# Patient Record
Sex: MALE | Race: BLACK OR AFRICAN AMERICAN | NOT HISPANIC OR LATINO | Employment: OTHER | URBAN - METROPOLITAN AREA
[De-identification: names, ages, dates, MRNs, and addresses within clinical notes are randomized per-mention and may not be internally consistent; named-entity substitution may affect disease eponyms.]

---

## 2017-01-05 ENCOUNTER — OFFICE VISIT (OUTPATIENT)
Dept: SURGERY | Facility: CLINIC | Age: 67
End: 2017-01-05
Payer: MEDICAID

## 2017-01-05 VITALS — BODY MASS INDEX: 24.77 KG/M2 | TEMPERATURE: 98 F | WEIGHT: 198.19 LBS

## 2017-01-05 DIAGNOSIS — Z48.02 VISIT FOR SUTURE REMOVAL: Primary | ICD-10-CM

## 2017-01-05 PROCEDURE — 99999 PR PBB SHADOW E&M-EST. PATIENT-LVL II: CPT | Mod: PBBFAC,,, | Performed by: SURGERY

## 2017-01-05 PROCEDURE — 99212 OFFICE O/P EST SF 10 MIN: CPT | Mod: PBBFAC,PO | Performed by: SURGERY

## 2017-01-05 PROCEDURE — 99024 POSTOP FOLLOW-UP VISIT: CPT | Mod: ,,, | Performed by: SURGERY

## 2017-01-05 NOTE — PATIENT INSTRUCTIONS
You no longer need to put antibiotic ointment on your scalp.    A little pustules in your beard from ingrown hairs these are usually self-limiting or if you can find hairs you can remove

## 2017-01-05 NOTE — MR AVS SNAPSHOT
[facility]  [address] [place] [address]  Phone: [phone]  Fax: [phone]                  Ulysses Boreaux   2017 1:20 PM   Office Visit    Description:  Male : 1950   Provider:  Chito Carolina MD   Department:  [facility]           Reason for Visit     Post-op Evaluation                To Do List           Future Appointments        Provider Department Dept Phone    2017 1:20 PM Chito Carolina MD [facility] [phone]    2017 2:00 PM MD JIMMY [facility] - Hematology Oncology [phone]    2017 9:00 AM ESTEPHANIA Bowles - Interventional Pain [phone]    4/3/2017 9:00 AM MD JIMMY [facility] - Internal Medicine [phone]    2017 9:00 AM MINGO [facility] - Ophthalmology [phone]      Goals (5 Years of Data)     None      [facility]     [facility] On Call Nurse Care Line -  Assistance  Registered nurses in the [facility] Center provide clinical advisement, health education, appointment booking, and other advisory services.  Call for this free service at [phone].             Medications           Message regarding Medications     Verify the changes and/or additions to your medication regime listed below are the same as discussed with your clinician today.  If any of these changes or additions are incorrect, please notify your healthcare provider.             Verify that the below list of medications is an accurate representation of the medications you are currently taking.  If none reported, the list may be blank. If incorrect, please contact your healthcare provider. Carry this list with you in case of emergency.           Current Medications     amlodipine (NORVASC) 10 MG tablet Take 1 tablet (10 mg total) by mouth once daily.    atorvastatin (LIPITOR) 20 MG tablet Take 1 tablet (20 mg total) by mouth nightly.    blood glucose control high,low Soln  1 each by Misc.(Non-Drug; Combo Route) route as needed. Accu-chek Sarina Plus control solution    blood sugar diagnostic Strp 1 each by Misc.(Non-Drug; Combo Route) route 2 (two) times daily.    blood-glucose meter (FREESTYLE SYSTEM KIT) kit Use as instructed    clonazePAM (KLONOPIN) 1 MG tablet Take 1 tablet (1 mg total) by mouth every evening.    fluticasone (FLONASE) 50 mcg/actuation nasal spray INSTILL 2 SPRAYS IN EACH NOSTRIL ONCE DAILY    hydrocodone-acetaminophen 10-325mg (NORCO)  mg Tab HYDROCODONE-ACETAMINOPHEN (Norco)  MG ORAL TAB - 1 tab po bid prn pain    hydrOXYzine HCl (ATARAX) 25 MG tablet Take 1 tablet (25 mg total) by mouth 4 (four) times daily as needed for Itching.    insulin NPH-insulin regular, 70/30, (HUMULIN 70/30) 100 unit/mL (70-30) injection INJECT 25 UNITS UNDER THE SKIN EVERY MORNING, THEN 23 UNITS EVERY EVENING    insulin syringe-needle U-100 1 mL 31 gauge x 5/16 Syrg USE AS DIRECTED WITH INSULIN    lancets Misc 1 each by Misc.(Non-Drug; Combo Route) route 2 (two) times daily.    losartan-hydrochlorothiazide 100-25 mg (HYZAAR) 100-25 mg per tablet Take 1 tablet by mouth once daily.    nebivolol (BYSTOLIC) 10 MG Tab Take 1 tablet (10 mg total) by mouth once daily.    hydrocortisone 1 % cream Apply to affected area on neck and chest 2 times daily           Clinical Reference Information           Vital Signs - Last Recorded  Most recent update: 1/5/2017  9:45 AM by Filippo Figueroa MA    Temp Wt BMI          97.6 °F (36.4 °C) 89.9 kg (198 lb 3.1 oz) 24.77 kg/m2        Allergies as of 1/5/2017     No Known Allergies      Immunizations Administered on Date of Encounter - 1/5/2017     None      [facility] Sign-Up     Activating your [facility] account is as easy as 1-2-3!     1) Visit [email or web address], select Sign Up Now, enter this activation code and your date of birth, then select Next.  [email or web address]  Expires: 1/23/2017 10:22 AM      2) Create a username and password to use  when you visit [facility] in the future and select a security question in case you lose your password and select Next.    3) Enter your e-mail address and click Sign Up!    Additional Information  If you have questions, please e-mail [email or web address] or call [phone] to talk to our [facility] staff. Remember, [facility] is NOT to be used for urgent needs. For medical emergencies, dial 911.         Instructions    You no longer need to put antibiotic ointment on your scalp.    A little pustules in your beard from ingrown hairs these are usually self-limiting or if you can find hairs you can remove

## 2017-01-05 NOTE — PROGRESS NOTES
Subjective:       Patient ID: Ulysses Boreaux is a 66 y.o. male.    Suture removal    Chief Complaint: Post-op Evaluation    HPI Comments: Patient has done well.  Incision is healing    Review of Systems   Skin:        Small pustules in his beard       Objective:      Physical Exam   Skin:   The anterior scalp incision is healing well.  Sutures removed.  There are a few pustules in his beard on the right       Assessment:        the incision from the pylorus cyst excision is healed.  Pustules in the beard from ingrown hair which should be self-limited      Plan: follow-up with surgery as needed

## 2017-01-09 ENCOUNTER — OFFICE VISIT (OUTPATIENT)
Dept: HEMATOLOGY/ONCOLOGY | Facility: CLINIC | Age: 67
End: 2017-01-09
Payer: MEDICAID

## 2017-01-09 ENCOUNTER — TELEPHONE (OUTPATIENT)
Dept: GASTROENTEROLOGY | Facility: CLINIC | Age: 67
End: 2017-01-09

## 2017-01-09 VITALS
DIASTOLIC BLOOD PRESSURE: 72 MMHG | TEMPERATURE: 98 F | WEIGHT: 199.31 LBS | HEIGHT: 75 IN | SYSTOLIC BLOOD PRESSURE: 118 MMHG | BODY MASS INDEX: 24.78 KG/M2 | OXYGEN SATURATION: 97 % | RESPIRATION RATE: 20 BRPM | HEART RATE: 77 BPM

## 2017-01-09 DIAGNOSIS — D72.820 LYMPHOCYTOSIS: ICD-10-CM

## 2017-01-09 DIAGNOSIS — B18.2 CHRONIC HEPATITIS C WITHOUT HEPATIC COMA: Primary | ICD-10-CM

## 2017-01-09 PROCEDURE — 99214 OFFICE O/P EST MOD 30 MIN: CPT | Mod: S$PBB,,, | Performed by: INTERNAL MEDICINE

## 2017-01-09 PROCEDURE — 99213 OFFICE O/P EST LOW 20 MIN: CPT | Mod: PBBFAC | Performed by: INTERNAL MEDICINE

## 2017-01-09 PROCEDURE — 99999 PR PBB SHADOW E&M-EST. PATIENT-LVL III: CPT | Mod: PBBFAC,,, | Performed by: INTERNAL MEDICINE

## 2017-01-09 NOTE — PROGRESS NOTES
Subjective:       Patient ID: Ulysses Boreaux is a 66 y.o. male.    Chief Complaint: No chief complaint on file.    HPI This 66-year-old -American gentleman had a   CBC done on 02/29/2016 that was reported as showing a hemoglobin of 14.9 with   4.58 million red cells. White cell count was 4390 (66% lymphocytes and 13.9%   granulocytes. Monocytes were 13.4%).      The patient's electronic chart shows that he has had relative lymphocytosis in   his differential at least since 2013 when the lymphocytes were about 50%.      The patient has a history of chronic active hepatitis C. He was referred to GI.  The process was started to get him on medication, and he was supposed to start HARVONI but he has not  ALLERGIES: None.      MEDICATIONS: See MedCard.      PREVIOUS SURGERIES: Amputation of fingers of the left hand after saw accident.   Appendectomy. Abdominal wall hernia repair x2.      SOCIAL HISTORY: He is single. He has two children. He lives in [place].   Smokes an occasional cigar. He used to smoke cigarettes, a pack a day or so for  10 years. He stopped in the 1970s. Denies drinking. He was a ,   currently disabled due to the amputation of the fingers of the left hand.      FAMILY HISTORY: Mother had throat cancer as well as a brother. A brother and   sister have diabetes. No heart attacks.      PAST MEDICAL HISTORY:  1. 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  2. Chronic active hepatitis C.  3. High blood pressure.  4. Diabetes.  5. Relative lymphocytosis without leukocytosis.  6. Abdominal wall hernia.      Review of Systems   Constitutional: Negative.  Negative for fatigue.   Eyes: Negative.    Cardiovascular: Negative.  Negative for chest pain.   Gastrointestinal: Negative for abdominal pain and nausea.   Genitourinary: Negative.  Negative for hematuria.   Musculoskeletal: Negative.    Skin: Negative.    Neurological: Negative.    Psychiatric/Behavioral: Negative.         Objective:      Physical Exam   Constitutional: He is oriented to person, place, and time. He appears well-developed and well-nourished.   HENT:   Head: Normocephalic.   Mouth/Throat: No oropharyngeal exudate.   Eyes: Pupils are equal, round, and reactive to light.   Neck: No thyromegaly present.   Cardiovascular: Normal rate, regular rhythm and normal heart sounds.  Exam reveals no gallop.    No murmur heard.  Pulmonary/Chest: No respiratory distress. He has no wheezes. He has no rales.   Abdominal: Soft. Bowel sounds are normal. He exhibits no distension and no mass. There is no rebound and no guarding.   Musculoskeletal: Normal range of motion. He exhibits no edema.   Lymphadenopathy:     He has no cervical adenopathy.   Neurological: He is alert and oriented to person, place, and time.   Skin: Skin is warm and dry.   Psychiatric: He has a normal mood and affect. His behavior is normal.       Wt Readings from Last 3 Encounters:   01/09/17 90.4 kg (199 lb 4.7 oz)   01/05/17 89.9 kg (198 lb 3.1 oz)   12/22/16 89 kg (196 lb 3.4 oz)     Temp Readings from Last 3 Encounters:   01/09/17 97.5 °F (36.4 °C) (Oral)   01/05/17 97.6 °F (36.4 °C)   12/22/16 97.9 °F (36.6 °C)     BP Readings from Last 3 Encounters:   01/09/17 118/72   12/22/16 (!) 155/90   12/12/16 118/72     Pulse Readings from Last 3 Encounters:   01/09/17 77   12/22/16 100   12/12/16 77       Assessment:       1. Chronic hepatitis C without hepatic coma    2. Lymphocytosis       CBC and cmp from today pending.  Needs to see GI for treatment of his hep C    Plan:       See em in 6 months with a cbc and a cmp

## 2017-01-09 NOTE — TELEPHONE ENCOUNTER
Dr. Smith saw the patient and wanted him to follow up with us to get his HCV treated; however, we have tried twice and his insurance won't cover treatment. If he wants, we can do a liver biopsy and see if that helps. No guarantee, but we cold try.

## 2017-01-10 NOTE — TELEPHONE ENCOUNTER
Spoke with patient and he states he does not want to do Liver Biopsy  And his insurance has not changed.

## 2017-01-11 ENCOUNTER — LAB VISIT (OUTPATIENT)
Dept: LAB | Facility: HOSPITAL | Age: 67
End: 2017-01-11
Attending: INTERNAL MEDICINE
Payer: MEDICAID

## 2017-01-11 ENCOUNTER — OFFICE VISIT (OUTPATIENT)
Dept: GASTROENTEROLOGY | Facility: CLINIC | Age: 67
End: 2017-01-11
Payer: MEDICAID

## 2017-01-11 VITALS
HEIGHT: 75 IN | DIASTOLIC BLOOD PRESSURE: 74 MMHG | HEART RATE: 79 BPM | BODY MASS INDEX: 24.94 KG/M2 | WEIGHT: 200.63 LBS | SYSTOLIC BLOOD PRESSURE: 120 MMHG

## 2017-01-11 DIAGNOSIS — B18.2 CHRONIC HEPATITIS C WITHOUT HEPATIC COMA: Primary | ICD-10-CM

## 2017-01-11 DIAGNOSIS — Z86.010 HISTORY OF COLON POLYPS: ICD-10-CM

## 2017-01-11 DIAGNOSIS — D72.820 LYMPHOCYTOSIS: ICD-10-CM

## 2017-01-11 DIAGNOSIS — B18.2 CHRONIC HEPATITIS C WITHOUT HEPATIC COMA: ICD-10-CM

## 2017-01-11 LAB
ALBUMIN SERPL BCP-MCNC: 3.7 G/DL
ALP SERPL-CCNC: 76 U/L
ALT SERPL W/O P-5'-P-CCNC: 74 U/L
ANION GAP SERPL CALC-SCNC: 8 MMOL/L
AST SERPL-CCNC: 68 U/L
BASOPHILS # BLD AUTO: 0.03 K/UL
BASOPHILS NFR BLD: 0.7 %
BILIRUB SERPL-MCNC: 0.7 MG/DL
BUN SERPL-MCNC: 14 MG/DL
CALCIUM SERPL-MCNC: 9.5 MG/DL
CHLORIDE SERPL-SCNC: 103 MMOL/L
CO2 SERPL-SCNC: 26 MMOL/L
CREAT SERPL-MCNC: 1.2 MG/DL
DIFFERENTIAL METHOD: ABNORMAL
EOSINOPHIL # BLD AUTO: 0.2 K/UL
EOSINOPHIL NFR BLD: 4.3 %
ERYTHROCYTE [DISTWIDTH] IN BLOOD BY AUTOMATED COUNT: 12.4 %
EST. GFR  (AFRICAN AMERICAN): >60 ML/MIN/1.73 M^2
EST. GFR  (NON AFRICAN AMERICAN): >60 ML/MIN/1.73 M^2
GLUCOSE SERPL-MCNC: 159 MG/DL
HCT VFR BLD AUTO: 41.4 %
HGB BLD-MCNC: 14.2 G/DL
LYMPHOCYTES # BLD AUTO: 2.5 K/UL
LYMPHOCYTES NFR BLD: 57.9 %
MCH RBC QN AUTO: 32.6 PG
MCHC RBC AUTO-ENTMCNC: 34.3 %
MCV RBC AUTO: 95 FL
MONOCYTES # BLD AUTO: 0.6 K/UL
MONOCYTES NFR BLD: 15.1 %
NEUTROPHILS # BLD AUTO: 0.9 K/UL
NEUTROPHILS NFR BLD: 22 %
PLATELET # BLD AUTO: 163 K/UL
PMV BLD AUTO: 10 FL
POTASSIUM SERPL-SCNC: 3.6 MMOL/L
PROT SERPL-MCNC: 7.5 G/DL
RBC # BLD AUTO: 4.35 M/UL
SODIUM SERPL-SCNC: 137 MMOL/L
WBC # BLD AUTO: 4.23 K/UL

## 2017-01-11 PROCEDURE — 84165 PROTEIN E-PHORESIS SERUM: CPT | Mod: 26,,, | Performed by: PATHOLOGY

## 2017-01-11 PROCEDURE — 99213 OFFICE O/P EST LOW 20 MIN: CPT | Mod: S$PBB,,, | Performed by: PHYSICIAN ASSISTANT

## 2017-01-11 PROCEDURE — 99214 OFFICE O/P EST MOD 30 MIN: CPT | Mod: PBBFAC | Performed by: PHYSICIAN ASSISTANT

## 2017-01-11 PROCEDURE — 99999 PR PBB SHADOW E&M-EST. PATIENT-LVL IV: CPT | Mod: PBBFAC,,, | Performed by: PHYSICIAN ASSISTANT

## 2017-01-11 NOTE — PROGRESS NOTES
Subjective:       Patient ID: Ulysses Boreaux is a 66 y.o. male.    Chief Complaint: Hepatitis C    HPI   The patient has a history of Hepatitis C and is here for a follow up visit. The patient was diagnosed about seven years ago. His risk factors include homemade tattoos he got as a teenager and using cocaine in the late 70s-early 80s. Genotype is 1b. Last viral load is around 1.3 million. He has never had a liver biopsy. He is treatment naive. 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 A repeat Fibrosure (07/2016) showed FIbrosis stage F3. The patient reports doing well. He denies liver or GI symptoms. He reports drinking alcohol on the weekends. He is due for a colonoscopy in February.     Review of Systems    As per HPI.     Objective:      Physical Exam   Constitutional: He is oriented to person, place, and time. He appears well-developed and well-nourished.   HENT:   Head: Normocephalic and atraumatic.   Cardiovascular: Normal rate and regular rhythm.    Pulmonary/Chest: Effort normal and breath sounds normal. No respiratory distress.   Abdominal: Soft. Bowel sounds are normal. He exhibits no distension. There is no tenderness.   Neurological: He is alert and oriented to person, place, and time. No cranial nerve deficit.   Psychiatric: His behavior is normal.       Assessment:       1. Chronic hepatitis C without hepatic coma    2. History of colon polyps        Plan:       We discussed doing a liver biopsy or a fibroscan. He said he will think about it. He will let me know what he decides so we can arrange it. Cautioned about alcohol use. Otherwise, RTC in a year.   The patient is due for surveillance colonoscopy. He was given the option to schedule now, but he said he would call back when he was ready.     Thank you for the opportunity to participate in the care of this patient.   Jeferson Vega PA-C.

## 2017-01-11 NOTE — PATIENT INSTRUCTIONS
Screening colonoscopy due next month.     Liver Biopsy  During a liver biopsy, your health care provider puts a needle through your skin (percutaneous) and into your liver. He or she removes a small sample of liver tissue and sends it to a lab to be looked at.  Before your liver biopsy, ask your health care provider any questions you have.      Your health care provider will give you an ultrasound or CT scan of your lower chest and upper abdominal area to help find the best site for your biopsy.   Getting ready  · Be sure to have any blood tests that your health care provider orders.  · Stop taking aspirin and other medicines as directed 1 week before the biopsy.  · Follow any directions youre given for not eating or drinking before the biopsy.  · Arrange for someone to drive you home after your biopsy.  · Tell your provider if you are taking any blood-thinning medicines. This includes aspirin.  During the procedure  · You will change into a hospital gown. You will lie on your back or your left side. Part of your body is draped.  · Your health care provider checks your blood pressure, pulse, breathing, and temperature.   · Your provider may give you medicine through an IV (intravenous) line to help you relax. He or she will also put medicine on your skin around the biopsy site to numb it.  · He or she puts a small needle through a tiny cut (incision) in your abdominal wall into the liver.  · He or she will take out a small sample of liver tissue. While this is done, you will be told to hold your breath. The needle is taken out.  · A health care provider places a bandage over the incision site. He or she may ask you to lie for a while on your right side. A pillow or special sandbag may be used to put pressure on the incision site.  · You will be watched for a few hours after your biopsy. You can then go home if you have no pain or signs of bleeding.  After the procedure  Have someone drive you home after your liver  biopsy. You may feel some pain near the biopsy site or in your right shoulder. This is a result of referral pain. Get plenty of rest. Avoid alcohol, heavy lifting, and exercise for a few days. Don't take aspirin. Follow your health care provider's advice.  Getting your results  Getting your biopsy results may take a few days. When the results are ready, your health care provider will discuss them with you.  When to call your doctor  Call your health care provider right away if you have any of these:  · Severe pain near the biopsy site or in your abdomen or chest  · Fainting or feeling lightheaded  · Trouble breathing  · Fever  · Bleeding from the incision site  · Rectal bleeding  · Swollen abdomen   © 6817-2169 [employer]. [address] [place] [address], [place] [address]. All rights reserved. This information is not intended as a substitute for professional medical care. Always follow your healthcare professional's instructions.

## 2017-01-11 NOTE — MR AVS SNAPSHOT
[facility]  [address]  [place] [address]  Phone: [phone]  Fax: [phone]                  Ulysses Boreaux   2017 9:15 AM   Office Visit    Description:  Male : 1950   Provider:  Jeferson Vega PA-C   Department:  [facility]           Reason for Visit     Hepatitis C           Diagnoses this Visit        Comments    Chronic hepatitis C without hepatic coma    -  Primary     History of colon polyps                To Do List           Future Appointments        Provider Department Dept Phone    2017 9:00 AM Ysabel Garcia PA-C [facility] - Interventional Pain [phone]    4/3/2017 9:00 AM Elza Pantoja MD [facility] - Internal Medicine [phone]    2017 9:00 AM MAURA Adams OD [facility] - Ophthalmology [phone]      Goals (5 Years of Data)     None      Follow-Up and Disposition     Return in about 1 year (around 2018).      [facility] On Call     [facility] On Call Nurse Care Line - 24/7 Assistance  Registered nurses in the [facility] On Call Center provide clinical advisement, health education, appointment booking, and other advisory services.  Call for this free service at [phone].             Medications           Message regarding Medications     Verify the changes and/or additions to your medication regime listed below are the same as discussed with your clinician today.  If any of these changes or additions are incorrect, please notify your healthcare provider.             Verify that the below list of medications is an accurate representation of the medications you are currently taking.  If none reported, the list may be blank. If incorrect, please contact your healthcare provider. Carry this list with you in case of emergency.           Current Medications     amlodipine (NORVASC) 10 MG tablet Take 1 tablet (10 mg total) by mouth once daily.    atorvastatin (LIPITOR) 20 MG tablet Take 1 tablet (20 mg  "total) by mouth nightly.    blood glucose control high,low Soln 1 each by Misc.(Non-Drug; Combo Route) route as needed. Accu-chek Sarina Plus control solution    blood sugar diagnostic Strp 1 each by Misc.(Non-Drug; Combo Route) route 2 (two) times daily.    blood-glucose meter (FREESTYLE SYSTEM KIT) kit Use as instructed    clonazePAM (KLONOPIN) 1 MG tablet Take 1 tablet (1 mg total) by mouth every evening.    fluticasone (FLONASE) 50 mcg/actuation nasal spray INSTILL 2 SPRAYS IN EACH NOSTRIL ONCE DAILY    hydrOXYzine HCl (ATARAX) 25 MG tablet Take 1 tablet (25 mg total) by mouth 4 (four) times daily as needed for Itching.    insulin NPH-insulin regular, 70/30, (HUMULIN 70/30) 100 unit/mL (70-30) injection INJECT 25 UNITS UNDER THE SKIN EVERY MORNING, THEN 23 UNITS EVERY EVENING    insulin syringe-needle U-100 1 mL 31 gauge x 5/16 Syrg USE AS DIRECTED WITH INSULIN    lancets Misc 1 each by Misc.(Non-Drug; Combo Route) route 2 (two) times daily.    nebivolol (BYSTOLIC) 10 MG Tab Take 1 tablet (10 mg total) by mouth once daily.    hydrocodone-acetaminophen 10-325mg (NORCO)  mg Tab HYDROCODONE-ACETAMINOPHEN (Norco)  MG ORAL TAB - 1 tab po bid prn pain    hydrocortisone 1 % cream Apply to affected area on neck and chest 2 times daily    losartan-hydrochlorothiazide 100-25 mg (HYZAAR) 100-25 mg per tablet Take 1 tablet by mouth once daily.           Clinical Reference Information           Vital Signs - Last Recorded  Most recent update: 1/11/2017  9:16 AM by Tamica Bah MA    BP Pulse Ht Wt BMI    120/74 79 6' 3" (1.905 m) 91 kg (200 lb 9.9 oz) 25.08 kg/m2      Blood Pressure          Most Recent Value    BP  120/74      Allergies as of 1/11/2017     No Known Allergies      Immunizations Administered on Date of Encounter - 1/11/2017     None      Orders Placed During Today's Visit     Future Labs/Procedures Expected by Expires    Hepatitis C genotype  1/11/2017 3/12/2018    Protime-INR  1/11/2017 " 3/12/2018      [facility] Sign-Up     Activating your [facility] account is as easy as 1-2-3!     1) Visit [email or web address], select Sign Up Now, enter this activation code and your date of birth, then select Next.  [email or web address]  Expires: 1/23/2017 10:22 AM      2) Create a username and password to use when you visit [facility] in the future and select a security question in case you lose your password and select Next.    3) Enter your e-mail address and click Sign Up!    Additional Information  If you have questions, please e-mail [email or web address] or call [phone] to talk to our [facility] staff. Remember, [facility] is NOT to be used for urgent needs. For medical emergencies, dial 911.         Instructions    Screening colonoscopy due next month.     Liver Biopsy  During a liver biopsy, your health care provider puts a needle through your skin (percutaneous) and into your liver. He or she removes a small sample of liver tissue and sends it to a lab to be looked at.  Before your liver biopsy, ask your health care provider any questions you have.      Your health care provider will give you an ultrasound or CT scan of your lower chest and upper abdominal area to help find the best site for your biopsy.   Getting ready  · Be sure to have any blood tests that your health care provider orders.  · Stop taking aspirin and other medicines as directed 1 week before the biopsy.  · Follow any directions youre given for not eating or drinking before the biopsy.  · Arrange for someone to drive you home after your biopsy.  · Tell your provider if you are taking any blood-thinning medicines. This includes aspirin.  During the procedure  · You will change into a hospital gown. You will lie on your back or your left side. Part of your body is draped.  · Your health care provider checks your blood pressure, pulse, breathing, and temperature.   · Your provider may give you medicine through an IV (intravenous) line to help you  relax. He or she will also put medicine on your skin around the biopsy site to numb it.  · He or she puts a small needle through a tiny cut (incision) in your abdominal wall into the liver.  · He or she will take out a small sample of liver tissue. While this is done, you will be told to hold your breath. The needle is taken out.  · A health care provider places a bandage over the incision site. He or she may ask you to lie for a while on your right side. A pillow or special sandbag may be used to put pressure on the incision site.  · You will be watched for a few hours after your biopsy. You can then go home if you have no pain or signs of bleeding.  After the procedure  Have someone drive you home after your liver biopsy. You may feel some pain near the biopsy site or in your right shoulder. This is a result of referral pain. Get plenty of rest. Avoid alcohol, heavy lifting, and exercise for a few days. Don't take aspirin. Follow your health care provider's advice.  Getting your results  Getting your biopsy results may take a few days. When the results are ready, your health care provider will discuss them with you.  When to call your doctor  Call your health care provider right away if you have any of these:  · Severe pain near the biopsy site or in your abdomen or chest  · Fainting or feeling lightheaded  · Trouble breathing  · Fever  · Bleeding from the incision site  · Rectal bleeding  · Swollen abdomen   © 4938-1299 [employer]. [address] [place] [address], [place] [address]. All rights reserved. This information is not intended as a substitute for professional medical care. Always follow your healthcare professional's instructions.

## 2017-01-12 LAB
ALBUMIN SERPL ELPH-MCNC: 3.82 G/DL
ALPHA1 GLOB SERPL ELPH-MCNC: 0.32 G/DL
ALPHA2 GLOB SERPL ELPH-MCNC: 0.87 G/DL
B-GLOBULIN SERPL ELPH-MCNC: 0.78 G/DL
GAMMA GLOB SERPL ELPH-MCNC: 1.41 G/DL
PATHOLOGIST INTERPRETATION SPE: NORMAL
PROT SERPL-MCNC: 7.2 G/DL

## 2017-01-17 DIAGNOSIS — G89.21 CHRONIC PAIN DUE TO TRAUMA: ICD-10-CM

## 2017-01-17 RX ORDER — HYDROCODONE BITARTRATE AND ACETAMINOPHEN 10; 325 MG/1; MG/1
TABLET ORAL
Qty: 60 TABLET | Refills: 0 | Status: SHIPPED | OUTPATIENT
Start: 2017-01-18 | End: 2017-01-17 | Stop reason: SDUPTHER

## 2017-01-17 RX ORDER — HYDROCODONE BITARTRATE AND ACETAMINOPHEN 10; 325 MG/1; MG/1
TABLET ORAL
Qty: 60 TABLET | Refills: 0 | Status: SHIPPED | OUTPATIENT
Start: 2017-02-16 | End: 2017-01-17 | Stop reason: SDUPTHER

## 2017-01-17 RX ORDER — HYDROCODONE BITARTRATE AND ACETAMINOPHEN 10; 325 MG/1; MG/1
TABLET ORAL
Qty: 60 TABLET | Refills: 0 | Status: SHIPPED | OUTPATIENT
Start: 2017-03-17 | End: 2017-04-13 | Stop reason: SDUPTHER

## 2017-01-17 NOTE — TELEPHONE ENCOUNTER
Patient's prescriptions were printed and signed by Dr. Howell prior to the patient's clinic appointment.

## 2017-01-18 ENCOUNTER — LAB VISIT (OUTPATIENT)
Dept: LAB | Facility: HOSPITAL | Age: 67
End: 2017-01-18
Attending: INTERNAL MEDICINE
Payer: MEDICAID

## 2017-01-18 ENCOUNTER — OFFICE VISIT (OUTPATIENT)
Dept: PAIN MEDICINE | Facility: CLINIC | Age: 67
End: 2017-01-18

## 2017-01-18 VITALS
HEART RATE: 80 BPM | DIASTOLIC BLOOD PRESSURE: 77 MMHG | BODY MASS INDEX: 24.87 KG/M2 | HEIGHT: 75 IN | WEIGHT: 200 LBS | SYSTOLIC BLOOD PRESSURE: 126 MMHG

## 2017-01-18 DIAGNOSIS — G89.4 CHRONIC PAIN DISORDER: ICD-10-CM

## 2017-01-18 DIAGNOSIS — B18.2 CHRONIC HEPATITIS C WITHOUT HEPATIC COMA: ICD-10-CM

## 2017-01-18 DIAGNOSIS — S68.119A TRAUMATIC AMPUTATION OF DIGIT OF ONE HAND WITHOUT COMPLICATION: ICD-10-CM

## 2017-01-18 DIAGNOSIS — G89.21 CHRONIC PAIN DUE TO TRAUMA: Primary | ICD-10-CM

## 2017-01-18 LAB
INR PPP: 1
PROTHROMBIN TIME: 10.9 SEC

## 2017-01-18 PROCEDURE — 99213 OFFICE O/P EST LOW 20 MIN: CPT | Mod: PBBFAC | Performed by: PHYSICIAN ASSISTANT

## 2017-01-18 PROCEDURE — 99999 PR PBB SHADOW E&M-EST. PATIENT-LVL III: CPT | Mod: PBBFAC,,, | Performed by: PHYSICIAN ASSISTANT

## 2017-01-18 PROCEDURE — 87902 NFCT AGT GNTYP ALYS HEP C: CPT

## 2017-01-18 PROCEDURE — 87522 HEPATITIS C REVRS TRNSCRPJ: CPT

## 2017-01-18 PROCEDURE — 99214 OFFICE O/P EST MOD 30 MIN: CPT | Mod: S$PBB,,, | Performed by: PHYSICIAN ASSISTANT

## 2017-01-18 NOTE — MR AVS SNAPSHOT
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         Chronic pain disorder                To Do List           Future Appointments        Provider Department Dept Phone    2017 11:15 AM LAB, SAME DAY [facility] [phone]    2017 2:40 PM Ysabel Garcia PA-C [facility] - Interventional Pain [phone]    4/3/2017 9:00 AM Elza Pantoja MD [facility] - Internal Medicine [phone]    2017 10:00 AM Ysabel Garcia PA-C [facility] - Interventional Pain [phone]    2017 9:00 AM MAURA Adams OD [facility] - Ophthalmology [phone]      Goals (5 Years of Data)     None      Follow-Up and Disposition     Return in about 12 weeks (around 2017) for Medication refill.      [facility] Line - 24/7 Assistance  Registered nurses in the [facility] provide clinical advisement, health education, appointment booking, and other advisory services.  Call for this free service at [phone].             Medications           Message regarding Medications     Verify the changes and/or additions to your medication regime listed below are the same as discussed with your clinician today.  If any of these changes or additions are incorrect, please notify your healthcare provider.             Verify that the below list of medications is an accurate representation of the medications you are currently taking.  If none reported, the list may be blank. If incorrect,  "please contact your healthcare provider. Carry this list with you in case of emergency.           Current Medications     amlodipine (NORVASC) 10 MG tablet Take 1 tablet (10 mg total) by mouth once daily.    atorvastatin (LIPITOR) 20 MG tablet Take 1 tablet (20 mg total) by mouth nightly.    blood glucose control high,low Soln 1 each by Misc.(Non-Drug; Combo Route) route as needed. Accu-chek Sarina Plus control solution    blood sugar diagnostic Strp 1 each by Misc.(Non-Drug; Combo Route) route 2 (two) times daily.    blood-glucose meter (FREESTYLE SYSTEM KIT) kit Use as instructed    clonazePAM (KLONOPIN) 1 MG tablet Take 1 tablet (1 mg total) by mouth every evening.    fluticasone (FLONASE) 50 mcg/actuation nasal spray INSTILL 2 SPRAYS IN EACH NOSTRIL ONCE DAILY    hydrocodone-acetaminophen 10-325mg (NORCO)  mg Tab Starting on Mar 17, 2017. HYDROCODONE-ACETAMINOPHEN (Norco)  MG ORAL TAB - 1 tab po bid prn pain    hydrocortisone 1 % cream Apply to affected area on neck and chest 2 times daily    hydrOXYzine HCl (ATARAX) 25 MG tablet Take 1 tablet (25 mg total) by mouth 4 (four) times daily as needed for Itching.    insulin NPH-insulin regular, 70/30, (HUMULIN 70/30) 100 unit/mL (70-30) injection INJECT 25 UNITS UNDER THE SKIN EVERY MORNING, THEN 23 UNITS EVERY EVENING    insulin syringe-needle U-100 1 mL 31 gauge x 5/16 Syrg USE AS DIRECTED WITH INSULIN    lancets Misc 1 each by Misc.(Non-Drug; Combo Route) route 2 (two) times daily.    losartan-hydrochlorothiazide 100-25 mg (HYZAAR) 100-25 mg per tablet Take 1 tablet by mouth once daily.    nebivolol (BYSTOLIC) 10 MG Tab Take 1 tablet (10 mg total) by mouth once daily.           Clinical Reference Information           Vital Signs - Last Recorded  Most recent update: 1/18/2017 10:28 AM by Enid Escobar MA    BP Pulse Ht Wt BMI    126/77 80 6' 3" (1.905 m) 90.7 kg (200 lb) 25 kg/m2      Blood Pressure          Most Recent Value    BP  126/77      Allergies " as of 1/18/2017     No Known Allergies      Immunizations Administered on Date of Encounter - 1/18/2017     None      [facility] Sign-Up     Activating your [facility] account is as easy as 1-2-3!     1) Visit [email or web address], select Sign Up Now, enter this activation code and your date of birth, then select Next.  [email or web address]  Expires: 1/23/2017 10:22 AM      2) Create a username and password to use when you visit [facility] in the future and select a security question in case you lose your password and select Next.    3) Enter your e-mail address and click Sign Up!    Additional Information  If you have questions, please e-mail [email or web address] or call [phone] to talk to our [facility] staff. Remember, [facility] is NOT to be used for urgent needs. For medical emergencies, dial 911.

## 2017-01-18 NOTE — PROGRESS NOTES
"Subjective:        Chief Complaint:  Left Hand Pain, Lt side     History of Present Illness:  This patient is a 65 year old male who presents today for f/u complaining of the above noted pains. The patient describes this pain as follows.    - duration (acute, chronic): Chronic, left hand pain since 1997 status post table saw amputation of digits 2 through 5 at work, left lower quadrant pain since hernia surgery in 2004  - timing (constant, intermittent): Constant  - character (sharp, dull, aching, burning): Throbbing  - radiating: No  - dermatomal distribution: No  - side: Left   - aggravating factors: Nothing worsens left digit pain, left lower quadrant pain worse with exercise or walking  - relieving factors: Medication / Norco  - antecedent trauma: Amputation via skill saw and 1997, hernia repair in 2004  - prior spinal surgery: None  - pertinent negatives: No fever, No chills, No weight loss, No bladder dysfunction, No bowel dysfunction, No extremity weakness, No saddle anesthesia  - medications tried: Norco, Percocet, over-the-counter medications, compound pain cream  - other therapies tried (physical therapy, injections):     >> physical therapy tried in the past    >> no prior injection        ROS:  CONSTITUTIONAL: No fever, chills, weight loss  SKIN: No rash or itching  CARDIOVASCULAR:  No chest pain, palpitations  RESPIRATORY: No shortness of breath  GASTROINTESTINAL: No diarrhea, No constipation, abdominal pain, left lower quadrant  GENITOURINARY: No urinary incontinence    MUSCULOSKELETAL:  - patient reports pain as above, see chief complaint     NEUROLOGICAL:   - Pain as above  - Strength in lower extremities is normal  - Sensation in lower extremities is normal  - No bowel or bladder incontinence     PSYCHIATRIC: No change in mood noticed       Objective:     Visit Vitals    /77    Pulse 80    Ht 6' 3" (1.905 m)    Wt 90.7 kg (200 lb)    BMI 25 kg/m2   (reviewed on 1/18/2017)    General: " alert and oriented, in no apparent distress  Gait: normal gait  Skin: No rashes, No discoloration   HEENT: EOMI  Respiratory: respirations nonlabored    Musculoskeletal:  - Cervical range of motion intact, left upper extremity range of motion intact throughout  - Digital stumps are hypersensitive to palpation, hypersensitivity does not extend further proximal, no color change, no swelling, no changes in hair growth along left hand     Neuro:  - Lower extremities:      >> 5/5 strength bilaterally, throughout, symmetric  - Reflexes: Upper extremity DTRs 2+ throughout  - Sensory: sensation to light touch intact bilaterally, hypersensitivity as described above    Psych: mood and affect appropriate        Assessment:   - Chronic pain due to trauma  - Left finger amputation    Plan:   This patient presents today for follow-up visit.  He has chronic left digital pain along stumps at site of previous amputation.    - He continues to take the Norco 10/325 twice daily, and this helps his pain, which we will refill today x 2 months.  He tolerates this medication well, denies any drowsiness or constipation.    - [place]  appropriate (filled on 12-20-16).  - UDS from last visit was appropriate. Previous urine drug screens have been appropriate. Will order repeat test next visit  RTC in 3 months for med refill. I discussed the risks, benefits, and alternatives to potential treatment options. All questions and concerns were fully addressed today in clinic. Dr. Howell was consulted regarding the patient plan and agrees.            >> UDS:  8-18-15 :: appropriate  12-29-15 :: appropriate  5-3-16 :: appropriate  10-18-16 :: appropriate    >> PDI:  10/18/2016 :: 50  1/18/2017 :: 35    >>Opioid Risk Tool:  1/18/2017 :: 0

## 2017-01-23 LAB
HCV GENTYP SERPL NAA+PROBE: ABNORMAL
HCV QUALITATIVE RESULT: DETECTED
HCV QUANTITATIVE LOG: 6.45 LOG (10) IU/ML
HCV RNA SPEC NAA+PROBE-ACNC: ABNORMAL IU/ML

## 2017-01-25 DIAGNOSIS — B18.2 CHRONIC HEPATITIS C WITHOUT HEPATIC COMA: Primary | ICD-10-CM

## 2017-02-24 ENCOUNTER — TELEPHONE (OUTPATIENT)
Dept: RADIOLOGY | Facility: HOSPITAL | Age: 67
End: 2017-02-24

## 2017-02-27 ENCOUNTER — HOSPITAL ENCOUNTER (OUTPATIENT)
Dept: RADIOLOGY | Facility: HOSPITAL | Age: 67
Discharge: HOME | End: 2017-02-27
Attending: INTERNAL MEDICINE
Payer: MEDICAID

## 2017-02-27 ENCOUNTER — TELEPHONE (OUTPATIENT)
Dept: RADIOLOGY | Facility: HOSPITAL | Age: 67
End: 2017-02-27

## 2017-03-10 ENCOUNTER — TELEPHONE (OUTPATIENT)
Dept: RADIOLOGY | Facility: HOSPITAL | Age: 67
End: 2017-03-10

## 2017-03-13 ENCOUNTER — HOSPITAL ENCOUNTER (OUTPATIENT)
Dept: RADIOLOGY | Facility: HOSPITAL | Age: 67
Discharge: HOME | End: 2017-03-13
Attending: INTERNAL MEDICINE
Payer: MEDICAID

## 2017-03-13 VITALS
OXYGEN SATURATION: 98 % | BODY MASS INDEX: 25.49 KG/M2 | SYSTOLIC BLOOD PRESSURE: 117 MMHG | HEIGHT: 75 IN | TEMPERATURE: 98 F | WEIGHT: 205 LBS | HEART RATE: 62 BPM | DIASTOLIC BLOOD PRESSURE: 72 MMHG

## 2017-03-13 DIAGNOSIS — B18.2 CHRONIC HEPATITIS C WITHOUT HEPATIC COMA: ICD-10-CM

## 2017-03-13 LAB
APTT BLDCRRT: 26.8 SEC
BASOPHILS # BLD AUTO: 0.02 K/UL
BASOPHILS NFR BLD: 0.5 %
DIFFERENTIAL METHOD: ABNORMAL
EOSINOPHIL # BLD AUTO: 0.1 K/UL
EOSINOPHIL NFR BLD: 3.2 %
ERYTHROCYTE [DISTWIDTH] IN BLOOD BY AUTOMATED COUNT: 12.2 %
HCT VFR BLD AUTO: 44 %
HGB BLD-MCNC: 15.6 G/DL
INR PPP: 1
LYMPHOCYTES # BLD AUTO: 2.6 K/UL
LYMPHOCYTES NFR BLD: 59.9 %
MCH RBC QN AUTO: 33.2 PG
MCHC RBC AUTO-ENTMCNC: 35.5 %
MCV RBC AUTO: 94 FL
MONOCYTES # BLD AUTO: 0.4 K/UL
MONOCYTES NFR BLD: 9 %
NEUTROPHILS # BLD AUTO: 1.2 K/UL
NEUTROPHILS NFR BLD: 27.4 %
PLATELET # BLD AUTO: 189 K/UL
PMV BLD AUTO: 10.3 FL
PROTHROMBIN TIME: 10.7 SEC
RBC # BLD AUTO: 4.7 M/UL
WBC # BLD AUTO: 4.31 K/UL

## 2017-03-13 PROCEDURE — 85025 COMPLETE CBC W/AUTO DIFF WBC: CPT

## 2017-03-13 PROCEDURE — 85730 THROMBOPLASTIN TIME PARTIAL: CPT

## 2017-03-13 PROCEDURE — 85610 PROTHROMBIN TIME: CPT

## 2017-03-13 NOTE — H&P
"[facility] -   History & Physical    Subjective:      Chief Complaint/Reason for Admission: Hep C Ulysses Boreaux is a 66 y.o. male.    Past Medical History:   Diagnosis Date    Amputation of finger of left hand     all fingers except for thumb    Cataract     OS NGCL     Chronic pain due to trauma     traumatic amputations left hand    Diabetes mellitus, type 2     Hepatitis C, chronic 2/13/2014    Hyperlipidemia     Hypertension     Refractive error      Past Surgical History:   Procedure Laterality Date    APPENDECTOMY      2003    CATARACT EXTRACTION      OD     HERNIA REPAIR       Family History   Problem Relation Age of Onset    Cancer Mother     Hypertension Mother     Hypertension Father     Diabetes Sister     Hypertension Sister     Heart disease Sister     Diabetes Brother     Hypertension Brother     Cancer Brother      Social History   Substance Use Topics    Smoking status: Former Smoker     Packs/day: 0.50     Types: Cigarettes     Quit date: 2/24/1972    Smokeless tobacco: Not on file    Alcohol use 0.0 oz/week     0 Standard drinks or equivalent per week      Comment: " Every blue moon"         (Not in a hospital admission)  Review of patient's allergies indicates:  No Known Allergies     Review of Systems   Constitutional: Negative.    Eyes: Negative.    Cardiovascular: Negative.    Gastrointestinal: Negative.    Musculoskeletal: Negative.        Objective:      Vital Signs (Most Recent)       Vital Signs Range (Last 24H):       Physical Exam   Constitutional: He appears well-developed and well-nourished.   Pulmonary/Chest: Effort normal and breath sounds normal.   Abdominal: Soft. Bowel sounds are normal.       Data Review:    CBC:   Lab Results   Component Value Date    WBC 4.23 01/11/2017    RBC 4.35 (L) 01/11/2017    HGB 14.2 01/11/2017    HCT 41.4 01/11/2017     01/11/2017      ECG: no prior ECG.     Assessment:      There are no hospital problems " to display for this patient.      Plan:    CT guided liver biopsy

## 2017-03-13 NOTE — DISCHARGE INSTRUCTIONS
Discharge Instructions for Liver Biopsy  You had a procedure called liver biopsy. A healthcare provider used a special needle to remove a small piece of tissue from your liver. Then it was examined for signs of damage or disease. A liver biopsy is ordered after other tests have shown that your liver is not working properly. You may also have a liver biopsy when liver disease is suspected, to determine whether there is too much iron in the liver, or to rule out cancer.  Home care  Recommendations include the following:   · Because you had anesthesia, you should not drive until the day after your biopsy.   · Remove the bandage covering the biopsy site 48 hours after the procedure.  · Rest for 6 hours and take it easy when you arrive home.  · Dont shower for 24 hours after the biopsy. If you wish, you may wash yourself with a sponge or washcloth. When you are able to shower, dont scrub the site. Gently wash the area and pat it dry.  · Dont lift anything heavier than 10 pounds for up to 1 week after the procedure, or as advised by your healthcare provider.  · Don't do strenuous activities or exercises for up to 1 week after the procedure.  · Ask your healthcare provider when you can return to work.  · Do not start taking blood thinners without clear instructions from your healthcare provider.  Follow-up care  Make a follow-up appointment as directed by our staff.     When to call your healthcare provider  Call your healthcare provider immediately if you have any of the following:  · Bleeding from the biopsy site  · Dizziness or lightheadedness  · Sudden or increased shortness of breath  · Sudden chest pain  · Fever of 100.4°F (38.0°C) or higher, or as directed by your healthcare provider  · Shaking chills  · Yellow eyes or skin  · Increasing redness, tenderness, or swelling at the biopsy site  · Drainage from the biopsy site  · Opening of the biopsy site  · Vomiting blood  · Rectal bleeding or bloody  stools  · Increasing pain, with or without activity, in the liver or belly area, or pain shooting to the right shoulder   Date Last Reviewed: 7/1/2016  © 6714-7175 [employer]. [address] [place] [address], [place] [address]. All rights reserved. This information is not intended as a substitute for professional medical care. Always follow your healthcare professional's instructions.

## 2017-03-13 NOTE — PLAN OF CARE
Problem: Patient Care Overview  Goal: Individualization & Mutuality  Outcome: Ongoing (interventions implemented as appropriate)  Liver biopsy.  Pt admitted to extended recovery post procedure.

## 2017-03-20 ENCOUNTER — HOSPITAL ENCOUNTER (OUTPATIENT)
Dept: RADIOLOGY | Facility: HOSPITAL | Age: 67
Discharge: HOME | End: 2017-03-20
Attending: INTERNAL MEDICINE

## 2017-03-23 ENCOUNTER — TELEPHONE (OUTPATIENT)
Dept: RADIOLOGY | Facility: HOSPITAL | Age: 67
End: 2017-03-23

## 2017-03-24 ENCOUNTER — HOSPITAL ENCOUNTER (OUTPATIENT)
Dept: RADIOLOGY | Facility: HOSPITAL | Age: 67
Discharge: HOME | End: 2017-03-24
Attending: INTERNAL MEDICINE
Payer: MEDICAID

## 2017-03-24 ENCOUNTER — HOSPITAL ENCOUNTER (OUTPATIENT)
Facility: HOSPITAL | Age: 67
Discharge: LEFT BEFORE DISCHARGE | End: 2017-03-25
Attending: INTERNAL MEDICINE | Admitting: INTERNAL MEDICINE
Payer: MEDICAID

## 2017-03-24 VITALS
OXYGEN SATURATION: 100 % | SYSTOLIC BLOOD PRESSURE: 110 MMHG | RESPIRATION RATE: 16 BRPM | TEMPERATURE: 97 F | DIASTOLIC BLOOD PRESSURE: 78 MMHG | HEART RATE: 60 BPM

## 2017-03-24 DIAGNOSIS — B18.2 CHRONIC HEPATITIS C WITHOUT HEPATIC COMA: ICD-10-CM

## 2017-03-24 DIAGNOSIS — Z98.890 HISTORY OF LIVER BIOPSY: ICD-10-CM

## 2017-03-24 LAB
POCT GLUCOSE: 275 MG/DL (ref 70–110)
POCT GLUCOSE: 308 MG/DL (ref 70–110)

## 2017-03-24 PROCEDURE — 88313 SPECIAL STAINS GROUP 2: CPT | Mod: 26,,, | Performed by: PATHOLOGY

## 2017-03-24 PROCEDURE — 88307 TISSUE EXAM BY PATHOLOGIST: CPT | Performed by: PATHOLOGY

## 2017-03-24 PROCEDURE — 36415 COLL VENOUS BLD VENIPUNCTURE: CPT

## 2017-03-24 PROCEDURE — 63600175 PHARM REV CODE 636 W HCPCS: Performed by: NURSE PRACTITIONER

## 2017-03-24 PROCEDURE — 63600175 PHARM REV CODE 636 W HCPCS: Performed by: RADIOLOGY

## 2017-03-24 PROCEDURE — G0379 DIRECT REFER HOSPITAL OBSERV: HCPCS

## 2017-03-24 PROCEDURE — 25000003 PHARM REV CODE 250: Performed by: RADIOLOGY

## 2017-03-24 PROCEDURE — 25000003 PHARM REV CODE 250: Performed by: NURSE PRACTITIONER

## 2017-03-24 PROCEDURE — 83036 HEMOGLOBIN GLYCOSYLATED A1C: CPT

## 2017-03-24 PROCEDURE — 47000 NEEDLE BIOPSY OF LIVER PERQ: CPT

## 2017-03-24 PROCEDURE — G0378 HOSPITAL OBSERVATION PER HR: HCPCS

## 2017-03-24 PROCEDURE — 88307 TISSUE EXAM BY PATHOLOGIST: CPT | Mod: 26,,, | Performed by: PATHOLOGY

## 2017-03-24 RX ORDER — NEBIVOLOL 10 MG/1
10 TABLET ORAL DAILY
Status: DISCONTINUED | OUTPATIENT
Start: 2017-03-25 | End: 2017-03-25 | Stop reason: HOSPADM

## 2017-03-24 RX ORDER — INSULIN ASPART 100 [IU]/ML
0-5 INJECTION, SOLUTION INTRAVENOUS; SUBCUTANEOUS
Status: DISCONTINUED | OUTPATIENT
Start: 2017-03-24 | End: 2017-03-25 | Stop reason: HOSPADM

## 2017-03-24 RX ORDER — SODIUM CHLORIDE 9 MG/ML
INJECTION, SOLUTION INTRAVENOUS CONTINUOUS
Status: DISCONTINUED | OUTPATIENT
Start: 2017-03-24 | End: 2017-03-25 | Stop reason: HOSPADM

## 2017-03-24 RX ORDER — HYDROCODONE BITARTRATE AND ACETAMINOPHEN 10; 325 MG/1; MG/1
1 TABLET ORAL ONCE
Status: COMPLETED | OUTPATIENT
Start: 2017-03-24 | End: 2017-03-24

## 2017-03-24 RX ORDER — ATORVASTATIN CALCIUM 10 MG/1
20 TABLET, FILM COATED ORAL NIGHTLY
Status: DISCONTINUED | OUTPATIENT
Start: 2017-03-24 | End: 2017-03-25 | Stop reason: HOSPADM

## 2017-03-24 RX ORDER — IBUPROFEN 200 MG
24 TABLET ORAL
Status: DISCONTINUED | OUTPATIENT
Start: 2017-03-24 | End: 2017-03-25 | Stop reason: HOSPADM

## 2017-03-24 RX ORDER — BUTALBITAL, ACETAMINOPHEN AND CAFFEINE 50; 325; 40 MG/1; MG/1; MG/1
1 TABLET ORAL EVERY 6 HOURS PRN
Status: DISCONTINUED | OUTPATIENT
Start: 2017-03-24 | End: 2017-03-25 | Stop reason: HOSPADM

## 2017-03-24 RX ORDER — AMLODIPINE BESYLATE 10 MG/1
10 TABLET ORAL DAILY
Status: DISCONTINUED | OUTPATIENT
Start: 2017-03-25 | End: 2017-03-25 | Stop reason: HOSPADM

## 2017-03-24 RX ORDER — FENTANYL CITRATE 50 UG/ML
50 INJECTION, SOLUTION INTRAMUSCULAR; INTRAVENOUS ONCE
Status: COMPLETED | OUTPATIENT
Start: 2017-03-24 | End: 2017-03-24

## 2017-03-24 RX ORDER — IBUPROFEN 200 MG
16 TABLET ORAL
Status: DISCONTINUED | OUTPATIENT
Start: 2017-03-24 | End: 2017-03-25 | Stop reason: HOSPADM

## 2017-03-24 RX ORDER — HYDROCODONE BITARTRATE AND ACETAMINOPHEN 5; 325 MG/1; MG/1
1 TABLET ORAL EVERY 6 HOURS PRN
Status: DISCONTINUED | OUTPATIENT
Start: 2017-03-24 | End: 2017-03-25 | Stop reason: HOSPADM

## 2017-03-24 RX ORDER — GLUCAGON 1 MG
1 KIT INJECTION
Status: DISCONTINUED | OUTPATIENT
Start: 2017-03-24 | End: 2017-03-25 | Stop reason: HOSPADM

## 2017-03-24 RX ORDER — BUTALBITAL, ACETAMINOPHEN AND CAFFEINE 50; 325; 40 MG/1; MG/1; MG/1
1 TABLET ORAL EVERY 4 HOURS PRN
Status: DISCONTINUED | OUTPATIENT
Start: 2017-03-24 | End: 2017-03-24

## 2017-03-24 RX ORDER — CIPROFLOXACIN 2 MG/ML
400 INJECTION, SOLUTION INTRAVENOUS
Status: DISCONTINUED | OUTPATIENT
Start: 2017-03-24 | End: 2017-03-25 | Stop reason: HOSPADM

## 2017-03-24 RX ORDER — MIDAZOLAM HYDROCHLORIDE 1 MG/ML
1 INJECTION, SOLUTION INTRAMUSCULAR; INTRAVENOUS ONCE
Status: COMPLETED | OUTPATIENT
Start: 2017-03-24 | End: 2017-03-24

## 2017-03-24 RX ADMIN — SODIUM CHLORIDE: 0.9 INJECTION, SOLUTION INTRAVENOUS at 08:03

## 2017-03-24 RX ADMIN — CIPROFLOXACIN 400 MG: 2 INJECTION, SOLUTION INTRAVENOUS at 08:03

## 2017-03-24 RX ADMIN — FENTANYL CITRATE 50 MCG: 50 INJECTION, SOLUTION INTRAMUSCULAR; INTRAVENOUS at 10:03

## 2017-03-24 RX ADMIN — HYDROCODONE BITARTRATE AND ACETAMINOPHEN 1 TABLET: 10; 325 TABLET ORAL at 12:03

## 2017-03-24 RX ADMIN — ATORVASTATIN CALCIUM 20 MG: 10 TABLET, FILM COATED ORAL at 09:03

## 2017-03-24 RX ADMIN — INSULIN ASPART 2 UNITS: 100 INJECTION, SOLUTION INTRAVENOUS; SUBCUTANEOUS at 09:03

## 2017-03-24 RX ADMIN — BUTALBITAL, ACETAMINOPHEN, AND CAFFEINE 1 TABLET: 50; 325; 40 TABLET ORAL at 06:03

## 2017-03-24 RX ADMIN — MIDAZOLAM HYDROCHLORIDE 1 MG: 1 INJECTION, SOLUTION INTRAMUSCULAR; INTRAVENOUS at 10:03

## 2017-03-24 NOTE — DISCHARGE SUMMARY
Procedure was performed by the radiologist.  Sterile technique was performed in the RUQ, lidocaine was used as a local anesthetic.  Multiple samples taken from liver core.  Pt tolerated the procedure well without immediate complications.  Please see radiologist report for details. F/u with PCP and/or ordering physician.

## 2017-03-24 NOTE — SUBJECTIVE & OBJECTIVE
Past Medical History:   Diagnosis Date    Amputation of finger of left hand     all fingers except for thumb    Cataract     OS NGCL     Chronic pain due to trauma     traumatic amputations left hand    Diabetes mellitus, type 2     Hepatitis C, chronic 2/13/2014    Hyperlipidemia     Hypertension     Refractive error        Past Surgical History:   Procedure Laterality Date    APPENDECTOMY      2003    CATARACT EXTRACTION      OD     HERNIA REPAIR         Review of patient's allergies indicates:  No Known Allergies    Current Facility-Administered Medications on File Prior to Encounter   Medication    [COMPLETED] fentaNYL 50 mcg/mL injection 50 mcg    [COMPLETED] hydrocodone-acetaminophen 10-325mg per tablet 1 tablet    [COMPLETED] midazolam injection 1 mg     Current Outpatient Prescriptions on File Prior to Encounter   Medication Sig    amlodipine (NORVASC) 10 MG tablet Take 1 tablet (10 mg total) by mouth once daily.    atorvastatin (LIPITOR) 20 MG tablet Take 1 tablet (20 mg total) by mouth nightly.    blood glucose control high,low Soln 1 each by Misc.(Non-Drug; Combo Route) route as needed. Accu-chek Sarina Plus control solution    blood sugar diagnostic Strp 1 each by Misc.(Non-Drug; Combo Route) route 2 (two) times daily.    blood-glucose meter (FREESTYLE SYSTEM KIT) kit Use as instructed    clonazePAM (KLONOPIN) 1 MG tablet Take 1 tablet (1 mg total) by mouth every evening.    fluticasone (FLONASE) 50 mcg/actuation nasal spray INSTILL 2 SPRAYS IN EACH NOSTRIL ONCE DAILY    hydrocodone-acetaminophen 10-325mg (NORCO)  mg Tab HYDROCODONE-ACETAMINOPHEN (Norco)  MG ORAL TAB - 1 tab po bid prn pain    hydrocortisone 1 % cream Apply to affected area on neck and chest 2 times daily    hydrOXYzine HCl (ATARAX) 25 MG tablet Take 1 tablet (25 mg total) by mouth 4 (four) times daily as needed for Itching.    insulin NPH-insulin regular, 70/30, (HUMULIN 70/30) 100 unit/mL (70-30)  "injection INJECT 25 UNITS UNDER THE SKIN EVERY MORNING, THEN 23 UNITS EVERY EVENING (Patient taking differently: INJECT 21 UNITS UNDER THE SKIN EVERY MORNING, THEN 21 UNITS EVERY EVENING)    insulin syringe-needle U-100 1 mL 31 gauge x 5/16 Syrg USE AS DIRECTED WITH INSULIN    lancets Misc 1 each by Misc.(Non-Drug; Combo Route) route 2 (two) times daily.    losartan-hydrochlorothiazide 100-25 mg (HYZAAR) 100-25 mg per tablet Take 1 tablet by mouth once daily.    nebivolol (BYSTOLIC) 10 MG Tab Take 1 tablet (10 mg total) by mouth once daily.     Family History     Problem Relation (Age of Onset)    Cancer Mother, Brother    Diabetes Sister, Brother    Heart disease Sister    Hypertension Mother, Father, Sister, Brother        Social History Main Topics    Smoking status: Former Smoker     Packs/day: 0.50     Types: Cigarettes     Quit date: 2/24/1972    Smokeless tobacco: Not on file    Alcohol use 0.0 oz/week     0 Standard drinks or equivalent per week      Comment: " Every blue moon"    Drug use: No    Sexual activity: Yes     Partners: Female     Review of Systems   Constitutional: Negative for activity change, appetite change, chills, fatigue and fever.   HENT: Negative for congestion, postnasal drip, rhinorrhea, sinus pressure, sneezing, sore throat and trouble swallowing.    Eyes: Negative for redness, itching and visual disturbance.   Respiratory: Negative for cough, chest tightness, shortness of breath and wheezing.    Cardiovascular: Negative for chest pain, palpitations and leg swelling.   Gastrointestinal: Negative for abdominal distention, abdominal pain, diarrhea, nausea and vomiting.   Endocrine: Negative for cold intolerance and heat intolerance.   Genitourinary: Negative for difficulty urinating, dysuria, flank pain, frequency and urgency.   Musculoskeletal: Negative for arthralgias, back pain, joint swelling and myalgias.   Skin: Negative for color change and wound.   Allergic/Immunologic: " Negative for environmental allergies and food allergies.   Neurological: Positive for headaches. Negative for dizziness, syncope and weakness.   Hematological: Does not bruise/bleed easily.   Psychiatric/Behavioral: Negative for confusion and sleep disturbance. The patient is not nervous/anxious.      Objective:     Vital Signs (Most Recent):  Temp: 97.7 °F (36.5 °C) (03/24/17 1420)  Pulse: (!) 56 (03/24/17 1420)  Resp: 17 (03/24/17 1420)  BP: 127/80 (03/24/17 1420)  SpO2: 100 % (03/24/17 1420) Vital Signs (24h Range):  Temp:  [97.3 °F (36.3 °C)-97.7 °F (36.5 °C)] 97.7 °F (36.5 °C)  Pulse:  [56-78] 56  Resp:  [16-17] 17  SpO2:  [98 %-100 %] 100 %  BP: ()/(55-80) 127/80        There is no height or weight on file to calculate BMI.    Physical Exam   Constitutional: He is oriented to person, place, and time. He appears well-developed and well-nourished.   HENT:   Head: Normocephalic.   Nose: Nose normal.   Mouth/Throat: Oropharynx is clear and moist.   Eyes: Conjunctivae and EOM are normal.   Neck: Normal range of motion. No JVD present.   Cardiovascular: Normal rate, regular rhythm, normal heart sounds and intact distal pulses.  Exam reveals no friction rub.    No murmur heard.  Pulmonary/Chest: Effort normal and breath sounds normal. No respiratory distress. He has no wheezes.   Abdominal: Soft. Bowel sounds are normal. He exhibits no distension. There is no tenderness.   Musculoskeletal: Normal range of motion.   Neurological: He is alert and oriented to person, place, and time.   Skin: Skin is warm and dry.   Psychiatric: He has a normal mood and affect. His behavior is normal. Thought content normal.        Significant Labs: CBC: No results for input(s): WBC, HGB, HCT, PLT in the last 48 hours.  CMP: No results for input(s): NA, K, CL, CO2, GLU, BUN, CREATININE, CALCIUM, PROT, ALBUMIN, BILITOT, ALKPHOS, AST, ALT, ANIONGAP, EGFRNONAA in the last 48 hours.    Invalid input(s): ESTGFAFR[place]    Significant  Imaging:   Imaging Results     None

## 2017-03-24 NOTE — PROGRESS NOTES
"Pt resting comfortably on stretcher on R side. Pt denies pain, states "I feel good". Pt aaox3, nad noted, vss. Biopsy site wnl.   "

## 2017-03-24 NOTE — PROGRESS NOTES
Pt lying in stretcher, aaox3, biopsy site wnl, band aid intact. Abd soft/nondistended. Pt denies complaints. Pt remains on monitor. NAD noted.

## 2017-03-24 NOTE — H&P
"[facility] -   History & Physical    Subjective:      Chief Complaint/Reason for Admission: Hep C Ulysses Boreaux is a 66 y.o. male.    Past Medical History:   Diagnosis Date    Amputation of finger of left hand     all fingers except for thumb    Cataract     OS NGCL     Chronic pain due to trauma     traumatic amputations left hand    Diabetes mellitus, type 2     Hepatitis C, chronic 2/13/2014    Hyperlipidemia     Hypertension     Refractive error      Past Surgical History:   Procedure Laterality Date    APPENDECTOMY      2003    CATARACT EXTRACTION      OD     HERNIA REPAIR       Family History   Problem Relation Age of Onset    Cancer Mother     Hypertension Mother     Hypertension Father     Diabetes Sister     Hypertension Sister     Heart disease Sister     Diabetes Brother     Hypertension Brother     Cancer Brother      Social History   Substance Use Topics    Smoking status: Former Smoker     Packs/day: 0.50     Types: Cigarettes     Quit date: 2/24/1972    Smokeless tobacco: Not on file    Alcohol use 0.0 oz/week     0 Standard drinks or equivalent per week      Comment: " Every blue moon"         (Not in a hospital admission)  Review of patient's allergies indicates:  No Known Allergies     Review of Systems   Constitutional: Negative.    Respiratory: Negative.    Gastrointestinal: Negative.        Objective:      Vital Signs (Most Recent)  Temp: 97.3 °F (36.3 °C) (03/24/17 0900)  Pulse: 78 (03/24/17 0900)  Resp: 16 (03/24/17 0900)  BP: 115/72 (03/24/17 0900)  SpO2: 98 % (03/24/17 0900)    Vital Signs Range (Last 24H):  Temp:  [97.3 °F (36.3 °C)]   Pulse:  [78]   Resp:  [16]   BP: (115)/(72)   SpO2:  [98 %]     Physical Exam   Constitutional: He appears well-developed and well-nourished.   Pulmonary/Chest: Effort normal.   Abdominal: Soft. Bowel sounds are normal.       Data Review:    CBC:   Lab Results   Component Value Date    WBC 4.31 03/13/2017    RBC 4.70 " 03/13/2017    HGB 15.6 03/13/2017    HCT 44.0 03/13/2017     03/13/2017      ECG: no prior ECG.     Assessment:      Active Hospital Problems    Diagnosis  POA    Chronic hepatitis C without hepatic coma [B18.2]  Yes      Resolved Hospital Problems    Diagnosis Date Resolved POA   No resolved problems to display.       Plan:    CT guided liver biopsy

## 2017-03-24 NOTE — PROGRESS NOTES
Pt aaox3, NAD noted, lunch tray ordered to be delivered to . Pt amb w/o complaints, bx site wnl, vss. Pt transported to  236 in stable condition.

## 2017-03-24 NOTE — NURSING TRANSFER
Nursing Transfer Note      3/24/2017     Transfer : to  236    Transfer via wc     Transfer with clothes, no family    Transported by LORETO Arango RN    Medicines sent: NONE    Chart send with patient:No    Notified: RAJAT Lewis    Patient reassessed at: 1300, NAD NOTED    Upon arrival to floor

## 2017-03-24 NOTE — BRIEF OP NOTE
Pt transported to ct via stretcher in stable condition. Pt placed self on ct table in supine position with bilat arms above head. Pt denies complaints. Pt placed on monitor. VSS

## 2017-03-24 NOTE — H&P
[facility] Hospital Medicine  History & Physical    Patient Name: Ulysses Boreaux  MRN: [id number]  Admission Date: 3/24/2017  Attending Physician: Yung Cardona MD   Primary Care Provider: Elza Pantoja MD         Patient information was obtained from patient and ER records.     Subjective:     Principal Problem: s/p liver biopsy    Chief Complaint: abnormal lab results     HPI: Ulysses Boreaux is a 66 y.o. Male with PMHx of HTN, DM, HLD, Hep C, and chronic pain admitted to Observation Unit for extended recovery post liver biopsy per IR.  Pt followed outpatient by GI.    Past Medical History:   Diagnosis Date    Amputation of finger of left hand     all fingers except for thumb    Cataract     OS NGCL     Chronic pain due to trauma     traumatic amputations left hand    Diabetes mellitus, type 2     Hepatitis C, chronic 2/13/2014    Hyperlipidemia     Hypertension     Refractive error        Past Surgical History:   Procedure Laterality Date    APPENDECTOMY      2003    CATARACT EXTRACTION      OD     HERNIA REPAIR         Review of patient's allergies indicates:  No Known Allergies    Current Facility-Administered Medications on File Prior to Encounter   Medication    [COMPLETED] fentaNYL 50 mcg/mL injection 50 mcg    [COMPLETED] hydrocodone-acetaminophen 10-325mg per tablet 1 tablet    [COMPLETED] midazolam injection 1 mg     Current Outpatient Prescriptions on File Prior to Encounter   Medication Sig    amlodipine (NORVASC) 10 MG tablet Take 1 tablet (10 mg total) by mouth once daily.    atorvastatin (LIPITOR) 20 MG tablet Take 1 tablet (20 mg total) by mouth nightly.    blood glucose control high,low Soln 1 each by Misc.(Non-Drug; Combo Route) route as needed. Accu-chek Sarina Plus control solution    blood sugar diagnostic Strp 1 each by Misc.(Non-Drug; Combo Route) route 2 (two) times daily.    blood-glucose meter (FREESTYLE SYSTEM KIT) kit Use as instructed     "clonazePAM (KLONOPIN) 1 MG tablet Take 1 tablet (1 mg total) by mouth every evening.    fluticasone (FLONASE) 50 mcg/actuation nasal spray INSTILL 2 SPRAYS IN EACH NOSTRIL ONCE DAILY    hydrocodone-acetaminophen 10-325mg (NORCO)  mg Tab HYDROCODONE-ACETAMINOPHEN (Norco)  MG ORAL TAB - 1 tab po bid prn pain    hydrocortisone 1 % cream Apply to affected area on neck and chest 2 times daily    hydrOXYzine HCl (ATARAX) 25 MG tablet Take 1 tablet (25 mg total) by mouth 4 (four) times daily as needed for Itching.    insulin NPH-insulin regular, 70/30, (HUMULIN 70/30) 100 unit/mL (70-30) injection INJECT 25 UNITS UNDER THE SKIN EVERY MORNING, THEN 23 UNITS EVERY EVENING (Patient taking differently: INJECT 21 UNITS UNDER THE SKIN EVERY MORNING, THEN 21 UNITS EVERY EVENING)    insulin syringe-needle U-100 1 mL 31 gauge x 5/16 Syrg USE AS DIRECTED WITH INSULIN    lancets Misc 1 each by Misc.(Non-Drug; Combo Route) route 2 (two) times daily.    losartan-hydrochlorothiazide 100-25 mg (HYZAAR) 100-25 mg per tablet Take 1 tablet by mouth once daily.    nebivolol (BYSTOLIC) 10 MG Tab Take 1 tablet (10 mg total) by mouth once daily.     Family History     Problem Relation (Age of Onset)    Cancer Mother, Brother    Diabetes Sister, Brother    Heart disease Sister    Hypertension Mother, Father, Sister, Brother        Social History Main Topics    Smoking status: Former Smoker     Packs/day: 0.50     Types: Cigarettes     Quit date: 2/24/1972    Smokeless tobacco: Not on file    Alcohol use 0.0 oz/week     0 Standard drinks or equivalent per week      Comment: " Every blue moon"    Drug use: No    Sexual activity: Yes     Partners: Female     Review of Systems   Constitutional: Negative for activity change, appetite change, chills, fatigue and fever.   HENT: Negative for congestion, postnasal drip, rhinorrhea, sinus pressure, sneezing, sore throat and trouble swallowing.    Eyes: Negative for redness, " itching and visual disturbance.   Respiratory: Negative for cough, chest tightness, shortness of breath and wheezing.    Cardiovascular: Negative for chest pain, palpitations and leg swelling.   Gastrointestinal: Negative for abdominal distention, abdominal pain, diarrhea, nausea and vomiting.   Endocrine: Negative for cold intolerance and heat intolerance.   Genitourinary: Negative for difficulty urinating, dysuria, flank pain, frequency and urgency.   Musculoskeletal: Negative for arthralgias, back pain, joint swelling and myalgias.   Skin: Negative for color change and wound.   Allergic/Immunologic: Negative for environmental allergies and food allergies.   Neurological: Positive for headaches. Negative for dizziness, syncope and weakness.   Hematological: Does not bruise/bleed easily.   Psychiatric/Behavioral: Negative for confusion and sleep disturbance. The patient is not nervous/anxious.      Objective:     Vital Signs (Most Recent):  Temp: 97.7 °F (36.5 °C) (03/24/17 1420)  Pulse: (!) 56 (03/24/17 1420)  Resp: 17 (03/24/17 1420)  BP: 127/80 (03/24/17 1420)  SpO2: 100 % (03/24/17 1420) Vital Signs (24h Range):  Temp:  [97.3 °F (36.3 °C)-97.7 °F (36.5 °C)] 97.7 °F (36.5 °C)  Pulse:  [56-78] 56  Resp:  [16-17] 17  SpO2:  [98 %-100 %] 100 %  BP: ()/(55-80) 127/80        There is no height or weight on file to calculate BMI.    Physical Exam   Constitutional: He is oriented to person, place, and time. He appears well-developed and well-nourished.   HENT:   Head: Normocephalic.   Nose: Nose normal.   Mouth/Throat: Oropharynx is clear and moist.   Eyes: Conjunctivae and EOM are normal.   Neck: Normal range of motion. No JVD present.   Cardiovascular: Normal rate, regular rhythm, normal heart sounds and intact distal pulses.  Exam reveals no friction rub.    No murmur heard.  Pulmonary/Chest: Effort normal and breath sounds normal. No respiratory distress. He has no wheezes.   Abdominal: Soft. Bowel sounds are  normal. He exhibits no distension. There is no tenderness.   Musculoskeletal: Normal range of motion.   Neurological: He is alert and oriented to person, place, and time.   Skin: Skin is warm and dry.   Psychiatric: He has a normal mood and affect. His behavior is normal. Thought content normal.        Significant Labs: CBC: No results for input(s): WBC, HGB, HCT, PLT in the last 48 hours.  CMP: No results for input(s): NA, K, CL, CO2, GLU, BUN, CREATININE, CALCIUM, PROT, ALBUMIN, BILITOT, ALKPHOS, AST, ALT, ANIONGAP, EGFRNONAA in the last 48 hours.    Invalid input(s): ESTGFAFR[place]    Significant Imaging:   Imaging Results     None        Assessment/Plan:     Uncontrolled type 2 diabetes mellitus without complication, with long-term current use of insulin  -Accuchecks and SSI continued    Unspecified essential hypertension  -home medications resumed    Other and unspecified hyperlipidemia  -statin continued    Hepatitis C, chronic  -hx of  -will continue to monitor    History of liver biopsy  -pt admitted to Observation Unit for extended recovery post procedure  -s/p biopsy 3/24/17  -IR performed procedure today  -IVF  -Cipro  -analgesia prn      VTE Risk Mitigation     None        Meri Helm NP  Department of Hospital Medicine   [facility]

## 2017-03-24 NOTE — BRIEF OP NOTE
Procedure complete, pt tolerated well. Biopsy site wnl, band aid applied. Abd soft/nondistended. Pt transferred self onto stretcher and is lying on R side. Side rails x2, transported back to recovery in stable condition. Pt verbalized understanding of staying on R side x30 min. Pt placed on monitor, will monitor closely for changes. Pt voices no complaints.

## 2017-03-25 VITALS
RESPIRATION RATE: 18 BRPM | BODY MASS INDEX: 24.25 KG/M2 | HEIGHT: 75 IN | TEMPERATURE: 98 F | HEART RATE: 65 BPM | DIASTOLIC BLOOD PRESSURE: 65 MMHG | OXYGEN SATURATION: 98 % | WEIGHT: 195 LBS | SYSTOLIC BLOOD PRESSURE: 105 MMHG

## 2017-03-25 LAB
BASOPHILS # BLD AUTO: 0.03 K/UL
BASOPHILS NFR BLD: 0.7 %
DIFFERENTIAL METHOD: ABNORMAL
EOSINOPHIL # BLD AUTO: 0.2 K/UL
EOSINOPHIL NFR BLD: 4.7 %
ERYTHROCYTE [DISTWIDTH] IN BLOOD BY AUTOMATED COUNT: 13.1 %
HCT VFR BLD AUTO: 38.6 %
HGB BLD-MCNC: 13.9 G/DL
LYMPHOCYTES # BLD AUTO: 2.7 K/UL
LYMPHOCYTES NFR BLD: 64.3 %
MCH RBC QN AUTO: 33.2 PG
MCHC RBC AUTO-ENTMCNC: 36 %
MCV RBC AUTO: 92 FL
MONOCYTES # BLD AUTO: 0.5 K/UL
MONOCYTES NFR BLD: 10.6 %
NEUTROPHILS # BLD AUTO: 0.8 K/UL
NEUTROPHILS NFR BLD: 19.9 %
PLATELET # BLD AUTO: 252 K/UL
PLATELET BLD QL SMEAR: ABNORMAL
PMV BLD AUTO: 12 FL
POCT GLUCOSE: 179 MG/DL (ref 70–110)
RBC # BLD AUTO: 4.19 M/UL
WBC # BLD AUTO: 4.26 K/UL

## 2017-03-25 PROCEDURE — 36415 COLL VENOUS BLD VENIPUNCTURE: CPT

## 2017-03-25 PROCEDURE — 63600175 PHARM REV CODE 636 W HCPCS: Performed by: NURSE PRACTITIONER

## 2017-03-25 PROCEDURE — G0378 HOSPITAL OBSERVATION PER HR: HCPCS

## 2017-03-25 PROCEDURE — 85025 COMPLETE CBC W/AUTO DIFF WBC: CPT

## 2017-03-25 RX ADMIN — CIPROFLOXACIN 400 MG: 2 INJECTION, SOLUTION INTRAVENOUS at 06:03

## 2017-03-25 NOTE — PROGRESS NOTES
Pt. Okay to have IV antibiotics infused, but refused to stay to continue to receive the 1Liter of NS. Pt. Wanted to leave AMA, form was signed and physician was notified. AMA form is in the chart.

## 2017-03-25 NOTE — DISCHARGE SUMMARY
[facility] Hospital Medicine  Discharge Summary      Patient Name: Ulysses Boreaux  MRN: [id number]  Admission Date: 3/24/2017  Hospital Length of Stay: 0 days  Discharge Date and Time: 3/25/2017  9:15 AM  Attending Physician: Dr.Majid Germain  Discharging Provider: Meri Helm NP  Primary Care Provider: Elza Pantoja MD      HPI:   Ulysses Boreaux is a 66 y.o. Male with PMHx of HTN, DM, HLD, Hep C, and chronic pain admitted to Observation Unit for extended recovery post liver biopsy per IR.      * No surgery found *      Indwelling Lines/Drains at time of discharge:   Lines/Drains/Airways          No matching active lines, drains, or airways        Hospital Course:   Pt admitted to Observation Unit post liver biopsy per IR.  Pt received IVF, antibiotics, and analgesia prn post procedure.  Pt refused any additional interventions and  decided to leave AMA with physician on call informed.  All risks associated with leaving AMA explained to patient per nurse prior to and form signed.       Consults:     Significant Diagnostic Studies:   Imaging Results     None          Pending Diagnostic Studies:     None        Final Active Diagnoses:    Diagnosis Date Noted POA    History of liver biopsy [Z98.890] 03/24/2017 Not Applicable    Hepatitis C, chronic [B18.2] 02/13/2014 Yes    Uncontrolled type 2 diabetes mellitus without complication, with long-term current use of insulin [E11.65, Z79.4] 12/31/2013 Not Applicable    Unspecified essential hypertension [I10] 12/31/2013 Yes    Other and unspecified hyperlipidemia [E78.5] 12/31/2013 Yes      Problems Resolved During this Admission:    Diagnosis Date Noted Date Resolved POA        Discharged Condition: against medical advice    Disposition: Left Against Medical Adv*    Follow Up:    Patient Instructions:   No discharge procedures on file.  Medications:  Reconciled Home Medications:   Discharge Medication List as of 3/25/2017  1:03 PM      CONTINUE  these medications which have NOT CHANGED    Details   atorvastatin (LIPITOR) 20 MG tablet Take 1 tablet (20 mg total) by mouth nightly., Starting 10/3/2016, Until Discontinued, Normal      blood glucose control high,low Soln 1 each by Misc.(Non-Drug; Combo Route) route as needed. Accu-chek Sarina Plus control solution, Starting 10/17/2016, Until Tue 10/17/17, Normal      blood sugar diagnostic Strp 1 each by Misc.(Non-Drug; Combo Route) route 2 (two) times daily., Starting 10/3/2016, Until Discontinued, Normal      blood-glucose meter (FREESTYLE SYSTEM KIT) kit Use as instructed, Normal      clonazePAM (KLONOPIN) 1 MG tablet Take 1 tablet (1 mg total) by mouth every evening., Starting 10/18/2016, Until Discontinued, Normal      fluticasone (FLONASE) 50 mcg/actuation nasal spray INSTILL 2 SPRAYS IN EACH NOSTRIL ONCE DAILY, Normal      hydrocodone-acetaminophen 10-325mg (NORCO)  mg Tab HYDROCODONE-ACETAMINOPHEN (Norco)  MG ORAL TAB - 1 tab po bid prn pain, Print      hydrocortisone 1 % cream Apply to affected area on neck and chest 2 times daily, Print      hydrOXYzine HCl (ATARAX) 25 MG tablet Take 1 tablet (25 mg total) by mouth 4 (four) times daily as needed for Itching., Starting 12/9/2016, Until Discontinued, Normal      insulin NPH-insulin regular, 70/30, (HUMULIN 70/30) 100 unit/mL (70-30) injection INJECT 25 UNITS UNDER THE SKIN EVERY MORNING, THEN 23 UNITS EVERY EVENING, Normal      insulin syringe-needle U-100 1 mL 31 gauge x 5/16 Syrg USE AS DIRECTED WITH INSULIN, Normal      lancets Misc 1 each by Misc.(Non-Drug; Combo Route) route 2 (two) times daily., Starting 10/3/2016, Until Discontinued, Normal      nebivolol (BYSTOLIC) 10 MG Tab Take 1 tablet (10 mg total) by mouth once daily., Starting 10/3/2016, Until Discontinued, Normal      amlodipine (NORVASC) 10 MG tablet Take 1 tablet (10 mg total) by mouth once daily., Starting 10/3/2016, Until Discontinued, Normal       losartan-hydrochlorothiazide 100-25 mg (HYZAAR) 100-25 mg per tablet Take 1 tablet by mouth once daily., Starting 10/3/2016, Until Discontinued, Normal           Time spent on the discharge of patient: 35 minutes    Meri Helm NP  Department of Hospital Medicine  [facility]

## 2017-03-25 NOTE — PLAN OF CARE
Problem: Patient Care Overview  Goal: Plan of Care Review  Outcome: Ongoing (interventions implemented as appropriate)  Free of falls/injury during shift  Pain managed effectively w/ PRN meds  DM monitored and managed  Pending d/c this am  Safety interventions in place

## 2017-03-26 LAB
ESTIMATED AVG GLUCOSE: 180 MG/DL
HBA1C MFR BLD HPLC: 7.9 %

## 2017-03-27 DIAGNOSIS — I15.2 HYPERTENSION ASSOCIATED WITH DIABETES: ICD-10-CM

## 2017-03-27 DIAGNOSIS — E11.59 HYPERTENSION ASSOCIATED WITH DIABETES: ICD-10-CM

## 2017-03-27 RX ORDER — AMLODIPINE BESYLATE 10 MG/1
TABLET ORAL
Qty: 30 TABLET | Refills: 0 | Status: SHIPPED | OUTPATIENT
Start: 2017-03-27 | End: 2017-04-03 | Stop reason: SDUPTHER

## 2017-03-27 RX ORDER — LOSARTAN POTASSIUM AND HYDROCHLOROTHIAZIDE 25; 100 MG/1; MG/1
TABLET ORAL
Qty: 30 TABLET | Refills: 0 | Status: SHIPPED | OUTPATIENT
Start: 2017-03-27 | End: 2017-04-03 | Stop reason: SDUPTHER

## 2017-04-03 ENCOUNTER — TELEPHONE (OUTPATIENT)
Dept: DIABETES | Facility: CLINIC | Age: 67
End: 2017-04-03

## 2017-04-03 ENCOUNTER — OFFICE VISIT (OUTPATIENT)
Dept: INTERNAL MEDICINE | Facility: CLINIC | Age: 67
End: 2017-04-03
Payer: MEDICAID

## 2017-04-03 VITALS
SYSTOLIC BLOOD PRESSURE: 130 MMHG | HEIGHT: 75 IN | WEIGHT: 197.75 LBS | DIASTOLIC BLOOD PRESSURE: 80 MMHG | TEMPERATURE: 97 F | HEART RATE: 72 BPM | BODY MASS INDEX: 24.59 KG/M2

## 2017-04-03 DIAGNOSIS — E11.69 HYPERLIPIDEMIA ASSOCIATED WITH TYPE 2 DIABETES MELLITUS: ICD-10-CM

## 2017-04-03 DIAGNOSIS — E11.59 HYPERTENSION ASSOCIATED WITH DIABETES: ICD-10-CM

## 2017-04-03 DIAGNOSIS — I15.2 HYPERTENSION ASSOCIATED WITH DIABETES: ICD-10-CM

## 2017-04-03 DIAGNOSIS — E78.5 HYPERLIPIDEMIA ASSOCIATED WITH TYPE 2 DIABETES MELLITUS: ICD-10-CM

## 2017-04-03 DIAGNOSIS — Z00.00 ROUTINE GENERAL MEDICAL EXAMINATION AT A HEALTH CARE FACILITY: Primary | ICD-10-CM

## 2017-04-03 PROCEDURE — 99999 PR PBB SHADOW E&M-EST. PATIENT-LVL III: CPT | Mod: PBBFAC,,, | Performed by: FAMILY MEDICINE

## 2017-04-03 PROCEDURE — 99213 OFFICE O/P EST LOW 20 MIN: CPT | Mod: PBBFAC | Performed by: FAMILY MEDICINE

## 2017-04-03 PROCEDURE — 99396 PREV VISIT EST AGE 40-64: CPT | Mod: S$PBB,,, | Performed by: FAMILY MEDICINE

## 2017-04-03 RX ORDER — AMLODIPINE BESYLATE 10 MG/1
10 TABLET ORAL DAILY
Qty: 30 TABLET | Refills: 11 | Status: SHIPPED | OUTPATIENT
Start: 2017-04-03 | End: 2017-04-13 | Stop reason: SDUPTHER

## 2017-04-03 RX ORDER — LOSARTAN POTASSIUM AND HYDROCHLOROTHIAZIDE 25; 100 MG/1; MG/1
1 TABLET ORAL DAILY
Qty: 30 TABLET | Refills: 11 | Status: SHIPPED | OUTPATIENT
Start: 2017-04-03 | End: 2017-04-13 | Stop reason: SDUPTHER

## 2017-04-03 RX ORDER — NEBIVOLOL 10 MG/1
10 TABLET ORAL DAILY
Qty: 30 TABLET | Refills: 11 | Status: SHIPPED | OUTPATIENT
Start: 2017-04-03 | End: 2017-04-13 | Stop reason: SDUPTHER

## 2017-04-03 NOTE — TELEPHONE ENCOUNTER
----- Message from Elza Pantoja MD sent at 4/3/2017  9:38 AM CDT -----  Patient needs f/u with Mrs. Mcgarry, a1c 7.9. Thank you

## 2017-04-07 ENCOUNTER — TELEPHONE (OUTPATIENT)
Dept: GASTROENTEROLOGY | Facility: CLINIC | Age: 67
End: 2017-04-07

## 2017-04-07 DIAGNOSIS — B18.2 CHRONIC HEPATITIS C WITHOUT HEPATIC COMA: Primary | ICD-10-CM

## 2017-04-07 NOTE — TELEPHONE ENCOUNTER
----- Message from Nolvia Angulo sent at 4/7/2017 12:28 PM CDT -----  Regarding: Medicaid Required Labs / Documentation  Jeferson    To proceed with the PA for Mr. Ernandez[facility] Medicaid will need the following labs:       1. Urine Drug Toxicology screen for drugs - within 30 days of starting treatment.    2. Blood Alcohol toxicology screen for alcohol - within 30 days of starting treatment.    3. New HCV Fibrosure to document staging - Fibrosure must be within 90 days of starting PA / treatment.      4. CBC Panel - Must have updated test within 90 days of starting PA/treatment    5. CHEM Panel - Must have updated test within 90 days of starting PA/treatment    6. New HIV test - Must have updated test within 30 days of starting PA / treatment.     7. Doctor's attestation in chart notes that patient's life expectancy is not less than 12 months due to non Hep C conditions.     8. Doctor's attestation in chart notes that patient has abstained from drugs for the last six months. (Due to previous history)    9.  Once all labs are completed and returned order new prescription for HCV medication.    Let me know if you have any questions.    Nolvia

## 2017-04-07 NOTE — TELEPHONE ENCOUNTER
----- Message from Nolvia Angulo sent at 4/7/2017 12:28 PM CDT -----  Regarding: Medicaid Required Labs / Documentation  Jeferson    To proceed with the PA for Mr. [facility] Medicaid will need the following labs:       1. Urine Drug Toxicology screen for drugs - within 30 days of starting treatment.    2. Blood Alcohol toxicology screen for alcohol - within 30 days of starting treatment.    3. New HCV Fibrosure to document staging - Fibrosure must be within 90 days of starting PA / treatment.      4. CBC Panel - Must have updated test within 90 days of starting PA/treatment    5. CHEM Panel - Must have updated test within 90 days of starting PA/treatment    6. New HIV test - Must have updated test within 30 days of starting PA / treatment.     7. Doctor's attestation in chart notes that patient's life expectancy is not less than 12 months due to non Hep C conditions.     8. Doctor's attestation in chart notes that patient has abstained from drugs for the last six months. (Due to previous history)    9.  Once all labs are completed and returned order new prescription for HCV medication.    Let me know if you have any questions.    Nolvia

## 2017-04-07 NOTE — TELEPHONE ENCOUNTER
Tamica,   I have ordered the labs requested by the insurance. Please contact the patient to schedule.     Based on previous interviews with the patient, he hasn't used drugs in many years. His comorbid conditions were reviewed and his life expectancy is not less than 12 months.     Jeferson Vega PA-C

## 2017-04-07 NOTE — TELEPHONE ENCOUNTER
----- Message from Nolvia Angulo sent at 4/7/2017 12:28 PM CDT -----  Regarding: Medicaid Required Labs / Documentation  Jeferson    To proceed with the PA for Mr. [facility] Medicaid will need the following labs:       1. Urine Drug Toxicology screen for drugs - within 30 days of starting treatment.    2. Blood Alcohol toxicology screen for alcohol - within 30 days of starting treatment.    3. New HCV Fibrosure to document staging - Fibrosure must be within 90 days of starting PA / treatment.      4. CBC Panel - Must have updated test within 90 days of starting PA/treatment    5. CHEM Panel - Must have updated test within 90 days of starting PA/treatment    6. New HIV test - Must have updated test within 30 days of starting PA / treatment.     7. Doctor's attestation in chart notes that patient's life expectancy is not less than 12 months due to non Hep C conditions.     8. Doctor's attestation in chart notes that patient has abstained from drugs for the last six months. (Due to previous history)    9.  Once all labs are completed and returned order new prescription for HCV medication.    Let me know if you have any questions.    Nolvia

## 2017-04-10 NOTE — PROGRESS NOTES
Subjective:       Patient ID: Ulysses Boreaux is a 66 y.o. male.    Chief Complaint: Annual Exam    HPI Comments: Patient presents to clinic today for annual physical exam.    Review of Systems   Constitutional: Negative for chills, fatigue, fever and unexpected weight change.   HENT: Negative for congestion, dental problem, ear pain, hearing loss, rhinorrhea and trouble swallowing.    Eyes: Negative for pain and visual disturbance.   Respiratory: Negative for cough and shortness of breath.    Cardiovascular: Negative for chest pain, palpitations and leg swelling.   Gastrointestinal: Negative for abdominal distention, abdominal pain, blood in stool, constipation, diarrhea, nausea and vomiting.   Genitourinary: Negative for difficulty urinating, scrotal swelling and testicular pain.   Musculoskeletal: Negative for arthralgias and myalgias.   Skin: Negative for rash.   Neurological: Negative for dizziness, weakness, numbness and headaches.   Hematological: Negative for adenopathy. Does not bruise/bleed easily.   Psychiatric/Behavioral: Negative for dysphoric mood and sleep disturbance. The patient is not nervous/anxious.        Objective:      Physical Exam   Constitutional: He is oriented to person, place, and time. He appears well-developed and well-nourished. No distress.   HENT:   Head: Normocephalic and atraumatic.   Right Ear: Hearing, tympanic membrane, external ear and ear canal normal.   Left Ear: Hearing, tympanic membrane, external ear and ear canal normal.   Nose: Nose normal.   Mouth/Throat: Uvula is midline, oropharynx is clear and moist and mucous membranes are normal.   Eyes: Conjunctivae, EOM and lids are normal. Pupils are equal, round, and reactive to light. No scleral icterus.   Neck: Normal range of motion. Neck supple. No thyromegaly present.   Cardiovascular: Normal rate and regular rhythm.  Exam reveals no gallop and no friction rub.    No murmur heard.  Pulmonary/Chest: Effort normal and  breath sounds normal. He has no wheezes. He has no rales.   Abdominal: Soft. Bowel sounds are normal. He exhibits no distension and no mass. There is no hepatosplenomegaly. There is no tenderness.   Musculoskeletal: Normal range of motion. He exhibits no edema or tenderness.   Lymphadenopathy:     He has no cervical adenopathy.   Neurological: He is alert and oriented to person, place, and time. No cranial nerve deficit. Coordination normal.   Reflex Scores:       Patellar reflexes are 2+ on the right side and 2+ on the left side.  Skin: Skin is warm and dry. No rash noted.   Psychiatric: He has a normal mood and affect.   Vitals reviewed.      Assessment:       1. Routine general medical examination at a health care facility    2. Uncontrolled type 2 diabetes mellitus with complication, with long-term current use of insulin    3. Hypertension associated with diabetes    4. Hyperlipidemia associated with type 2 diabetes mellitus        Plan:   Ulysses was seen today for annual exam.    Diagnoses and all orders for this visit:    Routine general medical examination at a health care facility    Uncontrolled type 2 diabetes mellitus with complication, with long-term current use of insulin  Comments:  advised follow up with Mrs. Mcgarry in diabetes    Hypertension associated with diabetes  Comments:  controlled, continue current meds  Orders:  -     amlodipine (NORVASC) 10 MG tablet; Take 1 tablet (10 mg total) by mouth once daily.  -     losartan-hydrochlorothiazide 100-25 mg (HYZAAR) 100-25 mg per tablet; Take 1 tablet by mouth once daily.  -     nebivolol (BYSTOLIC) 10 MG Tab; Take 1 tablet (10 mg total) by mouth once daily.  -     TSH; Standing    Hyperlipidemia associated with type 2 diabetes mellitus  Comments:  status pending labs  Orders:  -     Comprehensive metabolic panel; Standing  -     Lipid panel; Standing    - Health Maintenance reviewed/updated. Plans to discuss colonoscopy with GI.

## 2017-04-11 ENCOUNTER — TELEPHONE (OUTPATIENT)
Dept: GASTROENTEROLOGY | Facility: CLINIC | Age: 67
End: 2017-04-11

## 2017-04-11 NOTE — TELEPHONE ENCOUNTER
----- Message from Zoya Roberts sent at 4/11/2017 11:43 AM CDT -----  Contact: pt  Pt returning a missed call pt can be reached at [phone]///[id number]

## 2017-04-13 ENCOUNTER — LAB VISIT (OUTPATIENT)
Dept: LAB | Facility: HOSPITAL | Age: 67
End: 2017-04-13
Attending: PHYSICIAN ASSISTANT
Payer: MEDICAID

## 2017-04-13 ENCOUNTER — TELEPHONE (OUTPATIENT)
Dept: PHARMACY | Facility: CLINIC | Age: 67
End: 2017-04-13

## 2017-04-13 ENCOUNTER — OFFICE VISIT (OUTPATIENT)
Dept: PAIN MEDICINE | Facility: CLINIC | Age: 67
End: 2017-04-13

## 2017-04-13 VITALS
SYSTOLIC BLOOD PRESSURE: 109 MMHG | BODY MASS INDEX: 24.87 KG/M2 | DIASTOLIC BLOOD PRESSURE: 69 MMHG | HEART RATE: 80 BPM | RESPIRATION RATE: 18 BRPM | HEIGHT: 75 IN | WEIGHT: 200 LBS

## 2017-04-13 DIAGNOSIS — E11.69 COMBINED HYPERLIPIDEMIA ASSOCIATED WITH TYPE 2 DIABETES MELLITUS: ICD-10-CM

## 2017-04-13 DIAGNOSIS — G89.21 CHRONIC PAIN DUE TO TRAUMA: ICD-10-CM

## 2017-04-13 DIAGNOSIS — B18.2 CHRONIC HEPATITIS C WITHOUT HEPATIC COMA: ICD-10-CM

## 2017-04-13 DIAGNOSIS — E11.59 HYPERTENSION ASSOCIATED WITH DIABETES: ICD-10-CM

## 2017-04-13 DIAGNOSIS — E78.2 COMBINED HYPERLIPIDEMIA ASSOCIATED WITH TYPE 2 DIABETES MELLITUS: ICD-10-CM

## 2017-04-13 DIAGNOSIS — I15.2 HYPERTENSION ASSOCIATED WITH DIABETES: ICD-10-CM

## 2017-04-13 DIAGNOSIS — E78.5 HYPERLIPIDEMIA ASSOCIATED WITH TYPE 2 DIABETES MELLITUS: ICD-10-CM

## 2017-04-13 DIAGNOSIS — E11.69 HYPERLIPIDEMIA ASSOCIATED WITH TYPE 2 DIABETES MELLITUS: ICD-10-CM

## 2017-04-13 LAB
ALBUMIN SERPL BCP-MCNC: 3.8 G/DL
ALBUMIN SERPL BCP-MCNC: 3.8 G/DL
ALP SERPL-CCNC: 65 U/L
ALP SERPL-CCNC: 65 U/L
ALT SERPL W/O P-5'-P-CCNC: 87 U/L
ALT SERPL W/O P-5'-P-CCNC: 87 U/L
ANION GAP SERPL CALC-SCNC: 10 MMOL/L
ANION GAP SERPL CALC-SCNC: 10 MMOL/L
APTT BLDCRRT: 25.8 SEC
AST SERPL-CCNC: 89 U/L
AST SERPL-CCNC: 89 U/L
BASOPHILS # BLD AUTO: 0.03 K/UL
BASOPHILS # BLD AUTO: 0.03 K/UL
BASOPHILS NFR BLD: 0.7 %
BASOPHILS NFR BLD: 0.7 %
BILIRUB SERPL-MCNC: 0.6 MG/DL
BILIRUB SERPL-MCNC: 0.6 MG/DL
BUN SERPL-MCNC: 20 MG/DL
BUN SERPL-MCNC: 20 MG/DL
CALCIUM SERPL-MCNC: 9.6 MG/DL
CALCIUM SERPL-MCNC: 9.6 MG/DL
CHLORIDE SERPL-SCNC: 104 MMOL/L
CHLORIDE SERPL-SCNC: 104 MMOL/L
CHOLEST/HDLC SERPL: 2.5 {RATIO}
CO2 SERPL-SCNC: 27 MMOL/L
CO2 SERPL-SCNC: 27 MMOL/L
CREAT SERPL-MCNC: 1.3 MG/DL
CREAT SERPL-MCNC: 1.3 MG/DL
DIFFERENTIAL METHOD: ABNORMAL
DIFFERENTIAL METHOD: ABNORMAL
EOSINOPHIL # BLD AUTO: 0.3 K/UL
EOSINOPHIL # BLD AUTO: 0.3 K/UL
EOSINOPHIL NFR BLD: 6.6 %
EOSINOPHIL NFR BLD: 6.6 %
ERYTHROCYTE [DISTWIDTH] IN BLOOD BY AUTOMATED COUNT: 12.6 %
ERYTHROCYTE [DISTWIDTH] IN BLOOD BY AUTOMATED COUNT: 12.6 %
EST. GFR  (AFRICAN AMERICAN): >60 ML/MIN/1.73 M^2
EST. GFR  (AFRICAN AMERICAN): >60 ML/MIN/1.73 M^2
EST. GFR  (NON AFRICAN AMERICAN): 57.7 ML/MIN/1.73 M^2
EST. GFR  (NON AFRICAN AMERICAN): 57.7 ML/MIN/1.73 M^2
ETHANOL SERPL-MCNC: <10 MG/DL
GLUCOSE SERPL-MCNC: 100 MG/DL
GLUCOSE SERPL-MCNC: 100 MG/DL
HCT VFR BLD AUTO: 42.9 %
HCT VFR BLD AUTO: 42.9 %
HDL/CHOLESTEROL RATIO: 39.3 %
HDLC SERPL-MCNC: 122 MG/DL
HDLC SERPL-MCNC: 48 MG/DL
HGB BLD-MCNC: 15 G/DL
HGB BLD-MCNC: 15 G/DL
INR PPP: 1
LDLC SERPL CALC-MCNC: 62 MG/DL
LYMPHOCYTES # BLD AUTO: 2.8 K/UL
LYMPHOCYTES # BLD AUTO: 2.8 K/UL
LYMPHOCYTES NFR BLD: 66 %
LYMPHOCYTES NFR BLD: 66 %
MCH RBC QN AUTO: 32.5 PG
MCH RBC QN AUTO: 32.5 PG
MCHC RBC AUTO-ENTMCNC: 35 %
MCHC RBC AUTO-ENTMCNC: 35 %
MCV RBC AUTO: 93 FL
MCV RBC AUTO: 93 FL
MONOCYTES # BLD AUTO: 0.5 K/UL
MONOCYTES # BLD AUTO: 0.5 K/UL
MONOCYTES NFR BLD: 10.6 %
MONOCYTES NFR BLD: 10.6 %
NEUTROPHILS # BLD AUTO: 0.7 K/UL
NEUTROPHILS # BLD AUTO: 0.7 K/UL
NEUTROPHILS NFR BLD: 16.1 %
NEUTROPHILS NFR BLD: 16.1 %
NONHDLC SERPL-MCNC: 74 MG/DL
PLATELET # BLD AUTO: 188 K/UL
PLATELET # BLD AUTO: 188 K/UL
PMV BLD AUTO: 10.9 FL
PMV BLD AUTO: 10.9 FL
POTASSIUM SERPL-SCNC: 3.6 MMOL/L
POTASSIUM SERPL-SCNC: 3.6 MMOL/L
PROT SERPL-MCNC: 7.6 G/DL
PROT SERPL-MCNC: 7.6 G/DL
PROTHROMBIN TIME: 10.9 SEC
RBC # BLD AUTO: 4.61 M/UL
RBC # BLD AUTO: 4.61 M/UL
SODIUM SERPL-SCNC: 141 MMOL/L
SODIUM SERPL-SCNC: 141 MMOL/L
T4 FREE SERPL-MCNC: 1.33 NG/DL
TRIGL SERPL-MCNC: 60 MG/DL
TSH SERPL DL<=0.005 MIU/L-ACNC: 0.03 UIU/ML
WBC # BLD AUTO: 4.26 K/UL
WBC # BLD AUTO: 4.26 K/UL

## 2017-04-13 PROCEDURE — 99213 OFFICE O/P EST LOW 20 MIN: CPT | Mod: PBBFAC | Performed by: ANESTHESIOLOGY

## 2017-04-13 PROCEDURE — 99999 PR PBB SHADOW E&M-EST. PATIENT-LVL III: CPT | Mod: PBBFAC,,, | Performed by: ANESTHESIOLOGY

## 2017-04-13 PROCEDURE — 99213 OFFICE O/P EST LOW 20 MIN: CPT | Mod: S$PBB,,, | Performed by: ANESTHESIOLOGY

## 2017-04-13 RX ORDER — AMLODIPINE BESYLATE 10 MG/1
10 TABLET ORAL DAILY
Qty: 30 TABLET | Refills: 11 | Status: SHIPPED | OUTPATIENT
Start: 2017-04-13 | End: 2017-04-26 | Stop reason: SDUPTHER

## 2017-04-13 RX ORDER — HYDROCODONE BITARTRATE AND ACETAMINOPHEN 10; 325 MG/1; MG/1
TABLET ORAL
Qty: 60 TABLET | Refills: 0 | Status: SHIPPED | OUTPATIENT
Start: 2017-04-13 | End: 2017-04-13 | Stop reason: SDUPTHER

## 2017-04-13 RX ORDER — HYDROCODONE BITARTRATE AND ACETAMINOPHEN 10; 325 MG/1; MG/1
TABLET ORAL
Qty: 60 TABLET | Refills: 0 | Status: SHIPPED | OUTPATIENT
Start: 2017-05-12 | End: 2017-04-13 | Stop reason: SDUPTHER

## 2017-04-13 RX ORDER — ATORVASTATIN CALCIUM 20 MG/1
20 TABLET, FILM COATED ORAL NIGHTLY
Qty: 30 TABLET | Refills: 5 | Status: SHIPPED | OUTPATIENT
Start: 2017-04-13 | End: 2017-10-10 | Stop reason: SDUPTHER

## 2017-04-13 RX ORDER — HYDROCODONE BITARTRATE AND ACETAMINOPHEN 10; 325 MG/1; MG/1
TABLET ORAL
Qty: 60 TABLET | Refills: 0 | Status: SHIPPED | OUTPATIENT
Start: 2017-06-11 | End: 2017-07-06 | Stop reason: SDUPTHER

## 2017-04-13 RX ORDER — LOSARTAN POTASSIUM AND HYDROCHLOROTHIAZIDE 25; 100 MG/1; MG/1
1 TABLET ORAL DAILY
Qty: 30 TABLET | Refills: 11 | Status: SHIPPED | OUTPATIENT
Start: 2017-04-13 | End: 2017-04-26 | Stop reason: SDUPTHER

## 2017-04-13 RX ORDER — NEBIVOLOL 10 MG/1
10 TABLET ORAL DAILY
Qty: 30 TABLET | Refills: 11 | Status: SHIPPED | OUTPATIENT
Start: 2017-04-13 | End: 2017-10-10 | Stop reason: SDUPTHER

## 2017-04-13 RX ORDER — CLONAZEPAM 1 MG/1
1 TABLET ORAL NIGHTLY
Qty: 30 TABLET | Refills: 5 | Status: SHIPPED | OUTPATIENT
Start: 2017-04-13 | End: 2017-10-03 | Stop reason: SDUPTHER

## 2017-04-13 NOTE — TELEPHONE ENCOUNTER
Called to notify patient Harvoni prescription received and insurance requires a PA.    Patient is well informed on procedures with his insurance, and asked me to keep him posted.  I explained we would.    Patient verbalized understanding.

## 2017-04-13 NOTE — MR AVS SNAPSHOT
[facility]  [address] [facility] [address]  Phone: [phone]  Fax: [phone]                  Ulysses Boreaux   2017 10:00 AM   Office Visit    Description:  Male : 1950   Provider:  Owen Howell MD   Department:  [facility]           Reason for Visit     Hand Pain           Diagnoses this Visit        Comments    Chronic pain due to trauma                To Do List           Future Appointments        Provider Department Dept Phone    2017 10:00 AM Owen Howell MD [facility] [phone]    2017 10:00 AM Citlali Mcgarry, NP-C, E [facility] Diabetes Management [phone]    2017 9:00 AM MAURA Adams OD [facility] - Ophthalmology [phone]    2017 10:40 AM Owen Howell MD [facility] Inter[facility] [phone]    2017 10:30 AM LABORATORY, [address] [facility] [phone]      Goals (5 Years of Data)     None      Follow-Up and Disposition     Return in about 12 weeks (around 2017).       These Medications        Disp Refills Start End    hydrocodone-acetaminophen 10-325mg (NORCO)  mg Tab 60 tablet 0 2017     HYDROCODONE-ACETAMINOPHEN (Norco)  MG ORAL TAB - 1 tab po bid prn pain    Pharmacy: [facility] [address] [place] [address] SAUL MARTINEZ AT [facility] Ph #: [phone]         [facility]     [facility] Nurse Care [facility] -  Assistance  Unless otherwise directed by your provider, please contact [facility], our nurse care line that is available for  assistance.     Registered nurses in the [facility] provide: appointment scheduling, clinical advisement, health education, and other advisory services.  Call: [phone] (toll free)               Medications           Message regarding Medications     Verify the changes and/or additions to your medication  regime listed below are the same as discussed with your clinician today.  If any of these changes or additions are incorrect, please notify your healthcare provider.             Verify that the below list of medications is an accurate representation of the medications you are currently taking.  If none reported, the list may be blank. If incorrect, please contact your healthcare provider. Carry this list with you in case of emergency.           Current Medications     amlodipine (NORVASC) 10 MG tablet Take 1 tablet (10 mg total) by mouth once daily.    atorvastatin (LIPITOR) 20 MG tablet Take 1 tablet (20 mg total) by mouth nightly.    blood glucose control high,low Soln 1 each by Misc.(Non-Drug; Combo Route) route as needed. Accu-chek Sarina Plus control solution    blood sugar diagnostic Strp 1 each by Misc.(Non-Drug; Combo Route) route 2 (two) times daily.    blood-glucose meter (FREESTYLE SYSTEM KIT) kit Use as instructed    clonazePAM (KLONOPIN) 1 MG tablet Take 1 tablet (1 mg total) by mouth every evening.    fluticasone (FLONASE) 50 mcg/actuation nasal spray INSTILL 2 SPRAYS IN EACH NOSTRIL ONCE DAILY    hydrocodone-acetaminophen 10-325mg (NORCO)  mg Tab Starting on Jun 11, 2017. HYDROCODONE-ACETAMINOPHEN (Norco)  MG ORAL TAB - 1 tab po bid prn pain    insulin NPH-insulin regular, 70/30, (HUMULIN 70/30) 100 unit/mL (70-30) injection INJECT 25 UNITS UNDER THE SKIN EVERY MORNING, THEN 23 UNITS EVERY EVENING    insulin syringe-needle U-100 1 mL 31 gauge x 5/16 Syrg USE AS DIRECTED WITH INSULIN    lancets Misc 1 each by Misc.(Non-Drug; Combo Route) route 2 (two) times daily.    losartan-hydrochlorothiazide 100-25 mg (HYZAAR) 100-25 mg per tablet Take 1 tablet by mouth once daily.    nebivolol (BYSTOLIC) 10 MG Tab Take 1 tablet (10 mg total) by mouth once daily.    hydrocortisone 1 % cream Apply to affected area on neck and chest 2 times daily           Clinical Reference Information           Your  "Vitals Were     BP Pulse Resp Height Weight BMI    109/69 (BP Location: Right arm, Patient Position: Sitting, BP Method: Automatic) 80 18 6' 3" (1.905 m) 90.7 kg (200 lb) 25 kg/m2      Blood Pressure          Most Recent Value    BP  109/69      Allergies as of 4/13/2017     No Known Allergies      Immunizations Administered on Date of Encounter - 4/13/2017     None      [facility] Sign-Up     Activating your [facility] account is as easy as 1-2-3!     1) Visit [email or web address], select Sign Up Now, enter this activation code and your date of birth, then select Next.  [email or web address]  Expires: 5/28/2017  9:49 AM      2) Create a username and password to use when you visit [facility] in the future and select a security question in case you lose your password and select Next.    3) Enter your e-mail address and click Sign Up!    Additional Information  If you have questions, please e-mail [email or web address] or call [phone] to talk to our [facility] staff. Remember, [facility] is NOT to be used for urgent needs. For medical emergencies, dial 911.         Language Assistance Services     ATTENTION: Language assistance services are available, free of charge. Please call [phone].      ATENCIÓN: Si habla español, tiene a acosta disposición servicios gratuitos de asistencia lingüística. Llame al [phone].     CHÚ Ý: N?u b?n nói Ti?ng Vi?t, có các d?ch v? h? tr? ngôn ng? mi?n phí dành cho b?n. G?i s? [phone].         [facility] complies with applicable Federal civil rights laws and does not discriminate on the basis of race, color, national origin, age, disability, or sex.        "

## 2017-04-13 NOTE — PROGRESS NOTES
"Subjective:        Chief Complaint:  Left Hand Pain    History of Present Illness:  This patient is a 65 year old male who presents today for f/u complaining of the above noted pains. The patient describes this pain as follows.    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 work, left lower quadrant pain since hernia surgery in 2004  - timing (constant, intermittent): Constant  - character (sharp, dull, aching, burning): Throbbing  - radiating: No  - dermatomal distribution: No  - side: Left   - aggravating factors: Nothing worsens left digit pain, left lower quadrant pain worse with exercise or walking  - relieving factors: Medication / Norco  - antecedent trauma: Amputation via skill saw and 1997, hernia repair in 2004  - prior spinal surgery: None  - pertinent negatives: No fever, No chills, No weight loss, No bladder dysfunction, No bowel dysfunction, No extremity weakness, No saddle anesthesia  - medications tried: Norco, Percocet, over-the-counter medications, compound pain cream  - other therapies tried (physical therapy, injections):     >> physical therapy tried in the past    >> no prior injection        ROS:  CONSTITUTIONAL: No fever, chills, weight loss  SKIN: No rash or itching  CARDIOVASCULAR:  No chest pain, palpitations  RESPIRATORY: No shortness of breath  GASTROINTESTINAL: No diarrhea, No constipation, abdominal pain, left lower quadrant  GENITOURINARY: No urinary incontinence    MUSCULOSKELETAL:  - patient reports pain as above, see chief complaint     NEUROLOGICAL:   - Pain as above  - Strength in lower extremities is normal  - Sensation in lower extremities is normal  - No bowel or bladder incontinence     PSYCHIATRIC: No change in mood noticed       Objective:     /69 (BP Location: Right arm, Patient Position: Sitting, BP Method: Automatic)  Pulse 80  Resp 18  Ht 6' 3" (1.905 m)  Wt 90.7 kg (200 lb)  BMI 25 " kg/m2(reviewed on 4/13/2017)    General: alert and oriented, in no apparent distress  Gait: normal gait  Skin: No rashes, No discoloration   HEENT: EOMI  Respiratory: respirations nonlabored    Musculoskeletal:  - Cervical range of motion intact, left upper extremity range of motion intact throughout  - Digital stumps are hypersensitive to palpation, hypersensitivity does not extend further proximal, no color change, no swelling, no changes in hair growth along left hand     Neuro:  - Lower extremities:      >> 5/5 strength bilaterally, throughout, symmetric  - Reflexes: Upper extremity DTRs 2+ throughout  - Sensory: sensation to light touch intact bilaterally, hypersensitivity as described above    Psych: mood and affect appropriate        Assessment:   - Chronic pain due to trauma      Plan:   - This patient presents today for follow-up visit.  He has chronic left digital pain along stumps at site of previous amputation.    - He continues to take the Norco 10/325 twice daily, and this helps his pain, he tolerates this medication well, denies any drowsiness or constipation.    - LA  appropriate (filled on 3-12-17).  - UDS from last visit was appropriate, I will check today.  - RTC in 3 months for med refill.   - I discussed the risks, benefits, and alternatives to potential treatment options. All questions and concerns were fully addressed today in clinic.      >> UDS:  8-18-15 :: appropriate  12-29-15 :: appropriate  5-3-16 :: appropriate  10-18-16 :: appropriate  4/13/2017 :: pending

## 2017-04-14 LAB — HIV 1+2 AB+HIV1 P24 AG SERPL QL IA: NEGATIVE

## 2017-04-18 LAB
A2 MACROGLOB SERPL-MCNC: 365 MG/DL (ref 106–279)
ALT SERPL W P-5'-P-CCNC: 70 U/L (ref 9–46)
APO A-I SERPL-MCNC: 151 MG/DL (ref 94–176)
BILIRUB SERPL-MCNC: 0.6 MG/DL (ref 0.2–1.2)
FIBROSIS STAGE SERPL QL: ABNORMAL
FIBROTEST INTERPRETATION: ABNORMAL
FOOTNOTE: ABNORMAL
GGT SERPL-CCNC: 107 U/L (ref 3–70)
HAPTOGLOB SERPL-MCNC: 129 MG/DL (ref 43–212)
LIVER FIBR SCORE SERPL CALC.FIBROSURE: 0.73
NECROINFLAMMAT INTERP: ABNORMAL
NECROINFLAMMATORY ACT GRADE SERPL QL: ABNORMAL
NECROINFLAMMATORY ACT SCORE SERPL: 0.58
REFERENCE ID: ABNORMAL

## 2017-04-26 DIAGNOSIS — I15.2 HYPERTENSION ASSOCIATED WITH DIABETES: ICD-10-CM

## 2017-04-26 DIAGNOSIS — E11.59 HYPERTENSION ASSOCIATED WITH DIABETES: ICD-10-CM

## 2017-04-26 RX ORDER — LOSARTAN POTASSIUM AND HYDROCHLOROTHIAZIDE 25; 100 MG/1; MG/1
TABLET ORAL
Qty: 30 TABLET | Refills: 11 | Status: SHIPPED | OUTPATIENT
Start: 2017-04-26 | End: 2017-10-10 | Stop reason: SDUPTHER

## 2017-04-26 RX ORDER — AMLODIPINE BESYLATE 10 MG/1
TABLET ORAL
Qty: 30 TABLET | Refills: 11 | Status: SHIPPED | OUTPATIENT
Start: 2017-04-26 | End: 2017-10-10 | Stop reason: SDUPTHER

## 2017-05-09 ENCOUNTER — TELEPHONE (OUTPATIENT)
Dept: PHARMACY | Facility: CLINIC | Age: 67
End: 2017-05-09

## 2017-05-09 NOTE — TELEPHONE ENCOUNTER
Called to notify patient PA for Js was denied on his insurance.    Explained to patient that I notified Ms. Vega of decision and we would notify him of next steps.  Patient verbalized understanding and was thankful for the call and information.

## 2017-05-10 ENCOUNTER — TELEPHONE (OUTPATIENT)
Dept: GASTROENTEROLOGY | Facility: CLINIC | Age: 67
End: 2017-05-10

## 2017-05-19 ENCOUNTER — OFFICE VISIT (OUTPATIENT)
Dept: DIABETES | Facility: CLINIC | Age: 67
End: 2017-05-19
Payer: MEDICAID

## 2017-05-19 VITALS
BODY MASS INDEX: 24.21 KG/M2 | DIASTOLIC BLOOD PRESSURE: 70 MMHG | HEIGHT: 75 IN | WEIGHT: 194.69 LBS | SYSTOLIC BLOOD PRESSURE: 108 MMHG

## 2017-05-19 DIAGNOSIS — I15.2 HYPERTENSION ASSOCIATED WITH DIABETES: ICD-10-CM

## 2017-05-19 DIAGNOSIS — E78.2 COMBINED HYPERLIPIDEMIA ASSOCIATED WITH TYPE 2 DIABETES MELLITUS: ICD-10-CM

## 2017-05-19 DIAGNOSIS — E11.59 HYPERTENSION ASSOCIATED WITH DIABETES: ICD-10-CM

## 2017-05-19 DIAGNOSIS — E11.69 COMBINED HYPERLIPIDEMIA ASSOCIATED WITH TYPE 2 DIABETES MELLITUS: ICD-10-CM

## 2017-05-19 LAB — GLUCOSE SERPL-MCNC: 183 MG/DL (ref 70–110)

## 2017-05-19 PROCEDURE — 99213 OFFICE O/P EST LOW 20 MIN: CPT | Mod: PBBFAC | Performed by: NURSE PRACTITIONER

## 2017-05-19 PROCEDURE — 82948 REAGENT STRIP/BLOOD GLUCOSE: CPT | Mod: PBBFAC | Performed by: NURSE PRACTITIONER

## 2017-05-19 PROCEDURE — 99999 PR PBB SHADOW E&M-EST. PATIENT-LVL III: CPT | Mod: PBBFAC,,, | Performed by: NURSE PRACTITIONER

## 2017-05-19 PROCEDURE — 99214 OFFICE O/P EST MOD 30 MIN: CPT | Mod: S$PBB,,, | Performed by: NURSE PRACTITIONER

## 2017-05-19 RX ORDER — DEXTROSE 4 G
1 TABLET,CHEWABLE ORAL 2 TIMES DAILY
Qty: 1 EACH | Refills: 0 | Status: SHIPPED | OUTPATIENT
Start: 2017-05-19 | End: 2017-10-26 | Stop reason: SDUPTHER

## 2017-05-19 RX ORDER — LANCETS
1 EACH MISCELLANEOUS 2 TIMES DAILY
Qty: 100 EACH | Refills: 11 | Status: SHIPPED | OUTPATIENT
Start: 2017-05-19 | End: 2017-08-22 | Stop reason: SDUPTHER

## 2017-05-19 NOTE — PROGRESS NOTES
"PCP: Dr. Pantoja    HISTORY OF PRESENT ILLNESS: 66 year old  male patient is in clinic today for diabetes.  Patient has had Type II diabetes since 2003.  Most recent A1C was 7.9, ADA recommends less than 7.0.  Patient has lost 6 pounds since last visit.  No glucometer or BG readings today.  Per recall, fasting BGs are 119 - 127; ac dinner around 109 - 120 s.  He has a history of chronic Hep C.     Patient denies polyuria, polydipsia, polyphagia, or blurred vision.   Also denies nausea, vomiting, or diarrhea.  Has occasional hypoglycemia, BG 70, which he treats with cream pies.     Height: 6 ' 3 " Weight: 194 pounds, BMI 24.33  Blood Glucose reading this AM: 140 mg/dl fasting  Blood Glucose reading in clinic: 183 mg/dl 8:56 am    DM MEDICATIONS:   Humulin 70 / 30 - 21 units TWICE DAILY ac    LABORATORY: reviewed    REVIEW OF SYSTEMS:  GENERAL: Denies fever, chills, change in appetite.  HEENT: Denies impaired hearing, dysphagia.  RESPIRATORY: Denies shortness of breath, cough or wheezing.  CARDIOVASCULAR: Denies chest pain, palpitations.  GI: Denies hematochezia.  : Denies hematuria, dysuria or frequency.  MS: Normal gait. Denies difficulty with mobility, muscle or joint pain.  SKIN: Denies rashes and lesions.  NEURO: Denies numbness or tingling in the hands or feet.   PSYCH: Denies depression or anxiety. No suicidal ideations.  ENDO: See HPI.    STANDARDS OF CARE:   Eye exam: Dr. JOANNA Adams, Last exam 5 / 2016.  Has an upcoming appointment.   Dental exam: Recommend regular exams; denies gums bleeding.  Podiatry exam: None    ACTIVITY LEVEL: No regular activity.   MEAL PLANNING: Number of meals per day to be 3 and number of snacks per day to be 2.  Patient is encouraged to consume 45 - 60 grams of carbohydrates in each meal, and 1800 k / enid per day.  Per dietary recall, patient is not limiting carbohydrates, saturated fats and sodium.   BLOOD GLUCOSE TESTING: Self-monitoring with ACCU-CHEK " meter    PHYSICAL EXAMINATION:  GENERAL: WDWN  male in no acute distress, ambulatory, responds appropriately. AAO X 3.   NECK: Supple, no thyromegaly, no cervical or supraclavicular lymphadenopathy, no carotid bruit.  HEART: Regular rate and rhythm. No rubs, murmurs or gallops.   LUNGS: CTA bilaterally. Unlabored breathing, no use of accessory muscles.  MUSCULOSKELETAL: Normal gait and muscle strength.  ABDOMEN: Active bowel sounds X 4, no masses or tenderness.   SKIN: Warm, dry skin. No lesions or abrasions. Clean, dry well-healed injection sites.   NEUROLOGIC: Cranial nerves II-XII grossly intact.   FOOT EXAMINATION: Protective Sensation (w/ 10 gram monofilament):  Right: Intact Left: Intact  // Visual Inspection:  Normal -  Bilateral  // Pedal Pulses:  Right: Present Left: Present    ASSESSMENT:  1. Diabetes Type 2 - suboptimal control on  Humulin 70 / 30, A1C 7.9  2. Hypertension - good control on Losartan-HCT, amlodipine, bystolic   3. Hyperlipidemia - good control on atorvastatin    PLAN:  1.) Patient was instructed to monitor blood glucose 2 - 3 x daily, fasting and ac lunch and dinner. Discussed ADA goal for fasting blood sugar, 80 - 130 mg/dL; pp blood sugars below 180 mg/dl. Also, discussed prevention of hypoglycemia and the need to adjust goals to higher levels if persistent hypoglycemia.  Reminded to bring BG records or meter to each visit for review.  2.) Reviewed pathophysiology of Type 2 diabetes, complications related to the disease, importance of annual dilated eye exam and daily foot examination.  3.) Continue Humulin 70/30 -  21 units TWICE DAILY ac.  He did not bring records or meter today, so will continue current meds.  Since A1C is < 8 %, I explained that patient should limit his carbohydrate intake because his pp BG are contributing to elevation in A1C.  Also, increase exercise.   He voiced understanding.  I  suggested he monitor BG after meals so we can adjust insulin as  needed accordingly.  He agrees to bring records to clinic next Friday.  4.) Meal planning teaching: Carbohydrate definition - one serving is 15 gms. Carbohydrate spacing - carbohydrates should be spaced into approximately 3 meals with 2 snacks ( of one carbohydrate ) between meals or at bedtime. Increase vegetable intake to 2 or more cups of vegetables per day as well as 2 fruit servings. Recommended low saturated fat, low sodium diet to aid in control of hypertension and cholesterol.  5.) Discussed activity, benefits, methods, and precautions. Recommended patient start some form of exercise and increase as tolerated to 30 minutes per day to facilitate weight loss and aid in control of blood glucoses.  6.) Future Labs scheduled in July: A1C   7.) Return to clinic in 3 months for follow up.  Advised patient to call clinic with any questions or concerns.    Citlali Mcgarry, NP-C, CDE

## 2017-05-19 NOTE — MR AVS SNAPSHOT
[facility]  [address] [facility]  [place] [address]  Phone: [phone]  Fax: [phone]                  Ulysses Boreaux   2017 9:00 AM   Office Visit    Description:  Male : 1950   Provider:  BEN Ugalde, ANNAMARIA   Department:  [facility]           Reason for Visit     Diabetes           Diagnoses this Visit        Comments    Uncontrolled type 2 diabetes mellitus without complication, with long-term current use of insulin    -  Primary     Hypertension associated with diabetes         Unspecified essential hypertension                To Do List           Future Appointments        Provider Department Dept Phone    2017 9:00 AM MAURA Adams OD [facility] - Ophthalmology [phone]    2017 10:40 AM Owen Howell MD [facility] - Interventional Pain [phone]    2017 10:30 AM LABORATORY, [address] [facility] [phone]    2017 11:00 AM Pietro Smith MD [place] - Hematology Oncology [phone]    2017 9:00 AM BEN Ugalde, ANNAMARIA [facility] Diabetes Management [phone]      Goals (5 Years of Data)     None      Follow-Up and Disposition     Return in about 3 months (around 2017).      [facility]     [facility] Care [facility] -  Assistance  Unless otherwise directed by your provider, please contact [facility], our nurse care line that is available for  assistance.     Registered nurses in the [facility] provide: appointment scheduling, clinical advisement, health education, and other advisory services.  Call: [phone] (toll free)               Medications           Message regarding Medications     Verify the changes and/or additions to your medication regime listed below are the same as discussed with your clinician today.  If any of these changes or additions are incorrect, please notify your healthcare provider.    "          Verify that the below list of medications is an accurate representation of the medications you are currently taking.  If none reported, the list may be blank. If incorrect, please contact your healthcare provider. Carry this list with you in case of emergency.           Current Medications     amlodipine (NORVASC) 10 MG tablet TAKE 1 TABLET BY MOUTH ONCE DAILY    atorvastatin (LIPITOR) 20 MG tablet Take 1 tablet (20 mg total) by mouth nightly.    blood glucose control high,low Soln 1 each by Misc.(Non-Drug; Combo Route) route as needed. Accu-chek Sarina Plus control solution    clonazePAM (KLONOPIN) 1 MG tablet Take 1 tablet (1 mg total) by mouth every evening.    fluticasone (FLONASE) 50 mcg/actuation nasal spray INSTILL 2 SPRAYS IN EACH NOSTRIL ONCE DAILY    hydrocodone-acetaminophen 10-325mg (NORCO)  mg Tab Starting on Jun 11, 2017. HYDROCODONE-ACETAMINOPHEN (Norco)  MG ORAL TAB - 1 tab po bid prn pain    insulin NPH-insulin regular, 70/30, (HUMULIN 70/30) 100 unit/mL (70-30) injection INJECT 21 UNITS UNDER THE SKIN EVERY MORNING, THEN 21 UNITS EVERY EVENING    insulin syringe-needle U-100 1 mL 31 gauge x 5/16 Syrg USE AS DIRECTED WITH INSULIN    losartan-hydrochlorothiazide 100-25 mg (HYZAAR) 100-25 mg per tablet TAKE 1 TABLET BY MOUTH EVERY DAY    nebivolol (BYSTOLIC) 10 MG Tab Take 1 tablet (10 mg total) by mouth once daily.    hydrocortisone 1 % cream Apply to affected area on neck and chest 2 times daily           Clinical Reference Information           Your Vitals Were     BP Height Weight BMI       108/70 6' 3" (1.905 m) 88.3 kg (194 lb 10.7 oz) 24.33 kg/m2       Blood Pressure          Most Recent Value    BP  108/70      Allergies as of 5/19/2017     No Known Allergies      Immunizations Administered on Date of Encounter - 5/19/2017     None      Orders Placed During Today's Visit      Normal Orders This Visit    POCT glucose          5/19/2017  9:29 AM - Cb Joseph LPN    "   Component Results     Component Value Flag Ref Range Units Status    POC Glucose 183 (A) 70 - 110 mg/dL Final            [facility] Sign-Up     Activating your [facility] account is as easy as 1-2-3!     1) Visit [email or web address], select Sign Up Now, enter this activation code and your date of birth, then select Next.  [email or web address]  Expires: 5/28/2017  9:49 AM      2) Create a username and password to use when you visit [facility] in the future and select a security question in case you lose your password and select Next.    3) Enter your e-mail address and click Sign Up!    Additional Information  If you have questions, please e-mail [email or web address] or call [phone] to talk to our [facility] staff. Remember, [facility] is NOT to be used for urgent needs. For medical emergencies, dial 911.         Language Assistance Services     ATTENTION: Language assistance services are available, free of charge. Please call [phone].      ATENCIÓN: Si habla susan, tiene a acosta disposición servicios gratuitos de asistencia lingüística. Llame al [phone].     CHÚ Ý: N?u b?n nói Ti?ng Vi?t, có các d?ch v? h? tr? ngôn ng? mi?n phí dành cho b?n. G?i s? [phone].         [facility] complies with applicable Federal civil rights laws and does not discriminate on the basis of race, color, national origin, age, disability, or sex.

## 2017-05-22 ENCOUNTER — TELEPHONE (OUTPATIENT)
Dept: GASTROENTEROLOGY | Facility: CLINIC | Age: 67
End: 2017-05-22

## 2017-05-22 ENCOUNTER — TELEPHONE (OUTPATIENT)
Dept: PHARMACY | Facility: CLINIC | Age: 67
End: 2017-05-22

## 2017-05-22 NOTE — TELEPHONE ENCOUNTER
----- Message from Nolvia Angulo sent at 5/10/2017  3:46 PM CDT -----  Regarding: RE: Ranjeetrosa m Denied  Yes, I am.  They we can submit the PA for Zepatier.  They may still deny it, then we can appeal Ms. Govea.    Lilliana  ----- Message -----     From: Jeferson Vega PA-C     Sent: 5/10/2017   2:59 PM       To: Nolvia Angulo  Subject: RE: Ranjeetrosa m Denied                               I can send you a script for Zepatier. Are you seeing this happen a lot where the Zepatier is preferred?  Jeferson     ----- Message -----     From: Nolvia Angulo     Sent: 5/9/2017   4:03 PM       To: Jeferson Vega PA-C, Black Davila, PharmD, #  Subject: Harvoni Denied                                   Ms. Govea,    [employer] has denied the PA for Js stating criteria has not been met for coverage.    1.  Zepatier is preferred   2.  Patient does not have advanced severe fibrosis of the liver.    Would you like us to appeal this decision?    Lilliana

## 2017-05-26 ENCOUNTER — OFFICE VISIT (OUTPATIENT)
Dept: OPHTHALMOLOGY | Facility: CLINIC | Age: 67
End: 2017-05-26
Payer: MEDICAID

## 2017-05-26 DIAGNOSIS — H52.7 REFRACTIVE ERROR: ICD-10-CM

## 2017-05-26 DIAGNOSIS — E11.9 DIABETES MELLITUS TYPE 2 WITHOUT RETINOPATHY: ICD-10-CM

## 2017-05-26 DIAGNOSIS — E11.59 HYPERTENSION ASSOCIATED WITH DIABETES: Primary | ICD-10-CM

## 2017-05-26 DIAGNOSIS — I15.2 HYPERTENSION ASSOCIATED WITH DIABETES: Primary | ICD-10-CM

## 2017-05-26 DIAGNOSIS — Z13.5 SCREENING FOR GLAUCOMA: ICD-10-CM

## 2017-05-26 DIAGNOSIS — Z96.1 PSEUDOPHAKIA OF RIGHT EYE: ICD-10-CM

## 2017-05-26 DIAGNOSIS — H25.12 CATARACT, NUCLEAR SCLEROTIC, LEFT EYE: ICD-10-CM

## 2017-05-26 PROCEDURE — 99999 PR PBB SHADOW E&M-EST. PATIENT-LVL I: CPT | Mod: PBBFAC,,, | Performed by: OPTOMETRIST

## 2017-05-26 PROCEDURE — 92014 COMPRE OPH EXAM EST PT 1/>: CPT | Mod: S$PBB,,, | Performed by: OPTOMETRIST

## 2017-05-26 PROCEDURE — 99211 OFF/OP EST MAY X REQ PHY/QHP: CPT | Mod: PBBFAC | Performed by: OPTOMETRIST

## 2017-05-26 PROCEDURE — 92015 DETERMINE REFRACTIVE STATE: CPT | Mod: ,,, | Performed by: OPTOMETRIST

## 2017-06-01 NOTE — TELEPHONE ENCOUNTER
PA for Zepatier DENIED on 5/30/17 because patient does not have advanced to severe fibrosis of the liver (F3-F4 fibrosis).    Routing provider to advise.

## 2017-06-02 NOTE — TELEPHONE ENCOUNTER
Patient returned call and was given  Information on denial.  And patient states that he has spoken with the pharmacy and they advised that they will continue to work on this because his insurance will change in august.

## 2017-07-06 ENCOUNTER — OFFICE VISIT (OUTPATIENT)
Dept: PAIN MEDICINE | Facility: CLINIC | Age: 67
End: 2017-07-06
Payer: MEDICAID

## 2017-07-06 VITALS
SYSTOLIC BLOOD PRESSURE: 123 MMHG | HEIGHT: 75 IN | WEIGHT: 194 LBS | BODY MASS INDEX: 24.12 KG/M2 | RESPIRATION RATE: 16 BRPM | HEART RATE: 74 BPM | DIASTOLIC BLOOD PRESSURE: 76 MMHG

## 2017-07-06 DIAGNOSIS — S68.119A TRAUMATIC AMPUTATION OF DIGIT OF ONE HAND WITHOUT COMPLICATION: Primary | ICD-10-CM

## 2017-07-06 DIAGNOSIS — G89.21 CHRONIC PAIN DUE TO TRAUMA: ICD-10-CM

## 2017-07-06 DIAGNOSIS — M79.642 LEFT HAND PAIN: ICD-10-CM

## 2017-07-06 DIAGNOSIS — G89.4 CHRONIC PAIN DISORDER: ICD-10-CM

## 2017-07-06 PROCEDURE — 99999 PR PBB SHADOW E&M-EST. PATIENT-LVL IV: CPT | Mod: PBBFAC,,, | Performed by: PHYSICIAN ASSISTANT

## 2017-07-06 PROCEDURE — 99214 OFFICE O/P EST MOD 30 MIN: CPT | Mod: S$PBB,,, | Performed by: PHYSICIAN ASSISTANT

## 2017-07-06 PROCEDURE — 99214 OFFICE O/P EST MOD 30 MIN: CPT | Mod: PBBFAC | Performed by: PHYSICIAN ASSISTANT

## 2017-07-06 RX ORDER — HYDROCODONE BITARTRATE AND ACETAMINOPHEN 10; 325 MG/1; MG/1
TABLET ORAL
Qty: 60 TABLET | Refills: 0 | Status: SHIPPED | OUTPATIENT
Start: 2017-07-12 | End: 2017-07-06 | Stop reason: SDUPTHER

## 2017-07-06 RX ORDER — HYDROCODONE BITARTRATE AND ACETAMINOPHEN 10; 325 MG/1; MG/1
TABLET ORAL
Qty: 60 TABLET | Refills: 0 | Status: SHIPPED | OUTPATIENT
Start: 2017-08-10 | End: 2017-09-28 | Stop reason: SDUPTHER

## 2017-07-06 RX ORDER — HYDROCODONE BITARTRATE AND ACETAMINOPHEN 10; 325 MG/1; MG/1
1 TABLET ORAL 2 TIMES DAILY PRN
Qty: 60 TABLET | Refills: 0 | Status: SHIPPED | OUTPATIENT
Start: 2017-09-08 | End: 2017-09-28 | Stop reason: SDUPTHER

## 2017-07-06 NOTE — PROGRESS NOTES
"Subjective:     Chief Complaint:  Left Hand Pain    History of Present Illness:  This patient is a 65 year old male who presents today for f/u complaining of the above noted pains. The patient describes this pain as follows.    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 work, left lower quadrant pain since hernia surgery in 2004  - timing (constant, intermittent): Constant  - character (sharp, dull, aching, burning): Throbbing  - radiating: No  - dermatomal distribution: No  - side: Left   - aggravating factors: Nothing worsens left digit pain, left lower quadrant pain worse with exercise or walking  - relieving factors: Medication / Norco  - antecedent trauma: Amputation via skill saw and 1997, hernia repair in 2004  - prior spinal surgery: None  - pertinent negatives: No fever, No chills, No weight loss, No bladder dysfunction, No bowel dysfunction, No extremity weakness, No saddle anesthesia  - medications tried: Norco, Percocet, over-the-counter medications, compound pain cream  - other therapies tried (physical therapy, injections):     >> physical therapy tried in the past    >> no prior injections        ROS:  CONSTITUTIONAL: No fever, chills, weight loss  SKIN: No rash or itching  CARDIOVASCULAR:  No chest pain, palpitations  RESPIRATORY: No shortness of breath  GASTROINTESTINAL: No diarrhea, No constipation, abdominal pain, left lower quadrant  GENITOURINARY: No urinary incontinence    MUSCULOSKELETAL:  - patient reports pain as above, see chief complaint     NEUROLOGICAL:   - Pain as above  - Strength in lower extremities is normal  - Sensation in lower extremities is normal  - No bowel or bladder incontinence     PSYCHIATRIC: No change in mood noticed       Objective:     Vitals:  /76 (BP Location: Right arm, Patient Position: Sitting, BP Method: Automatic)   Pulse 74   Resp 16   Ht 6' 3" (1.905 m)   Wt 88 kg (194 lb)   BMI " 24.25 kg/m²    (reviewed on 7/6/2017)     General: alert and oriented, in no apparent distress  Gait: normal gait  Skin: No rashes, No discoloration   HEENT: EOMI  Respiratory: respirations nonlabored    Musculoskeletal:  - Cervical range of motion intact, left upper extremity range of motion intact throughout  - Digital stumps are hypersensitive to palpation, hypersensitivity does not extend further proximal, no color change, no swelling, no changes in hair growth along left hand     Neuro:  - Lower extremities:      >> 5/5 strength bilaterally, throughout, symmetric  - Reflexes:    >> Upper extremity DTRs 2+ throughout  - Sensory:    >> sensation to light touch intact bilaterally, hypersensitivity as described above    Psych: mood and affect appropriate        Assessment:   - Chronic pain due to trauma      Plan:   - This patient presents today for follow-up visit.  He has chronic left digital pain along stumps at site of previous amputation.    - Refill Norco 10/325 BID PRN pain x 3 months. He feels this helps his pain. He tolerates this medication well, and he denies any drowsiness or constipation.    - LA  appropriate (filled on 6-13-17).  - UDS from last visit was appropriate. Will order UDS next visit to ensure medication compliance.    RTC in 3 months for med refill. I discussed the risks, benefits, and alternatives to potential treatment options. All questions and concerns were fully addressed today in clinic. Dr. Howell was consulted regarding the patient plan and agrees.             >> UDS:  8-18-15 :: appropriate  12-29-15 :: appropriate  5-3-16 :: appropriate  10-18-16 :: appropriate  4/13/2017 :: appropriate

## 2017-07-16 DIAGNOSIS — J30.9 ALLERGIC RHINITIS: ICD-10-CM

## 2017-07-17 RX ORDER — FLUTICASONE PROPIONATE 50 MCG
SPRAY, SUSPENSION (ML) NASAL
Qty: 16 G | Refills: 11 | Status: SHIPPED | OUTPATIENT
Start: 2017-07-17 | End: 2018-07-16 | Stop reason: SDUPTHER

## 2017-07-27 ENCOUNTER — LAB VISIT (OUTPATIENT)
Dept: LAB | Facility: HOSPITAL | Age: 67
End: 2017-07-27
Attending: INTERNAL MEDICINE
Payer: MEDICAID

## 2017-07-27 ENCOUNTER — OFFICE VISIT (OUTPATIENT)
Dept: HEMATOLOGY/ONCOLOGY | Facility: CLINIC | Age: 67
End: 2017-07-27
Payer: MEDICAID

## 2017-07-27 VITALS
DIASTOLIC BLOOD PRESSURE: 75 MMHG | TEMPERATURE: 97 F | BODY MASS INDEX: 24.45 KG/M2 | HEIGHT: 75 IN | SYSTOLIC BLOOD PRESSURE: 128 MMHG | RESPIRATION RATE: 20 BRPM | OXYGEN SATURATION: 98 % | WEIGHT: 196.63 LBS | HEART RATE: 87 BPM

## 2017-07-27 DIAGNOSIS — D72.820 LYMPHOCYTOSIS: Primary | ICD-10-CM

## 2017-07-27 DIAGNOSIS — D72.820 LYMPHOCYTOSIS: ICD-10-CM

## 2017-07-27 DIAGNOSIS — B18.2 CHRONIC HEPATITIS C WITHOUT HEPATIC COMA: Primary | ICD-10-CM

## 2017-07-27 DIAGNOSIS — B18.2 HEPATITIS C VIRUS CARRIER STATE: Primary | ICD-10-CM

## 2017-07-27 DIAGNOSIS — B18.2 CHRONIC HEPATITIS C WITHOUT HEPATIC COMA: ICD-10-CM

## 2017-07-27 LAB
ALBUMIN SERPL BCP-MCNC: 3.6 G/DL
ALP SERPL-CCNC: 72 U/L
ALT SERPL W/O P-5'-P-CCNC: 97 U/L
ANION GAP SERPL CALC-SCNC: 8 MMOL/L
AST SERPL-CCNC: 88 U/L
BASOPHILS # BLD AUTO: 0.03 K/UL
BASOPHILS NFR BLD: 0.7 %
BILIRUB SERPL-MCNC: 0.6 MG/DL
BUN SERPL-MCNC: 19 MG/DL
CALCIUM SERPL-MCNC: 9.9 MG/DL
CHLORIDE SERPL-SCNC: 101 MMOL/L
CO2 SERPL-SCNC: 30 MMOL/L
CREAT SERPL-MCNC: 1.6 MG/DL
DIFFERENTIAL METHOD: ABNORMAL
EOSINOPHIL # BLD AUTO: 0.1 K/UL
EOSINOPHIL NFR BLD: 2.8 %
ERYTHROCYTE [DISTWIDTH] IN BLOOD BY AUTOMATED COUNT: 12.3 %
EST. GFR  (AFRICAN AMERICAN): 52 ML/MIN/1.73 M^2
EST. GFR  (NON AFRICAN AMERICAN): 45 ML/MIN/1.73 M^2
GLUCOSE SERPL-MCNC: 349 MG/DL
HCT VFR BLD AUTO: 41.3 %
HGB BLD-MCNC: 14.9 G/DL
LYMPHOCYTES # BLD AUTO: 2.3 K/UL
LYMPHOCYTES NFR BLD: 51 %
MCH RBC QN AUTO: 33.5 PG
MCHC RBC AUTO-ENTMCNC: 36.1 G/DL
MCV RBC AUTO: 93 FL
MONOCYTES # BLD AUTO: 0.5 K/UL
MONOCYTES NFR BLD: 10.7 %
NEUTROPHILS # BLD AUTO: 1.6 K/UL
NEUTROPHILS NFR BLD: 34.8 %
PLATELET # BLD AUTO: 155 K/UL
PMV BLD AUTO: 10.1 FL
POTASSIUM SERPL-SCNC: 3.9 MMOL/L
PROT SERPL-MCNC: 7.5 G/DL
RBC # BLD AUTO: 4.45 M/UL
SODIUM SERPL-SCNC: 139 MMOL/L
WBC # BLD AUTO: 4.59 K/UL

## 2017-07-27 PROCEDURE — 99214 OFFICE O/P EST MOD 30 MIN: CPT | Mod: PBBFAC,PO | Performed by: INTERNAL MEDICINE

## 2017-07-27 PROCEDURE — 99999 PR PBB SHADOW E&M-EST. PATIENT-LVL IV: CPT | Mod: PBBFAC,,, | Performed by: INTERNAL MEDICINE

## 2017-07-27 PROCEDURE — 99214 OFFICE O/P EST MOD 30 MIN: CPT | Mod: S$PBB,,, | Performed by: INTERNAL MEDICINE

## 2017-07-27 NOTE — PROGRESS NOTES
Subjective:       Patient ID: Ulysses Boreaux is a 66 y.o. male.    Chief Complaint: Follow-up    HPI  This 66-year-old -American gentleman had a   CBC done on 02/29/2016 that was reported as showing a hemoglobin of 14.9 with   4.58 million red cells. White cell count was 4390 (66% lymphocytes and 13.9%   granulocytes. Monocytes were 13.4%).      The patient's electronic chart shows that he has had relative lymphocytosis in   his differential at least since 2013 when the lymphocytes were about 50%.      The patient has a history of chronic active hepatitis C. He was referred to GI.  The process was started to get him on medication, and he was supposed to start HARVONI but he has not  ALLERGIES: None.      MEDICATIONS: See MedCard.      PREVIOUS SURGERIES: Amputation of fingers of the left hand after saw accident.   Appendectomy. Abdominal wall hernia repair x2.      SOCIAL HISTORY: He is single. He has two children. He lives in [place].   Smokes an occasional cigar. He used to smoke cigarettes, a pack a day or so for  10 years. He stopped in the 1970s. Denies drinking. He was a ,   currently disabled due to the amputation of the fingers of the left hand.      FAMILY HISTORY: Mother had throat cancer as well as a brother. A brother and   sister have diabetes. No heart attacks.      PAST MEDICAL HISTORY:  1. 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  2. Chronic active hepatitis C.  3. High blood pressure.  4. Diabetes.  5. Relative lymphocytosis without leukocytosis.  6. Abdominal wall hernia.      Review of Systems   Constitutional: Negative.  Negative for fatigue.   Eyes: Negative.    Cardiovascular: Negative.  Negative for chest pain.   Gastrointestinal: Negative for abdominal pain and nausea.   Genitourinary: Negative.  Negative for hematuria.   Musculoskeletal: Negative.    Skin: Negative.    Neurological: Negative.    Psychiatric/Behavioral: Negative.        Objective:       Physical Exam   Constitutional: He is oriented to person, place, and time. He appears well-developed and well-nourished.   HENT:   Head: Normocephalic.   Mouth/Throat: No oropharyngeal exudate.   Eyes: Pupils are equal, round, and reactive to light.   Neck: No thyromegaly present.   Cardiovascular: Normal rate, regular rhythm and normal heart sounds.  Exam reveals no gallop.    No murmur heard.  Pulmonary/Chest: No respiratory distress. He has no wheezes. He has no rales.   Abdominal: Soft. Bowel sounds are normal. He exhibits no distension and no mass. There is no rebound and no guarding.   Musculoskeletal: Normal range of motion. He exhibits no edema.   Lymphadenopathy:     He has no cervical adenopathy.   Neurological: He is alert and oriented to person, place, and time.   Skin: Skin is warm and dry.   Psychiatric: He has a normal mood and affect. His behavior is normal.       Wt Readings from Last 3 Encounters:   07/27/17 89.2 kg (196 lb 10.4 oz)   07/06/17 88 kg (194 lb)   05/19/17 88.3 kg (194 lb 10.7 oz)     Temp Readings from Last 3 Encounters:   07/27/17 97.2 °F (36.2 °C) (Oral)   04/03/17 96.6 °F (35.9 °C) (Tympanic)   03/25/17 98.1 °F (36.7 °C) (Oral)     BP Readings from Last 3 Encounters:   07/27/17 128/75   07/06/17 123/76   05/19/17 108/70     Pulse Readings from Last 3 Encounters:   07/27/17 87   07/06/17 74   04/13/17 80       Assessment:       1. Chronic hepatitis C without hepatic coma    2. Lymphocytosis        Plan:       Lab Results   Component Value Date    WBC 4.59 07/27/2017    HGB 14.9 07/27/2017    HCT 41.3 07/27/2017    MCV 93 07/27/2017     07/27/2017       Counts are essentially unchanged. See me in 6 months with a cbc  I am not sure why his insuranxce denies his treatment for hep C. Will refer to

## 2017-07-27 NOTE — PROGRESS NOTES
Subjective:       Patient ID: Ulysses Boreaux is a 66 y.o. male.    Chief Complaint: No chief complaint on file.    HPI  This 66-year-old -American gentleman had a   CBC done on 02/29/2016 that was reported as showing a hemoglobin of 14.9 with   4.58 million red cells. White cell count was 4390 (66% lymphocytes and 13.9%   granulocytes. Monocytes were 13.4%).      The patient's electronic chart shows that he has had relative lymphocytosis in   his differential at least since 2013 when the lymphocytes were about 50%.However, the number num,naseem of lymphocytes is normal, and his blood count shows relative neutropenia      The patient has a history of chronic active hepatitis C. He was referred to GI.  The process was started to get him on medication, and he was supposed to start treatment but he has not  ALLERGIES: None.      MEDICATIONS: See MedCard.      PREVIOUS SURGERIES: Amputation of fingers of the left hand after saw accident.   Appendectomy. Abdominal wall hernia repair x2.      SOCIAL HISTORY: He is single. He has two children. He lives in [place].   Smokes an occasional cigar. He used to smoke cigarettes, a pack a day or so for  10 years. He stopped in the 1970s. Denies drinking. He was a ,   currently disabled due to the amputation of the fingers of the left hand.      FAMILY HISTORY: Mother had throat cancer as well as a brother. A brother and   sister have diabetes. No heart attacks.      PAST MEDICAL HISTORY:  1. 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  2. Chronic active hepatitis C.  3. High blood pressure.  4. Diabetes.  5. Relative lymphocytosis without leukocytosis.  6. Abdominal wall hernia.      Review of Systems   Constitutional: Negative.  Negative for fatigue.   Eyes: Negative.    Cardiovascular: Negative.  Negative for chest pain.   Gastrointestinal: Negative for abdominal pain and nausea.   Genitourinary: Negative.  Negative for hematuria.    Musculoskeletal: Negative.    Skin: Negative.    Neurological: Negative.    Psychiatric/Behavioral: Negative.        Objective:      Physical Exam   Constitutional: He is oriented to person, place, and time. He appears well-developed and well-nourished.   HENT:   Head: Normocephalic.   Mouth/Throat: No oropharyngeal exudate.   Eyes: Pupils are equal, round, and reactive to light.   Neck: No thyromegaly present.   Cardiovascular: Normal rate, regular rhythm and normal heart sounds.  Exam reveals no gallop.    No murmur heard.  Pulmonary/Chest: No respiratory distress. He has no wheezes. He has no rales.   Abdominal: Soft. Bowel sounds are normal. He exhibits no distension and no mass. There is no rebound and no guarding.   Musculoskeletal: Normal range of motion. He exhibits no edema.   Lymphadenopathy:     He has no cervical adenopathy.   Neurological: He is alert and oriented to person, place, and time.   Skin: Skin is warm and dry.   Psychiatric: He has a normal mood and affect. His behavior is normal.       Wt Readings from Last 3 Encounters:   07/27/17 89.2 kg (196 lb 10.4 oz)   07/06/17 88 kg (194 lb)   05/19/17 88.3 kg (194 lb 10.7 oz)     Temp Readings from Last 3 Encounters:   07/27/17 97.2 °F (36.2 °C) (Oral)   04/03/17 96.6 °F (35.9 °C) (Tympanic)   03/25/17 98.1 °F (36.7 °C) (Oral)     BP Readings from Last 3 Encounters:   07/27/17 128/75   07/06/17 123/76   05/19/17 108/70     Pulse Readings from Last 3 Encounters:   07/27/17 87   07/06/17 74   04/13/17 80       Assessment:       1. Lymphocytosis    2. Chronic hepatitis C without hepatic coma        Plan:       Lab Results   Component Value Date    WBC 4.59 07/27/2017    HGB 14.9 07/27/2017    HCT 41.3 07/27/2017    MCV 93 07/27/2017     07/27/2017        Lymphocytes 51% granulocyes 37%  I am stgill unsure why his insurance denies treatmetn . Will ask  to check onthis

## 2017-07-28 LAB
ESTIMATED AVG GLUCOSE: 180 MG/DL
HBA1C MFR BLD HPLC: 7.9 %

## 2017-08-03 ENCOUNTER — TELEPHONE (OUTPATIENT)
Dept: HEMATOLOGY/ONCOLOGY | Facility: CLINIC | Age: 67
End: 2017-08-03

## 2017-08-03 NOTE — TELEPHONE ENCOUNTER
Rec'd call back from pt. Although he now does have traditional Medicare, he says, active August 1, he still has Medicaid secondary and likely this is still his only prescription coverage. Therefore, SW will contact [facility] to determine if there is any way to get the hep C medication approved for the patient. Will f/u with and update; will keep pt informed as well.

## 2017-08-03 NOTE — TELEPHONE ENCOUNTER
Attempted to reach pt to discuss issues obtaining Hep C medication. No answer; left voicemail requesting call back. Will f/u when call returned.

## 2017-08-22 ENCOUNTER — OFFICE VISIT (OUTPATIENT)
Dept: DIABETES | Facility: CLINIC | Age: 67
End: 2017-08-22
Payer: MEDICARE

## 2017-08-22 VITALS
HEIGHT: 75 IN | WEIGHT: 191.13 LBS | SYSTOLIC BLOOD PRESSURE: 114 MMHG | DIASTOLIC BLOOD PRESSURE: 70 MMHG | BODY MASS INDEX: 23.77 KG/M2

## 2017-08-22 DIAGNOSIS — E78.2 COMBINED HYPERLIPIDEMIA ASSOCIATED WITH TYPE 2 DIABETES MELLITUS: ICD-10-CM

## 2017-08-22 DIAGNOSIS — E11.65 UNCONTROLLED TYPE 2 DIABETES MELLITUS WITH HYPERGLYCEMIA, WITH LONG-TERM CURRENT USE OF INSULIN: ICD-10-CM

## 2017-08-22 DIAGNOSIS — Z79.4 UNCONTROLLED TYPE 2 DIABETES MELLITUS WITH HYPERGLYCEMIA, WITH LONG-TERM CURRENT USE OF INSULIN: ICD-10-CM

## 2017-08-22 DIAGNOSIS — E11.69 COMBINED HYPERLIPIDEMIA ASSOCIATED WITH TYPE 2 DIABETES MELLITUS: ICD-10-CM

## 2017-08-22 DIAGNOSIS — I15.2 HYPERTENSION ASSOCIATED WITH DIABETES: ICD-10-CM

## 2017-08-22 DIAGNOSIS — E11.59 HYPERTENSION ASSOCIATED WITH DIABETES: ICD-10-CM

## 2017-08-22 LAB — GLUCOSE SERPL-MCNC: 87 MG/DL (ref 70–110)

## 2017-08-22 PROCEDURE — 99999 PR PBB SHADOW E&M-EST. PATIENT-LVL III: CPT | Mod: PBBFAC,,, | Performed by: NURSE PRACTITIONER

## 2017-08-22 PROCEDURE — 99214 OFFICE O/P EST MOD 30 MIN: CPT | Mod: S$PBB,,, | Performed by: NURSE PRACTITIONER

## 2017-08-22 PROCEDURE — 3008F BODY MASS INDEX DOCD: CPT | Mod: ,,, | Performed by: NURSE PRACTITIONER

## 2017-08-22 PROCEDURE — 4010F ACE/ARB THERAPY RXD/TAKEN: CPT | Mod: ,,, | Performed by: NURSE PRACTITIONER

## 2017-08-22 PROCEDURE — 99213 OFFICE O/P EST LOW 20 MIN: CPT | Mod: PBBFAC | Performed by: NURSE PRACTITIONER

## 2017-08-22 PROCEDURE — 3045F PR MOST RECENT HEMOGLOBIN A1C LEVEL 7.0-9.0%: CPT | Mod: ,,, | Performed by: NURSE PRACTITIONER

## 2017-08-22 PROCEDURE — 82948 REAGENT STRIP/BLOOD GLUCOSE: CPT | Mod: PBBFAC | Performed by: NURSE PRACTITIONER

## 2017-08-22 PROCEDURE — 3078F DIAST BP <80 MM HG: CPT | Mod: ,,, | Performed by: NURSE PRACTITIONER

## 2017-08-22 PROCEDURE — 3074F SYST BP LT 130 MM HG: CPT | Mod: ,,, | Performed by: NURSE PRACTITIONER

## 2017-08-22 RX ORDER — SYRINGE,SAFETY WITH NEEDLE,1ML 25GX1"
SYRINGE (EA) MISCELLANEOUS
Qty: 100 EACH | Refills: 11 | Status: SHIPPED | OUTPATIENT
Start: 2017-08-22 | End: 2018-02-02 | Stop reason: SDUPTHER

## 2017-08-22 RX ORDER — LANCETS
1 EACH MISCELLANEOUS 2 TIMES DAILY
Qty: 100 EACH | Refills: 11 | Status: SHIPPED | OUTPATIENT
Start: 2017-08-22 | End: 2018-03-02 | Stop reason: CLARIF

## 2017-08-22 NOTE — PROGRESS NOTES
"PCP: Dr. Pantoja    HISTORY OF PRESENT ILLNESS: 67 year old  male patient is in clinic today for diabetes.  Patient has had Type II diabetes since 2003.  Most recent A1C was 7.9, ADA recommends less than 7.0.  Patient has lost 3 pounds since last visit.  No glucometer or blood glucose readings today.  Per recall, fasting BGs are mid 70 s - 130 s; ac dinner < 130 s.  He has a history of chronic Hep C.     Patient denies polyuria, polydipsia, polyphagia, or blurred vision.   Also denies nausea, vomiting, or diarrhea.  Has occasional hypoglycemia, BG 70, which he treats with cream pies.     Height: 6 ' 3 " Weight: 191 pounds, BMI 23.89  Blood Glucose reading this AM: Not Taken  Blood Glucose reading in clinic: 87 mg/dl 8:59 am    DM MEDICATIONS:   Humulin 70 / 30 - 21 units TWICE DAILY ac    LABORATORY: reviewed    REVIEW OF SYSTEMS:  GENERAL: Denies fever, chills, change in appetite.  HEENT: Denies impaired hearing, dysphagia.  RESPIRATORY: Denies shortness of breath, cough or wheezing.  CARDIOVASCULAR: Denies chest pain, palpitations.  GI: Denies hematochezia.  : Denies hematuria, dysuria or frequency.  MS: Normal gait. Denies difficulty with mobility, muscle or joint pain.  SKIN: Denies rashes and lesions.  NEURO: Denies numbness or tingling in the hands or feet.   PSYCH: Denies depression or anxiety. No suicidal ideations.  ENDO: See HPI.    STANDARDS OF CARE:   Eye exam: Dr. JOANNA Adams, Last exam 5 / 2017  Dental exam: Recommend regular exams; denies gums bleeding.  Podiatry exam: None    ACTIVITY LEVEL: No regular activity.   MEAL PLANNING: Number of meals per day to be 3 and number of snacks per day to be 2.  Patient is encouraged to consume 45 - 60 grams of carbohydrates in each meal, and 1800 k / enid per day.  Per dietary recall, patient is not limiting carbohydrates, saturated fats and sodium.   BLOOD GLUCOSE TESTING: Self-monitoring with ACCU-CHEK meter    PHYSICAL EXAMINATION:  GENERAL: " WDWN  male in no acute distress, ambulatory, responds appropriately. AAO X 3.   NECK: Supple, no thyromegaly, no cervical or supraclavicular lymphadenopathy, no carotid bruit.  HEART: Regular rate and rhythm. No rubs, murmurs or gallops.   LUNGS: CTA bilaterally. Unlabored breathing, no use of accessory muscles.  MUSCULOSKELETAL: Normal gait and muscle strength.  ABDOMEN: Active bowel sounds X 4, no masses or tenderness.   SKIN: Warm, dry skin. No lesions or abrasions. Clean, dry well-healed injection sites.   NEUROLOGIC: Cranial nerves II-XII grossly intact.   FOOT EXAMINATION: Protective Sensation (w/ 10 gram monofilament):  Right: Intact Left: Intact  // Visual Inspection:  Normal -  Bilateral  // Pedal Pulses:  Right: Present Left: Present    ASSESSMENT:  1. Diabetes Type 2 - suboptimal control on  Humulin 70 / 30, A1C 7.9  2. Hypertension - good control on Losartan-HCT, amlodipine, bystolic   3. Hyperlipidemia - good control on atorvastatin    PLAN:  1.) Patient was instructed to monitor blood glucose 2 - 3 x daily, fasting and ac lunch and dinner. Discussed ADA goal for fasting blood sugar, 80 - 130 mg/dL; pp blood sugars below 180 mg/dl. Also, discussed prevention of hypoglycemia and the need to adjust goals to higher levels if persistent hypoglycemia.  Reminded to bring blood glucose records or meter to each visit for review.  2.) Reviewed pathophysiology of Type 2 diabetes, complications related to the disease, importance of annual dilated eye exam and daily foot examination.  3.) Continue Humulin 70 / 30 -  Increase 22 - 23  units before breakfast, but continue 21 units before dinner.  He did not bring records or meter today, so no further adjustments made.  Since A1C is < 8 %, I explained that patient should limit his carbohydrate intake because his pp BG are contributing to elevation in A1C.  Also, increase exercise.   He voiced understanding.   4.) Meal planning teaching: Carbohydrate  definition - one serving is 15 gms. Carbohydrate spacing - carbohydrates should be spaced into approximately 3 meals with 2 snacks ( of one carbohydrate ) between meals or at bedtime. Increase vegetable intake to 2 or more cups of vegetables per day as well as 2 fruit servings. Recommended low saturated fat, low sodium diet to aid in control of hypertension and cholesterol.  5.) Discussed activity, benefits, methods, and precautions. Recommended patient start some form of exercise and increase as tolerated to 30 minutes per day to facilitate weight loss and aid in control of blood glucoses.  6.) Future Labs scheduled: C peptide, ARETHA, insulin antibody, CMP, A1C, and micral  6.) Return to clinic in 3 months for follow up.  Advised patient to call clinic with any questions or concerns.    Citlali Mcgarry, NP-C, CDE

## 2017-09-26 ENCOUNTER — LAB VISIT (OUTPATIENT)
Dept: LAB | Facility: HOSPITAL | Age: 67
End: 2017-09-26
Attending: FAMILY MEDICINE
Payer: MEDICARE

## 2017-09-26 DIAGNOSIS — E11.69 HYPERLIPIDEMIA ASSOCIATED WITH TYPE 2 DIABETES MELLITUS: ICD-10-CM

## 2017-09-26 DIAGNOSIS — E78.5 HYPERLIPIDEMIA ASSOCIATED WITH TYPE 2 DIABETES MELLITUS: ICD-10-CM

## 2017-09-26 LAB
ALBUMIN SERPL BCP-MCNC: 3.6 G/DL
ALP SERPL-CCNC: 71 U/L
ALT SERPL W/O P-5'-P-CCNC: 77 U/L
ANION GAP SERPL CALC-SCNC: 9 MMOL/L
AST SERPL-CCNC: 73 U/L
BILIRUB SERPL-MCNC: 0.7 MG/DL
BUN SERPL-MCNC: 22 MG/DL
CALCIUM SERPL-MCNC: 9.5 MG/DL
CHLORIDE SERPL-SCNC: 98 MMOL/L
CHOLEST SERPL-MCNC: 127 MG/DL
CHOLEST/HDLC SERPL: 2.7 {RATIO}
CO2 SERPL-SCNC: 30 MMOL/L
CREAT SERPL-MCNC: 1.4 MG/DL
EST. GFR  (AFRICAN AMERICAN): >60 ML/MIN/1.73 M^2
EST. GFR  (NON AFRICAN AMERICAN): 52.4 ML/MIN/1.73 M^2
GLUCOSE SERPL-MCNC: 142 MG/DL
HDLC SERPL-MCNC: 47 MG/DL
HDLC SERPL: 37 %
LDLC SERPL CALC-MCNC: 65.6 MG/DL
NONHDLC SERPL-MCNC: 80 MG/DL
POTASSIUM SERPL-SCNC: 3.6 MMOL/L
PROT SERPL-MCNC: 7.4 G/DL
SODIUM SERPL-SCNC: 137 MMOL/L
TRIGL SERPL-MCNC: 72 MG/DL

## 2017-09-26 PROCEDURE — 80061 LIPID PANEL: CPT

## 2017-09-26 PROCEDURE — 80053 COMPREHEN METABOLIC PANEL: CPT

## 2017-09-26 PROCEDURE — 36415 COLL VENOUS BLD VENIPUNCTURE: CPT

## 2017-09-28 DIAGNOSIS — G89.21 CHRONIC PAIN DUE TO TRAUMA: ICD-10-CM

## 2017-09-28 RX ORDER — HYDROCODONE BITARTRATE AND ACETAMINOPHEN 10; 325 MG/1; MG/1
1 TABLET ORAL 2 TIMES DAILY PRN
Qty: 60 TABLET | Refills: 0 | Status: SHIPPED | OUTPATIENT
Start: 2017-11-06 | End: 2017-12-15 | Stop reason: SDUPTHER

## 2017-09-28 RX ORDER — HYDROCODONE BITARTRATE AND ACETAMINOPHEN 10; 325 MG/1; MG/1
TABLET ORAL
Qty: 60 TABLET | Refills: 0 | Status: SHIPPED | OUTPATIENT
Start: 2017-12-05 | End: 2017-10-03 | Stop reason: SDUPTHER

## 2017-09-28 RX ORDER — HYDROCODONE BITARTRATE AND ACETAMINOPHEN 10; 325 MG/1; MG/1
TABLET ORAL
Qty: 60 TABLET | Refills: 0 | Status: SHIPPED | OUTPATIENT
Start: 2017-10-08 | End: 2017-09-28 | Stop reason: SDUPTHER

## 2017-09-29 ENCOUNTER — OFFICE VISIT (OUTPATIENT)
Dept: PAIN MEDICINE | Facility: CLINIC | Age: 67
End: 2017-09-29
Payer: MEDICARE

## 2017-09-29 VITALS
WEIGHT: 190 LBS | HEIGHT: 75 IN | DIASTOLIC BLOOD PRESSURE: 80 MMHG | BODY MASS INDEX: 23.62 KG/M2 | HEART RATE: 79 BPM | RESPIRATION RATE: 16 BRPM | SYSTOLIC BLOOD PRESSURE: 125 MMHG

## 2017-09-29 DIAGNOSIS — S68.119A TRAUMATIC AMPUTATION OF DIGIT OF ONE HAND WITHOUT COMPLICATION: ICD-10-CM

## 2017-09-29 DIAGNOSIS — M79.642 LEFT HAND PAIN: ICD-10-CM

## 2017-09-29 DIAGNOSIS — G89.4 CHRONIC PAIN DISORDER: ICD-10-CM

## 2017-09-29 DIAGNOSIS — G89.21 CHRONIC PAIN DUE TO TRAUMA: Primary | ICD-10-CM

## 2017-09-29 PROCEDURE — 99999 PR PBB SHADOW E&M-EST. PATIENT-LVL IV: CPT | Mod: PBBFAC,,, | Performed by: PHYSICIAN ASSISTANT

## 2017-09-29 PROCEDURE — 3079F DIAST BP 80-89 MM HG: CPT | Mod: ,,, | Performed by: PHYSICIAN ASSISTANT

## 2017-09-29 PROCEDURE — 99214 OFFICE O/P EST MOD 30 MIN: CPT | Mod: PBBFAC | Performed by: PHYSICIAN ASSISTANT

## 2017-09-29 PROCEDURE — 3074F SYST BP LT 130 MM HG: CPT | Mod: ,,, | Performed by: PHYSICIAN ASSISTANT

## 2017-09-29 PROCEDURE — 99214 OFFICE O/P EST MOD 30 MIN: CPT | Mod: S$PBB,,, | Performed by: PHYSICIAN ASSISTANT

## 2017-10-03 ENCOUNTER — OFFICE VISIT (OUTPATIENT)
Dept: INTERNAL MEDICINE | Facility: CLINIC | Age: 67
End: 2017-10-03
Payer: MEDICARE

## 2017-10-03 VITALS
WEIGHT: 193 LBS | DIASTOLIC BLOOD PRESSURE: 74 MMHG | OXYGEN SATURATION: 98 % | BODY MASS INDEX: 24 KG/M2 | HEART RATE: 85 BPM | HEIGHT: 75 IN | SYSTOLIC BLOOD PRESSURE: 128 MMHG | TEMPERATURE: 97 F

## 2017-10-03 DIAGNOSIS — I15.2 HYPERTENSION ASSOCIATED WITH DIABETES: Primary | ICD-10-CM

## 2017-10-03 DIAGNOSIS — Z13.6 ENCOUNTER FOR ABDOMINAL AORTIC ANEURYSM (AAA) SCREENING: ICD-10-CM

## 2017-10-03 DIAGNOSIS — E78.2 COMBINED HYPERLIPIDEMIA ASSOCIATED WITH TYPE 2 DIABETES MELLITUS: ICD-10-CM

## 2017-10-03 DIAGNOSIS — Z12.11 COLON CANCER SCREENING: ICD-10-CM

## 2017-10-03 DIAGNOSIS — E11.59 HYPERTENSION ASSOCIATED WITH DIABETES: Primary | ICD-10-CM

## 2017-10-03 DIAGNOSIS — E11.69 COMBINED HYPERLIPIDEMIA ASSOCIATED WITH TYPE 2 DIABETES MELLITUS: ICD-10-CM

## 2017-10-03 PROCEDURE — 99214 OFFICE O/P EST MOD 30 MIN: CPT | Mod: S$PBB,,, | Performed by: NURSE PRACTITIONER

## 2017-10-03 PROCEDURE — 99999 PR PBB SHADOW E&M-EST. PATIENT-LVL IV: CPT | Mod: PBBFAC,,, | Performed by: NURSE PRACTITIONER

## 2017-10-03 PROCEDURE — 99214 OFFICE O/P EST MOD 30 MIN: CPT | Mod: PBBFAC | Performed by: NURSE PRACTITIONER

## 2017-10-03 RX ORDER — CLONAZEPAM 1 MG/1
1 TABLET ORAL NIGHTLY
Qty: 30 TABLET | Refills: 5 | Status: SHIPPED | OUTPATIENT
Start: 2017-10-03 | End: 2018-04-04 | Stop reason: SDUPTHER

## 2017-10-03 NOTE — PROGRESS NOTES
"Subjective:       Patient ID: Ulysses Boreaux is a 67 y.o. male.    Chief Complaint: 6 month follow up    Patient presents to clinic today for followup of chronic conditions including HTN, HLD, and DMII. He reports his sleep has worsened in past two months. He is averaging 4-5 hours per night and often wakes feeling not rested. He is compliant with klonipin nightly and reports he has "been on it for years".        Review of Systems   Constitutional: Negative for chills, fatigue, fever and unexpected weight change.   Eyes: Negative for visual disturbance.   Respiratory: Negative for shortness of breath.    Cardiovascular: Negative for chest pain.   Musculoskeletal: Negative for myalgias.   Neurological: Negative for headaches.   Psychiatric/Behavioral: Positive for sleep disturbance.       Objective:      Physical Exam   Constitutional: He is oriented to person, place, and time. He appears well-developed and well-nourished. No distress.   HENT:   Head: Normocephalic and atraumatic.   Eyes: Conjunctivae and EOM are normal. Pupils are equal, round, and reactive to light.   Cardiovascular: Normal rate and regular rhythm.  Exam reveals no gallop and no friction rub.    No murmur heard.  Pulmonary/Chest: Effort normal and breath sounds normal.   Neurological: He is alert and oriented to person, place, and time.   Skin: Skin is warm and dry.   Psychiatric: He has a normal mood and affect.   Vitals reviewed.      Assessment:       1. Hypertension associated with diabetes    2. Combined hyperlipidemia associated with type 2 diabetes mellitus    3. Uncontrolled type 2 diabetes mellitus without complication, with long-term current use of insulin    4. Encounter for abdominal aortic aneurysm (AAA) screening    5. Colon cancer screening        Plan:   Hypertension associated with diabetes  Comments:  blood pressure controlled, continue current medications, keep follow up with DM next month for A1c    Combined hyperlipidemia " associated with type 2 diabetes mellitus  Comments:  lipids controlled, continue statin    Uncontrolled type 2 diabetes mellitus without complication, with long-term current use of insulin  Comments:  last A1c 7.9, home readings  per patient, keep follow up next month with DM NP    Encounter for abdominal aortic aneurysm (AAA) screening  -     Radiology US Abdominal Aorta; Future; Expected date: 10/03/2017    Colon cancer screening  -     Case request GI: COLONOSCOPY    Other orders  -     clonazePAM (KLONOPIN) 1 MG tablet; Take 1 tablet (1 mg total) by mouth every evening.  Dispense: 30 tablet; Refill: 5      Health Maintenance reviewed/updated.  Will get flu and PNA13 vaccines from pharmacy today.  Will discuss with Dr. Scarlett zepeda and sleep changes.  Patient verbalized understanding and agreement.   Return if symptoms worsen or fail to improve, for annual wellness.

## 2017-10-06 ENCOUNTER — TELEPHONE (OUTPATIENT)
Dept: INTERNAL MEDICINE | Facility: CLINIC | Age: 67
End: 2017-10-06

## 2017-10-06 NOTE — TELEPHONE ENCOUNTER
Patient notified of  and [facility]'s recommendations. Patient states he will think about it and call back if he decides to schedule with sleep specialist. Advised to call the office as needed,patient verbalized understanding.

## 2017-10-06 NOTE — TELEPHONE ENCOUNTER
----- Message from Soheila Tolbert NP sent at 10/4/2017  8:12 AM CDT -----  Regarding: FW: sleep and klonopin  Please let patient know I discussed with Dr. Pantoja and she agreed on sleep study or referral to sleep specialist if he wants to change the klonopin.    Thanks  jen  ----- Message -----  From: Elza Pantoja MD  Sent: 10/4/2017   6:56 AM  To: Soheila Tolbert NP  Subject: RE: sleep and klonopin                           I agree with sleep study, next step is either psychiatry or sleep specialist.  Thanks,  gina  ----- Message -----  From: Soheila Tolbert NP  Sent: 10/3/2017  10:24 AM  To: Elza Pantoja MD  Subject: sleep and klonopin                               I saw him today for 6 month follow up. He reports sleep has gotten worse in past two months and getting 4-5 hours per night. Sometimes wakes feeling rested, other times not. He is on klonopin 1mg nightly and stated he's been on that for years. Not sure where else to go with him but I recommended maybe a sleep study and he did not seem to want that.    Thoughts?    jen

## 2017-10-09 DIAGNOSIS — I15.2 HYPERTENSION ASSOCIATED WITH DIABETES: ICD-10-CM

## 2017-10-09 DIAGNOSIS — E11.59 HYPERTENSION ASSOCIATED WITH DIABETES: ICD-10-CM

## 2017-10-09 DIAGNOSIS — E78.2 COMBINED HYPERLIPIDEMIA ASSOCIATED WITH TYPE 2 DIABETES MELLITUS: ICD-10-CM

## 2017-10-09 DIAGNOSIS — G47.00 INSOMNIA, UNSPECIFIED TYPE: Primary | ICD-10-CM

## 2017-10-09 DIAGNOSIS — E11.69 COMBINED HYPERLIPIDEMIA ASSOCIATED WITH TYPE 2 DIABETES MELLITUS: ICD-10-CM

## 2017-10-09 NOTE — TELEPHONE ENCOUNTER
----- Message from Mony Barragan sent at 10/9/2017  9:14 AM CDT -----  Pt is requesting a call from nurse to discuss his refills on all his medication from a previous visit.        Please call pt back at [phone]

## 2017-10-10 ENCOUNTER — TELEPHONE (OUTPATIENT)
Dept: INTERNAL MEDICINE | Facility: CLINIC | Age: 67
End: 2017-10-10

## 2017-10-10 NOTE — TELEPHONE ENCOUNTER
Patient notified about the appointment with the sleep specialist that has been scheduled for 10/26. Patient states he does not want to go to this appointment. Patient states he is calling to have all of his medications refilled. Patient states at the last appointment only the Klonopin was refilled. Advised that the appointment will be canceled and medications will be sent to [facility] for refill. Advised to call back if he decides to have the sleep study done, patient verbalized understanding.

## 2017-10-11 RX ORDER — NEBIVOLOL 10 MG/1
10 TABLET ORAL DAILY
Qty: 30 TABLET | Refills: 6 | Status: SHIPPED | OUTPATIENT
Start: 2017-10-11 | End: 2019-10-30 | Stop reason: SDUPTHER

## 2017-10-11 RX ORDER — LOSARTAN POTASSIUM AND HYDROCHLOROTHIAZIDE 25; 100 MG/1; MG/1
1 TABLET ORAL DAILY
Qty: 30 TABLET | Refills: 6 | Status: SHIPPED | OUTPATIENT
Start: 2017-10-11 | End: 2018-04-01 | Stop reason: SDUPTHER

## 2017-10-11 RX ORDER — ATORVASTATIN CALCIUM 20 MG/1
20 TABLET, FILM COATED ORAL NIGHTLY
Qty: 30 TABLET | Refills: 6 | Status: SHIPPED | OUTPATIENT
Start: 2017-10-11 | End: 2018-04-30 | Stop reason: SDUPTHER

## 2017-10-11 RX ORDER — AMLODIPINE BESYLATE 10 MG/1
10 TABLET ORAL DAILY
Qty: 30 TABLET | Refills: 6 | Status: SHIPPED | OUTPATIENT
Start: 2017-10-11 | End: 2018-04-01 | Stop reason: SDUPTHER

## 2017-10-13 ENCOUNTER — TELEPHONE (OUTPATIENT)
Dept: INTERNAL MEDICINE | Facility: CLINIC | Age: 67
End: 2017-10-13

## 2017-10-13 NOTE — TELEPHONE ENCOUNTER
----- Message from Soheila Tolbert NP sent at 10/12/2017  3:29 PM CDT -----  Please notify patient lipid panel normal. CMP within acceptable limits and stable from previous results.

## 2017-10-20 ENCOUNTER — LAB VISIT (OUTPATIENT)
Dept: LAB | Facility: HOSPITAL | Age: 67
End: 2017-10-20
Attending: NURSE PRACTITIONER
Payer: MEDICARE

## 2017-10-20 LAB
C PEPTIDE SERPL-MCNC: 1.33 NG/ML
ESTIMATED AVG GLUCOSE: 163 MG/DL
HBA1C MFR BLD HPLC: 7.3 %

## 2017-10-20 PROCEDURE — 84681 ASSAY OF C-PEPTIDE: CPT

## 2017-10-20 PROCEDURE — 83036 HEMOGLOBIN GLYCOSYLATED A1C: CPT

## 2017-10-20 PROCEDURE — 36415 COLL VENOUS BLD VENIPUNCTURE: CPT

## 2017-10-25 LAB
GAD65 AB SER-SCNC: 0 NMOL/L
INSULIN AB SER-SCNC: 0.04 NMOL/L (ref 0–0.02)

## 2017-10-26 RX ORDER — DEXTROSE 4 G
1 TABLET,CHEWABLE ORAL 2 TIMES DAILY
Qty: 1 EACH | Refills: 0 | Status: SHIPPED | OUTPATIENT
Start: 2017-10-26 | End: 2018-03-05 | Stop reason: ALTCHOICE

## 2017-10-26 NOTE — TELEPHONE ENCOUNTER
LM informing patient new prescription for a Accu-chek bennett plus meter will be sent in to his preferred pharmacy. Advised patient to call back with any additional questions or concerns.

## 2017-10-26 NOTE — TELEPHONE ENCOUNTER
----- Message from Akua Carias sent at 10/25/2017 11:53 AM CDT -----  blood sugar machine acting up, pls adv..[phone] (home)

## 2017-11-28 ENCOUNTER — OFFICE VISIT (OUTPATIENT)
Dept: DIABETES | Facility: CLINIC | Age: 67
End: 2017-11-28
Payer: MEDICARE

## 2017-11-28 ENCOUNTER — LAB VISIT (OUTPATIENT)
Dept: LAB | Facility: HOSPITAL | Age: 67
End: 2017-11-28
Attending: NURSE PRACTITIONER
Payer: MEDICARE

## 2017-11-28 VITALS
WEIGHT: 194 LBS | HEIGHT: 75 IN | SYSTOLIC BLOOD PRESSURE: 132 MMHG | DIASTOLIC BLOOD PRESSURE: 86 MMHG | BODY MASS INDEX: 24.12 KG/M2

## 2017-11-28 DIAGNOSIS — E11.59 HYPERTENSION ASSOCIATED WITH DIABETES: ICD-10-CM

## 2017-11-28 DIAGNOSIS — I15.2 HYPERTENSION ASSOCIATED WITH DIABETES: ICD-10-CM

## 2017-11-28 DIAGNOSIS — E11.69 COMBINED HYPERLIPIDEMIA ASSOCIATED WITH TYPE 2 DIABETES MELLITUS: ICD-10-CM

## 2017-11-28 DIAGNOSIS — E78.2 COMBINED HYPERLIPIDEMIA ASSOCIATED WITH TYPE 2 DIABETES MELLITUS: ICD-10-CM

## 2017-11-28 LAB
CREAT UR-MCNC: 25 MG/DL
GLUCOSE SERPL-MCNC: 142 MG/DL (ref 70–110)
MICROALBUMIN UR DL<=1MG/L-MCNC: <2.5 UG/ML
MICROALBUMIN/CREATININE RATIO: NORMAL UG/MG

## 2017-11-28 PROCEDURE — 99214 OFFICE O/P EST MOD 30 MIN: CPT | Mod: S$PBB,,, | Performed by: NURSE PRACTITIONER

## 2017-11-28 PROCEDURE — 82948 REAGENT STRIP/BLOOD GLUCOSE: CPT | Mod: PBBFAC | Performed by: NURSE PRACTITIONER

## 2017-11-28 PROCEDURE — 99999 PR PBB SHADOW E&M-EST. PATIENT-LVL III: CPT | Mod: PBBFAC,,, | Performed by: NURSE PRACTITIONER

## 2017-11-28 PROCEDURE — 82570 ASSAY OF URINE CREATININE: CPT

## 2017-11-28 PROCEDURE — 99213 OFFICE O/P EST LOW 20 MIN: CPT | Mod: PBBFAC | Performed by: NURSE PRACTITIONER

## 2017-11-28 RX ORDER — INSULIN PUMP SYRINGE, 3 ML
1 EACH MISCELLANEOUS DAILY PRN
Qty: 1 EACH | Refills: 3 | Status: SHIPPED | OUTPATIENT
Start: 2017-11-28 | End: 2019-02-12

## 2017-11-28 NOTE — PROGRESS NOTES
"PCP: Dr. Pantoja    HISTORY OF PRESENT ILLNESS: 67 year old  male patient is in clinic today for diabetes.  Patient has had Type II diabetes since 2003.  Most recent A1C was 7.3, ADA recommends less than 7.0.  Patient has gained 3 pounds since last visit.  No glucometer or blood glucose readings today.  Per recall, fasting BGs are mid 80 s - 150 s; occasional 160. Not really monitoring other times.  He has a history of chronic Hep C.     Patient denies polyuria, polydipsia, polyphagia, or blurred vision.   Also denies nausea, vomiting, or diarrhea.  Has occasional hypoglycemia, BG 70, which he treats with cream pies.     Height: 6 ' 3 " Weight: 194 pounds, BMI 24.25  Blood Glucose reading this AM: Not Taken  Blood Glucose reading in clinic: 142 mg/dl 9:20 am    DM MEDICATIONS:   Humulin 70 / 30 - 21 units before breakfast and 23 units before dinner    LABORATORY: reviewed    REVIEW OF SYSTEMS:  GENERAL: Denies fever, chills, change in appetite.  HEENT: Denies impaired hearing, dysphagia.  RESPIRATORY: Denies shortness of breath, cough or wheezing.  CARDIOVASCULAR: Denies chest pain, palpitations.  GI: Denies hematochezia.  : Denies hematuria, dysuria or frequency.  MS: Normal gait. Denies difficulty with mobility, muscle or joint pain.  SKIN: Denies rashes and lesions.  NEURO: Denies numbness or tingling in the hands or feet.   PSYCH: Denies depression or anxiety. No suicidal ideations.  ENDO: See HPI.    STANDARDS OF CARE:   Eye exam: Dr. JOANNA Adams, Last exam 5 / 2017  Dental exam: Recommend regular exams; denies gums bleeding.  Podiatry exam: None    ACTIVITY LEVEL: No regular activity.   MEAL PLANNING: Number of meals per day to be 3 and number of snacks per day to be 2.  Patient is encouraged to consume 45 - 60 grams of carbohydrates in each meal, and 1800 k / enid per day.  Per dietary recall, patient is not limiting carbohydrates, saturated fats and sodium.   BLOOD GLUCOSE TESTING: " Self-monitoring with ACCU-CHEK meter    PHYSICAL EXAMINATION:  GENERAL: WDWN  male in no acute distress, ambulatory, responds appropriately. AAO X 3.   NECK: Supple, no thyromegaly, no cervical or supraclavicular lymphadenopathy, no carotid bruit.  HEART: Regular rate and rhythm. No rubs, murmurs or gallops.   LUNGS: CTA bilaterally. Unlabored breathing, no use of accessory muscles.  MUSCULOSKELETAL: Normal gait and muscle strength.  ABDOMEN: Active bowel sounds X 4, no masses or tenderness.   SKIN: Warm, dry skin. No lesions or abrasions. Clean, dry well-healed injection sites.   NEUROLOGIC: Cranial nerves II-XII grossly intact.   FOOT EXAMINATION: Protective Sensation (w/ 10 gram monofilament):  Right: Intact  Left: Intact  //  Visual Inspection: Normal -  Bilateral  //  Pedal Pulses:  Right: Present Left: Present    ASSESSMENT:  1. Diabetes Type 2 - improving control on Humulin 70 / 30, A1C 7.3  2. Hypertension - good control on Losartan-HCT, amlodipine, bystolic   3. Hyperlipidemia - good control on atorvastatin    PLAN:  1.) Patient was instructed to monitor blood glucose 2 - 3 x daily, fasting and ac lunch and dinner. Discussed ADA goal for fasting blood sugar, 80 - 130 mg/dL; pp blood sugars below 180 mg/dl. Also, discussed prevention of hypoglycemia and the need to adjust goals to higher levels if persistent hypoglycemia.  Reminded to bring blood glucose records or meter to each visit for review.  2.) Reviewed pathophysiology of Type 2 diabetes, complications related to the disease, importance of annual dilated eye exam and daily foot examination.  3.) Continue Humulin 70 / 30, 21 units before breakfast and 23 units before dinner.  He did not bring records or meter today, so no further adjustments made.  Since A1C is < 8 %, I explained that patient should limit his carbohydrate intake because his pp BG are contributing to elevation in A1C.  Also, increase exercise. He voiced understanding.    I offered to make changes, such as adding a DPP-4; but, patient declined at this time.  He would like to continue insulin only.   4.) Meal planning teaching: Carbohydrate definition - one serving is 15 gms. Carbohydrate spacing - carbohydrates should be spaced into approximately 3 meals with 2 snacks ( of one carbohydrate ) between meals or at bedtime. Increase vegetable intake to 2 or more cups of vegetables per day as well as 2 fruit servings. Recommended low saturated fat, low sodium diet to aid in control of hypertension and cholesterol.  5.) Discussed activity, benefits, methods, and precautions. Recommended patient start some form of exercise and increase as tolerated to 30 minutes per day to facilitate weight loss and aid in control of blood glucoses.  6.) Return to clinic in 3 months for follow up.  Advised patient to call clinic with any questions or concerns.    Citlali Mcgarry, NP-C, CDE

## 2017-12-06 DIAGNOSIS — D72.820 LYMPHOCYTOSIS: Primary | ICD-10-CM

## 2017-12-15 ENCOUNTER — OFFICE VISIT (OUTPATIENT)
Dept: PAIN MEDICINE | Facility: CLINIC | Age: 67
End: 2017-12-15
Payer: MEDICARE

## 2017-12-15 VITALS
WEIGHT: 194 LBS | SYSTOLIC BLOOD PRESSURE: 118 MMHG | HEART RATE: 82 BPM | RESPIRATION RATE: 16 BRPM | HEIGHT: 75 IN | BODY MASS INDEX: 24.12 KG/M2 | DIASTOLIC BLOOD PRESSURE: 76 MMHG

## 2017-12-15 DIAGNOSIS — G89.21 CHRONIC PAIN DUE TO TRAUMA: Primary | ICD-10-CM

## 2017-12-15 PROCEDURE — 99214 OFFICE O/P EST MOD 30 MIN: CPT | Mod: S$PBB,,, | Performed by: ANESTHESIOLOGY

## 2017-12-15 PROCEDURE — 99214 OFFICE O/P EST MOD 30 MIN: CPT | Mod: PBBFAC | Performed by: ANESTHESIOLOGY

## 2017-12-15 PROCEDURE — 99999 PR PBB SHADOW E&M-EST. PATIENT-LVL IV: CPT | Mod: PBBFAC,,, | Performed by: ANESTHESIOLOGY

## 2017-12-15 RX ORDER — HYDROCODONE BITARTRATE AND ACETAMINOPHEN 10; 325 MG/1; MG/1
1 TABLET ORAL 2 TIMES DAILY PRN
Qty: 60 TABLET | Refills: 0 | Status: SHIPPED | OUTPATIENT
Start: 2018-01-13 | End: 2017-12-15 | Stop reason: SDUPTHER

## 2017-12-15 RX ORDER — HYDROCODONE BITARTRATE AND ACETAMINOPHEN 10; 325 MG/1; MG/1
1 TABLET ORAL 2 TIMES DAILY PRN
Qty: 60 TABLET | Refills: 0 | Status: SHIPPED | OUTPATIENT
Start: 2017-12-15 | End: 2017-12-15 | Stop reason: SDUPTHER

## 2017-12-15 RX ORDER — HYDROCODONE BITARTRATE AND ACETAMINOPHEN 10; 325 MG/1; MG/1
1 TABLET ORAL 2 TIMES DAILY PRN
Qty: 60 TABLET | Refills: 0 | Status: SHIPPED | OUTPATIENT
Start: 2018-02-12 | End: 2018-03-08 | Stop reason: SDUPTHER

## 2017-12-15 NOTE — PROGRESS NOTES
Subjective:     Chief Complaint:  Left Hand Pain    History of Present Illness:  This patient is a 65 year old male who presents today for f/u complaining of the above noted pains. The patient describes this pain as follows.    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 work, left lower quadrant pain since hernia surgery in 2004  - timing (constant, intermittent): Constant  - character (sharp, dull, aching, burning): Throbbing  - radiating: No  - dermatomal distribution: No  - side: Left   - aggravating factors: Nothing worsens left digit pain, left lower quadrant pain worse with exercise or walking  - relieving factors: Medication / Norco  - antecedent trauma: Amputation via skill saw and 1997, hernia repair in 2004  - prior spinal surgery: None  - pertinent negatives: No fever, No chills, No weight loss, No bladder dysfunction, No bowel dysfunction, No extremity weakness, No saddle anesthesia  - medications tried: Norco, Percocet, over-the-counter medications, compound pain cream  - other therapies tried (physical therapy, injections):     >> physical therapy tried in the past    >> no prior injections        Imaging/ Labs (reviewed on 12/15/2017):      Comprehensive metabolic panel     Ref Range & Units 3d ago  (9/26/17) 2mo ago  (7/27/17) 5mo ago  (4/13/17) 5mo ago  (4/13/17)    Sodium 136 - 145 mmol/L 137  139  141  141     Potassium 3.5 - 5.1 mmol/L 3.6  3.9  3.6  3.6     Chloride 95 - 110 mmol/L 98  101  104  104     CO2 23 - 29 mmol/L 30   30   27  27     Glucose 70 - 110 mg/dL 142   349   100  100     BUN, Bld 8 - 23 mg/dL 22  19  20  20     Creatinine 0.5 - 1.4 mg/dL 1.4  1.6   1.3  1.3     Calcium 8.7 - 10.5 mg/dL 9.5  9.9  9.6  9.6     Total Protein 6.0 - 8.4 g/dL 7.4  7.5  7.6  7.6     Albumin 3.5 - 5.2 g/dL 3.6  3.6  3.8  3.8     Total Bilirubin 0.1 - 1.0 mg/dL 0.7  0.6CM  0.6CM  0.6CM    Comments: For infants and newborns,  "interpretation of results should be based   on gestational age, weight and in agreement with clinical   observations.   Premature Infant recommended reference ranges:   Up to 24 hours.............<8.0 mg/dL   Up to 48 hours............<12.0 mg/dL   3-5 days..................<15.0 mg/dL   6-29 days.................<15.0 mg/dL     Alkaline Phosphatase 55 - 135 U/L 71  72  65  65     AST 10 - 40 U/L 73   88   89   89      ALT 10 - 44 U/L 77   97   87   87      Anion Gap 8 - 16 mmol/L 9  8  10  10     eGFR if African American >60 mL/min/1.73 m^2 >60.0  52   >60.0  >60.0     eGFR if non African American >60 mL/min/1.73 m^2 52.4   45CM   57.7CM   57.7CM                 ROS:  CONSTITUTIONAL: No fever, chills, weight loss  SKIN: No rash or itching  CARDIOVASCULAR:  No chest pain, palpitations  RESPIRATORY: No shortness of breath  GASTROINTESTINAL: No diarrhea, No constipation, abdominal pain, left lower quadrant  GENITOURINARY: No urinary incontinence    MUSCULOSKELETAL:  - patient reports pain as above, see chief complaint     NEUROLOGICAL:   - Pain as above  - Strength in lower extremities is normal  - Sensation in lower extremities is normal  - No bowel or bladder incontinence     PSYCHIATRIC: No change in mood noticed         Objective:     Vitals:  /76 (BP Location: Right arm, Patient Position: Sitting, BP Method: Medium (Automatic))   Pulse 82   Resp 16   Ht 6' 3" (1.905 m)   Wt 88 kg (194 lb)   BMI 24.25 kg/m²    (reviewed on 12/15/2017)     General: alert and oriented, in no apparent distress  Gait: normal gait  Skin: No rashes, No discoloration   HEENT: EOMI  Respiratory: respirations nonlabored    Musculoskeletal:  - Cervical range of motion intact  - Left upper extremity range of motion intact throughout  - Digital stumps are hypersensitive to palpation, hypersensitivity does not extend further proximal  - No color change, no swelling, no changes in hair growth along left hand     Neuro:  - Lower " extremities:      >> 5/5 strength bilaterally, throughout, symmetric    Psych: mood and affect appropriate        Assessment:   - Chronic pain due to trauma      Plan:   - This patient presents today for follow-up visit.  He complains chronic left digital pain along stumps at site of previous amputation.    - Refill Norco 10/325 BID PRN pain x 3 months. He feels this helps his pain. He tolerates this medication well, and he denies any drowsiness or constipation.    - LA  appropriate.  - RTC in 3 months for med refill.  - I discussed the risks, benefits, and alternatives to potential treatment options. All questions and concerns were fully addressed today in clinic.       >> UDS:  8-18-15 :: appropriate  12-29-15 :: appropriate  5-3-16 :: appropriate  10-18-16 :: appropriate  4/13/2017 :: appropriate  9/29/2017 :: appropriate

## 2017-12-18 ENCOUNTER — HOSPITAL ENCOUNTER (EMERGENCY)
Facility: HOSPITAL | Age: 67
Discharge: HOME | End: 2017-12-18
Payer: MEDICARE

## 2017-12-18 VITALS
HEIGHT: 75 IN | DIASTOLIC BLOOD PRESSURE: 90 MMHG | OXYGEN SATURATION: 96 % | WEIGHT: 194.25 LBS | TEMPERATURE: 98 F | HEART RATE: 90 BPM | SYSTOLIC BLOOD PRESSURE: 140 MMHG | RESPIRATION RATE: 18 BRPM | BODY MASS INDEX: 24.15 KG/M2

## 2017-12-18 DIAGNOSIS — L65.9 ALOPECIA: Primary | ICD-10-CM

## 2017-12-18 PROCEDURE — 99283 EMERGENCY DEPT VISIT LOW MDM: CPT | Mod: 25

## 2017-12-18 NOTE — ED PROVIDER NOTES
"Encounter Date: 12/18/2017       History     Chief Complaint   Patient presents with    Hair Loss     " started within the past 2 weeks"     Pt c/o patch of hair loss to scalp.      The history is provided by the patient.   Rash    This is a new problem. The current episode started several weeks ago. The problem has been unchanged. The rash is present on the scalp.     Review of patient's allergies indicates:  No Known Allergies  Past Medical History:   Diagnosis Date    Amputation of finger of left hand     all fingers except for thumb    Cataract     OS NGCL     Chronic pain due to trauma     traumatic amputations left hand    Diabetes mellitus, type 2     Hepatitis C, chronic 2/13/2014    Hyperlipidemia     Hypertension     Refractive error      Past Surgical History:   Procedure Laterality Date    APPENDECTOMY      2003    CATARACT EXTRACTION      OD     HERNIA REPAIR       Family History   Problem Relation Age of Onset    Cancer Mother     Hypertension Mother     Hypertension Father     Diabetes Sister     Hypertension Sister     Heart disease Sister     Diabetes Brother     Hypertension Brother     Cancer Brother      Social History   Substance Use Topics    Smoking status: Former Smoker     Packs/day: 0.50     Types: Cigarettes     Quit date: 2/24/1972    Smokeless tobacco: Never Used    Alcohol use 0.0 oz/week      Comment: " Every blue moon"     Review of Systems   Constitutional: Negative for diaphoresis and fever.   HENT: Negative for sore throat.    Eyes: Negative for photophobia and redness.   Respiratory: Negative for shortness of breath.    Cardiovascular: Negative for chest pain.   Gastrointestinal: Negative for abdominal pain, constipation, diarrhea, nausea and vomiting.   Endocrine: Negative for polydipsia and polyphagia.   Genitourinary: Negative for dysuria and frequency.   Musculoskeletal: Negative for back pain.   Skin: Negative for rash.        Hair loss   "   Neurological: Negative for weakness.   Hematological: Does not bruise/bleed easily.   Psychiatric/Behavioral: The patient is not nervous/anxious.    All other systems reviewed and are negative.      Physical Exam     Initial Vitals [12/18/17 1042]   BP Pulse Resp Temp SpO2   (!) 140/90 90 18 98.4 °F (36.9 °C) 96 %      MAP       106.67         Physical Exam    Nursing note and vitals reviewed.  Constitutional: He appears well-developed and well-nourished.   HENT:   Head: Normocephalic and atraumatic.   Right Ear: External ear normal.   Left Ear: External ear normal.   Nose: Nose normal.   Mouth/Throat: Oropharynx is clear and moist.   Eyes: Conjunctivae and EOM are normal. Pupils are equal, round, and reactive to light.   Neck: Normal range of motion. Neck supple.   Cardiovascular: Normal rate, regular rhythm, normal heart sounds and intact distal pulses.   Pulmonary/Chest: Breath sounds normal. No respiratory distress. He has no wheezes. He has no rhonchi. He has no rales.   Abdominal: Soft. Bowel sounds are normal. He exhibits no distension. There is no tenderness. There is no rebound and no guarding.   Musculoskeletal: Normal range of motion.   Neurological: He is alert and oriented to person, place, and time. He has normal strength.   Skin: Skin is warm and dry.   Circular area of complete hair loss to right parietal scalp measuring approx 2 cm in diameter;  No scaling or skin changes noted   Psychiatric: He has a normal mood and affect. His behavior is normal. Judgment and thought content normal.         ED Course   Procedures  Labs Reviewed - No data to display                            ED Course      Clinical Impression:   The encounter diagnosis was Alopecia.    Disposition:   Disposition: Discharged  Condition: Stable                        CHEMA Schofield  12/18/17 1111

## 2018-01-02 ENCOUNTER — TELEPHONE (OUTPATIENT)
Dept: OTOLARYNGOLOGY | Facility: CLINIC | Age: 68
End: 2018-01-02

## 2018-01-02 ENCOUNTER — TELEPHONE (OUTPATIENT)
Dept: RADIOLOGY | Facility: HOSPITAL | Age: 68
End: 2018-01-02

## 2018-01-04 ENCOUNTER — TELEPHONE (OUTPATIENT)
Dept: PAIN MEDICINE | Facility: CLINIC | Age: 68
End: 2018-01-04

## 2018-01-06 ENCOUNTER — NURSE TRIAGE (OUTPATIENT)
Dept: ADMINISTRATIVE | Facility: CLINIC | Age: 68
End: 2018-01-06

## 2018-01-06 NOTE — TELEPHONE ENCOUNTER
Please get back with the insurance company [employer] on Monday morning because they are tell the patient that a form was incomplete or not returned.  The form has been faxed to you.   WDL

## 2018-01-06 NOTE — TELEPHONE ENCOUNTER
"  Reason for Disposition   Caller requesting a refill, no triage required, and triager able to refill per unit policy     No triage required BECAUSE patient is calling regarding the completion of paper work.    Answer Assessment - Initial Assessment Questions  1. SYMPTOMS: "Do you have any symptoms?"      pain  2. SEVERITY: If symptoms are present, ask "Are they mild, moderate or severe?"      Patient calling regarding forms that need to be completed.    Protocols used: ST MEDICATION QUESTION CALL-A-AH    "

## 2018-01-08 ENCOUNTER — TELEPHONE (OUTPATIENT)
Dept: PAIN MEDICINE | Facility: CLINIC | Age: 68
End: 2018-01-08

## 2018-01-08 NOTE — TELEPHONE ENCOUNTER
----- Message from Evelyn Graves LPN sent at 1/8/2018  1:24 PM CST -----  Contact: [phone]      ----- Message -----  From: Anju Rose  Sent: 1/8/2018  10:32 AM  To: Radha SALMERON Staff    Would like to consult with nurse about pain medication refill; hydrocodone; need authorization; pt states he has been out of med for over a week; please call pt kristine for further details; he states insurance company states incorrect information was submitted over from office; please call pt kristine at [phone] thx [facility] [employer] [address] [place] [address] ERIC CHAVEZ [facility] [address] [facility]  [address] [address]  Phone: [phone] Fax: [phone]

## 2018-01-09 ENCOUNTER — TELEPHONE (OUTPATIENT)
Dept: RADIOLOGY | Facility: HOSPITAL | Age: 68
End: 2018-01-09

## 2018-01-09 ENCOUNTER — TELEPHONE (OUTPATIENT)
Dept: PAIN MEDICINE | Facility: CLINIC | Age: 68
End: 2018-01-09

## 2018-01-10 ENCOUNTER — HOSPITAL ENCOUNTER (OUTPATIENT)
Dept: RADIOLOGY | Facility: HOSPITAL | Age: 68
Discharge: HOME | End: 2018-01-10
Attending: NURSE PRACTITIONER
Payer: MEDICARE

## 2018-01-10 DIAGNOSIS — Z13.6 ENCOUNTER FOR ABDOMINAL AORTIC ANEURYSM (AAA) SCREENING: ICD-10-CM

## 2018-01-10 PROCEDURE — 76775 US EXAM ABDO BACK WALL LIM: CPT | Mod: 26,59,, | Performed by: RADIOLOGY

## 2018-01-10 PROCEDURE — 76775 US EXAM ABDO BACK WALL LIM: CPT | Mod: TC,PO

## 2018-02-02 DIAGNOSIS — E78.2 COMBINED HYPERLIPIDEMIA ASSOCIATED WITH TYPE 2 DIABETES MELLITUS: ICD-10-CM

## 2018-02-02 DIAGNOSIS — E11.69 COMBINED HYPERLIPIDEMIA ASSOCIATED WITH TYPE 2 DIABETES MELLITUS: ICD-10-CM

## 2018-02-02 DIAGNOSIS — E11.59 HYPERTENSION ASSOCIATED WITH DIABETES: ICD-10-CM

## 2018-02-02 DIAGNOSIS — E11.65 UNCONTROLLED TYPE 2 DIABETES MELLITUS WITH HYPERGLYCEMIA, WITH LONG-TERM CURRENT USE OF INSULIN: ICD-10-CM

## 2018-02-02 DIAGNOSIS — Z79.4 UNCONTROLLED TYPE 2 DIABETES MELLITUS WITH HYPERGLYCEMIA, WITH LONG-TERM CURRENT USE OF INSULIN: ICD-10-CM

## 2018-02-02 DIAGNOSIS — I15.2 HYPERTENSION ASSOCIATED WITH DIABETES: ICD-10-CM

## 2018-02-02 RX ORDER — SYRINGE,SAFETY WITH NEEDLE,1ML 25GX1"
SYRINGE (EA) MISCELLANEOUS
Qty: 100 EACH | Refills: 11 | Status: SHIPPED | OUTPATIENT
Start: 2018-02-02 | End: 2019-02-12 | Stop reason: SDUPTHER

## 2018-02-02 NOTE — TELEPHONE ENCOUNTER
----- Message from Candace Bernard sent at 2/2/2018  1:26 PM CST -----  Contact: self   1. What is the name of the medication you are requesting? Diabetic medication  2. What is the dose?n/a  3. How do you take the medication? Orally, topically, etc? orally  4. How often do you take this medication? n/a  5. Do you need a 30 day or 90 day supply? n/a  6. How many refills are you requesting? n/a  7. What is your preferred pharmacy and location of the pharmacy?   [facility] [address] [place] [address] ERIC CHAVEZ  [address] AT [facility]  [address] [address]  Phone: [phone] Fax: [phone]      8. Who can we contact with further questions? Self

## 2018-02-02 NOTE — TELEPHONE ENCOUNTER
Spoke with patient and he stated pharmacy was saying additional info was needed for them to process insulin Rx. Contacted pharmacy to f/u and they said pt's formulary has changed and Novolin is now preferred. Pt updated

## 2018-03-02 ENCOUNTER — OFFICE VISIT (OUTPATIENT)
Dept: DIABETES | Facility: CLINIC | Age: 68
End: 2018-03-02
Payer: MEDICARE

## 2018-03-02 ENCOUNTER — LAB VISIT (OUTPATIENT)
Dept: LAB | Facility: HOSPITAL | Age: 68
End: 2018-03-02
Attending: NURSE PRACTITIONER
Payer: MEDICARE

## 2018-03-02 VITALS
DIASTOLIC BLOOD PRESSURE: 84 MMHG | SYSTOLIC BLOOD PRESSURE: 128 MMHG | WEIGHT: 193.56 LBS | BODY MASS INDEX: 24.07 KG/M2 | HEIGHT: 75 IN

## 2018-03-02 DIAGNOSIS — E11.59 HYPERTENSION ASSOCIATED WITH DIABETES: ICD-10-CM

## 2018-03-02 DIAGNOSIS — E78.2 COMBINED HYPERLIPIDEMIA ASSOCIATED WITH TYPE 2 DIABETES MELLITUS: ICD-10-CM

## 2018-03-02 DIAGNOSIS — E11.69 COMBINED HYPERLIPIDEMIA ASSOCIATED WITH TYPE 2 DIABETES MELLITUS: ICD-10-CM

## 2018-03-02 DIAGNOSIS — I15.2 HYPERTENSION ASSOCIATED WITH DIABETES: ICD-10-CM

## 2018-03-02 LAB
ALBUMIN SERPL BCP-MCNC: 4.1 G/DL
ALP SERPL-CCNC: 70 U/L
ALT SERPL W/O P-5'-P-CCNC: 98 U/L
ANION GAP SERPL CALC-SCNC: 8 MMOL/L
AST SERPL-CCNC: 87 U/L
BILIRUB SERPL-MCNC: 0.8 MG/DL
BUN SERPL-MCNC: 12 MG/DL
CALCIUM SERPL-MCNC: 10.1 MG/DL
CHLORIDE SERPL-SCNC: 101 MMOL/L
CO2 SERPL-SCNC: 31 MMOL/L
CREAT SERPL-MCNC: 1.2 MG/DL
EST. GFR  (AFRICAN AMERICAN): >60 ML/MIN/1.73 M^2
EST. GFR  (NON AFRICAN AMERICAN): >60 ML/MIN/1.73 M^2
ESTIMATED AVG GLUCOSE: 180 MG/DL
GLUCOSE SERPL-MCNC: 77 MG/DL
GLUCOSE SERPL-MCNC: 93 MG/DL (ref 70–110)
HBA1C MFR BLD HPLC: 7.9 %
POTASSIUM SERPL-SCNC: 3.9 MMOL/L
PROT SERPL-MCNC: 8 G/DL
SODIUM SERPL-SCNC: 140 MMOL/L

## 2018-03-02 PROCEDURE — 99214 OFFICE O/P EST MOD 30 MIN: CPT | Mod: S$PBB,,, | Performed by: NURSE PRACTITIONER

## 2018-03-02 PROCEDURE — 36415 COLL VENOUS BLD VENIPUNCTURE: CPT

## 2018-03-02 PROCEDURE — 82948 REAGENT STRIP/BLOOD GLUCOSE: CPT | Mod: PBBFAC | Performed by: NURSE PRACTITIONER

## 2018-03-02 PROCEDURE — 99213 OFFICE O/P EST LOW 20 MIN: CPT | Mod: PBBFAC | Performed by: NURSE PRACTITIONER

## 2018-03-02 PROCEDURE — 80053 COMPREHEN METABOLIC PANEL: CPT

## 2018-03-02 PROCEDURE — 99999 PR PBB SHADOW E&M-EST. PATIENT-LVL III: CPT | Mod: PBBFAC,,, | Performed by: NURSE PRACTITIONER

## 2018-03-02 PROCEDURE — 83036 HEMOGLOBIN GLYCOSYLATED A1C: CPT

## 2018-03-02 RX ORDER — LANCETS 33 GAUGE
1 EACH MISCELLANEOUS 3 TIMES DAILY
Qty: 100 EACH | Refills: 11 | Status: SHIPPED | OUTPATIENT
Start: 2018-03-02 | End: 2018-03-22 | Stop reason: SDUPTHER

## 2018-03-02 NOTE — PROGRESS NOTES
"PCP: Dr. Pantoja    HISTORY OF PRESENT ILLNESS: 67 year old  male patient is in clinic today for diabetes.  Patient has had Type II diabetes since 2003.  Most recent A1C was 7.3, ADA recommends less than 7.0.  Patient has lost 1 pound since last visit.  No glucometer or blood glucose readings today.  No meter today. Per recall, fasting BGs are mid 80 s - 120 s; occasional 160. Not really monitoring other times.  He has a history of chronic Hep C.     Patient denies polyuria, polydipsia, polyphagia, or blurred vision.   Also denies nausea, vomiting, or diarrhea.  Has occasional hypoglycemia, BG 70, which he treats with cream pies.     Height: 6 ' 3 " Weight: 193 pounds, BMI 24.19  Blood Glucose reading this AM: 104 mg/dl fasting  Blood Glucose reading in clinic: 93 mg/dl 8:54 am    DM MEDICATIONS:   Novolin 70 / 30 - 21 units before breakfast and 23 units before dinner    LABORATORY: reviewed    REVIEW OF SYSTEMS:  GENERAL: Denies fever, chills, change in appetite.  HEENT: Denies impaired hearing, dysphagia.  RESPIRATORY: Denies shortness of breath, cough or wheezing.  CARDIOVASCULAR: Denies chest pain, palpitations.  GI: Denies hematochezia.  : Denies hematuria, dysuria or frequency.  MS: Normal gait. Denies difficulty with mobility, muscle or joint pain.  SKIN: Denies rashes and lesions.  NEURO: Denies numbness or tingling in the hands or feet.   PSYCH: Denies depression or anxiety. No suicidal ideations.  ENDO: See HPI.    STANDARDS OF CARE:   Eye exam: Dr. Adams, Last exam 5 / 2017  Dental exam: Recommend regular exams; denies gums bleeding.  Podiatry exam: None  ACE / ARB: Yes  Statin: Yes    ACTIVITY LEVEL: No regular activity.   MEAL PLANNING: Number of meals per day to be 3 and number of snacks per day to be 2.  Patient is encouraged to consume 45 - 60 grams of carbohydrates in each meal, and 1800 k / enid per day.  Per dietary recall, patient is not limiting carbohydrates, saturated fats " and sodium.   BLOOD GLUCOSE TESTING: Self-monitoring     PHYSICAL EXAMINATION:  GENERAL: WDWN  male in no acute distress, ambulatory, responds appropriately. AAO X 3.   NECK: Supple, no thyromegaly, no cervical or supraclavicular lymphadenopathy, no carotid bruit.  HEART: Regular rate and rhythm. No rubs, murmurs or gallops.   LUNGS: CTA bilaterally. Unlabored breathing, no use of accessory muscles.  MUSCULOSKELETAL: Normal gait and muscle strength.  ABDOMEN: Active bowel sounds X 4, no masses or tenderness.   SKIN: Warm, dry skin. No lesions or abrasions. Clean, dry well-healed injection sites.   NEUROLOGIC: Cranial nerves II-XII grossly intact.   FOOT EXAMINATION: Protective Sensation (w/ 10 gram monofilament):  Right: Intact  Left: Intact  //  Visual Inspection: Normal -  Bilateral  //  Pedal Pulses:  Right: Present Left: Present    ASSESSMENT:  1. Diabetes Type 2 - per patient, controlled on Novolin 70 / 30,  A1C 7.3  2. Hypertension - good control on Losartan-HCT, amlodipine, bystolic   3. Hyperlipidemia - good control on Lipitor    PLAN:  1.) Provided patient with TRUEMETRIX meter.  Prescription for testing supplies sent to pharmacy. Patient was instructed to monitor blood glucose 2 - 3 x daily, fasting and ac lunch and dinner. Discussed ADA goal for fasting blood sugar, 80 - 130 mg/dL; pp blood sugars below 180 mg/dl. Also, discussed prevention of hypoglycemia and the need to adjust goals to higher levels if persistent hypoglycemia.  Reminded to bring blood glucose records or meter to each visit for review.  2.) Reviewed pathophysiology of Type 2 diabetes, complications related to the disease, importance of annual dilated eye exam and daily foot examination.  3.) Continue Novolin 70 / 30, 21 units before breakfast and 23 units before dinner.  This regimen appears to be controlling blood sugars without frequent hypoglycemia.  I offered to make changes, such as adding a DPP-4; but, patient  declined at this time.    4.) Meal planning teaching: Carbohydrate definition - one serving is 15 gms. Carbohydrate spacing - carbohydrates should be spaced into approximately 3 meals with 2 snacks ( of one carbohydrate ) between meals or at bedtime. Increase vegetable intake to 2 or more cups of vegetables per day as well as 2 fruit servings. Recommended low saturated fat, low sodium diet to aid in control of hypertension and cholesterol.  5.) Discussed activity, benefits, methods, and precautions. Recommended patient start some form of exercise and increase as tolerated to 30 minutes per day to facilitate weight loss and aid in control of blood glucoses.  6.) Labs Today: A1C, CMP  7.) Return to clinic in 6 months for follow up. The patient was explained the above plan and given opportunity to ask questions.  He understands, chooses and consents to this plan and accepts all the risks, which include but are not limited to the risks mentioned above. He understands the alternative of having no testing, interventions or treatments at this time. He left content and without further questions.     Citlali Mcgarry, NP-C, CDE

## 2018-03-05 ENCOUNTER — TELEPHONE (OUTPATIENT)
Dept: DIABETES | Facility: CLINIC | Age: 68
End: 2018-03-05

## 2018-03-05 NOTE — TELEPHONE ENCOUNTER
Patient said he's willing to try Januvia. Please send med with correct dosage to the pharmacy. Thank you!

## 2018-03-05 NOTE — TELEPHONE ENCOUNTER
Please call patient and advise that A1C has increased to 7.9 %.  I would like to start another oral medication for diabetes, called Vasiliyuvia.  This medication is very well tolerated and helps to lower after meal blood sugars.  The risk of low blood sugar is very low with this medication.  If he is willing, I will send medication to pharmacy.  He should take once a day in the am.  Thanks.

## 2018-03-08 RX ORDER — HYDROCODONE BITARTRATE AND ACETAMINOPHEN 10; 325 MG/1; MG/1
1 TABLET ORAL 2 TIMES DAILY PRN
Qty: 60 TABLET | Refills: 0 | Status: SHIPPED | OUTPATIENT
Start: 2018-04-04 | End: 2018-05-04

## 2018-03-09 ENCOUNTER — OFFICE VISIT (OUTPATIENT)
Dept: PAIN MEDICINE | Facility: CLINIC | Age: 68
End: 2018-03-09
Payer: MEDICARE

## 2018-03-09 VITALS
HEART RATE: 89 BPM | RESPIRATION RATE: 16 BRPM | BODY MASS INDEX: 24 KG/M2 | WEIGHT: 193 LBS | SYSTOLIC BLOOD PRESSURE: 153 MMHG | DIASTOLIC BLOOD PRESSURE: 91 MMHG | HEIGHT: 75 IN

## 2018-03-09 DIAGNOSIS — G89.21 CHRONIC PAIN DUE TO TRAUMA: Primary | ICD-10-CM

## 2018-03-09 DIAGNOSIS — G89.4 CHRONIC PAIN DISORDER: ICD-10-CM

## 2018-03-09 DIAGNOSIS — S68.119A TRAUMATIC AMPUTATION OF DIGIT OF ONE HAND WITHOUT COMPLICATION: ICD-10-CM

## 2018-03-09 DIAGNOSIS — M79.642 LEFT HAND PAIN: ICD-10-CM

## 2018-03-09 PROCEDURE — 99214 OFFICE O/P EST MOD 30 MIN: CPT | Mod: S$PBB,,, | Performed by: PHYSICIAN ASSISTANT

## 2018-03-09 PROCEDURE — 99999 PR PBB SHADOW E&M-EST. PATIENT-LVL IV: CPT | Mod: PBBFAC,,, | Performed by: PHYSICIAN ASSISTANT

## 2018-03-09 PROCEDURE — 99214 OFFICE O/P EST MOD 30 MIN: CPT | Mod: PBBFAC | Performed by: PHYSICIAN ASSISTANT

## 2018-03-09 NOTE — PROGRESS NOTES
Subjective:     Chief Complaint:  Left Hand Pain    History of Present Illness:  This patient is a 65 year old male who presents today for f/u complaining of the above noted pains. The patient describes this pain as follows.    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 work, left lower quadrant pain since hernia surgery in 2004  - timing (constant, intermittent): Constant  - character (sharp, dull, aching, burning): Throbbing  - radiating: No  - dermatomal distribution: No  - side: Left   - aggravating factors: Nothing worsens left digit pain, left lower quadrant pain worse with exercise or walking  - relieving factors: Medication / Norco  - antecedent trauma: Amputation via skill saw and 1997, hernia repair in 2004  - prior spinal surgery: None  - pertinent negatives: No fever, No chills, No weight loss, No bladder dysfunction, No bowel dysfunction, No extremity weakness, No saddle anesthesia  - medications tried: Norco, Percocet, over-the-counter medications, compound pain cream  - other therapies tried (physical therapy, injections):     >> physical therapy tried in the past    >> no prior injections        Imaging/ Labs (reviewed on 3/9/2018):    3/02/18 Comprehensive metabolic panel     Ref Range & Units 7d ago 5mo ago 7mo ago    Sodium 136 - 145 mmol/L 140  137  139     Potassium 3.5 - 5.1 mmol/L 3.9  3.6  3.9     Chloride 95 - 110 mmol/L 101  98  101     CO2 23 - 29 mmol/L 31   30   30      Glucose 70 - 110 mg/dL 77  142   349      BUN, Bld 8 - 23 mg/dL 12  22  19     Creatinine 0.5 - 1.4 mg/dL 1.2  1.4  1.6      Calcium 8.7 - 10.5 mg/dL 10.1  9.5  9.9     Total Protein 6.0 - 8.4 g/dL 8.0  7.4  7.5     Albumin 3.5 - 5.2 g/dL 4.1  3.6  3.6     Total Bilirubin 0.1 - 1.0 mg/dL 0.8  0.7CM  0.6CM    Comments: For infants and newborns, interpretation of results should be based   on gestational age, weight and in agreement with clinical  "  observations.   Premature Infant recommended reference ranges:   Up to 24 hours.............<8.0 mg/dL   Up to 48 hours............<12.0 mg/dL   3-5 days..................<15.0 mg/dL   6-29 days.................<15.0 mg/dL     Alkaline Phosphatase 55 - 135 U/L 70  71  72     AST 10 - 40 U/L 87   73   88      ALT 10 - 44 U/L 98   77   97      Anion Gap 8 - 16 mmol/L 8  9  8     eGFR if African American >60 mL/min/1.73 m^2 >60.0  >60.0  52      eGFR if non African American >60 mL/min/1.73 m^2 >60.0  52.4CM   45CM     Comments: Calculation used to obtain the estimated glomerular filtration   rate (eGFR) is the CKD-EPI equation.                ROS:  CONSTITUTIONAL: No fever, chills, weight loss  SKIN: No rash or itching  CARDIOVASCULAR:  No chest pain, palpitations  RESPIRATORY: No shortness of breath  GASTROINTESTINAL: No diarrhea, No constipation, abdominal pain, left lower quadrant  GENITOURINARY: No urinary incontinence    MUSCULOSKELETAL:  - patient reports pain as above, see chief complaint     NEUROLOGICAL:   - Pain as above  - Strength in lower extremities is normal  - Sensation in lower extremities is normal  - No bowel or bladder incontinence     PSYCHIATRIC: No change in mood noticed         Objective:   Vitals:  BP (!) 153/91 (BP Location: Right arm, Patient Position: Sitting, BP Method: Medium (Automatic))   Pulse 89   Resp 16   Ht 6' 3" (1.905 m)   Wt 87.5 kg (193 lb)   BMI 24.12 kg/m²    (reviewed on 3/9/2018)     General: alert and oriented, in no apparent distress  Gait: normal gait  Skin: No rashes, No discoloration   HEENT: EOMI  Respiratory: respirations nonlabored    Musculoskeletal:  - Cervical range of motion intact  - Left upper extremity range of motion intact throughout  - Digital stumps are hypersensitive to palpation, hypersensitivity does not extend further proximal  - No color change, no swelling, no changes in hair growth along left hand     Neuro:  - Lower extremities:      >> 5/5 " strength bilaterally, throughout, symmetric    Psych: mood and affect appropriate        Assessment:       ICD-10-CM ICD-9-CM   1. Chronic pain due to trauma G89.21 338.21   2. Traumatic amputation of digit of one hand without complication S68.119A 886.0   3. Left hand pain M79.642 729.5   4. Chronic pain disorder G89.4 338.4       He complains chronic left digital pain along stumps at site of previous amputation.        Plan:   1. Interventional: None.    2. Pharmacologic:   - Refill Norco 10/325 BID PRN pain. Patient is obtaining good pain control with functional improvement. He tolerates this medication well, and he denies any drowsiness or constipation.    - [place]  reviewed and appropriate. Last filled on 3-5-18. Will post-date accordingly.   - Will order UDS today to ensure medication compliance.      3. Rehabilitative: Encouraged regular exercise.    4. Diagnostic: None for now.    5. Follow up: med refill with Dr. Olmedo at first available slot.    - I discussed the risks, benefits, and alternatives to potential treatment options. All questions and concerns were fully addressed today in clinic. Dr. Olmedo was consulted regarding the patient plan and agrees.           >> UDS:  8-18-15 :: appropriate  12-29-15 :: appropriate  5-3-16 :: appropriate  10-18-16 :: appropriate  4/13/2017 :: appropriate  9/29/2017 :: appropriate  3/9/2018 :: pending

## 2018-03-09 NOTE — TELEPHONE ENCOUNTER
Patient's prescriptions were printed and signed by Dr. Olmedo prior to the patient's clinic appointment.

## 2018-03-21 ENCOUNTER — PATIENT OUTREACH (OUTPATIENT)
Dept: ADMINISTRATIVE | Facility: HOSPITAL | Age: 68
End: 2018-03-21

## 2018-03-22 RX ORDER — LANCETS 33 GAUGE
1 EACH MISCELLANEOUS 3 TIMES DAILY
Qty: 100 EACH | Refills: 11 | Status: SHIPPED | OUTPATIENT
Start: 2018-03-22 | End: 2019-02-12 | Stop reason: SDUPTHER

## 2018-03-22 NOTE — TELEPHONE ENCOUNTER
----- Message from Citlali Mcgarry, PhD, NP-C sent at 3/22/2018 10:13 AM CDT -----  Contact: pt      ----- Message -----  From: Sharon Ball  Sent: 3/22/2018   9:41 AM  To: Citlali Mcgarry, PhD, NP-C    Calling in about refill on test strips and send it to [address] and contact pt once it has been sent and please advise

## 2018-03-26 ENCOUNTER — OFFICE VISIT (OUTPATIENT)
Dept: DERMATOLOGY | Facility: CLINIC | Age: 68
End: 2018-03-26
Payer: MEDICARE

## 2018-03-26 ENCOUNTER — TELEPHONE (OUTPATIENT)
Dept: DIABETES | Facility: CLINIC | Age: 68
End: 2018-03-26

## 2018-03-26 ENCOUNTER — LAB VISIT (OUTPATIENT)
Dept: LAB | Facility: HOSPITAL | Age: 68
End: 2018-03-26
Attending: DERMATOLOGY
Payer: MEDICARE

## 2018-03-26 DIAGNOSIS — L63.9 ALOPECIA AREATA: Primary | ICD-10-CM

## 2018-03-26 DIAGNOSIS — L63.9 ALOPECIA AREATA: ICD-10-CM

## 2018-03-26 LAB
BASOPHILS # BLD AUTO: 0.05 K/UL
BASOPHILS NFR BLD: 1 %
DIFFERENTIAL METHOD: ABNORMAL
EOSINOPHIL # BLD AUTO: 0.1 K/UL
EOSINOPHIL NFR BLD: 1.7 %
ERYTHROCYTE [DISTWIDTH] IN BLOOD BY AUTOMATED COUNT: 12.2 %
HCT VFR BLD AUTO: 46.3 %
HGB BLD-MCNC: 15.2 G/DL
IMM GRANULOCYTES # BLD AUTO: 0 K/UL
IMM GRANULOCYTES NFR BLD AUTO: 0 %
LYMPHOCYTES # BLD AUTO: 2.6 K/UL
LYMPHOCYTES NFR BLD: 49.1 %
MCH RBC QN AUTO: 31.9 PG
MCHC RBC AUTO-ENTMCNC: 32.8 G/DL
MCV RBC AUTO: 97 FL
MONOCYTES # BLD AUTO: 0.6 K/UL
MONOCYTES NFR BLD: 11 %
NEUTROPHILS # BLD AUTO: 2 K/UL
NEUTROPHILS NFR BLD: 37.2 %
NRBC BLD-RTO: 0 /100 WBC
PLATELET # BLD AUTO: 206 K/UL
PMV BLD AUTO: 10.5 FL
RBC # BLD AUTO: 4.77 M/UL
T4 FREE SERPL-MCNC: 1.22 NG/DL
THYROPEROXIDASE IGG SERPL-ACNC: <6 IU/ML
TSH SERPL DL<=0.005 MIU/L-ACNC: 0.02 UIU/ML
WBC # BLD AUTO: 5.25 K/UL

## 2018-03-26 PROCEDURE — 86376 MICROSOMAL ANTIBODY EACH: CPT

## 2018-03-26 PROCEDURE — 84439 ASSAY OF FREE THYROXINE: CPT

## 2018-03-26 PROCEDURE — 36415 COLL VENOUS BLD VENIPUNCTURE: CPT | Mod: PO

## 2018-03-26 PROCEDURE — 11900 INJECT SKIN LESIONS </W 7: CPT | Mod: S$PBB,,, | Performed by: DERMATOLOGY

## 2018-03-26 PROCEDURE — 86800 THYROGLOBULIN ANTIBODY: CPT

## 2018-03-26 PROCEDURE — 99202 OFFICE O/P NEW SF 15 MIN: CPT | Mod: 25,S$PBB,, | Performed by: DERMATOLOGY

## 2018-03-26 PROCEDURE — 84443 ASSAY THYROID STIM HORMONE: CPT

## 2018-03-26 PROCEDURE — 85025 COMPLETE CBC W/AUTO DIFF WBC: CPT

## 2018-03-26 PROCEDURE — 99999 PR PBB SHADOW E&M-EST. PATIENT-LVL II: CPT | Mod: PBBFAC,,, | Performed by: DERMATOLOGY

## 2018-03-26 PROCEDURE — 99212 OFFICE O/P EST SF 10 MIN: CPT | Mod: PBBFAC,PO | Performed by: DERMATOLOGY

## 2018-03-26 PROCEDURE — 11900 INJECT SKIN LESIONS </W 7: CPT | Mod: PBBFAC,PO | Performed by: DERMATOLOGY

## 2018-03-26 RX ORDER — MOMETASONE FUROATE 1 MG/ML
SOLUTION TOPICAL 2 TIMES DAILY
Qty: 60 ML | Refills: 3 | Status: SHIPPED | OUTPATIENT
Start: 2018-03-26 | End: 2018-04-26 | Stop reason: ALTCHOICE

## 2018-03-26 RX ADMIN — TRIAMCINOLONE ACETONIDE 10 MG: 10 INJECTION, SUSPENSION INTRA-ARTICULAR; INTRALESIONAL at 10:03

## 2018-03-26 NOTE — PATIENT INSTRUCTIONS
Understanding Alopecia Areata    Alopecia areata is a condition that causes your hair to fall out. It causes bald patches on the scalp, but it can also cause hair loss on other parts of the body.  How to say it  [id number]   What causes alopecia areata?  Alopecia areata is an autoimmune disease. This means its caused by the bodys immune system attacking its own tissues. The immune system attacks the hair follicles. It causes hair to stop growing, and then break off and fall out.  You may be more likely to have alopecia areata if you have another type of autoimmune disease, such as:  · Thyroid disease  · Vitiligo  · Eczema (atopic dermatitis)  Symptoms of alopecia areata  The main symptom is one or more bald patches on the scalp that occur over a few weeks as hair falls out. Bald patches most often occur on the scalp, but hair can also fall out on the face and other parts of the body. The skin may itch or burn before the hair falls out. Then the skin may be smooth, or have some short hairs left. In some people, the rest of the hair on the head and the entire body may also thin or fall out.  Some people also have small dents or pits in their fingernails, or other nail symptoms. These may include roughness, cracks, red spots, or the nail pulling away from the finger.  Treatment for alopecia areata  You can choose not to have any treatment. For many people, the hair will grow back in time. In some cases, it may fall out again. Treatment doesnt prevent hair from falling out in the future.  Some medicines can help regrow hair faster, or stop hair from falling out. These medicines include:  · Corticosteroid medicine. This is injected into the areas where hair is falling out or gone. The injections are done every 4 to 12 weeks. Corticosteroid medicine can also be used as an ointment or cream put on the skin. These are often used along with the injections.  · Immunotherapy medicine. This is a type of  medicine that causes an allergic reaction on the skin. You apply it once a week as a lotion onto the skin of the scalp.  · Topical minoxidil. You put this over-the-counter medicine right on the scalp. It may help new hair grow.  · Other medicines. Many other medicines can be used, but they may suppress the immune system. They can have unwanted side effects.  Medicines used for treatment have side effects. They also work better on some people than others. It depends on how severe your hair loss is. Talk with your healthcare provider about what medicines are the best options for you.  Living with alopecia areata  For many people, the hair grows back within a year. But your hair may fall out again the future. This process may occur a few times over several years. Or the hair may not fully grow back. In some people, all the scalp hair or body hair may fall out.  Hair loss is more likely to happen again if you have any of these:  · Hair loss for more than 1 year  · Nail symptoms  · A family history of alopecia areata  · Hair loss that started in childhood  · Loss of a lot of hair  · Loss of hair in a band from the ears around the back of the head  Hair loss can cause stress and other emotional upset. Talk with your healthcare provider about finding support groups in your area to help you manage your condition. You can also talk to him or her about cosmetic fixes. A wig can be used to conceal bald patches. Tattooing of the eyebrows can restore the look of eyebrow hairs. Your healthcare provider may have resources to help.  When to call your healthcare provider  Call your healthcare provider right away if you have any of these:  · Symptoms that dont get better, or get worse  · New symptoms   Date Last Reviewed: 5/1/2016  © 9454-0058 [employer]. [address] [place] [address], [place] [address]. All rights reserved. This information is not intended as a substitute for professional medical care. Always follow your  healthcare professional's instructions.

## 2018-03-26 NOTE — PROGRESS NOTES
Subjective:       Patient ID:  Ulysses Boreaux is a 67 y.o. male who presents for   Chief Complaint   Patient presents with    Hair Loss     History of Present Illness: The patient presents with chief complaint of alopecia.  Location: scalp  Duration: 4 months  Signs/Symptoms: growing    Prior treatments: none          Review of Systems   Constitutional: Negative for fever and chills.   Gastrointestinal: Negative for nausea and vomiting.   Skin: Negative for itching, daily sunscreen use, activity-related sunscreen use and recent sunburn.   Hematologic/Lymphatic: Does not bruise/bleed easily.        Objective:    Physical Exam   Constitutional: He appears well-developed and well-nourished. No distress.   Neurological: He is alert and oriented to person, place, and time. He is not disoriented.   Psychiatric: He has a normal mood and affect.   Skin:   Areas Examined (abnormalities noted in diagram):   Scalp / Hair Palpated and Inspected  Head / Face Inspection Performed  Neck Inspection Performed  RUE Inspected  LUE Inspection Performed  Nails and Digits Inspection Performed             Assessment / Plan:        Alopecia areata  -     mometasone (ELOCON) 0.1 % lotion; Apply topically 2 (two) times daily.  Dispense: 60 mL; Refill: 3  -     TSH; Future  -     CBC auto differential; Future  -     Thyroid peroxidase antibody; Future  -     THYROGLOBULIN; Future  -     triamcinolone acetonide injection 10 mg; 1 mL (10 mg total) by Other route one time.  -     Discussed dx, will start above med. ILK #1 done today.  After risks, benefits and alternatives explained and side effect profile reviewed, including hypopigmentation, telangiectasia and atrophy, the patient verbally consented to ILK injection. A total of 1 cc of kenalog 10 mg/ml was injected into the area of alopecia of the right scalp.  Pt tolerated well without side effects.  RTC in 4 weeks for repeat injection.                 Follow-up in about 4 weeks (around  4/23/2018).

## 2018-03-26 NOTE — TELEPHONE ENCOUNTER
----- Message from Linda Burns sent at 3/26/2018 10:20 AM CDT -----  Contact: Patient  Patient states he was told he could come buy and  strips for his machine, he is over hear at [facility] and wants  To pick  Them up here, please call him back at [phone]. Thank you

## 2018-03-28 LAB
THRYOGLOBULIN INTERPRETATION: ABNORMAL
THYROGLOB AB SERPL-ACNC: <1.8 IU/ML
THYROGLOB SERPL-MCNC: 20 NG/ML

## 2018-04-01 DIAGNOSIS — I15.2 HYPERTENSION ASSOCIATED WITH DIABETES: ICD-10-CM

## 2018-04-01 DIAGNOSIS — E11.59 HYPERTENSION ASSOCIATED WITH DIABETES: ICD-10-CM

## 2018-04-01 RX ORDER — LOSARTAN POTASSIUM AND HYDROCHLOROTHIAZIDE 25; 100 MG/1; MG/1
TABLET ORAL
Qty: 90 TABLET | Refills: 0 | Status: SHIPPED | OUTPATIENT
Start: 2018-04-01 | End: 2018-10-04 | Stop reason: SDUPTHER

## 2018-04-01 RX ORDER — AMLODIPINE BESYLATE 10 MG/1
TABLET ORAL
Qty: 90 TABLET | Refills: 0 | Status: SHIPPED | OUTPATIENT
Start: 2018-04-01 | End: 2018-06-29 | Stop reason: SDUPTHER

## 2018-04-04 ENCOUNTER — OFFICE VISIT (OUTPATIENT)
Dept: INTERNAL MEDICINE | Facility: CLINIC | Age: 68
End: 2018-04-04
Payer: MEDICARE

## 2018-04-04 VITALS
DIASTOLIC BLOOD PRESSURE: 66 MMHG | TEMPERATURE: 98 F | SYSTOLIC BLOOD PRESSURE: 112 MMHG | HEART RATE: 68 BPM | OXYGEN SATURATION: 97 % | HEIGHT: 75 IN | WEIGHT: 195.13 LBS | BODY MASS INDEX: 24.26 KG/M2

## 2018-04-04 DIAGNOSIS — I15.2 HYPERTENSION ASSOCIATED WITH DIABETES: ICD-10-CM

## 2018-04-04 DIAGNOSIS — E78.5 HYPERLIPIDEMIA ASSOCIATED WITH TYPE 2 DIABETES MELLITUS: ICD-10-CM

## 2018-04-04 DIAGNOSIS — R93.89 ABNORMAL ULTRASOUND: ICD-10-CM

## 2018-04-04 DIAGNOSIS — E11.69 HYPERLIPIDEMIA ASSOCIATED WITH TYPE 2 DIABETES MELLITUS: ICD-10-CM

## 2018-04-04 DIAGNOSIS — G47.00 INSOMNIA, UNSPECIFIED TYPE: Primary | ICD-10-CM

## 2018-04-04 DIAGNOSIS — E11.59 HYPERTENSION ASSOCIATED WITH DIABETES: ICD-10-CM

## 2018-04-04 DIAGNOSIS — G89.21 CHRONIC PAIN DUE TO TRAUMA: ICD-10-CM

## 2018-04-04 DIAGNOSIS — B18.2 CHRONIC HEPATITIS C WITHOUT HEPATIC COMA: ICD-10-CM

## 2018-04-04 PROCEDURE — 99214 OFFICE O/P EST MOD 30 MIN: CPT | Mod: S$PBB,,, | Performed by: FAMILY MEDICINE

## 2018-04-04 PROCEDURE — 99999 PR PBB SHADOW E&M-EST. PATIENT-LVL III: CPT | Mod: PBBFAC,,, | Performed by: FAMILY MEDICINE

## 2018-04-04 PROCEDURE — 99213 OFFICE O/P EST LOW 20 MIN: CPT | Mod: PBBFAC | Performed by: FAMILY MEDICINE

## 2018-04-04 RX ORDER — CLONAZEPAM 1 MG/1
TABLET ORAL
Qty: 30 TABLET | Refills: 0 | OUTPATIENT
Start: 2018-04-04

## 2018-04-04 RX ORDER — CLONAZEPAM 1 MG/1
1 TABLET ORAL NIGHTLY
Qty: 30 TABLET | Refills: 5 | Status: SHIPPED | OUTPATIENT
Start: 2018-04-04 | End: 2018-10-04 | Stop reason: SDUPTHER

## 2018-04-04 NOTE — PROGRESS NOTES
Subjective:       Patient ID: Ulysses Boreaux is a 67 y.o. male.    Chief Complaint: Annual Exam    Patient presents to clinic today for annual physical exam.      Review of Systems   Constitutional: Negative for chills, fatigue, fever and unexpected weight change.   HENT: Positive for ear pain. Negative for congestion, dental problem, hearing loss, rhinorrhea and trouble swallowing.    Eyes: Negative for pain and visual disturbance.   Respiratory: Negative for cough and shortness of breath.    Cardiovascular: Negative for chest pain, palpitations and leg swelling.   Gastrointestinal: Negative for abdominal distention, abdominal pain, blood in stool, constipation, diarrhea, nausea and vomiting.   Genitourinary: Negative for difficulty urinating, scrotal swelling and testicular pain.   Musculoskeletal: Negative for arthralgias and myalgias.   Skin: Negative for rash.   Neurological: Negative for dizziness, weakness, numbness and headaches.   Hematological: Negative for adenopathy. Does not bruise/bleed easily.   Psychiatric/Behavioral: Positive for sleep disturbance. Negative for dysphoric mood. The patient is not nervous/anxious.        Objective:      Physical Exam   Constitutional: He is oriented to person, place, and time. He appears well-developed and well-nourished. No distress.   HENT:   Head: Normocephalic and atraumatic.   Right Ear: Hearing, tympanic membrane, external ear and ear canal normal.   Left Ear: Hearing, tympanic membrane, external ear and ear canal normal.   Nose: Nose normal.   Mouth/Throat: Uvula is midline, oropharynx is clear and moist and mucous membranes are normal.   Eyes: Conjunctivae, EOM and lids are normal. Pupils are equal, round, and reactive to light. No scleral icterus.   Neck: Normal range of motion. Neck supple. No thyromegaly present.   Cardiovascular: Normal rate and regular rhythm.  Exam reveals no gallop and no friction rub.    No murmur heard.  Pulmonary/Chest: Effort  normal and breath sounds normal. He has no wheezes. He has no rales.   Abdominal: Soft. Bowel sounds are normal. He exhibits no distension and no mass. There is no hepatosplenomegaly. There is no tenderness.   Musculoskeletal: Normal range of motion. He exhibits no edema or tenderness.   Lymphadenopathy:     He has no cervical adenopathy.   Neurological: He is alert and oriented to person, place, and time. No cranial nerve deficit. Coordination normal.   Reflex Scores:       Patellar reflexes are 2+ on the right side and 2+ on the left side.  Skin: Skin is warm and dry. No rash noted.   Psychiatric: He has a normal mood and affect.   Vitals reviewed.      Assessment:       1. Insomnia, unspecified type    2. Chronic hepatitis C without hepatic coma    3. Uncontrolled type 2 diabetes mellitus without complication, with long-term current use of insulin    4. Hypertension associated with diabetes    5. Hyperlipidemia associated with type 2 diabetes mellitus    6. Chronic pain due to trauma    7. Abnormal ultrasound        Plan:     Problem List Items Addressed This Visit     Uncontrolled type 2 diabetes mellitus without complication, with long-term current use of insulin    Current Assessment & Plan     Last a1c 7.9, followed by Mrs. Mcgarry in diabetes         Hepatitis C, chronic    Relevant Orders    Ambulatory referral to Gastroenterology    Hypertension associated with diabetes    Current Assessment & Plan     Controlled, continue amlodipine, nebivolol and losartan-HCTZ         Chronic pain due to trauma    Current Assessment & Plan     Has upcoming appointment with Dr. Olmedo         Hyperlipidemia associated with type 2 diabetes mellitus    Current Assessment & Plan     Status pending labs, continue lipitor              Other Visit Diagnoses     Insomnia, unspecified type    -  Primary    Relevant Medications    clonazePAM (KLONOPIN) 1 MG tablet    Abnormal ultrasound        Relevant Orders    US Abdominal Aorta           Health Maintenance reviewed/updated. Patient will discuss colonoscopy at GI appointment. Advised he can obtain Tdap at pharmacy.

## 2018-04-06 DIAGNOSIS — E11.9 DIABETES MELLITUS WITHOUT COMPLICATION: ICD-10-CM

## 2018-04-10 PROBLEM — E11.69 HYPERLIPIDEMIA ASSOCIATED WITH TYPE 2 DIABETES MELLITUS: Status: ACTIVE | Noted: 2018-04-10

## 2018-04-10 PROBLEM — E78.5 HYPERLIPIDEMIA ASSOCIATED WITH TYPE 2 DIABETES MELLITUS: Status: ACTIVE | Noted: 2018-04-10

## 2018-04-26 ENCOUNTER — OFFICE VISIT (OUTPATIENT)
Dept: DERMATOLOGY | Facility: CLINIC | Age: 68
End: 2018-04-26
Payer: MEDICARE

## 2018-04-26 DIAGNOSIS — L63.9 ALOPECIA AREATA: Primary | ICD-10-CM

## 2018-04-26 PROCEDURE — 99999 PR PBB SHADOW E&M-EST. PATIENT-LVL II: CPT | Mod: PBBFAC,,, | Performed by: DERMATOLOGY

## 2018-04-26 PROCEDURE — 99212 OFFICE O/P EST SF 10 MIN: CPT | Mod: PBBFAC,PO,25 | Performed by: DERMATOLOGY

## 2018-04-26 PROCEDURE — 11900 INJECT SKIN LESIONS </W 7: CPT | Mod: S$PBB,,, | Performed by: DERMATOLOGY

## 2018-04-26 PROCEDURE — 11900 INJECT SKIN LESIONS </W 7: CPT | Mod: PBBFAC,PO | Performed by: DERMATOLOGY

## 2018-04-26 PROCEDURE — 99213 OFFICE O/P EST LOW 20 MIN: CPT | Mod: 25,S$PBB,, | Performed by: DERMATOLOGY

## 2018-04-26 RX ADMIN — TRIAMCINOLONE ACETONIDE 10 MG: 10 INJECTION, SUSPENSION INTRA-ARTICULAR; INTRALESIONAL at 12:04

## 2018-04-26 NOTE — PROGRESS NOTES
Subjective:       Patient ID:  Ulysses Boreaux is a 67 y.o. male who presents for No chief complaint on file.    Hx of hepatitis C and alopecia areata, last seen on 3/26/18.  He is s/p ILK #1 at last visit. He is currently on mometasone lotion bid.   + improvement, here today for repeat ILK.     + mild decrease in TSH, free T4 wnl.  Anti-thyroglobulin and anti-TPO antibodies negative.        Review of Systems   Constitutional: Negative for fever and chills.   Gastrointestinal: Negative for nausea and vomiting.   Skin: Negative for daily sunscreen use, activity-related sunscreen use and recent sunburn.   Hematologic/Lymphatic: Does not bruise/bleed easily.        Objective:    Physical Exam   Constitutional: He appears well-developed and well-nourished. No distress.   Neurological: He is alert and oriented to person, place, and time. He is not disoriented.   Psychiatric: He has a normal mood and affect.   Skin:   Areas Examined (abnormalities noted in diagram):   Scalp / Hair Palpated and Inspected  Head / Face Inspection Performed  Neck Inspection Performed  RUE Inspected  LUE Inspection Performed  Nails and Digits Inspection Performed                 Assessment / Plan:        Alopecia areata  -     triamcinolone acetonide injection 10 mg; 1 mL (10 mg total) by Other route one time.  -     ILK #2 done today.  Continue mometasone lotion bid.  After risks, benefits and alternatives explained and side effect profile reviewed, including hypopigmentation, telangiectasia and atrophy, the patient verbally consented to ILK injection. A total of 1 cc of kenalog 10 mg/ml was injected into the area of alopecia of the scalp.  Pt tolerated well without side effects.                   Follow-up in about 2 months (around 6/26/2018).

## 2018-04-30 DIAGNOSIS — E11.69 COMBINED HYPERLIPIDEMIA ASSOCIATED WITH TYPE 2 DIABETES MELLITUS: ICD-10-CM

## 2018-04-30 DIAGNOSIS — E78.2 COMBINED HYPERLIPIDEMIA ASSOCIATED WITH TYPE 2 DIABETES MELLITUS: ICD-10-CM

## 2018-04-30 RX ORDER — ATORVASTATIN CALCIUM 20 MG/1
TABLET, FILM COATED ORAL
Qty: 30 TABLET | Refills: 11 | Status: SHIPPED | OUTPATIENT
Start: 2018-04-30 | End: 2018-10-04 | Stop reason: SDUPTHER

## 2018-05-07 ENCOUNTER — OFFICE VISIT (OUTPATIENT)
Dept: PAIN MEDICINE | Facility: CLINIC | Age: 68
End: 2018-05-07
Payer: MEDICARE

## 2018-05-07 ENCOUNTER — LAB VISIT (OUTPATIENT)
Dept: LAB | Facility: HOSPITAL | Age: 68
End: 2018-05-07
Attending: FAMILY MEDICINE
Payer: MEDICARE

## 2018-05-07 VITALS
SYSTOLIC BLOOD PRESSURE: 136 MMHG | BODY MASS INDEX: 24.25 KG/M2 | DIASTOLIC BLOOD PRESSURE: 82 MMHG | WEIGHT: 195 LBS | HEIGHT: 75 IN | HEART RATE: 66 BPM

## 2018-05-07 DIAGNOSIS — G89.21 CHRONIC PAIN DUE TO TRAUMA: Primary | ICD-10-CM

## 2018-05-07 DIAGNOSIS — E11.69 HYPERLIPIDEMIA ASSOCIATED WITH TYPE 2 DIABETES MELLITUS: ICD-10-CM

## 2018-05-07 DIAGNOSIS — E78.5 HYPERLIPIDEMIA ASSOCIATED WITH TYPE 2 DIABETES MELLITUS: ICD-10-CM

## 2018-05-07 DIAGNOSIS — E11.59 HYPERTENSION ASSOCIATED WITH DIABETES: ICD-10-CM

## 2018-05-07 DIAGNOSIS — M79.642 PAIN OF LEFT HAND: ICD-10-CM

## 2018-05-07 DIAGNOSIS — I15.2 HYPERTENSION ASSOCIATED WITH DIABETES: ICD-10-CM

## 2018-05-07 LAB
ALBUMIN SERPL BCP-MCNC: 4.3 G/DL
ALP SERPL-CCNC: 81 U/L
ALT SERPL W/O P-5'-P-CCNC: 116 U/L
ANION GAP SERPL CALC-SCNC: 11 MMOL/L
AST SERPL-CCNC: 102 U/L
BILIRUB SERPL-MCNC: 0.6 MG/DL
BUN SERPL-MCNC: 15 MG/DL
CALCIUM SERPL-MCNC: 10.6 MG/DL
CHLORIDE SERPL-SCNC: 98 MMOL/L
CHOLEST SERPL-MCNC: 159 MG/DL
CHOLEST/HDLC SERPL: 2.3 {RATIO}
CO2 SERPL-SCNC: 29 MMOL/L
CREAT SERPL-MCNC: 1.4 MG/DL
EST. GFR  (AFRICAN AMERICAN): >60 ML/MIN/1.73 M^2
EST. GFR  (NON AFRICAN AMERICAN): 52.4 ML/MIN/1.73 M^2
GLUCOSE SERPL-MCNC: 138 MG/DL
HDLC SERPL-MCNC: 69 MG/DL
HDLC SERPL: 43.4 %
LDLC SERPL CALC-MCNC: 80.2 MG/DL
NONHDLC SERPL-MCNC: 90 MG/DL
POTASSIUM SERPL-SCNC: 4.1 MMOL/L
PROT SERPL-MCNC: 8.8 G/DL
SODIUM SERPL-SCNC: 138 MMOL/L
T4 FREE SERPL-MCNC: 1.36 NG/DL
TRIGL SERPL-MCNC: 49 MG/DL
TSH SERPL DL<=0.005 MIU/L-ACNC: 0.23 UIU/ML

## 2018-05-07 PROCEDURE — 99999 PR PBB SHADOW E&M-EST. PATIENT-LVL II: CPT | Mod: PBBFAC,,, | Performed by: ANESTHESIOLOGY

## 2018-05-07 PROCEDURE — 80053 COMPREHEN METABOLIC PANEL: CPT

## 2018-05-07 PROCEDURE — 84443 ASSAY THYROID STIM HORMONE: CPT

## 2018-05-07 PROCEDURE — 99212 OFFICE O/P EST SF 10 MIN: CPT | Mod: PBBFAC,PO | Performed by: ANESTHESIOLOGY

## 2018-05-07 PROCEDURE — 80061 LIPID PANEL: CPT

## 2018-05-07 PROCEDURE — 36415 COLL VENOUS BLD VENIPUNCTURE: CPT | Mod: PO

## 2018-05-07 PROCEDURE — 99214 OFFICE O/P EST MOD 30 MIN: CPT | Mod: S$PBB,,, | Performed by: ANESTHESIOLOGY

## 2018-05-07 PROCEDURE — 84439 ASSAY OF FREE THYROXINE: CPT

## 2018-05-07 RX ORDER — HYDROCODONE BITARTRATE AND ACETAMINOPHEN 10; 325 MG/1; MG/1
TABLET ORAL
Qty: 60 TABLET | Refills: 0 | Status: SHIPPED | OUTPATIENT
Start: 2018-05-07 | End: 2018-05-07 | Stop reason: SDUPTHER

## 2018-05-07 RX ORDER — HYDROCODONE BITARTRATE AND ACETAMINOPHEN 10; 325 MG/1; MG/1
TABLET ORAL
Qty: 60 TABLET | Refills: 0 | Status: SHIPPED | OUTPATIENT
Start: 2018-06-07 | End: 2018-07-09 | Stop reason: SDUPTHER

## 2018-05-07 NOTE — PROGRESS NOTES
Subjective:     Chief Complaint:  Left Hand Pain    History of Present Illness:  This patient is a 65 year old male who presents today for f/u complaining of the above noted pains. The patient describes this pain as follows.    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 work, left lower quadrant pain since hernia surgery in 2004  - timing (constant, intermittent): Constant  - character (sharp, dull, aching, burning): Throbbing  - radiating: No  - dermatomal distribution: No  - side: Left   - aggravating factors: Nothing worsens left digit pain, left lower quadrant pain worse with exercise or walking  - relieving factors: Medication / Norco  - antecedent trauma: Amputation via skill saw and 1997, hernia repair in 2004  - prior spinal surgery: None  - pertinent negatives: No fever, No chills, No weight loss, No bladder dysfunction, No bowel dysfunction, No extremity weakness, No saddle anesthesia  - medications tried: Norco, Percocet, over-the-counter medications, compound pain cream  - other therapies tried (physical therapy, injections):     >> physical therapy tried in the past    >> no prior injections        Imaging/ Labs (reviewed on 5/7/2018):    3/02/18 Comprehensive metabolic panel     Ref Range & Units 7d ago 5mo ago 7mo ago    Sodium 136 - 145 mmol/L 140  137  139     Potassium 3.5 - 5.1 mmol/L 3.9  3.6  3.9     Chloride 95 - 110 mmol/L 101  98  101     CO2 23 - 29 mmol/L 31   30   30      Glucose 70 - 110 mg/dL 77  142   349      BUN, Bld 8 - 23 mg/dL 12  22  19     Creatinine 0.5 - 1.4 mg/dL 1.2  1.4  1.6      Calcium 8.7 - 10.5 mg/dL 10.1  9.5  9.9     Total Protein 6.0 - 8.4 g/dL 8.0  7.4  7.5     Albumin 3.5 - 5.2 g/dL 4.1  3.6  3.6     Total Bilirubin 0.1 - 1.0 mg/dL 0.8  0.7CM  0.6CM    Comments: For infants and newborns, interpretation of results should be based   on gestational age, weight and in agreement with clinical  "  observations.   Premature Infant recommended reference ranges:   Up to 24 hours.............<8.0 mg/dL   Up to 48 hours............<12.0 mg/dL   3-5 days..................<15.0 mg/dL   6-29 days.................<15.0 mg/dL     Alkaline Phosphatase 55 - 135 U/L 70  71  72     AST 10 - 40 U/L 87   73   88      ALT 10 - 44 U/L 98   77   97      Anion Gap 8 - 16 mmol/L 8  9  8     eGFR if African American >60 mL/min/1.73 m^2 >60.0  >60.0  52      eGFR if non African American >60 mL/min/1.73 m^2 >60.0  52.4CM   45CM     Comments: Calculation used to obtain the estimated glomerular filtration   rate (eGFR) is the CKD-EPI equation.                ROS:  CONSTITUTIONAL: No fever, chills, weight loss  SKIN: No rash or itching  CARDIOVASCULAR:  No chest pain, palpitations  RESPIRATORY: No shortness of breath  GASTROINTESTINAL: No diarrhea, No constipation, abdominal pain, left lower quadrant  GENITOURINARY: No urinary incontinence    MUSCULOSKELETAL:  - patient reports pain as above, see chief complaint     NEUROLOGICAL:   - Pain as above  - Strength in lower extremities is normal  - Sensation in lower extremities is normal  - No bowel or bladder incontinence     PSYCHIATRIC: No change in mood noticed         Objective:   Vitals:  /82   Pulse 66   Ht 6' 3" (1.905 m)   Wt 88.5 kg (195 lb)   BMI 24.37 kg/m²    (reviewed on 5/7/2018)     General: alert and oriented, in no apparent distress  Gait: normal gait  Skin: No rashes, No discoloration   HEENT: EOMI  Respiratory: respirations nonlabored    Musculoskeletal:  - Cervical range of motion intact  - Left upper extremity range of motion intact throughout  - Digital stumps are hypersensitive to palpation, hypersensitivity does not extend further proximal  - No color change, no swelling, no changes in hair growth along left hand     Neuro:  - Lower extremities:      >> 5/5 strength bilaterally, throughout, symmetric    Psych: mood and affect " appropriate        Assessment:     Encounter Diagnoses   Name Primary?    Chronic pain due to trauma Yes    Pain of left hand        Plan:     1. Interventional: None.    2. Pharmacologic: Continuel Norco 10/325 mg PO BID PRN (60tabs). Refill x1 post dated for 6/7. Patient is obtaining good pain control with functional improvement. He tolerates this medication well, and he denies any drowsiness or constipation.  LA  reviewed and appropriate. Consider Gabapentin.    3. Rehabilitative: Encouraged regular exercise.    4. Diagnostic: None for now.    5. F/U 8 weeks.    >> UDS:  8-18-15 :: appropriate  12-29-15 :: appropriate  5-3-16 :: appropriate  10-18-16 :: appropriate  4/13/2017 :: appropriate  9/29/2017 :: appropriate  3/9/2018 :: pending

## 2018-05-08 ENCOUNTER — TELEPHONE (OUTPATIENT)
Dept: INTERNAL MEDICINE | Facility: CLINIC | Age: 68
End: 2018-05-08

## 2018-05-08 DIAGNOSIS — E83.52 HYPERCALCEMIA: Primary | ICD-10-CM

## 2018-05-08 NOTE — TELEPHONE ENCOUNTER
Returned call to patient to inform of GI appt. (Noted in chart that patient cancelled appt this morning after it was scheduled.) Discussed cancelled appt and test results with patient. He stated that he was told at last office visit that appt will be scheduled in June and that he doesn't understand why it was scheduled before then. I advised patient that appt was scheduled for next available. Patient stated that he does not want a May appt. Appt rescheduled to June. Pt declined to schedule lab appt. He stated that he will call back to schedule.

## 2018-05-08 NOTE — TELEPHONE ENCOUNTER
----- Message from Mony Barragan sent at 5/8/2018 10:26 AM CDT -----  Pt is returning a call from nurse.      Please call pt back at [phone]

## 2018-05-14 ENCOUNTER — TELEPHONE (OUTPATIENT)
Dept: DERMATOLOGY | Facility: CLINIC | Age: 68
End: 2018-05-14

## 2018-05-14 NOTE — TELEPHONE ENCOUNTER
----- Message from Rosario Tomasz sent at 5/14/2018  3:16 PM CDT -----  Contact: Patient   Patient would like something called for a rash that's in his face, Please call him at [phone].    [facility] [address] - ERIC CHAVEZ  [address] AT [facility] [address]  [address] [address]  Phone: [phone] Fax: [phone]    Thanks  td

## 2018-05-14 NOTE — TELEPHONE ENCOUNTER
Patient declined next available appointment. Patient was then  instructed to go to the nearest emergency room if he felt it to be urgent.

## 2018-06-18 ENCOUNTER — INITIAL CONSULT (OUTPATIENT)
Dept: GASTROENTEROLOGY | Facility: CLINIC | Age: 68
End: 2018-06-18
Payer: MEDICARE

## 2018-06-18 ENCOUNTER — TELEPHONE (OUTPATIENT)
Dept: PHARMACY | Facility: CLINIC | Age: 68
End: 2018-06-18

## 2018-06-18 ENCOUNTER — LAB VISIT (OUTPATIENT)
Dept: LAB | Facility: HOSPITAL | Age: 68
End: 2018-06-18
Attending: PHYSICIAN ASSISTANT
Payer: MEDICARE

## 2018-06-18 ENCOUNTER — TELEPHONE (OUTPATIENT)
Dept: DERMATOLOGY | Facility: CLINIC | Age: 68
End: 2018-06-18

## 2018-06-18 VITALS
HEART RATE: 79 BPM | SYSTOLIC BLOOD PRESSURE: 126 MMHG | DIASTOLIC BLOOD PRESSURE: 72 MMHG | BODY MASS INDEX: 24.45 KG/M2 | WEIGHT: 196.63 LBS | HEIGHT: 75 IN

## 2018-06-18 DIAGNOSIS — B18.2 CHRONIC HEPATITIS C WITHOUT HEPATIC COMA: Primary | ICD-10-CM

## 2018-06-18 DIAGNOSIS — B18.2 CHRONIC HEPATITIS C WITHOUT HEPATIC COMA: ICD-10-CM

## 2018-06-18 DIAGNOSIS — E83.52 HYPERCALCEMIA: ICD-10-CM

## 2018-06-18 LAB
25(OH)D3+25(OH)D2 SERPL-MCNC: 38 NG/ML
ALBUMIN SERPL BCP-MCNC: 3.7 G/DL
ALP SERPL-CCNC: 72 U/L
ALT SERPL W/O P-5'-P-CCNC: 82 U/L
AST SERPL-CCNC: 67 U/L
BASOPHILS # BLD AUTO: 0.05 K/UL
BASOPHILS NFR BLD: 1.3 %
BILIRUB DIRECT SERPL-MCNC: 0.3 MG/DL
BILIRUB SERPL-MCNC: 0.5 MG/DL
CALCIUM SERPL-MCNC: 9.7 MG/DL
DIFFERENTIAL METHOD: ABNORMAL
EOSINOPHIL # BLD AUTO: 0.1 K/UL
EOSINOPHIL NFR BLD: 2 %
ERYTHROCYTE [DISTWIDTH] IN BLOOD BY AUTOMATED COUNT: 11.9 %
HCT VFR BLD AUTO: 45.8 %
HGB BLD-MCNC: 15.2 G/DL
IMM GRANULOCYTES # BLD AUTO: 0.01 K/UL
IMM GRANULOCYTES NFR BLD AUTO: 0.3 %
INR PPP: 1
LYMPHOCYTES # BLD AUTO: 2.4 K/UL
LYMPHOCYTES NFR BLD: 59.9 %
MCH RBC QN AUTO: 32.1 PG
MCHC RBC AUTO-ENTMCNC: 33.2 G/DL
MCV RBC AUTO: 97 FL
MONOCYTES # BLD AUTO: 0.4 K/UL
MONOCYTES NFR BLD: 10.7 %
NEUTROPHILS # BLD AUTO: 1 K/UL
NEUTROPHILS NFR BLD: 25.8 %
NRBC BLD-RTO: 0 /100 WBC
PLATELET # BLD AUTO: 186 K/UL
PMV BLD AUTO: 10.7 FL
PROT SERPL-MCNC: 7.7 G/DL
PROTHROMBIN TIME: 10.5 SEC
RBC # BLD AUTO: 4.73 M/UL
WBC # BLD AUTO: 3.94 K/UL

## 2018-06-18 PROCEDURE — 82306 VITAMIN D 25 HYDROXY: CPT

## 2018-06-18 PROCEDURE — 85610 PROTHROMBIN TIME: CPT

## 2018-06-18 PROCEDURE — 99214 OFFICE O/P EST MOD 30 MIN: CPT | Mod: PBBFAC | Performed by: PHYSICIAN ASSISTANT

## 2018-06-18 PROCEDURE — 99999 PR PBB SHADOW E&M-EST. PATIENT-LVL IV: CPT | Mod: PBBFAC,,, | Performed by: PHYSICIAN ASSISTANT

## 2018-06-18 PROCEDURE — 99213 OFFICE O/P EST LOW 20 MIN: CPT | Mod: S$PBB,,, | Performed by: PHYSICIAN ASSISTANT

## 2018-06-18 PROCEDURE — 85025 COMPLETE CBC W/AUTO DIFF WBC: CPT

## 2018-06-18 PROCEDURE — 80076 HEPATIC FUNCTION PANEL: CPT

## 2018-06-18 PROCEDURE — 87902 NFCT AGT GNTYP ALYS HEP C: CPT

## 2018-06-18 PROCEDURE — 36415 COLL VENOUS BLD VENIPUNCTURE: CPT

## 2018-06-18 PROCEDURE — 87522 HEPATITIS C REVRS TRNSCRPJ: CPT

## 2018-06-18 PROCEDURE — 82310 ASSAY OF CALCIUM: CPT

## 2018-06-18 RX ORDER — LEDIPASVIR AND SOFOSBUVIR 90; 400 MG/1; MG/1
1 TABLET, FILM COATED ORAL DAILY
Qty: 30 TABLET | Refills: 2 | Status: SHIPPED | OUTPATIENT
Start: 2018-06-18 | End: 2020-06-09

## 2018-06-18 NOTE — PROGRESS NOTES
Subjective:       Patient ID: Ulysses Boreaux is a 67 y.o. male.    Chief Complaint: Hepatitis C    HPI   The patient has a history of Hepatitis C. He is here for a follow up. Last year we tried to get the patient on treatment but his insurance denied it. He was told to follow up in a year so he is here. He reports doing well. He has no GI symptoms. LFT have increased since March. His genotype is 1b and last viral load was 2.8 million. Fibrosure was F3-F4. He has changed insurance.     Review of Systems    As per HPI.     Objective:      Physical Exam   Constitutional: He is oriented to person, place, and time. He appears well-developed and well-nourished. No distress.   HENT:   Head: Normocephalic and atraumatic.   Eyes: EOM are normal.   Cardiovascular: Normal rate and regular rhythm.    Pulmonary/Chest: Effort normal and breath sounds normal. No respiratory distress. He has no wheezes.   Abdominal: Soft. Bowel sounds are normal. He exhibits no distension. There is no tenderness.   Neurological: He is alert and oriented to person, place, and time. No cranial nerve deficit.   Skin: He is not diaphoretic.   Psychiatric: His behavior is normal.       Assessment:       1. Chronic hepatitis C without hepatic coma        Plan:       I will update his labs and ultrasound and then send a prescription to the pharmacy for treatment. He is treatment naive, non-cirrhotic, genotype 1b.     Orders Placed This Encounter   Procedures    US Abdomen Limited    CBC auto differential    Protime-INR    Hepatic function panel    Hepatitis C genotype     Medications Ordered This Encounter      ledipasvir-sofosbuvir  mg Tab          Sig: Take 1 tablet by mouth once daily.          Dispense:  30 tablet          Refill:  2          Order Specific Question: For: 1. benefits investigation, prior auth. and appeals; 2. patient financial assistance; and 3. patient education and medication management send to [facility]  to fill the prescription.          Answer: Yes, send prescription to [facility]    Follow-up if symptoms worsen or fail to improve.    Thank you for the opportunity to participate in the care of this patient.   Jeferson Vega PA-C.

## 2018-06-18 NOTE — TELEPHONE ENCOUNTER
LVM for pt that appt will be cancelled. We can get him scheduled with PA this week or Dr. Messina next available.

## 2018-06-18 NOTE — TELEPHONE ENCOUNTER
----- Message from Zoya Roberts sent at 6/18/2018  3:15 PM CDT -----  Contact: pt  The pt states he is returning a missed call, can be reached at [phone]///thxMW

## 2018-06-18 NOTE — LETTER
June 18, 2018      Elza Pantoja MD  [address] [place] [address] [address] [id number]           [facility]  [address] [place] [address] [address] [address]  Phone: [phone]  Fax: [phone]          Patient: Ulysses Boreaux   MR Number: [id number]   YOB: 1950   Date of Visit: 6/18/2018       Dear Dr. Elza Pantoja:    Thank you for referring Ulysses Boreaux to me for evaluation. Attached you will find relevant portions of my assessment and plan of care.    If you have questions, please do not hesitate to call me. I look forward to following Ulysses Boreaux along with you.    Sincerely,    ESTEPHANIA Bauerosure  CC:  No Recipients    If you would like to receive this communication electronically, please contact [email or web address] or [phone] to request more information on [employer] access.    For providers and/or their staff who would like to refer a patient to [facility], please contact us through our one-stop-shop provider referral line, [facility] [place], at [phone].    If you feel you have received this communication in error or would no longer like to receive these types of communications, please e-mail [email or web address]

## 2018-06-20 NOTE — TELEPHONE ENCOUNTER
DOCUMENTATION ONLY:  Prior authorization for Js approved from 03/21/2018 to 09/11/2018 x 12 weeks of treatment.   Case ID# [id number]    Co-pay: $3.70    Patient Assistance IS NOT required.     Forward to clinical pharmacist for consult & shipment.

## 2018-06-21 ENCOUNTER — OFFICE VISIT (OUTPATIENT)
Dept: DERMATOLOGY | Facility: CLINIC | Age: 68
End: 2018-06-21
Payer: MEDICARE

## 2018-06-21 DIAGNOSIS — L63.9 ALOPECIA AREATA: Primary | ICD-10-CM

## 2018-06-21 PROCEDURE — 99212 OFFICE O/P EST SF 10 MIN: CPT | Mod: PBBFAC,PO | Performed by: PHYSICIAN ASSISTANT

## 2018-06-21 PROCEDURE — 99999 PR PBB SHADOW E&M-EST. PATIENT-LVL II: CPT | Mod: PBBFAC,,, | Performed by: PHYSICIAN ASSISTANT

## 2018-06-21 PROCEDURE — 99212 OFFICE O/P EST SF 10 MIN: CPT | Mod: 25,S$PBB,, | Performed by: PHYSICIAN ASSISTANT

## 2018-06-21 PROCEDURE — 11900 INJECT SKIN LESIONS </W 7: CPT | Mod: PBBFAC,PO | Performed by: PHYSICIAN ASSISTANT

## 2018-06-21 PROCEDURE — 11900 INJECT SKIN LESIONS </W 7: CPT | Mod: S$PBB,,, | Performed by: PHYSICIAN ASSISTANT

## 2018-06-21 RX ADMIN — TRIAMCINOLONE ACETONIDE 10 MG: 10 INJECTION, SUSPENSION INTRA-ARTICULAR; INTRALESIONAL at 01:06

## 2018-06-21 NOTE — PROGRESS NOTES
Subjective:       Patient ID:  Ulysses Boreaux is a 67 y.o. male who presents for   Chief Complaint   Patient presents with    Hair Loss     follow up     Hx of hepatitis C and alopecia areata, last seen on 4/26/18.  He is s/p ILK #2 at last visit. He is currently on mometasone lotion bid.   + improvement, here today for repeat ILK.     + mild decrease in TSH, free T4 wnl.  Anti-thyroglobulin and anti-TPO antibodies negative (doc: 3/26/18)        Review of Systems   Constitutional: Negative for fever and chills.   Gastrointestinal: Positive for nausea. Negative for vomiting.   Skin: Negative for rash and daily sunscreen use.        Objective:    Physical Exam   Constitutional: He appears well-developed and well-nourished. No distress.   Neurological: He is alert and oriented to person, place, and time. He is not disoriented.   Psychiatric: He has a normal mood and affect.   Skin:   Areas Examined (abnormalities noted in diagram):   Scalp / Hair Palpated and Inspected  Head / Face Inspection Performed  Neck Inspection Performed  RUE Inspected  LUE Inspection Performed              Diagram Legend     Erythematous scaling macule/papule c/w actinic keratosis       Vascular papule c/w angioma      Pigmented verrucoid papule/plaque c/w seborrheic keratosis      Yellow umbilicated papule c/w sebaceous hyperplasia      Irregularly shaped tan macule c/w lentigo     1-2 mm smooth white papules consistent with Milia      Movable subcutaneous cyst with punctum c/w epidermal inclusion cyst      Subcutaneous movable cyst c/w pilar cyst      Firm pink to brown papule c/w dermatofibroma      Pedunculated fleshy papule(s) c/w skin tag(s)      Evenly pigmented macule c/w junctional nevus     Mildly variegated pigmented, slightly irregular-bordered macule c/w mildly atypical nevus      Flesh colored to evenly pigmented papule c/w intradermal nevus       Pink pearly papule/plaque c/w basal cell carcinoma      Erythematous  hyperkeratotic cursted plaque c/w SCC      Surgical scar with no sign of skin cancer recurrence      Open and closed comedones      Inflammatory papules and pustules      Verrucoid papule consistent consistent with wart     Erythematous eczematous patches and plaques     Dystrophic onycholytic nail with subungual debris c/w onychomycosis     Umbilicated papule    Erythematous-base heme-crusted tan verrucoid plaque consistent with inflamed seborrheic keratosis     Erythematous Silvery Scaling Plaque c/w Psoriasis     See annotation      Assessment / Plan:        Alopecia areata, improving  -     triamcinolone acetonide injection 10 mg; Inject 1 mL (10 mg total) into the skin one time.    -    After risks, benefits and alternatives explained and side effect profile reviewed, including hypopigmentation, telangiectasia and atrophy, the patient verbally consented to ILK injection. A total of 1 cc of kenalog 10 mg/ml was injected into the center of the areas of alopecia of the parietal scalp.  Pt tolerated well without side effects.  RTC in 4 weeks for repeat injection. Continue mometasone lotion bid.       Follow-up in about 4 weeks (around 7/19/2018) for alopecia areata.

## 2018-06-22 LAB
HCV GENTYP SERPL NAA+PROBE: ABNORMAL
HCV RNA SERPL NAA+PROBE-LOG IU: 6.29 LOG (10) IU/ML
HCV RNA SERPL QL NAA+PROBE: DETECTED
HCV RNA SPEC NAA+PROBE-ACNC: ABNORMAL IU/ML

## 2018-06-27 ENCOUNTER — TELEPHONE (OUTPATIENT)
Dept: PHARMACY | Facility: CLINIC | Age: 68
End: 2018-06-27

## 2018-06-27 DIAGNOSIS — B18.2 CHRONIC HEPATITIS C WITHOUT HEPATIC COMA: Primary | ICD-10-CM

## 2018-06-27 NOTE — TELEPHONE ENCOUNTER
Patient called for initial consult and ship on Harvoni - patient stated he is going out of town and cannot speak now.  Requested call back on Monday 7/2    LORETO Royal.Ph.  Clinical Pharmacist  [facility]  Phone: [phone]

## 2018-06-29 DIAGNOSIS — I15.2 HYPERTENSION ASSOCIATED WITH DIABETES: ICD-10-CM

## 2018-06-29 DIAGNOSIS — E11.59 HYPERTENSION ASSOCIATED WITH DIABETES: ICD-10-CM

## 2018-06-29 RX ORDER — AMLODIPINE BESYLATE 10 MG/1
TABLET ORAL
Qty: 90 TABLET | Refills: 1 | Status: SHIPPED | OUTPATIENT
Start: 2018-06-29 | End: 2018-10-04 | Stop reason: SDUPTHER

## 2018-07-02 NOTE — TELEPHONE ENCOUNTER
Initial Harvoni 90/400mg consult completed on . Harvoni 90/400mg will be shipped on  to arrive at patient's home on  via FedEx. $3.70 copay. Patient will start Harvoni 90/400mg on . Address confirmed, CC on file. Confirmed 2 patient identifiers - name and . Therapy Appropriate.    Harvoni- Take one tablet by mouth daily x 12 weeks  Counseling was reviewed:   1. Patient MUST take Harvoni at the SAME time every day.   2. Patient MUST avoid acid reducers without consulting with myself or provider first. Antacids are to be spaced out at least 4 hours apart from Harvoni. Patient denies taking any acid suppressing or controlling medication.  Antacid rules explained and understood.    3. Side effects include headaches and fatigue.   Headache: Patient may treat with OTC remedies. If Tylenol is used, dose should  not exceed 2000mg per day.    DDI: Medication list reviewed and potential DDIs addressed. No DDIs or allergies noted. Patient MUST contact myself or provider prior to starting any new OTC, herbal, or prescription drugs to avoid potential DDIs.    Discussed the importance of staying well hydrated while on therapy. Compliance stressed - patient to take missed doses as soon as remembered, but NOT to take 2 doses in one day. Patient will report questions or concerns to myself or practitioner. Patient verbalizes understanding. Patient plans to start Harvoni on . Consultation included: indication; goals of treatment; administration; storage and handling; side effects; how to handle side effects; the importance of compliance; how to handle missed doses; the importance of laboratory monitoring; the importance of keeping all follow up appointments.  Patient understands to report any medication changes to [facility] and provider. All questions answered and addressed to patients satisfaction.  I will f/u with patient in 1 week from start, and [facility] will contact patient in 3 weeks to coordinate next  refill.    Berlin Looney R.Ph.  Clinical Pharmacist  [facility]  Phone: [phone]

## 2018-07-09 ENCOUNTER — OFFICE VISIT (OUTPATIENT)
Dept: PAIN MEDICINE | Facility: CLINIC | Age: 68
End: 2018-07-09
Payer: MEDICARE

## 2018-07-09 VITALS
HEART RATE: 85 BPM | SYSTOLIC BLOOD PRESSURE: 132 MMHG | WEIGHT: 196 LBS | HEIGHT: 75 IN | DIASTOLIC BLOOD PRESSURE: 84 MMHG | RESPIRATION RATE: 18 BRPM | BODY MASS INDEX: 24.37 KG/M2

## 2018-07-09 DIAGNOSIS — M79.642 LEFT HAND PAIN: ICD-10-CM

## 2018-07-09 DIAGNOSIS — G89.21 CHRONIC PAIN DUE TO TRAUMA: Primary | ICD-10-CM

## 2018-07-09 DIAGNOSIS — G89.4 CHRONIC PAIN DISORDER: ICD-10-CM

## 2018-07-09 DIAGNOSIS — M79.642 PAIN OF LEFT HAND: ICD-10-CM

## 2018-07-09 DIAGNOSIS — S68.119A TRAUMATIC AMPUTATION OF DIGIT OF ONE HAND WITHOUT COMPLICATION: ICD-10-CM

## 2018-07-09 PROCEDURE — 99214 OFFICE O/P EST MOD 30 MIN: CPT | Mod: PBBFAC,PO | Performed by: PHYSICIAN ASSISTANT

## 2018-07-09 PROCEDURE — 99999 PR PBB SHADOW E&M-EST. PATIENT-LVL IV: CPT | Mod: PBBFAC,,, | Performed by: PHYSICIAN ASSISTANT

## 2018-07-09 PROCEDURE — 99214 OFFICE O/P EST MOD 30 MIN: CPT | Mod: S$PBB,,, | Performed by: PHYSICIAN ASSISTANT

## 2018-07-09 RX ORDER — HYDROCODONE BITARTRATE AND ACETAMINOPHEN 10; 325 MG/1; MG/1
TABLET ORAL
Qty: 60 TABLET | Refills: 0 | Status: SHIPPED | OUTPATIENT
Start: 2018-07-09 | End: 2018-10-04

## 2018-07-09 RX ORDER — HYDROCODONE BITARTRATE AND ACETAMINOPHEN 10; 325 MG/1; MG/1
1 TABLET ORAL EVERY 12 HOURS PRN
Qty: 60 TABLET | Refills: 0 | Status: SHIPPED | OUTPATIENT
Start: 2018-08-08 | End: 2018-09-04 | Stop reason: SDUPTHER

## 2018-07-09 NOTE — PROGRESS NOTES
Subjective:     Chief Complaint:  Left Hand Pain    History of Present Illness:  This patient is a 65 year old male who presents today for f/u complaining of the above noted pains. The patient describes this pain as follows.    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 work, left lower quadrant pain since hernia surgery in 2004  - timing (constant, intermittent): Constant  - character (sharp, dull, aching, burning): Throbbing  - radiating: No  - dermatomal distribution: No  - side: Left   - aggravating factors: Nothing worsens left digit pain, left lower quadrant pain worse with exercise or walking  - relieving factors: Medication / Norco  - antecedent trauma: Amputation via skill saw and 1997, hernia repair in 2004  - prior spinal surgery: None  - pertinent negatives: No fever, No chills, No weight loss, No bladder dysfunction, No bowel dysfunction, No extremity weakness, No saddle anesthesia  - medications tried: Norco, Percocet, over-the-counter medications, compound pain cream  - other therapies tried (physical therapy, injections):     >> physical therapy tried in the past    >> no prior injections        Imaging/ Labs (reviewed on 7/9/2018):    3/02/18 Comprehensive metabolic panel     Ref Range & Units 7d ago 5mo ago 7mo ago    Sodium 136 - 145 mmol/L 140  137  139     Potassium 3.5 - 5.1 mmol/L 3.9  3.6  3.9     Chloride 95 - 110 mmol/L 101  98  101     CO2 23 - 29 mmol/L 31   30   30      Glucose 70 - 110 mg/dL 77  142   349      BUN, Bld 8 - 23 mg/dL 12  22  19     Creatinine 0.5 - 1.4 mg/dL 1.2  1.4  1.6      Calcium 8.7 - 10.5 mg/dL 10.1  9.5  9.9     Total Protein 6.0 - 8.4 g/dL 8.0  7.4  7.5     Albumin 3.5 - 5.2 g/dL 4.1  3.6  3.6     Total Bilirubin 0.1 - 1.0 mg/dL 0.8  0.7CM  0.6CM    Comments: For infants and newborns, interpretation of results should be based   on gestational age, weight and in agreement with clinical  "  observations.   Premature Infant recommended reference ranges:   Up to 24 hours.............<8.0 mg/dL   Up to 48 hours............<12.0 mg/dL   3-5 days..................<15.0 mg/dL   6-29 days.................<15.0 mg/dL     Alkaline Phosphatase 55 - 135 U/L 70  71  72     AST 10 - 40 U/L 87   73   88      ALT 10 - 44 U/L 98   77   97      Anion Gap 8 - 16 mmol/L 8  9  8     eGFR if African American >60 mL/min/1.73 m^2 >60.0  >60.0  52      eGFR if non African American >60 mL/min/1.73 m^2 >60.0  52.4CM   45CM     Comments: Calculation used to obtain the estimated glomerular filtration   rate (eGFR) is the CKD-EPI equation.                ROS:  CONSTITUTIONAL: No fever, chills, weight loss  SKIN: No rash or itching  CARDIOVASCULAR:  No chest pain, palpitations  RESPIRATORY: No shortness of breath  GASTROINTESTINAL: No diarrhea, No constipation, abdominal pain, left lower quadrant  GENITOURINARY: No urinary incontinence    MUSCULOSKELETAL:  - patient reports pain as above, see chief complaint     NEUROLOGICAL:   - Pain as above  - Strength in lower extremities is normal  - Sensation in lower extremities is normal  - No bowel or bladder incontinence     PSYCHIATRIC: No change in mood noticed         Objective:   Vitals:  /84 (BP Location: Right arm, Patient Position: Sitting, BP Method: Medium (Automatic))   Pulse 85   Resp 18   Ht 6' 3" (1.905 m)   Wt 88.9 kg (196 lb)   BMI 24.50 kg/m²    (reviewed on 7/9/2018)     General: alert and oriented, in no apparent distress  Gait: normal gait  Skin: No rashes, No discoloration   HEENT: EOMI  Respiratory: respirations nonlabored    Musculoskeletal:  - Cervical range of motion intact  - Left upper extremity range of motion intact throughout  - Digital stumps are hypersensitive to palpation, hypersensitivity does not extend further proximal  - No color change, no swelling, no changes in hair growth along left hand     Neuro:  - Lower extremities:      >> 5/5 " strength bilaterally, throughout, symmetric    Psych: mood and affect appropriate        Assessment:     Encounter Diagnoses   Name Primary?   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 Interventional: None.    2. Pharmacologic:   - Refill Norco 10/325 mg PO BID PRN (60tabs) x 2 months. Paper Rx given. Patient tolerating opioids with no side effects, obtaining good pain control with functional improvement. He tolerates this medication well, and he denies any drowsiness or constipation.  - Consider Gabapentin.  - Opioid contract signed on 5/7/2018.     - LA  reviewed and appropriate. Last filled 6-7-18.  - Last UDS was appropriate.    3. Rehabilitative: Encouraged regular exercise.    4. Diagnostic: None for now.    5. Follow up: 8 weeks for med refill.      - I discussed the risks, benefits, and alternatives to potential treatment options. All questions and concerns were fully addressed today in clinic. Dr. Olmeod was consulted regarding the patient plan and agrees.           >> UDS:  8-18-15 :: appropriate  12-29-15 :: appropriate  5-3-16 :: appropriate  10-18-16 :: appropriate  4/13/2017 :: appropriate  9/29/2017 :: appropriate  3/9/2018 :: appropriate

## 2018-07-11 ENCOUNTER — TELEPHONE (OUTPATIENT)
Dept: RADIOLOGY | Facility: HOSPITAL | Age: 68
End: 2018-07-11

## 2018-07-12 ENCOUNTER — HOSPITAL ENCOUNTER (OUTPATIENT)
Dept: RADIOLOGY | Facility: HOSPITAL | Age: 68
Discharge: HOME | End: 2018-07-12
Attending: FAMILY MEDICINE
Payer: MEDICARE

## 2018-07-12 DIAGNOSIS — R93.89 ABNORMAL ULTRASOUND: ICD-10-CM

## 2018-07-12 PROCEDURE — 76775 US EXAM ABDO BACK WALL LIM: CPT | Mod: 26,,, | Performed by: RADIOLOGY

## 2018-07-12 PROCEDURE — 76775 US EXAM ABDO BACK WALL LIM: CPT | Mod: TC,PO

## 2018-07-16 ENCOUNTER — TELEPHONE (OUTPATIENT)
Dept: PHARMACY | Facility: CLINIC | Age: 68
End: 2018-07-16

## 2018-07-16 DIAGNOSIS — J30.9 ALLERGIC RHINITIS: ICD-10-CM

## 2018-07-16 RX ORDER — FLUTICASONE PROPIONATE 50 MCG
SPRAY, SUSPENSION (ML) NASAL
Qty: 16 ML | Refills: 5 | Status: SHIPPED | OUTPATIENT
Start: 2018-07-16 | End: 2018-10-04 | Stop reason: SDUPTHER

## 2018-07-16 NOTE — TELEPHONE ENCOUNTER
Initial clinical follow-up conducted for Js. Name/ confirmed. no missed doses; no new medications.  Patient reports moderate headache and fatigue; patient states he has been taking acetaminophen 1000 mg BID and does not help much.  Patient may some Norco he had left over if headache persists. Patient currently does not want to stop medication and has not missed any doses.  Patient understands to report any medication changes to [facility] and provider. All questions answered and addressed to patients satisfaction.

## 2018-07-26 ENCOUNTER — TELEPHONE (OUTPATIENT)
Dept: PHARMACY | Facility: CLINIC | Age: 68
End: 2018-07-26

## 2018-07-30 NOTE — TELEPHONE ENCOUNTER
Harvoni (2 of 3)  refill confirmed. We will ship Harvoni refill on  via [employer] to arrive on . $0.00 copay- 004. Confirmed 2 patient identifiers - name and .     Patient has 4 doses of Harvoni remaining and takes it around 9 am daily.  Pt reports mild headache and fatigue, but no other issues.  Patient reports that headache was painful at first, but now headaches have lessened.  No missed doses, no new medications, no new allergies or health conditions reported at this time.. All questions answered and addressed to patients satisfaction. Pt verbalized understanding.

## 2018-08-23 ENCOUNTER — TELEPHONE (OUTPATIENT)
Dept: PHARMACY | Facility: CLINIC | Age: 68
End: 2018-08-23

## 2018-08-23 NOTE — TELEPHONE ENCOUNTER
Harvoni (3 of 3)  refill confirmed. We will ship Harvoni refill on  via fedex to arrive on . $0.00 copay- 004. Confirmed 2 patient identifiers - name and .     Patient has 8 doses of Harvoni remaining and takes it around 7 am daily.  Pt reports occasional headache but Harvoni is very tolerable.  No missed doses, no new medications, no new allergies or health conditions reported at this time.  All questions answered and addressed to patients satisfaction.  Pt verbalized understanding.

## 2018-09-04 ENCOUNTER — OFFICE VISIT (OUTPATIENT)
Dept: PAIN MEDICINE | Facility: CLINIC | Age: 68
End: 2018-09-04
Payer: MEDICARE

## 2018-09-04 VITALS
WEIGHT: 196 LBS | DIASTOLIC BLOOD PRESSURE: 92 MMHG | SYSTOLIC BLOOD PRESSURE: 151 MMHG | BODY MASS INDEX: 24.37 KG/M2 | HEIGHT: 75 IN | RESPIRATION RATE: 18 BRPM | HEART RATE: 86 BPM

## 2018-09-04 DIAGNOSIS — S68.119A TRAUMATIC AMPUTATION OF DIGIT OF ONE HAND WITHOUT COMPLICATION: ICD-10-CM

## 2018-09-04 DIAGNOSIS — M79.642 LEFT HAND PAIN: ICD-10-CM

## 2018-09-04 DIAGNOSIS — M79.642 PAIN OF LEFT HAND: ICD-10-CM

## 2018-09-04 DIAGNOSIS — G89.21 CHRONIC PAIN DUE TO TRAUMA: Primary | ICD-10-CM

## 2018-09-04 PROCEDURE — 99214 OFFICE O/P EST MOD 30 MIN: CPT | Mod: S$PBB,,, | Performed by: PHYSICIAN ASSISTANT

## 2018-09-04 PROCEDURE — 99999 PR PBB SHADOW E&M-EST. PATIENT-LVL IV: CPT | Mod: PBBFAC,,, | Performed by: PHYSICIAN ASSISTANT

## 2018-09-04 PROCEDURE — 99214 OFFICE O/P EST MOD 30 MIN: CPT | Mod: PBBFAC | Performed by: PHYSICIAN ASSISTANT

## 2018-09-04 RX ORDER — HYDROCODONE BITARTRATE AND ACETAMINOPHEN 10; 325 MG/1; MG/1
1 TABLET ORAL EVERY 12 HOURS PRN
Qty: 60 TABLET | Refills: 0 | Status: SHIPPED | OUTPATIENT
Start: 2018-09-04 | End: 2018-10-04 | Stop reason: SDUPTHER

## 2018-09-04 NOTE — PROGRESS NOTES
Subjective:     Chief Complaint:  Left Hand Pain    History of Present Illness:  This patient is a 65 year old male who presents today for f/u complaining of the above noted pains. The patient describes this pain as follows.    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 work, left lower quadrant pain since hernia surgery in 2004  - timing (constant, intermittent): Constant  - character (sharp, dull, aching, burning): Throbbing  - radiating: No  - dermatomal distribution: No  - side: Left   - aggravating factors: Nothing worsens left digit pain, left lower quadrant pain worse with exercise or walking  - relieving factors: Medication / Norco  - antecedent trauma: Amputation via skill saw and 1997, hernia repair in 2004  - prior spinal surgery: None  - pertinent negatives: No fever, No chills, No weight loss, No bladder dysfunction, No bowel dysfunction, No extremity weakness, No saddle anesthesia  - medications tried: Norco, Percocet, over-the-counter medications, compound pain cream  - other therapies tried (physical therapy, injections):     >> physical therapy tried in the past    >> no prior injections        Imaging/ Labs (reviewed on 9/4/2018):    7/12/2018 US ABDOMINAL AORTA    CLINICAL HISTORY:  Abnormal findings on diagnostic imaging of other specified body structures    TECHNIQUE:  Limited ultrasound was performed of the abdominal aorta, with cross sectional diameter measurements obtained.    COMPARISON:  01/10/2018    FINDINGS:  The proximal abdominal aorta measures 2.7 cm.    The mid abdominal aorta measures 1.8 cm.    The distal abdominal aorta measures 1.9 cm, slightly ectatic and unchanged from prior.    The right iliac artery measures 1.0 cm.    The left iliac artery measures 1.1 cm.    Aortoiliac atherosclerosis: Mild    Impression: Stable examination with slight distal abdominal aortic ectasia.  Negative for aneurysm.  "        ROS:  CONSTITUTIONAL: No fever, chills, weight loss  SKIN: No rash or itching  CARDIOVASCULAR:  No chest pain, palpitations  RESPIRATORY: No shortness of breath  GASTROINTESTINAL: No diarrhea, No constipation, abdominal pain, left lower quadrant  GENITOURINARY: No urinary incontinence    MUSCULOSKELETAL:  - patient reports pain as above, see chief complaint     NEUROLOGICAL:   - Pain as above  - Strength in lower extremities is normal  - Sensation in lower extremities is normal  - No bowel or bladder incontinence     PSYCHIATRIC: No change in mood noticed         Objective:   Vitals:  BP (!) 151/92 (BP Location: Right arm, Patient Position: Sitting, BP Method: Medium (Automatic))   Pulse 86   Resp 18   Ht 6' 3" (1.905 m)   Wt 88.9 kg (196 lb)   BMI 24.50 kg/m²    (reviewed on 9/4/2018)     General: alert and oriented, in no apparent distress  Gait: normal gait  Skin: No rashes, No discoloration   HEENT: EOMI  Respiratory: respirations nonlabored    Musculoskeletal:  - Cervical range of motion intact  - Lumbar ROM intact  - Left upper extremity range of motion intact throughout  - Digital stumps are hypersensitive to palpation, hypersensitivity does not extend further proximal  - No color change, no swelling, no changes in hair growth along left hand     Neuro:  - Lower extremities:      >> 5/5 strength bilaterally, throughout, symmetric    Psych: mood and affect appropriate        Assessment:     Encounter Diagnoses   Name Primary?   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 Interventional: None.    2. Pharmacologic:   - Refill Norco 10/325 mg PO BID PRN (60 tabs) x 2 months. Will send in electronically this month and in 30 days when due for refill. Patient tolerating opioids with no side effects, obtaining good pain control with functional improvement. He tolerates this medication " well, and he denies any drowsiness or constipation.  - Opioid contract signed on 5/7/2018.     - [place]  reviewed and appropriate. Last filled 8-8-18.  - Will order UDS today to ensure medication compliance.      3. Rehabilitative: Encouraged regular exercise.    4. Diagnostic: None for now.    5. Follow up: 8 weeks for med refill.      - I discussed the risks, benefits, and alternatives to potential treatment options. All questions and concerns were fully addressed today in clinic. Dr. Olmedo was consulted regarding the patient plan and agrees.           >> UDS:  8-18-15 :: appropriate  12-29-15 :: appropriate  5-3-16 :: appropriate  10-18-16 :: appropriate  4/13/2017 :: appropriate  9/29/2017 :: appropriate  3/9/2018 :: appropriate  9/4/2018 :: pending

## 2018-09-14 DIAGNOSIS — E11.9 TYPE 2 DIABETES MELLITUS WITHOUT COMPLICATION: ICD-10-CM

## 2018-10-01 ENCOUNTER — TELEPHONE (OUTPATIENT)
Dept: PHARMACY | Facility: CLINIC | Age: 68
End: 2018-10-01

## 2018-10-01 DIAGNOSIS — G47.00 INSOMNIA, UNSPECIFIED TYPE: ICD-10-CM

## 2018-10-01 NOTE — TELEPHONE ENCOUNTER
Patient doing well since completing Harvoni.  Patient denies any Hep C symtoms prior to starting Harvoni, during treatment or after completion.  Patient is not physically limited and is able to complete daily tasks without difficult.  Patient had headache and fatigue while on Harvoni but symtoms have resolved.  Advised patient to contact [facility] with any issues.  Patient informed of SVR 12 and importance.

## 2018-10-02 RX ORDER — CLONAZEPAM 1 MG/1
TABLET ORAL
Qty: 30 TABLET | Refills: 0 | OUTPATIENT
Start: 2018-10-02

## 2018-10-03 ENCOUNTER — TELEPHONE (OUTPATIENT)
Dept: INTERNAL MEDICINE | Facility: CLINIC | Age: 68
End: 2018-10-03

## 2018-10-03 NOTE — TELEPHONE ENCOUNTER
Spoke with pt informed of need for appt.  Pt request appt for tomorrow 10/4/18 appt scheduled pt voiced understanding date and time.

## 2018-10-03 NOTE — TELEPHONE ENCOUNTER
----- Message from Juanaggie Garcia sent at 10/3/2018  1:16 PM CDT -----  Contact: [phone]  Pharmacy has not received Rx for Clonpin.  Please send Rx to pharmacy.  Please call back at [phone].  Thx-AH           Pt [facility]:  .  [facility] [facility] [address] [place] [address] ERIC CHAVEZ  [address] AT [place] [address] [address] [place] [address]  Phone: [phone] Fax: [phone]

## 2018-10-03 NOTE — TELEPHONE ENCOUNTER
----- Message from Juanaggie Garcia sent at 10/3/2018  1:16 PM CDT -----  Contact: [phone]  Pharmacy has not received Rx for Clonpin.  Please send Rx to pharmacy.  Please call back at [phone].  Thx-AH           Pt uses:  .  [facility] [employer] [address] [place] [address] ERIC CHAVEZ  [address] AT [place] [id number] [place] [place] [address]  Phone: [phone] Fax: [phone]

## 2018-10-03 NOTE — TELEPHONE ENCOUNTER
Spoke with pt request to schedule appt  10/04/18  For med refill.  Appt scheduled Voiced understanding.

## 2018-10-04 ENCOUNTER — TELEPHONE (OUTPATIENT)
Dept: INTERNAL MEDICINE | Facility: CLINIC | Age: 68
End: 2018-10-04

## 2018-10-04 ENCOUNTER — OFFICE VISIT (OUTPATIENT)
Dept: INTERNAL MEDICINE | Facility: CLINIC | Age: 68
End: 2018-10-04
Payer: MEDICARE

## 2018-10-04 VITALS
HEART RATE: 97 BPM | DIASTOLIC BLOOD PRESSURE: 76 MMHG | WEIGHT: 204.38 LBS | TEMPERATURE: 98 F | OXYGEN SATURATION: 97 % | HEIGHT: 75 IN | SYSTOLIC BLOOD PRESSURE: 138 MMHG | BODY MASS INDEX: 25.41 KG/M2

## 2018-10-04 DIAGNOSIS — E78.5 HYPERLIPIDEMIA ASSOCIATED WITH TYPE 2 DIABETES MELLITUS: ICD-10-CM

## 2018-10-04 DIAGNOSIS — I15.2 HYPERTENSION ASSOCIATED WITH DIABETES: ICD-10-CM

## 2018-10-04 DIAGNOSIS — E78.2 COMBINED HYPERLIPIDEMIA ASSOCIATED WITH TYPE 2 DIABETES MELLITUS: ICD-10-CM

## 2018-10-04 DIAGNOSIS — G47.00 INSOMNIA, UNSPECIFIED TYPE: ICD-10-CM

## 2018-10-04 DIAGNOSIS — J30.9 ALLERGIC RHINITIS: ICD-10-CM

## 2018-10-04 DIAGNOSIS — E11.69 HYPERLIPIDEMIA ASSOCIATED WITH TYPE 2 DIABETES MELLITUS: ICD-10-CM

## 2018-10-04 DIAGNOSIS — E11.59 HYPERTENSION ASSOCIATED WITH DIABETES: ICD-10-CM

## 2018-10-04 DIAGNOSIS — E11.69 COMBINED HYPERLIPIDEMIA ASSOCIATED WITH TYPE 2 DIABETES MELLITUS: ICD-10-CM

## 2018-10-04 PROCEDURE — 90662 IIV NO PRSV INCREASED AG IM: CPT | Mod: PBBFAC

## 2018-10-04 PROCEDURE — 99213 OFFICE O/P EST LOW 20 MIN: CPT | Mod: PBBFAC | Performed by: FAMILY MEDICINE

## 2018-10-04 PROCEDURE — 99214 OFFICE O/P EST MOD 30 MIN: CPT | Mod: S$PBB,,, | Performed by: FAMILY MEDICINE

## 2018-10-04 PROCEDURE — 99999 PR PBB SHADOW E&M-EST. PATIENT-LVL III: CPT | Mod: PBBFAC,,, | Performed by: FAMILY MEDICINE

## 2018-10-04 RX ORDER — AMLODIPINE BESYLATE 10 MG/1
TABLET ORAL
Qty: 90 TABLET | Refills: 1 | Status: SHIPPED | OUTPATIENT
Start: 2018-10-04 | End: 2019-06-23 | Stop reason: SDUPTHER

## 2018-10-04 RX ORDER — ATORVASTATIN CALCIUM 20 MG/1
TABLET, FILM COATED ORAL
Qty: 90 TABLET | Refills: 1 | Status: SHIPPED | OUTPATIENT
Start: 2018-10-04 | End: 2019-10-30 | Stop reason: SDUPTHER

## 2018-10-04 RX ORDER — LOSARTAN POTASSIUM AND HYDROCHLOROTHIAZIDE 25; 100 MG/1; MG/1
1 TABLET ORAL DAILY
Qty: 90 TABLET | Refills: 1 | Status: SHIPPED | OUTPATIENT
Start: 2018-10-04 | End: 2019-01-07 | Stop reason: SDUPTHER

## 2018-10-04 RX ORDER — CLONAZEPAM 1 MG/1
1 TABLET ORAL NIGHTLY
Qty: 30 TABLET | Refills: 5 | Status: SHIPPED | OUTPATIENT
Start: 2018-10-04 | End: 2019-04-04 | Stop reason: SDUPTHER

## 2018-10-04 RX ORDER — FLUTICASONE PROPIONATE 50 MCG
SPRAY, SUSPENSION (ML) NASAL
Qty: 16 ML | Refills: 5 | Status: SHIPPED | OUTPATIENT
Start: 2018-10-04 | End: 2019-08-08 | Stop reason: SDUPTHER

## 2018-10-04 RX ORDER — HYDROCODONE BITARTRATE AND ACETAMINOPHEN 10; 325 MG/1; MG/1
1 TABLET ORAL EVERY 12 HOURS PRN
Qty: 60 TABLET | Refills: 0 | Status: SHIPPED | OUTPATIENT
Start: 2018-10-04 | End: 2018-11-03

## 2018-10-04 NOTE — TELEPHONE ENCOUNTER
----- Message from Soheila Tolbert NP sent at 10/3/2018 10:05 AM CDT -----  Regarding: colonoscopy  Patient is overdue for colonoscopy. Order .     He is also due to see me this month. We can address that and chronic conditions then. Also please see who is eye doctor is. If he sees ours, please schedule them the same day as me for diabetic eye exam    Thank you!    mk

## 2018-10-04 NOTE — TELEPHONE ENCOUNTER
----- Message from Evelyn Shultz sent at 10/4/2018  3:18 PM CDT -----  Contact: self  Pt requesting rx refill. Please call back at [phone].    1. What is the name of the medication you are requesting? Narco   2. What is the dose? n/a  3. How do you take the medication? Orally, topically, etc? n/a  4. How often do you take this medication? n/a  5. Do you need a 30 day or 90 day supply? n/a  6. How many refills are you requesting? n/a  7. What is your preferred pharmacy and location of the pharmacy?     Pt uses:      [facility] [address] [place] [address] SARAH ARCHER [place] [address] [address] AT [facility]  [address] [address]  Phone: [phone] Fax: [phone]      8. Who can we contact with further questions? N/a    Thanks,   Evelyn Shultz

## 2018-10-09 PROBLEM — J30.9 ALLERGIC RHINITIS: Status: ACTIVE | Noted: 2018-10-09

## 2018-10-09 PROBLEM — G47.00 INSOMNIA: Status: ACTIVE | Noted: 2018-10-09

## 2018-10-10 ENCOUNTER — CLINICAL SUPPORT (OUTPATIENT)
Dept: DIABETES | Facility: CLINIC | Age: 68
End: 2018-10-10
Payer: MEDICARE

## 2018-10-10 ENCOUNTER — LAB VISIT (OUTPATIENT)
Dept: LAB | Facility: HOSPITAL | Age: 68
End: 2018-10-10
Attending: FAMILY MEDICINE
Payer: MEDICARE

## 2018-10-10 VITALS — BODY MASS INDEX: 25.08 KG/M2 | WEIGHT: 201.75 LBS | HEIGHT: 75 IN

## 2018-10-10 DIAGNOSIS — E11.69 HYPERLIPIDEMIA ASSOCIATED WITH TYPE 2 DIABETES MELLITUS: ICD-10-CM

## 2018-10-10 DIAGNOSIS — B18.2 CHRONIC HEPATITIS C WITHOUT HEPATIC COMA: ICD-10-CM

## 2018-10-10 DIAGNOSIS — E11.9 TYPE 2 DIABETES MELLITUS WITHOUT COMPLICATION: ICD-10-CM

## 2018-10-10 DIAGNOSIS — E78.5 HYPERLIPIDEMIA ASSOCIATED WITH TYPE 2 DIABETES MELLITUS: ICD-10-CM

## 2018-10-10 DIAGNOSIS — E11.9 DIABETES MELLITUS WITHOUT COMPLICATION: Primary | ICD-10-CM

## 2018-10-10 LAB
ALBUMIN SERPL BCP-MCNC: 4 G/DL
ALP SERPL-CCNC: 61 U/L
ALT SERPL W/O P-5'-P-CCNC: 25 U/L
ANION GAP SERPL CALC-SCNC: 7 MMOL/L
AST SERPL-CCNC: 30 U/L
BILIRUB SERPL-MCNC: 0.5 MG/DL
BUN SERPL-MCNC: 19 MG/DL
CALCIUM SERPL-MCNC: 10 MG/DL
CHLORIDE SERPL-SCNC: 105 MMOL/L
CHOLEST SERPL-MCNC: 153 MG/DL
CHOLEST/HDLC SERPL: 2.9 {RATIO}
CO2 SERPL-SCNC: 26 MMOL/L
CREAT SERPL-MCNC: 1.2 MG/DL
EST. GFR  (AFRICAN AMERICAN): >60 ML/MIN/1.73 M^2
EST. GFR  (NON AFRICAN AMERICAN): >60 ML/MIN/1.73 M^2
ESTIMATED AVG GLUCOSE: 154 MG/DL
GLUCOSE SERPL-MCNC: 143 MG/DL
HBA1C MFR BLD HPLC: 7 %
HDLC SERPL-MCNC: 53 MG/DL
HDLC SERPL: 34.6 %
LDLC SERPL CALC-MCNC: 89.4 MG/DL
NONHDLC SERPL-MCNC: 100 MG/DL
POTASSIUM SERPL-SCNC: 3.8 MMOL/L
PROT SERPL-MCNC: 8.2 G/DL
SODIUM SERPL-SCNC: 138 MMOL/L
TRIGL SERPL-MCNC: 53 MG/DL

## 2018-10-10 PROCEDURE — G0108 DIAB MANAGE TRN  PER INDIV: HCPCS | Mod: PBBFAC | Performed by: DIETITIAN, REGISTERED

## 2018-10-10 PROCEDURE — 80061 LIPID PANEL: CPT

## 2018-10-10 PROCEDURE — 83036 HEMOGLOBIN GLYCOSYLATED A1C: CPT

## 2018-10-10 PROCEDURE — 87522 HEPATITIS C REVRS TRNSCRPJ: CPT

## 2018-10-10 PROCEDURE — 80053 COMPREHEN METABOLIC PANEL: CPT

## 2018-10-10 PROCEDURE — 99999 PR PBB SHADOW E&M-EST. PATIENT-LVL III: CPT | Mod: PBBFAC,,, | Performed by: DIETITIAN, REGISTERED

## 2018-10-10 PROCEDURE — 99213 OFFICE O/P EST LOW 20 MIN: CPT | Mod: PBBFAC | Performed by: DIETITIAN, REGISTERED

## 2018-10-10 NOTE — LETTER
October 10, 2018        Elza Pantoja MD  [address] [place] [address] [address] [id number]             [facility]  [address] [place] [address] [address] [address]  Phone: [phone]  Fax: [phone]   Patient: Ulysses Boreaux   MR Number: [id number]   YOB: 1950   Date of Visit: 10/10/2018       Dear Dr. Pantoja:    Thank you for referring Ulysses Boreaux to me for evaluation. Below are the relevant portions of my assessment and plan of care.    If you have questions, please do not hesitate to call me. I look forward to following Ulysses along with you.    Sincerely,      Mu Sharma RD           CC  No Recipients

## 2018-10-10 NOTE — PROGRESS NOTES
Subjective:       Patient ID: Ulysses Boreaux is a 68 y.o. male.    Chief Complaint: Medication Refill    Patient presents to clinic today for followup of chronic conditions.       Review of Systems   Constitutional: Negative for chills, fatigue, fever and unexpected weight change.   Eyes: Negative for visual disturbance.   Respiratory: Negative for shortness of breath.    Cardiovascular: Negative for chest pain.   Musculoskeletal: Negative for myalgias.   Neurological: Negative for headaches.       Objective:      Physical Exam   Constitutional: He is oriented to person, place, and time. He appears well-developed and well-nourished. No distress.   HENT:   Head: Normocephalic and atraumatic.   Eyes: Conjunctivae and EOM are normal. Pupils are equal, round, and reactive to light. No scleral icterus.   Cardiovascular: Normal rate and regular rhythm. Exam reveals no gallop and no friction rub.   No murmur heard.  Pulmonary/Chest: Effort normal and breath sounds normal.   Neurological: He is alert and oriented to person, place, and time. No cranial nerve deficit. Gait normal.   Psychiatric: He has a normal mood and affect.   Vitals reviewed.      Assessment:       1. Uncontrolled type 2 diabetes mellitus without complication, with long-term current use of insulin    2. Hypertension associated with diabetes    3. Combined hyperlipidemia associated with type 2 diabetes mellitus    4. Insomnia, unspecified type    5. Allergic rhinitis    6. Hyperlipidemia associated with type 2 diabetes mellitus        Plan:     Problem List Items Addressed This Visit     Uncontrolled type 2 diabetes mellitus without complication, with long-term current use of insulin - Primary    Current Assessment & Plan     Status pending labs, continue current meds, followed by diabetic education           Hypertension associated with diabetes    Current Assessment & Plan     Controlled, continue nebivolol, losartan-HCTZ and amlodipine         Relevant  Medications    amLODIPine (NORVASC) 10 MG tablet    losartan-hydrochlorothiazide 100-25 mg (HYZAAR) 100-25 mg per tablet    Hyperlipidemia associated with type 2 diabetes mellitus    Current Assessment & Plan     Status pending labs, continue lipitor           Insomnia    Current Assessment & Plan     Stable on klonopin         Relevant Medications    clonazePAM (KLONOPIN) 1 MG tablet    Allergic rhinitis    Relevant Medications    fluticasone (FLONASE) 50 mcg/actuation nasal spray      Other Visit Diagnoses     Combined hyperlipidemia associated with type 2 diabetes mellitus        Relevant Medications    atorvastatin (LIPITOR) 20 MG tablet          Health Maintenance reviewed/updated. He will discuss colonoscopy with GI department. Flu vaccine today.

## 2018-10-10 NOTE — PROGRESS NOTES
Diabetes Education  Author: Mu Sharma RD  Date: 10/10/2018    Diabetes Care Management Summary  Diabetes Education Record Assessment/Progress: Initial  Current Diabetes Risk Level: Moderate     Referring Provider: Jimmy Boston  68 y.o. male in clinic today for diabetes education. Patient has taken diabetes education courses in the past.    Lab Results   Component Value Date    HGBA1C 7.0 (H) 10/10/2018       Diabetes Type  Diabetes Type : Type II    Diabetes History  Diabetes Diagnosis: >10 years(dx ~2004)  Current Treatment: Insulin, Diet, Oral Medication  Reviewed Problem List with Patient: Yes     DM medications:  Novolin 70/30 - 21 units am, 23 units pm  Januvia, 100 mg daily in am    Health Maintenance was reviewed today with patient. Discussed with patient importance of routine eye exams, foot exams/foot care, blood work (i.e.: A1c, microalbumin, and lipid), dental visits, yearly flu vaccine, and pneumonia vaccine as indicated by PCP. Patient verbalized understanding.     Health Maintenance Topics with due status: Not Due       Topic Last Completion Date    Pneumococcal (65+) 10/03/2017    Foot Exam 03/02/2018    Lipid Panel 05/07/2018    Low Dose Statin 10/10/2018     Health Maintenance Due   Topic Date Due    TETANUS VACCINE  08/16/1970    Colonoscopy  02/24/2017    Eye Exam  08/22/2018    Hemoglobin A1c  09/02/2018       Nutrition  Meal Planning: skipping meals, 3 meals per day, water, drinks regular soda, diet drinks, snacks between meal(eats breakfast and dinner)  What type of sweetener do you use?: Equal  What type of beverages do you drink?: diet soda/tea, regular soda/tea, juice, water  Meal Plan 24 Hour Recall - Breakfast: 9:30a - 2 scrambled eggs, birch, 2pc toast, 6 oz cranberry juice   Meal Plan 24 Hour Recall - Lunch: 1p - small pot pie, water  Meal Plan 24 Hour Recall - Dinner: 6:45p - white beans, rice, pig tails, candied yams, ribeye, cornbread, strawberry drink (regular)    Meal Plan 24 Hour Recall - Snack: 9:30p - peanut butter crackers     Monitoring   Self Monitoring : checking bid - 9a and 6p (wakes bet 6a-7a, breakfast at 9:30a) // per pt recall - fastin-135; acdinner:    Blood Glucose Logs: No  Do you use a personal continuous glucose monitor?: No  In the last month, how often have you had a low blood sugar reaction?: once(low but not confirmed by testing)  What are your symptoms of low blood sugar?: sweating, nervous, headache  How do you treat low blood sugar?: eat candy  Can you tell when your blood sugar is too high?: no       Exercise   Exercise Type: none(physically active but no exercise)    Current Diabetes Treatment   Current Treatment: Insulin, Diet, Oral Medication    Social History  Primary Support: Self, Family  Educational Level: High School  Occupation: retired  Smoking Status: Ex Smoker  Alcohol Use: Weekly(sometimes splurges on wine on weekend)            DDS-2 Score  ( > 3 = SIGNIFICANT DISTRESS): 1                   Barriers to Change  Barriers to Change: None  Learning Challenges : None    Readiness to Learn   Readiness to Learn : Acceptance    Cultural Influences  Cultural Influences: No    Diabetes Education Assessment/Progress  Diabetes Disease Process (diabetes disease process and treatment options): Discussion, Individual Session, Demonstrates Understanding/Competency(verbalizes/demonstrates)  Nutrition (Incorporating nutritional management into one's lifestyle): Discussion, Instructed, Individual Session, Demonstrates Understanding/Competency (verbalizes/demonstrates), Written Materials Provided  Physical Activity (incorporating physical activity into one's lifestyle): Discussion, Individual Session, Demonstrates Understanding/Competency (verbalizes/demonstrates)  Medications (states correct name, dose, onset, peak, duration, side effects & timing of meds): Discussion, Individual Session, Demonstrates  Understanding/Competency(verbalizes/demonstrates)  Monitoring (monitoring blood glucose/other parameters & using results): Discussion, Instructed, Individual Session, Demonstrates Understanding/Competency (verbalizes/demonstrates), Written Materials Provided  Acute Complications (preventing, detecting, and treating acute complications): Discussion, Instructed, Individual Session, Demonstrates Understanding/Competency (verbalizes/demonstrates), Written Materials Provided  Chronic Complications (preventing, detecting, and treating chronic complications): Discussion, Individual Session, Demonstrates Understanding/Competency (verbalizes/demonstrates)  Clinical (diabetes, other pertinent medical history, and relevant comorbidities reviewed during visit): Discussion, Individual Session, Demonstrates Understanding/Competency (verbalizes/demonstrates)  Cognitive (knowledge of self-management skills, functional health literacy): Discussion, Individual Session, Demonstrates Understanding/Competency (verbalizes/demonstrates)  Psychosocial (emotional response to diabetes): Discussion, Individual Session, Demonstrates Understanding/Competency (verbalizes/demonstrates)  Diabetes Distress and Support Systems: Other, Individual Session  Behavioral (readiness for change, lifestyle practices, self-care behaviors): Discussion, Individual Session, Demonstrates Understanding/Competency (verbalizes/demonstrates)    Goals  Patient has selected/evaluated goals during today's session: Yes, selected  Healthy Eating: Set(Follow meal plan; eat 3 meals per day)  Start Date: 10/10/18  Target Date: 01/10/19  Physical Activity: Set(walk 2-3 days/wk)  Start Date: 10/10/18  Target Date: 01/10/19  Monitoring: Set(check BG 2-3 times per day)  Start Date: 10/10/18  Target Date: 01/10/19         Diabetes Care Plan/Intervention  Education Plan/Intervention: Endocrine Provider Visit Set Up, Other, Individual Follow-Up DSMT(a1c today; scheduled f/u with DM  provider)   F/u in 3 months    Diabetes Meal Plan  Calories: 2200  Carbohydrate Per Meal: 45-60g  Carbohydrate Per Snack : 15-20g    Today's Self-Management Care Plan was developed with the patient's input and is based on barriers identified during today's assessment.    The long and short-term goals in the care plan were written with the patient/caregiver's input. The patient has agreed to work toward these goals to improve his overall diabetes control.      The patient received a copy of today's self-management plan and verbalized understanding of the care plan, goals, and all of today's instructions.      The patient was encouraged to communicate with his physician and care team regarding his condition(s) and treatment.  I provided the patient with my contact information today and encouraged him to contact me via phone or patient portal as needed.     Education Units of Time   Time Spent: 60 min

## 2018-10-12 LAB
HCV LOG: <1.08 LOG (10) IU/ML
HCV RNA QUANT PCR: <12 IU/ML
HCV, QUALITATIVE: NOT DETECTED IU/ML

## 2018-10-29 ENCOUNTER — OFFICE VISIT (OUTPATIENT)
Dept: PAIN MEDICINE | Facility: CLINIC | Age: 68
End: 2018-10-29
Payer: MEDICARE

## 2018-10-29 VITALS
RESPIRATION RATE: 18 BRPM | HEIGHT: 75 IN | SYSTOLIC BLOOD PRESSURE: 160 MMHG | BODY MASS INDEX: 24.99 KG/M2 | DIASTOLIC BLOOD PRESSURE: 79 MMHG | WEIGHT: 201 LBS | HEART RATE: 85 BPM

## 2018-10-29 DIAGNOSIS — G89.21 CHRONIC PAIN DUE TO TRAUMA: Primary | ICD-10-CM

## 2018-10-29 DIAGNOSIS — M79.642 PAIN OF LEFT HAND: ICD-10-CM

## 2018-10-29 DIAGNOSIS — M79.642 LEFT HAND PAIN: ICD-10-CM

## 2018-10-29 DIAGNOSIS — G89.4 CHRONIC PAIN DISORDER: ICD-10-CM

## 2018-10-29 DIAGNOSIS — S68.119A TRAUMATIC AMPUTATION OF DIGIT OF ONE HAND WITHOUT COMPLICATION: ICD-10-CM

## 2018-10-29 PROCEDURE — 99999 PR PBB SHADOW E&M-EST. PATIENT-LVL IV: CPT | Mod: PBBFAC,,, | Performed by: PHYSICIAN ASSISTANT

## 2018-10-29 PROCEDURE — 99214 OFFICE O/P EST MOD 30 MIN: CPT | Mod: PBBFAC,PO | Performed by: PHYSICIAN ASSISTANT

## 2018-10-29 PROCEDURE — 99214 OFFICE O/P EST MOD 30 MIN: CPT | Mod: S$PBB,,, | Performed by: PHYSICIAN ASSISTANT

## 2018-10-29 RX ORDER — HYDROCODONE BITARTRATE AND ACETAMINOPHEN 10; 325 MG/1; MG/1
1 TABLET ORAL EVERY 12 HOURS PRN
Qty: 60 TABLET | Refills: 0 | Status: SHIPPED | OUTPATIENT
Start: 2018-12-01 | End: 2018-12-31

## 2018-10-29 RX ORDER — HYDROCODONE BITARTRATE AND ACETAMINOPHEN 10; 325 MG/1; MG/1
1 TABLET ORAL EVERY 12 HOURS PRN
Qty: 60 TABLET | Refills: 0 | Status: SHIPPED | OUTPATIENT
Start: 2018-11-02 | End: 2018-12-02

## 2018-10-29 NOTE — PROGRESS NOTES
Subjective:     Chief Complaint:  Left Hand Pain    History of Present Illness:  This patient is a 65 year old male who presents today for f/u complaining of the above noted pains. The patient describes this pain as follows.    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 work, left lower quadrant pain since hernia surgery in 2004  - timing (constant, intermittent): Constant  - character (sharp, dull, aching, burning): Throbbing  - radiating: No  - dermatomal distribution: No  - side: Left   - aggravating factors: Nothing worsens left digit pain, left lower quadrant pain worse with exercise or walking  - relieving factors: Medication / Norco  - antecedent trauma: Amputation via skill saw and 1997, hernia repair in 2004  - prior spinal surgery: None  - pertinent negatives: No fever, No chills, No weight loss, No bladder dysfunction, No bowel dysfunction, No extremity weakness, No saddle anesthesia  - medications tried: Norco, Percocet, over-the-counter medications, compound pain cream  - other therapies tried (physical therapy, injections):     >> physical therapy tried in the past    >> no prior injections        Imaging/ Labs (reviewed on 10/29/2018):    7/12/2018 US ABDOMINAL AORTA    CLINICAL HISTORY:  Abnormal findings on diagnostic imaging of other specified body structures    TECHNIQUE:  Limited ultrasound was performed of the abdominal aorta, with cross sectional diameter measurements obtained.    COMPARISON:  01/10/2018    FINDINGS:  The proximal abdominal aorta measures 2.7 cm.    The mid abdominal aorta measures 1.8 cm.    The distal abdominal aorta measures 1.9 cm, slightly ectatic and unchanged from prior.    The right iliac artery measures 1.0 cm.    The left iliac artery measures 1.1 cm.    Aortoiliac atherosclerosis: Mild    Impression: Stable examination with slight distal abdominal aortic ectasia.  Negative for aneurysm.  "        ROS:  CONSTITUTIONAL: No fever, chills, weight loss  SKIN: No rash or itching  CARDIOVASCULAR:  No chest pain, palpitations  RESPIRATORY: No shortness of breath  GASTROINTESTINAL: No diarrhea, No constipation, abdominal pain, left lower quadrant  GENITOURINARY: No urinary incontinence    MUSCULOSKELETAL:  - patient reports pain as above, see chief complaint     NEUROLOGICAL:   - Pain as above  - Strength in lower extremities is normal  - Sensation in lower extremities is normal  - No bowel or bladder incontinence     PSYCHIATRIC: No change in mood noticed         Objective:   Vitals:  BP (!) 160/79 (BP Location: Right arm, Patient Position: Sitting, BP Method: Medium (Automatic))   Pulse 85   Resp 18   Ht 6' 3" (1.905 m)   Wt 91.2 kg (201 lb)   BMI 25.12 kg/m²    (reviewed on 10/29/2018)     General: alert and oriented, in no apparent distress  Gait: normal gait  Skin: No rashes, No discoloration   HEENT: EOMI  Respiratory: respirations nonlabored  Cardiology: No obvious peripheral edema    Musculoskeletal:  - Lumbar ROM intact  - Left upper extremity range of motion intact throughout  - Digital stumps are hypersensitive to palpation, hypersensitivity does not extend further proximal  - No color change, no swelling, no changes in hair growth along left hand     Neuro:  - Lower extremities:      >> 5/5 strength bilaterally, throughout, symmetric    Psych: mood and affect appropriate        Assessment:     Encounter Diagnoses   Name Primary?   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 Interventional: None.    2. Pharmacologic:   - Refill Norco 10/325 mg PO BID PRN (60 tabs) x 2 months. Paper Rx given. Patient tolerating opioids with no side effects, obtaining good pain control with functional improvement. He tolerates this medication well, and he denies any " drowsiness or constipation.  - Opioid contract signed on 5/7/2018.     - LA  reviewed and appropriate. Last filled 10-4-18.  - Last UDS was appropriate.    3. Rehabilitative: Encouraged regular exercise.    4. Diagnostic: None for now.    5. Follow up: 8 weeks for med refill.      - I discussed the risks, benefits, and alternatives to potential treatment options. All questions and concerns were fully addressed today in clinic. Dr. Olmedo was consulted regarding the patient plan and agrees.           >> UDS:  8-18-15 :: appropriate  12-29-15 :: appropriate  5-3-16 :: appropriate  10-18-16 :: appropriate  4/13/2017 :: appropriate  9/29/2017 :: appropriate  3/9/2018 :: appropriate  9/4/2018 :: appropriate

## 2018-11-05 ENCOUNTER — OFFICE VISIT (OUTPATIENT)
Dept: DIABETES | Facility: CLINIC | Age: 68
End: 2018-11-05
Payer: MEDICARE

## 2018-11-05 ENCOUNTER — LAB VISIT (OUTPATIENT)
Dept: LAB | Facility: HOSPITAL | Age: 68
End: 2018-11-05
Attending: NURSE PRACTITIONER
Payer: MEDICARE

## 2018-11-05 VITALS
BODY MASS INDEX: 25.38 KG/M2 | DIASTOLIC BLOOD PRESSURE: 86 MMHG | WEIGHT: 204.13 LBS | SYSTOLIC BLOOD PRESSURE: 148 MMHG | HEIGHT: 75 IN

## 2018-11-05 DIAGNOSIS — E11.59 HYPERTENSION ASSOCIATED WITH DIABETES: ICD-10-CM

## 2018-11-05 DIAGNOSIS — E78.5 HYPERLIPIDEMIA ASSOCIATED WITH TYPE 2 DIABETES MELLITUS: ICD-10-CM

## 2018-11-05 DIAGNOSIS — I15.2 HYPERTENSION ASSOCIATED WITH DIABETES: ICD-10-CM

## 2018-11-05 DIAGNOSIS — E11.69 HYPERLIPIDEMIA ASSOCIATED WITH TYPE 2 DIABETES MELLITUS: ICD-10-CM

## 2018-11-05 LAB
ALBUMIN/CREAT UR: NORMAL UG/MG
CREAT UR-MCNC: 41 MG/DL
GLUCOSE SERPL-MCNC: 311 MG/DL (ref 70–110)
MICROALBUMIN UR DL<=1MG/L-MCNC: <2.5 UG/ML

## 2018-11-05 PROCEDURE — 82043 UR ALBUMIN QUANTITATIVE: CPT

## 2018-11-05 PROCEDURE — 99213 OFFICE O/P EST LOW 20 MIN: CPT | Mod: PBBFAC,PO | Performed by: NURSE PRACTITIONER

## 2018-11-05 PROCEDURE — 82948 REAGENT STRIP/BLOOD GLUCOSE: CPT | Mod: PBBFAC,PO | Performed by: NURSE PRACTITIONER

## 2018-11-05 PROCEDURE — 99999 PR PBB SHADOW E&M-EST. PATIENT-LVL III: CPT | Mod: PBBFAC,,, | Performed by: NURSE PRACTITIONER

## 2018-11-05 PROCEDURE — 99215 OFFICE O/P EST HI 40 MIN: CPT | Mod: S$PBB,,, | Performed by: NURSE PRACTITIONER

## 2018-11-05 NOTE — PROGRESS NOTES
"Subjective:         Patient ID: Ulysses Boreaux is a 68 y.o. male.  Patient's current PCP is Elza Pantoja MD.   Social History     Socioeconomic History    Marital status:      Spouse name: Not on file    Number of children: 2    Years of education: Not on file    Highest education level: Not on file   Social Needs    Financial resource strain: Not on file    Food insecurity - worry: Not on file    Food insecurity - inability: Not on file    Transportation needs - medical: Not on file    Transportation needs - non-medical: Not on file   Occupational History    Occupation: retired   Tobacco Use    Smoking status: Former Smoker     Packs/day: 0.50     Types: Cigarettes     Last attempt to quit: 1972     Years since quittin.7    Smokeless tobacco: Never Used   Substance and Sexual Activity    Alcohol use: Yes     Alcohol/week: 0.0 oz     Comment: " Every blue moon"    Drug use: No    Sexual activity: Yes     Partners: Female   Other Topics Concern    Not on file   Social History Narrative     . Has 2 children. Patient disabled- was .        Chief Complaint: Diabetes    HPI  Ulysses Boreaux is a 68 y.o. Black or  male presenting for new consultation for diabetes, previously managed by Citlali Mcgarry NP. Patient has been diagnosed with diabetes since 2004 and has the following complications from diabetes: hypertension, hyperlipidemia. Blood glucose testing is performed regularly. In the past 1 week patient reports blood glucose values to have approximately ranged from 100-130s. Condition is controlled.     He denies any recent hospital admissions, emergency room visits, hypoglycemia.      Height: 6' 3" (190.5 cm)  //  Weight: 92.6 kg (204 lb 2.3 oz), Body mass index is 25.52 kg/m².  Home Blood Glucose reading this AM: 111 mg/dl fasting.  His blood sugar in clinic today is:   Lab Results   Component Value Date    POCGLU 311 (A) 2018   Patient " reports that he had a cheat meal this morning.     Labs reviewed and are noted below.    His most recent A1C is:   Lab Results   Component Value Date    HGBA1C 7.0 (H) 10/10/2018     Lab Results   Component Value Date    CPEPTIDE 1.33 10/20/2017     Lab Results   Component Value Date    GLUTAMICACID 0.00 10/20/2017     Lab Results   Component Value Date    WBC 3.94 06/18/2018    HGB 15.2 06/18/2018    HCT 45.8 06/18/2018     06/18/2018    CHOL 153 10/10/2018    TRIG 53 10/10/2018    HDL 53 10/10/2018    ALT 25 10/10/2018    AST 30 10/10/2018     10/10/2018    K 3.8 10/10/2018     10/10/2018    CREATININE 1.2 10/10/2018    BUN 19 10/10/2018    CO2 26 10/10/2018    TSH 0.231 (L) 05/07/2018    INR 1.0 06/18/2018    HGBA1C 7.0 (H) 10/10/2018       CURRENT DM MEDICATIONS:   Current Outpatient Medications   Medication Sig Dispense Refill    insulin NPH-insulin regular, 70/30, (NOVOLIN 70/30 U-100 INSULIN) 100 unit/mL (70-30) injection INJECT 21 UNITS UNDER THE SKIN EVERY MORNING, THEN 23 UNITS EVERY EVENING 20 mL 1    SITagliptin (JANUVIA) 100 MG Tab Take 1 tablet (100 mg total) by mouth once daily. 30 tablet 3     No current facility-administered medications for this visit.        Health Maintenance   Topic Date Due    TETANUS VACCINE  08/16/1970    Colonoscopy  02/24/2017    Eye Exam  08/22/2018    Foot Exam  03/02/2019    Hemoglobin A1c  04/10/2019    Lipid Panel  10/10/2019    Pneumococcal (65+) (2 of 2 - PPSV23) 04/01/2021    Hepatitis C Screening  Completed    Influenza Vaccine  Completed    Abdominal Aortic Aneurysm Screening  Completed    Zoster Vaccine  Addressed       STANDARDS OF CARE:  Current Ophthalmologist/Optometrist: MAURA Adams. Last exam 2017.  Current Podiatrist: No  ACE/ARB: Yes  Statin: Yes  He  has attended diabetes education in the past.     LIFESTYLE:    BLOOD GLUCOSE TESTING: Patient reports testing on average a total of 2 times per day.    Review of Systems    Constitutional: Negative for activity change, appetite change and fatigue.   Eyes: Negative for visual disturbance.   Gastrointestinal: Negative for diarrhea, nausea and rectal pain.   Endocrine: Negative for polydipsia, polyphagia and polyuria.   Psychiatric/Behavioral: Negative for confusion.         Objective:      Physical Exam   Constitutional: He is oriented to person, place, and time. He appears well-developed and well-nourished.   HENT:   Head: Normocephalic and atraumatic.   Neck: Normal range of motion.   Cardiovascular: Normal rate.   Pulmonary/Chest: Effort normal.   Musculoskeletal: Normal range of motion.   Neurological: He is alert and oriented to person, place, and time.   Skin: Skin is warm and dry.   Psychiatric: He has a normal mood and affect. His behavior is normal. Judgment and thought content normal.   Nursing note and vitals reviewed.      Assessment:       1. Uncontrolled type 2 diabetes mellitus without complication, with long-term current use of insulin    2. Hyperlipidemia associated with type 2 diabetes mellitus    3. Hypertension associated with diabetes        Plan:   Uncontrolled type 2 diabetes mellitus without complication, with long-term current use of insulin  -     POCT Glucose  -     Microalbumin/creatinine urine ratio; Future; Expected date: 11/05/2018  -     insulin NPH-insulin regular, 70/30, (NOVOLIN 70/30 U-100 INSULIN) 100 unit/mL (70-30) injection; INJECT 21 UNITS UNDER THE SKIN EVERY MORNING, THEN 23 UNITS EVERY EVENING  Dispense: 20 mL; Refill: 1  -     SITagliptin (JANUVIA) 100 MG Tab; Take 1 tablet (100 mg total) by mouth once daily.  Dispense: 30 tablet; Refill: 3  -     Hemoglobin A1c; Future; Expected date: 02/05/2019    - Condition controlled. Continue current regimen. DM education reviewed. Patient encouraged to carb count and exercise per recommendations. Labs and Referrals as noted. Patient instructed to send in log weekly for review and potential medication  adjustments. RV scheduled in 3 months.     Hyperlipidemia associated with type 2 diabetes mellitus    - LDL 89, patient working on diet and exercise to further decrease. Patient intolerant of Lipitor above 20mg.       Hypertension associated with diabetes    - Controlled.     Additional Plan Details:    1.) Patient was instructed to monitor blood glucose 2 - 3 x daily, fasting and ac dinner or at bedtime. Discussed ADA goal for fasting blood sugar, 80 - 130mg/dL; pp blood sugars below 180 mg/dl. Also, discussed prevention of hypoglycemia and the need to adjust goals to higher levels if persistent hypoglycemia.  Reminded to bring BG records or meter to each visit for review.  2.) Reviewed pathophysiology of Type 2 diabetes, complications related to the disease, importance of annual dilated eye exam and daily foot examination.  3.) We discussed the ADA recommendations, which are as follows:  Hemoglobin A1c below 7.0 %. All patients with diabetes should be on statins unless contraindicated.  ACE or ARB therapy if not contraindicated.    4.) Continue medications as prescribed.  My [facility] e-mail or phone review in one week with BG records for adjustment of medication.  5.) Meal planning teaching: Reviewed carb counting, portion control, importance of spacing meals throughout the day to prevent post prandial elevations.  6.) Discussed activity with related benefits, methods, and precautions. Recommended patient start or continue some form of exercise and increase as tolerated to 30 minutes per day to aid in control of BGs.  7.) A1C, TSH, Lipid Panel, CMP with eGFR and Micro/Creatinine are utd or were ordered per ADA protocol.  8.) Return to clinic in 3 months for follow up. The patient was explained the above plan and given opportunity to ask questions.  He understands, chooses and consents to this plan and accepts all the risks, which include but are not limited to the risks mentioned above. He understands the  alternative of having no testing, interventions or treatments at this time. He left content and without further questions.     A total of 60 minutes was spent in face to face time, of which over 50% was spent in counseling patient on disease process, complications, treatment, and side effects of medications.        Sandra Shirley, BESSIE-C

## 2018-11-05 NOTE — PATIENT INSTRUCTIONS
PATIENT INSTRUCTIONS  - Follow up as scheduled.   - Carb Count: 30-45G/meal and 15G/snack  - Exercise: Goal is 150 minutes or more per week  - Bring meter and blood sugar log to each appointment.   - Continue current regimen.     - You will take your blood sugar three times daily. Once will always be in the morning before you have any food, (known as your fasting blood sugar), and once should be two hours after EITHER your breakfast, lunch, or dinner, (known as your post-prandial blood sugar). Each day you should check a different meal, (ie. Monday-breakfast; Tuesday- lunch; Wednesday- supper, then repeat).     - Send me your blood sugars weekly if directed to do so. The easiest way to do this is through [facility]. Send in logs on Tuesdays, Wednesdays, or Thursdays.    - Blood Sugar Goals:  1. The goal for fasting blood sugars is 80 -130 mg/dl.   2. The goal for the 2 hour after meal blood sugars is below 180 mg/dl.  3. Blood sugars below 70 are considered LOW and you must eat or drink 15G of carbohydrates immediately, then recheck blood sugar after 15 minutes to ensure blood sugar has returned to normal range, (70 or above)                                                              Diabetes (General Information)  Diabetes is a long-term health problem. It means your body does not make enough insulin. Or it may mean that your body cannot use the insulin it makes. Insulin is a hormone in your body. It lets blood sugar (glucose) reach the cells in your body. All of your cells need glucose for fuel.  When you have diabetes, the glucose in your blood builds up because it cannot get into the cells. This buildup is called high blood sugar (hyperglycemia).  Your blood sugar level depends on several things. It depends on what kind of food you eat and how much of it you eat. It also depends on how much exercise you get, and how much insulin you have in your body. Eating too much of the wrong kinds of food or not taking  diabetes medicine on time can cause high blood sugar. Infections can cause high blood sugar even if you are taking medicines correctly.  These things can also cause low blood sugar:  · Missing meals  · Not eating enough food  · Taking too much diabetes medicine  Diabetes can cause serious problems over time if you do not get treated. These problems include heart disease, stroke, kidney failure, and blindness. They also include nerve pain or loss of feeling in your legs and feet, and gangrene of the feet. By keeping your blood sugar under control you can prevent or delay these problems.  Normal blood sugar levels are 80 to 100 before a meal and less than 180 in the 1 to 2 hours after a meal.  Home care  Follow these guidelines when caring for yourself at home:  · Follow the diet your healthcare provider gives you. Take insulin or other diabetes medicine exactly as told to.  · Watch your blood sugar as you are told to. Keep a log of your results. This will help your provider change your medicines to keep your blood sugar under control.  · Try to reach your ideal weight. You may be able to cut back on or not have to take diabetes medicine if you eat the right foods and get exercise.  · Do not smoke. Smoking worsens the effects of diabetes on your circulation. You are much more likely to have a heart attack if you have diabetes and you smoke.  · Take good care of your feet. If you have lost feeling in your feet, you may not see an injury or infection. Check your feet and between your toes at least once a week.  · Wear a medical alert bracelet or necklace, or carry a card in your wallet that says you have diabetes. This will help healthcare providers give you the right care if you get very ill and cannot tell them that you have diabetes.  Sick day plan  If you get a cold, the flu, or a bacterial or viral infection, take these steps:  · Look at your diabetes sick plan and call your healthcare provider as you were told to.  You may need to call your provider right away if:  ¨ Your blood sugar is above 240 while taking your diabetes medicine  ¨ Your urine ketone levels are above normal or high  ¨ You have been vomiting more than 6 hours  ¨ You have trouble breathing or your breath ha s a fruity smell  ¨ You have a high fever  ¨ You have a fever for several days and you are not getting better  ¨ You get light-headed and are sleepier than usual  · Keep taking your diabetes pills (oral medicine) even if you have been vomiting and are feeling sick. Call your provider right away because you may need insulin to lower your blood sugar until you recover from your illness.  · Keep taking your insulin even if you have been vomiting and are feeling sick. Call your provider right away to ask if you need to change your insulin dose. This will depend on your blood sugar results.  · Check your blood sugar every 2 to 4 hours, or at least 4 times a day.  · Check your ketones often. If you are vomiting and having diarrhea, watch them more often.  · Do not skip meals. Try to eat small meals on a regular schedule. Do this even if you do not feel like eating.  · Drink water or other liquids that do not have caffeine or calories. This will keep you from getting dehydrated. If you are nauseated or vomiting, takes small sips every 5 minutes. To prevent dehydration try to drink a cup (8 ounces) of fluids every hour while you are awake.  General care  Always bring a source of fast-acting sugar with you in case you have symptoms of low blood sugar (below 70). At the first sign of low blood sugar, eat or drink 15 to 20 grams of fast-acting sugar to raise your blood sugar. Examples are:  · 3 to 4 glucose tablets. You can buy these at most drugstores.  · 4 ounces (1/2 cup) of regular (not diet) soft drinks  · 4 ounces (1/2 cup) of any fruit juice  · 8 ounces (1 cup) of milk  · 5 to 6 pieces of hard candy  · 1 tablespoon of honey  Check your blood sugar 15 minutes  after treating yourself. If it is still below 70, take 15 to 20 more grams of fast-acting sugar. Test again in 15 minutes. If it returns to normal (70 or above), eat a snack or meal to keep your blood sugar in a safe range. If it stays low, call your doctor or go to an emergency room.  Follow-up care  Follow-up with your healthcare provider, or as advised. For more information about diabetes, visit the American Diabetes Association website at www.diabetes.org or call 143-451-9239.  When to seek medical advice  Call your healthcare provider right away if you have any of these symptoms of high blood sugar:  · Frequent urination  · Dizziness  · Drowsiness  · Thirst  · Headache  · Nausea or vomiting  · Abdominal pain  · Eyesight changes  · Fast breathing  · Confusion or loss of consciousness  Also call your provider right away if you have any of these signs of low blood sugar:  · Fatigue  · Headache  · Shakes  · Excess sweating  · Hunger  · Feeling anxious or restless  · Eyesight changes  · Drowsiness  · Weakness  · Confusion or loss of consciousness  Call 911  Call for emergency help right away if any of these occur:  · Chest pain or shortness of breath  · Dizziness or fainting  · Weakness of an arm or leg or one side of the face  · Trouble speaking or seeing   Date Last Reviewed: 6/1/2016 © 2000-2016 [employer]. [address] [place] [address], [place] [address]. All rights reserved. This information is not intended as a substitute for professional medical care. Always follow your healthcare professional's instructions.                                                                     Understanding Type 2 Diabetes  When your body is working normally, the food you eat is digested and used as fuel. This fuel supplies energy to the bodys cells. When you have diabetes, the fuel cant enter the cells. Without treatment, diabetes can cause serious long-term health problems.     Your body breaks down the food you  eat into glucose.          How the body gets energy  The digestive system breaks down food, resulting in a sugar called glucose. Some of this glucose is stored in the liver. But most of it enters the bloodstream and travels to the cells to be used as fuel. Glucose needs the help of a hormone called insulin to enter the cells. Insulin is made in the pancreas. It is released into the bloodstream in response to the presence of glucose in the blood. Think of insulin as a key. When insulin reaches a cell, it attaches to the cell wall. This signals the cell to create an opening that allows glucose to enter the cell.  When you have type 2 diabetes  Early in type 2 diabetes, your cells dont respond properly to insulin. Because of this, less glucose than normal moves into cells. This is called insulin resistance. In response, the pancreas makes more insulin. But eventually, the pancreas cant produce enough insulin to overcome insulin resistance. As less and less glucose enters cells, it builds up to a harmful level in the bloodstream. This is known as high blood sugar or hyperglycemia. The result is type 2 diabetes. The cells become starved for energy, which can leave you feeling tired and rundown.  Why high blood sugar is a problem  If high blood sugar is not controlled, blood vessels throughout the body become damaged. Prolonged high blood sugar affects organs, blood vessels, and nerves. As a result, the risks of damage to the heart, kidneys, eyes, and limbs increase. Diabetes also makes other problems, such as high blood pressure and high cholesterol, more dangerous. Over time, people with uncontrolled high blood sugar have an increase in risk of dying of, or being disabled by, heart attack or stroke.  Date Last Reviewed: 7/1/2016 © 2000-2016 [employer]. [address] [place] [address], [place] [address]. All rights reserved. This information is not intended as a substitute for professional medical care. Always  follow your healthcare professional's instructions.                                                            Using a Blood Sugar Log    You have diabetes. This means your body has trouble regulating a sugar called glucose. To help manage your diabetes, youll need to check your blood sugar level as directed by your healthcare provider. Keeping a log of your blood sugar levels will help you track your blood sugar readings. Its a simple and easy way to see how well you are controlling your diabetes.  Checking your blood sugar level  You can check your blood sugar level with a blood glucose meter. Youll first prick the side of your finger with a tiny lancet to draw a tiny drop of blood onto the test strip. Some glucose meters let you use another place on your body to test. But these other places should not be used in some cases as they may be inaccurate. Follow the instructions for your glucose meter. And talk with your healthcare provider before doing the test on other places.  The strip goes into the meter first, then a drop of blood is placed on the tip of the strip. The meter then shows a reading that tells you the level of your blood sugar. Your readings should be in your target range as often as possible. This means not too high or too low. Staying in this range helps lower your risk for complications. Your healthcare provider will help you figure out the target range that is best for you.  Tracking your readings  Every time you check your blood sugar, use your log to keep track of your readings. Your meter will also probably have a memory feature that your healthcare provider can check at your next visit. You may be advised by your healthcare provider to check your blood sugar in the morning, at bedtime, and before and after meals. Be sure to write down all of your numbers. Also use your log to record things that might have affected your blood sugar. Some examples include being sick, certain medicines, being  physically active, feeling stressed, or skipping meals.   Lessons learned from your readings  Tracking your blood sugar readings helps you see patterns. These patterns tell you how your actions affect your blood sugar. For instance, you may have higher numbers after eating certain foods or lower numbers after exercise. They just help you understand how to stay in your target range more often, so that your diabetes remains in good control.  Sharing your log with your healthcare team  Bring your blood sugar log and glucose meter with you to all of your healthcare appointments. This can help your healthcare team make changes to your treatment plan, if needed. This may involve making changes in what you eat, what medicines you take, or how much you exercise.  To learn more  The resources below can help you learn more:  · American Diabetes Association 886-366-8717 www.diabetes.org  · [employer] 510-212-2331 www.lighthouse.org  · [facility] [place] 213-209-6822 www.nei.nih.gov  · [employer] 066-291-4896 www.hormone.org  Date Last Reviewed: 5/1/2016  © 2462-1167 [employer]. [address] [place] [address]. All rights reserved. This information is not intended as a substitute for professional medical care. Always follow your healthcare professional's instructions.                                                                                            Diabetes: Exams and Tests    For your diabetes care, you may see your primary care provider or a specialist 2 to 4 times a year, or as directed. This page lists some of the regular exams and tests recommended for people with diabetes. To learn more, contact the American Diabetes Association (632-648-8235, www.diabetes.org).  Tests and immunizations  These should be done at least as often as stated below:  · Blood pressure check: every healthcare provider visit  · A1C: at first, every 3 months; if controlled, then every 3  to 6 months   · Cholesterol and blood lipid tests: at least every 12 months.  · Urine tests for kidney function: every 12 months  · Flu shots: once a year  · Pneumonia shots: talk with your healthcare provider about which pneumonia vaccines are right for you  · Hepatitis B shots: as soon as possible if youre under 60, or as advised by your healthcare provider if youre older than 60  · Shingles vaccine after age 60, even if you have already had shingles   · Other tests or vaccines: as advised by your healthcare provider  · Individualized medical nutrition therapy: at least once, then as needed  · Stop smoking counseling, if you still smoke, at each visit   Regular exams  The following exams help keep you healthy:  · Foot exams. Nerve and blood vessel problems can affect your feet sooner than other parts of your body. Make sure that your healthcare provider checks your feet at every office visit.  · Eye exams. You can have problems with your eyes even if you dont have trouble seeing. An ophthalmologist (eye healthcare provider) or specially trained optometrist will give you a dilated eye exam at least once a year. If you see dark spots, see poorly in dim light, have eye pain or pressure, or notice any other problems, tell your healthcare provider right away.  · Dental exams. Gum disease (also called periodontal disease) and other mouth problems are common in people with diabetes. To help prevent these problems, see your dentist two or more times a year.  Ask your healthcare provider what other exams youll need on a regular basis.  Date Last Reviewed: 6/1/2016  © 6867-5011 [employer]. [address] [place] [address], [place] [address]. All rights reserved. This information is not intended as a substitute for professional medical care. Always follow your healthcare professional's instructions.

## 2018-11-14 ENCOUNTER — TELEPHONE (OUTPATIENT)
Dept: DIABETES | Facility: CLINIC | Age: 68
End: 2018-11-14

## 2018-11-19 ENCOUNTER — TELEPHONE (OUTPATIENT)
Dept: RADIOLOGY | Facility: HOSPITAL | Age: 68
End: 2018-11-19

## 2018-11-20 ENCOUNTER — HOSPITAL ENCOUNTER (OUTPATIENT)
Dept: RADIOLOGY | Facility: HOSPITAL | Age: 68
Discharge: HOME | End: 2018-11-20
Attending: PHYSICIAN ASSISTANT
Payer: MEDICARE

## 2018-11-20 DIAGNOSIS — B18.2 CHRONIC HEPATITIS C WITHOUT HEPATIC COMA: ICD-10-CM

## 2018-11-20 PROCEDURE — 76705 ECHO EXAM OF ABDOMEN: CPT | Mod: 26,,, | Performed by: RADIOLOGY

## 2018-11-20 PROCEDURE — 76705 ECHO EXAM OF ABDOMEN: CPT | Mod: TC,PO

## 2018-12-10 ENCOUNTER — OFFICE VISIT (OUTPATIENT)
Dept: GASTROENTEROLOGY | Facility: CLINIC | Age: 68
End: 2018-12-10
Payer: MEDICARE

## 2018-12-10 ENCOUNTER — LAB VISIT (OUTPATIENT)
Dept: LAB | Facility: HOSPITAL | Age: 68
End: 2018-12-10
Payer: MEDICARE

## 2018-12-10 VITALS
WEIGHT: 208.31 LBS | HEIGHT: 75 IN | HEART RATE: 79 BPM | SYSTOLIC BLOOD PRESSURE: 122 MMHG | BODY MASS INDEX: 25.9 KG/M2 | DIASTOLIC BLOOD PRESSURE: 69 MMHG

## 2018-12-10 DIAGNOSIS — B18.2 CHRONIC HEPATITIS C WITHOUT HEPATIC COMA: Primary | ICD-10-CM

## 2018-12-10 DIAGNOSIS — B18.2 CHRONIC HEPATITIS C WITHOUT HEPATIC COMA: ICD-10-CM

## 2018-12-10 LAB
AFP SERPL-MCNC: 2.4 NG/ML
ALBUMIN SERPL BCP-MCNC: 3.8 G/DL
ALP SERPL-CCNC: 62 U/L
ALT SERPL W/O P-5'-P-CCNC: 26 U/L
ANION GAP SERPL CALC-SCNC: 7 MMOL/L
AST SERPL-CCNC: 26 U/L
BILIRUB SERPL-MCNC: 0.4 MG/DL
BUN SERPL-MCNC: 21 MG/DL
CALCIUM SERPL-MCNC: 10.1 MG/DL
CHLORIDE SERPL-SCNC: 101 MMOL/L
CO2 SERPL-SCNC: 30 MMOL/L
CREAT SERPL-MCNC: 1.3 MG/DL
EST. GFR  (AFRICAN AMERICAN): >60 ML/MIN/1.73 M^2
EST. GFR  (NON AFRICAN AMERICAN): 56.9 ML/MIN/1.73 M^2
GLUCOSE SERPL-MCNC: 200 MG/DL
INR PPP: 1
POTASSIUM SERPL-SCNC: 3.8 MMOL/L
PROT SERPL-MCNC: 8 G/DL
PROTHROMBIN TIME: 10.1 SEC
SODIUM SERPL-SCNC: 138 MMOL/L

## 2018-12-10 PROCEDURE — 85610 PROTHROMBIN TIME: CPT

## 2018-12-10 PROCEDURE — 82105 ALPHA-FETOPROTEIN SERUM: CPT

## 2018-12-10 PROCEDURE — 36415 COLL VENOUS BLD VENIPUNCTURE: CPT

## 2018-12-10 PROCEDURE — 99999 PR PBB SHADOW E&M-EST. PATIENT-LVL III: CPT | Mod: PBBFAC,,, | Performed by: PHYSICIAN ASSISTANT

## 2018-12-10 PROCEDURE — 80053 COMPREHEN METABOLIC PANEL: CPT

## 2018-12-10 PROCEDURE — 99213 OFFICE O/P EST LOW 20 MIN: CPT | Mod: S$PBB,,, | Performed by: PHYSICIAN ASSISTANT

## 2018-12-10 PROCEDURE — 87522 HEPATITIS C REVRS TRNSCRPJ: CPT

## 2018-12-10 PROCEDURE — 99213 OFFICE O/P EST LOW 20 MIN: CPT | Mod: PBBFAC | Performed by: PHYSICIAN ASSISTANT

## 2018-12-10 NOTE — PROGRESS NOTES
Subjective:      Patient ID: Ulysses Boreaux is a 68 y.o. male.    Chief Complaint: Hepatitis C    HPI  The patient has a history of hepatitis C. His genotype is 1b and viral load before treatment was 2.8 million. Fibrosure was F3-F4. He was treated with Harvoni for 8 weeks. End of treatment viral load is undetected. He is here today for a follow up. He had an ultrasound last month which showed slightly heterogeneou appearance of the liver which can be seen with underlying liver disease; unchanged since June 2016. No focal hepatic lesions. He has no complaints today. His last colonoscopy was 2014 and a three year repeat was recommended due to polys. He says he has no ride because his  broke her foot recently.     Review of Systems  As per HPI.     Objective:     Physical Exam   Constitutional: He is oriented to person, place, and time. He appears well-developed and well-nourished. No distress.   HENT:   Head: Normocephalic and atraumatic.   Eyes: EOM are normal.   Cardiovascular: Normal rate and regular rhythm.   Pulmonary/Chest: Effort normal and breath sounds normal. No respiratory distress. He has no wheezes.   Abdominal: Soft. Bowel sounds are normal. He exhibits no distension. There is no tenderness.   Neurological: He is alert and oriented to person, place, and time. No cranial nerve deficit.   Skin: He is not diaphoretic.   Psychiatric: His behavior is normal.       Assessment:     1. Chronic hepatitis C without hepatic coma        Plan:     Consider ultrasound elastography. Will discuss again at next visit.   He is due for a colonoscopy but doesn't have a ride at this time. We will discuss at his follow up when his ride should be healthy.     Orders Placed This Encounter   Procedures    Protime-INR    AFP tumor marker    Comprehensive metabolic panel    Hepatitis C RNA, quantitative, PCR       Follow-up in about 6 months (around 6/10/2019).    Thank you for the opportunity to participate in the  care of this patient.   Jeferson Vega PA-C.

## 2018-12-12 LAB
HCV RNA SERPL NAA+PROBE-LOG IU: <1.08 LOG (10) IU/ML
HCV RNA SERPL QL NAA+PROBE: NOT DETECTED IU/ML
HCV RNA SPEC NAA+PROBE-ACNC: <12 IU/ML

## 2018-12-17 ENCOUNTER — OFFICE VISIT (OUTPATIENT)
Dept: PAIN MEDICINE | Facility: CLINIC | Age: 68
End: 2018-12-17
Payer: MEDICARE

## 2018-12-17 VITALS
HEART RATE: 92 BPM | BODY MASS INDEX: 26.15 KG/M2 | DIASTOLIC BLOOD PRESSURE: 84 MMHG | SYSTOLIC BLOOD PRESSURE: 151 MMHG | WEIGHT: 210.31 LBS | HEIGHT: 75 IN

## 2018-12-17 DIAGNOSIS — M79.642 LEFT HAND PAIN: ICD-10-CM

## 2018-12-17 DIAGNOSIS — M79.642 PAIN OF LEFT HAND: ICD-10-CM

## 2018-12-17 DIAGNOSIS — G89.4 CHRONIC PAIN DISORDER: ICD-10-CM

## 2018-12-17 DIAGNOSIS — G89.21 CHRONIC PAIN DUE TO TRAUMA: Primary | ICD-10-CM

## 2018-12-17 DIAGNOSIS — S68.119A TRAUMATIC AMPUTATION OF DIGIT OF ONE HAND WITHOUT COMPLICATION: ICD-10-CM

## 2018-12-17 PROCEDURE — 99999 PR PBB SHADOW E&M-EST. PATIENT-LVL IV: CPT | Mod: PBBFAC,,, | Performed by: PHYSICIAN ASSISTANT

## 2018-12-17 PROCEDURE — 99214 OFFICE O/P EST MOD 30 MIN: CPT | Mod: PBBFAC,PO | Performed by: PHYSICIAN ASSISTANT

## 2018-12-17 PROCEDURE — 99214 OFFICE O/P EST MOD 30 MIN: CPT | Mod: S$PBB,,, | Performed by: PHYSICIAN ASSISTANT

## 2018-12-17 RX ORDER — HYDROCODONE BITARTRATE AND ACETAMINOPHEN 10; 325 MG/1; MG/1
1 TABLET ORAL EVERY 12 HOURS PRN
Qty: 60 TABLET | Refills: 0 | Status: SHIPPED | OUTPATIENT
Start: 2019-01-29 | End: 2019-02-28

## 2018-12-17 RX ORDER — HYDROCODONE BITARTRATE AND ACETAMINOPHEN 10; 325 MG/1; MG/1
1 TABLET ORAL EVERY 12 HOURS PRN
Qty: 60 TABLET | Refills: 0 | Status: SHIPPED | OUTPATIENT
Start: 2018-12-30 | End: 2019-01-29

## 2018-12-17 NOTE — PROGRESS NOTES
Subjective:     Chief Complaint:  Left Hand Pain    History of Present Illness:  This patient is a 65 year old male who presents today for f/u complaining of the above noted pains. The patient describes this pain as follows.    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 work, left lower quadrant pain since hernia surgery in 2004  - timing (constant, intermittent): Constant  - character (sharp, dull, aching, burning): Throbbing  - radiating: No  - dermatomal distribution: No  - side: Left   - aggravating factors: Nothing worsens left digit pain, left lower quadrant pain worse with exercise or walking  - relieving factors: Medication / Norco  - antecedent trauma: Amputation via skill saw and 1997, hernia repair in 2004  - prior spinal surgery: None  - pertinent negatives: No fever, No chills, No weight loss, No bladder dysfunction, No bowel dysfunction, No extremity weakness, No saddle anesthesia  - medications tried: Norco, Percocet, over-the-counter medications, compound pain cream  - other therapies tried (physical therapy, injections):     >> physical therapy tried in the past    >> no prior injections        Imaging/ Labs (reviewed on 12/17/2018):    7/12/2018 US ABDOMINAL AORTA  CLINICAL HISTORY:  Abnormal findings on diagnostic imaging of other specified body structures  TECHNIQUE:  Limited ultrasound was performed of the abdominal aorta, with cross sectional diameter measurements obtained.  COMPARISON:  01/10/2018  FINDINGS:  The proximal abdominal aorta measures 2.7 cm.  The mid abdominal aorta measures 1.8 cm.  The distal abdominal aorta measures 1.9 cm, slightly ectatic and unchanged from prior.  The right iliac artery measures 1.0 cm.  The left iliac artery measures 1.1 cm.  Aortoiliac atherosclerosis: Mild  Impression: Stable examination with slight distal abdominal aortic ectasia.  Negative for aneurysm.         ROS:  CONSTITUTIONAL:  "No fever, chills, weight loss  SKIN: No rash or itching  CARDIOVASCULAR:  No chest pain, palpitations  RESPIRATORY: No shortness of breath  GASTROINTESTINAL: No diarrhea, No constipation, abdominal pain, left lower quadrant  GENITOURINARY: No urinary incontinence    MUSCULOSKELETAL:  - patient reports pain as above, see chief complaint     NEUROLOGICAL:   - Pain as above  - Strength in lower extremities is normal  - Sensation in lower extremities is normal  - No bowel or bladder incontinence     PSYCHIATRIC: No change in mood noticed         Objective:   Vitals:  BP (!) 151/84   Pulse 92   Ht 6' 3" (1.905 m)   Wt 95.4 kg (210 lb 5.1 oz)   BMI 26.29 kg/m²    (reviewed on 12/17/2018)     General: alert and oriented, in no apparent distress  Gait: normal gait  Skin: No rashes, No discoloration   HEENT: EOMI  Respiratory: respirations nonlabored  Cardiology: No obvious peripheral edema    Musculoskeletal:  - Lumbar ROM intact  - Left upper extremity range of motion intact throughout  - Digital stumps are hypersensitive to palpation, hypersensitivity does not extend further proximal  - No color change, no swelling, no changes in hair growth along left hand     Neuro:  - Lower extremities:      >> 5/5 strength bilaterally, throughout, symmetric  - Upper extremities:    >> 5/5 strength bilaterally    Psych: mood and affect appropriate        Assessment:     Encounter Diagnoses   Name Primary?   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 Interventional: None.    2. Pharmacologic:   - Refill Norco 10/325 mg PO BID PRN (60 tabs) x 2 months. Paper Rx given. Patient tolerating opioids with no side effects, obtaining good pain control with functional improvement. He tolerates this medication well, and he denies any drowsiness or constipation.  - Opioid contract signed on 5/7/2018. "     - [place]  reviewed and appropriate. Last filled 12-1-18.  Will post date accordingly.  - Last UDS was appropriate.    3. Rehabilitative: Encouraged regular exercise.    4. Diagnostic: None for now.    5. Follow up: 9 weeks for med refill @ [facility]    - I discussed the risks, benefits, and alternatives to potential treatment options. All questions and concerns were fully addressed today in clinic. Dr. Olmedo was consulted regarding the patient plan and agrees.           >> UDS:  8-18-15 :: appropriate  12-29-15 :: appropriate  5-3-16 :: appropriate  10-18-16 :: appropriate  4/13/2017 :: appropriate  9/29/2017 :: appropriate  3/9/2018 :: appropriate  9/4/2018 :: appropriate

## 2018-12-20 DIAGNOSIS — I15.2 HYPERTENSION ASSOCIATED WITH DIABETES: ICD-10-CM

## 2018-12-20 DIAGNOSIS — E11.59 HYPERTENSION ASSOCIATED WITH DIABETES: ICD-10-CM

## 2018-12-20 RX ORDER — LOSARTAN POTASSIUM AND HYDROCHLOROTHIAZIDE 25; 100 MG/1; MG/1
TABLET ORAL
Qty: 30 TABLET | Refills: 5 | Status: SHIPPED | OUTPATIENT
Start: 2018-12-20 | End: 2019-10-30 | Stop reason: SDUPTHER

## 2019-01-07 ENCOUNTER — NUTRITION (OUTPATIENT)
Dept: DIABETES | Facility: CLINIC | Age: 69
End: 2019-01-07
Payer: MEDICARE

## 2019-01-07 VITALS — HEIGHT: 75 IN | BODY MASS INDEX: 25.85 KG/M2 | WEIGHT: 207.88 LBS

## 2019-01-07 DIAGNOSIS — E11.9 DIABETES MELLITUS WITHOUT COMPLICATION: Primary | ICD-10-CM

## 2019-01-07 PROCEDURE — G0108 DIAB MANAGE TRN  PER INDIV: HCPCS | Mod: PBBFAC | Performed by: DIETITIAN, REGISTERED

## 2019-01-07 PROCEDURE — 99999 PR PBB SHADOW E&M-EST. PATIENT-LVL II: CPT | Mod: PBBFAC,,, | Performed by: DIETITIAN, REGISTERED

## 2019-01-07 PROCEDURE — 99212 OFFICE O/P EST SF 10 MIN: CPT | Mod: PBBFAC | Performed by: DIETITIAN, REGISTERED

## 2019-01-07 PROCEDURE — 99999 PR PBB SHADOW E&M-EST. PATIENT-LVL II: ICD-10-PCS | Mod: PBBFAC,,, | Performed by: DIETITIAN, REGISTERED

## 2019-01-07 NOTE — PROGRESS NOTES
"Diabetes Education  Author: Mu Sharma, MICHELLE  Date: 1/7/2019    Diabetes Care Management Summary  Diabetes Education Record Assessment/Progress: Comprehensive/Group  Current Diabetes Risk Level: Moderate     Referring Provider: Elza Pantoja MD  68 y.o. male in clinic today for diabetes education f/u.     Weight: 94.3 kg (207 lb 14.3 oz)   Height: 6' 3" (190.5 cm)     6 lbs gained since last visit.    Last A1c:   Lab Results   Component Value Date    HGBA1C 7.0 (H) 10/10/2018     Last visit with Diabetes Educator: : 01/07/2019      Diabetes Type  Diabetes Type : Type II    Diabetes History  Diabetes Diagnosis: >10 years(dx 2004)  Current Treatment: Oral Medication, Diet, Insulin  Reviewed Problem List with Patient: Yes   DM medications:  Novolin 70/30, 21 units in am, 23 units pm (before meals)  Januvia, 100 mg    Health Maintenance was reviewed today with patient. Discussed with patient importance of routine eye exams, foot exams/foot care, blood work (i.e.: A1c, microalbumin, and lipid), dental visits, yearly flu vaccine, and pneumonia vaccine as indicated by PCP. Patient verbalized understanding.     Health Maintenance Topics with due status: Not Due       Topic Last Completion Date    Pneumococcal Vaccine (65+ Low/Medium Risk) 10/03/2017    Lipid Panel 10/10/2018    Hemoglobin A1c 10/10/2018    Low Dose Statin 12/17/2018     Health Maintenance Due   Topic Date Due    TETANUS VACCINE  08/16/1970    Colonoscopy  02/24/2017    Eye Exam  08/22/2018    Foot Exam  03/02/2019       Nutrition  Meal Planning: skipping meals, water, diet drinks(occasionally drinks beverage with sugar, but mostly diet drinks)  What type of sweetener do you use?: Equal  What type of beverages do you drink?: diet soda/tea, water  Meal Plan 24 Hour Recall - Breakfast: grits, eggs, birch, tea w/Equal  Meal Plan 24 Hour Recall - Lunch: none  Meal Plan 24 Hour Recall - Dinner: turkey wings, rice and gravy, yams, peas, water  Meal " Plan 24 Hour Recall - Snack: none   Pt says he has cut back on portions sizes.     Monitoring   Self Monitoring : checking bid - fasting and 6p (before dinner) // per meter review - fastin-186; acdinner:    Blood Glucose Logs: No  In the last month, how often have you had a low blood sugar reaction?: once  What are your symptoms of low blood sugar?: sweaty, shaky, headache  How do you treat low blood sugar?: eat something sweet - cake or honeybun, or juice or soft drink  Can you tell when your blood sugar is too high?: no    Exercise   Exercise Type: walking  Intensity: Low  Frequency: (2-3 days/wk)  Duration: 15 min    Current Diabetes Treatment   Current Treatment: Oral Medication, Diet, Insulin                                     Barriers to Change  Barriers to Change: None  Learning Challenges : None              Diabetes Education Assessment/Progress  Diabetes Disease Process (diabetes disease process and treatment options): Discussion, Individual Session, Demonstrates Understanding/Competency(verbalizes/demonstrates)  Nutrition (Incorporating nutritional management into one's lifestyle): Discussion, Individual Session, Demonstrates Understanding/Competency (verbalizes/demonstrates)  Physical Activity (incorporating physical activity into one's lifestyle): Discussion, Individual Session, Demonstrates Understanding/Competency (verbalizes/demonstrates)  Medications (states correct name, dose, onset, peak, duration, side effects & timing of meds): Discussion, Individual Session, Demonstrates Understanding/Competency(verbalizes/demonstrates)  Monitoring (monitoring blood glucose/other parameters & using results): Discussion, Individual Session, Demonstrates Understanding/Competency (verbalizes/demonstrates)  Acute Complications (preventing, detecting, and treating acute complications): Discussion, Instructed, Individual Session, Demonstrates Understanding/Competency (verbalizes/demonstrates)  Chronic  Complications (preventing, detecting, and treating chronic complications): Discussion, Individual Session, Demonstrates Understanding/Competency (verbalizes/demonstrates)  Clinical (diabetes, other pertinent medical history, and relevant comorbidities reviewed during visit): Discussion, Individual Session  Cognitive (knowledge of self-management skills, functional health literacy): Discussion, Individual Session  Psychosocial (emotional response to diabetes): Discussion, Individual Session  Diabetes Distress and Support Systems: Not Covered/Deferred  Behavioral (readiness for change, lifestyle practices, self-care behaviors): Discussion, Individual Session    Goals  Patient has selected/evaluated goals during today's session: Yes, selected  Healthy Eating: Set(Eat 3 meals per day, limiting carbs to 45-60 grams per meal; cont to avoid soft drinks and juice except when treating hypoglycemia)  Start Date: 01/07/19  Target Date: 04/07/19  Physical Activity: Set(Increase walking from 2-3 days/wk to 3-4 days/wk )  Start Date: 01/07/19  Target Date: 04/07/19  Monitoring: In Progress(check bg 2-3 times/day)    Cont to limit portion sizes, sky of carbohydrate foods.   Be cautious when treating hypoglycemia, do not overtreat with carbs.        Diabetes Care Plan/Intervention  Education Plan/Intervention: Individual Follow-Up DSMT   Noted appt with david and labs next month  F/u in 3 months       Diabetes Meal Plan  Calories: 2200  Carbohydrate Per Meal: 45-60g  Carbohydrate Per Snack : 15-20g    Today's Self-Management Care Plan was developed with the patient's input and is based on barriers identified during today's assessment.    The long and short-term goals in the care plan were written with the patient/caregiver's input. The patient has agreed to work toward these goals to improve his overall diabetes control.      The patient received a copy of today's self-management plan and verbalized understanding of the care plan,  goals, and all of today's instructions.      The patient was encouraged to communicate with his physician and care team regarding his condition(s) and treatment.  I provided the patient with my contact information today and encouraged him to contact me via phone or patient portal as needed.     Education Units of Time   Time Spent: 30 min

## 2019-02-05 ENCOUNTER — LAB VISIT (OUTPATIENT)
Dept: LAB | Facility: HOSPITAL | Age: 69
End: 2019-02-05
Attending: NURSE PRACTITIONER
Payer: MEDICARE

## 2019-02-05 LAB
ESTIMATED AVG GLUCOSE: 154 MG/DL
HBA1C MFR BLD HPLC: 7 %

## 2019-02-05 PROCEDURE — 83036 HEMOGLOBIN GLYCOSYLATED A1C: CPT

## 2019-02-05 PROCEDURE — 36415 COLL VENOUS BLD VENIPUNCTURE: CPT

## 2019-02-12 ENCOUNTER — OFFICE VISIT (OUTPATIENT)
Dept: DIABETES | Facility: CLINIC | Age: 69
End: 2019-02-12
Payer: MEDICARE

## 2019-02-12 VITALS
BODY MASS INDEX: 25.57 KG/M2 | DIASTOLIC BLOOD PRESSURE: 80 MMHG | SYSTOLIC BLOOD PRESSURE: 140 MMHG | HEIGHT: 75 IN | WEIGHT: 205.69 LBS

## 2019-02-12 DIAGNOSIS — E78.5 HYPERLIPIDEMIA ASSOCIATED WITH TYPE 2 DIABETES MELLITUS: ICD-10-CM

## 2019-02-12 DIAGNOSIS — E11.59 HYPERTENSION ASSOCIATED WITH DIABETES: ICD-10-CM

## 2019-02-12 DIAGNOSIS — E11.69 HYPERLIPIDEMIA ASSOCIATED WITH TYPE 2 DIABETES MELLITUS: ICD-10-CM

## 2019-02-12 DIAGNOSIS — I15.2 HYPERTENSION ASSOCIATED WITH DIABETES: ICD-10-CM

## 2019-02-12 LAB — GLUCOSE SERPL-MCNC: 260 MG/DL (ref 70–110)

## 2019-02-12 PROCEDURE — 99214 OFFICE O/P EST MOD 30 MIN: CPT | Mod: S$PBB,,, | Performed by: NURSE PRACTITIONER

## 2019-02-12 PROCEDURE — 82962 GLUCOSE BLOOD TEST: CPT | Mod: PBBFAC,PN | Performed by: NURSE PRACTITIONER

## 2019-02-12 PROCEDURE — 99999 PR PBB SHADOW E&M-EST. PATIENT-LVL III: ICD-10-PCS | Mod: PBBFAC,,, | Performed by: NURSE PRACTITIONER

## 2019-02-12 PROCEDURE — 99214 PR OFFICE/OUTPT VISIT, EST, LEVL IV, 30-39 MIN: ICD-10-PCS | Mod: S$PBB,,, | Performed by: NURSE PRACTITIONER

## 2019-02-12 PROCEDURE — 99999 PR PBB SHADOW E&M-EST. PATIENT-LVL III: CPT | Mod: PBBFAC,,, | Performed by: NURSE PRACTITIONER

## 2019-02-12 PROCEDURE — 99213 OFFICE O/P EST LOW 20 MIN: CPT | Mod: PBBFAC,PN | Performed by: NURSE PRACTITIONER

## 2019-02-12 RX ORDER — LANCETS 33 GAUGE
1 EACH MISCELLANEOUS 3 TIMES DAILY
Qty: 100 EACH | Refills: 11 | Status: SHIPPED | OUTPATIENT
Start: 2019-02-12 | End: 2020-12-28 | Stop reason: SDUPTHER

## 2019-02-12 RX ORDER — SYRINGE,SAFETY WITH NEEDLE,1ML 25GX1"
SYRINGE (EA) MISCELLANEOUS
Qty: 100 EACH | Refills: 11 | Status: SHIPPED | OUTPATIENT
Start: 2019-02-12 | End: 2020-03-06 | Stop reason: SDUPTHER

## 2019-02-12 NOTE — PATIENT INSTRUCTIONS
IF YOU ARE HAPPY WITH YOUR VISIT TODAY, PLEASE FILL OUT THE SURVEY THAT MAY BE SENT TO YOUR EMAIL ADDRESS OR MAILBOX FOLLOWING THIS VISIT. WE LOVE TO HEAR YOUR COMMENTS! HAVE A WONDERFUL DAY!    CONTINUE CURRENT REGIMEN!

## 2019-02-12 NOTE — PROGRESS NOTES
"Subjective:         Patient ID: Ulysses Boreaux is a 68 y.o. male.  Patient's current PCP is Elza Pantoja MD.   Social History     Socioeconomic History    Marital status:      Spouse name: Not on file    Number of children: 2    Years of education: Not on file    Highest education level: Not on file   Social Needs    Financial resource strain: Not on file    Food insecurity - worry: Not on file    Food insecurity - inability: Not on file    Transportation needs - medical: Not on file    Transportation needs - non-medical: Not on file   Occupational History    Occupation: retired   Tobacco Use    Smoking status: Former Smoker     Packs/day: 0.50     Types: Cigarettes     Last attempt to quit: 1972     Years since quittin.0    Smokeless tobacco: Never Used   Substance and Sexual Activity    Alcohol use: Yes     Alcohol/week: 0.0 oz     Comment: " Every blue moon"    Drug use: No    Sexual activity: Yes     Partners: Female   Other Topics Concern    Not on file   Social History Narrative     . Has 2 children. Patient disabled- was .        Chief Complaint: Diabetes      Ulysses Boreaux is a 68 y.o. Black or  male presenting for new consultation for diabetes, previously managed by Citlali Mcgarry NP. Patient has been diagnosed with diabetes since 2004 and has the following complications from diabetes: hypertension, hyperlipidemia. Blood glucose testing is performed regularly. In the past 1 week patient reports blood glucose values to have approximately ranged from 100-130s. Condition is controlled.     He denies any recent hospital admissions, emergency room visits, hypoglycemia.      Height: 6' 3" (190.5 cm)  //  Weight: 93.3 kg (205 lb 11 oz), Body mass index is 25.71 kg/m².  Home Blood Glucose reading this AM: 111 mg/dl fasting.  His blood sugar in clinic today is:   Lab Results   Component Value Date    POCGLU 311 (A) 2018   Patient " reports that he had a cheat meal this morning.     Labs reviewed and are noted below.    His most recent A1C is:   Lab Results   Component Value Date    HGBA1C 7.0 (H) 02/05/2019     Lab Results   Component Value Date    CPEPTIDE 1.33 10/20/2017     Lab Results   Component Value Date    GLUTAMICACID 0.00 10/20/2017     Lab Results   Component Value Date    WBC 3.94 06/18/2018    HGB 15.2 06/18/2018    HCT 45.8 06/18/2018     06/18/2018    CHOL 153 10/10/2018    TRIG 53 10/10/2018    HDL 53 10/10/2018    ALT 26 12/10/2018    AST 26 12/10/2018     12/10/2018    K 3.8 12/10/2018     12/10/2018    CREATININE 1.3 12/10/2018    BUN 21 12/10/2018    CO2 30 (H) 12/10/2018    TSH 0.231 (L) 05/07/2018    INR 1.0 12/10/2018    HGBA1C 7.0 (H) 02/05/2019       CURRENT DM MEDICATIONS:   Current Outpatient Medications   Medication Sig Dispense Refill    insulin NPH-insulin regular, 70/30, (NOVOLIN 70/30 U-100 INSULIN) 100 unit/mL (70-30) injection INJECT 21 UNITS UNDER THE SKIN EVERY MORNING, THEN 23 UNITS EVERY EVENING 20 mL 1    SITagliptin (JANUVIA) 100 MG Tab Take 1 tablet (100 mg total) by mouth once daily. 30 tablet 3     No current facility-administered medications for this visit.        Health Maintenance   Topic Date Due    TETANUS VACCINE  08/16/1970    Colonoscopy  02/24/2017    Eye Exam  08/22/2018    Foot Exam  03/02/2019    Hemoglobin A1c  08/05/2019    Lipid Panel  10/10/2019    Pneumococcal Vaccine (65+ Low/Medium Risk) (2 of 2 - PPSV23) 04/01/2021    Hepatitis C Screening  Completed    Influenza Vaccine  Completed    Abdominal Aortic Aneurysm Screening  Completed    Zoster Vaccine  Addressed       STANDARDS OF CARE:  Current Ophthalmologist/Optometrist: MAURA Adams. Last exam 2017.  Current Podiatrist: No  ACE/ARB: Yes  Statin: Yes  He  has attended diabetes education in the past.     LIFESTYLE:    BLOOD GLUCOSE TESTING: Patient reports testing on average a total of 2 times per  day.    Review of Systems   Constitutional: Negative for activity change, appetite change and fatigue.   Eyes: Negative for visual disturbance.   Gastrointestinal: Negative for diarrhea, nausea and rectal pain.   Endocrine: Negative for polydipsia, polyphagia and polyuria.   Psychiatric/Behavioral: Negative for confusion.         Objective:      Physical Exam   Constitutional: He is oriented to person, place, and time. He appears well-developed and well-nourished.   HENT:   Head: Normocephalic and atraumatic.   Neck: Normal range of motion.   Cardiovascular: Normal rate.   Pulmonary/Chest: Effort normal.   Musculoskeletal: Normal range of motion.   Neurological: He is alert and oriented to person, place, and time.   Skin: Skin is warm and dry.   Psychiatric: He has a normal mood and affect. His behavior is normal. Judgment and thought content normal.   Nursing note and vitals reviewed.      Assessment:       1. Uncontrolled type 2 diabetes mellitus without complication, with long-term current use of insulin    2. Hyperlipidemia associated with type 2 diabetes mellitus    3. Hypertension associated with diabetes        Plan:     Uncontrolled type 2 diabetes mellitus without complication, with long-term current use of insulin  -     Lipid panel; Future; Expected date: 02/12/2019  -     TSH; Future; Expected date: 02/12/2019  -     POCT Glucose, Hand-Held Device  -     Microalbumin/creatinine urine ratio; Future; Expected date: 02/12/2019  -     Hemoglobin A1c; Future; Expected date: 02/12/2019  -     SITagliptin (JANUVIA) 100 MG Tab; Take 1 tablet (100 mg total) by mouth once daily.  Dispense: 30 tablet; Refill: 6  -     insulin NPH-insulin regular, 70/30, (NOVOLIN 70/30 U-100 INSULIN) 100 unit/mL (70-30) injection; INJECT 21 UNITS UNDER THE SKIN EVERY MORNING, THEN 23 UNITS EVERY EVENING  Dispense: 20 mL; Refill: 6  -     insulin syringe-needle U-100 1 mL 31 gauge x 5/16 Syrg; USE AS DIRECTED WITH INSULIN  Dispense:  100 each; Refill: 11  -     lancets (TRUEPLUS LANCETS) 33 gauge Misc; 1 lancet by Misc.(Non-Drug; Combo Route) route 3 (three) times daily. ICD 10 Code: E11.9  Dispense: 100 each; Refill: 11  -     blood sugar diagnostic (TRUE METRIX GLUCOSE TEST STRIP) Strp; 1 strip by Misc.(Non-Drug; Combo Route) route 3 (three) times daily. True Metrix Strips- ICD:10 - E11.9  Dispense: 100 strip; Refill: 11    - Condition controlled. Continue current regimen. DM education reviewed. Patient encouraged to carb count and exercise per recommendations. Labs and Referrals as noted. Patient instructed to send in log weekly for review and potential medication adjustments. RV scheduled in 3 months.     Hyperlipidemia associated with type 2 diabetes mellitus  -     Lipid panel; Future; Expected date: 02/12/2019    - LDL 89, patient working on diet and exercise to further decrease. Patient intolerant of Lipitor above 20mg.     Hypertension associated with diabetes    - Controlled.     Additional Plan Details:    1.) Patient was instructed to monitor blood glucose 2 - 3 x daily, fasting and ac dinner or at bedtime. Discussed ADA goal for fasting blood sugar, 80 - 130mg/dL; pp blood sugars below 180 mg/dl. Also, discussed prevention of hypoglycemia and the need to adjust goals to higher levels if persistent hypoglycemia.  Reminded to bring BG records or meter to each visit for review.  2.) Reviewed pathophysiology of Type 2 diabetes, complications related to the disease, importance of annual dilated eye exam and daily foot examination.  3.) We discussed the ADA recommendations, which are as follows:  Hemoglobin A1c below 7.0 %. All patients with diabetes should be on statins unless contraindicated.  ACE or ARB therapy if not contraindicated.    4.) Continue medications as prescribed.  My [facility] e-mail or phone review in one week with BG records for adjustment of medication.  5.) Meal planning teaching: Reviewed carb counting, portion  control, importance of spacing meals throughout the day to prevent post prandial elevations.  6.) Discussed activity with related benefits, methods, and precautions. Recommended patient start or continue some form of exercise and increase as tolerated to 30 minutes per day to aid in control of BGs.  7.) A1C, TSH, Lipid Panel, CMP with eGFR and Micro/Creatinine are utd or were ordered per ADA protocol.  8.) Return to clinic in 3 months for follow up. The patient was explained the above plan and given opportunity to ask questions.  He understands, chooses and consents to this plan and accepts all the risks, which include but are not limited to the risks mentioned above. He understands the alternative of having no testing, interventions or treatments at this time. He left content and without further questions.     A total of 30 minutes was spent in face to face time, of which over 50% was spent in counseling patient on disease process, complications, treatment, and side effects of medications.        BEN Aguilar

## 2019-02-18 RX ORDER — HYDROCODONE BITARTRATE AND ACETAMINOPHEN 10; 325 MG/1; MG/1
1 TABLET ORAL EVERY 12 HOURS PRN
Qty: 60 TABLET | Refills: 0 | Status: SHIPPED | OUTPATIENT
Start: 2019-02-27 | End: 2019-03-29

## 2019-02-18 RX ORDER — HYDROCODONE BITARTRATE AND ACETAMINOPHEN 10; 325 MG/1; MG/1
1 TABLET ORAL EVERY 12 HOURS PRN
Qty: 60 TABLET | Refills: 0 | Status: SHIPPED | OUTPATIENT
Start: 2019-03-28 | End: 2019-04-27

## 2019-02-19 ENCOUNTER — OFFICE VISIT (OUTPATIENT)
Dept: PAIN MEDICINE | Facility: CLINIC | Age: 69
End: 2019-02-19
Payer: MEDICARE

## 2019-02-19 VITALS
HEIGHT: 75 IN | HEART RATE: 92 BPM | RESPIRATION RATE: 18 BRPM | BODY MASS INDEX: 25.49 KG/M2 | DIASTOLIC BLOOD PRESSURE: 87 MMHG | WEIGHT: 205 LBS | SYSTOLIC BLOOD PRESSURE: 146 MMHG

## 2019-02-19 DIAGNOSIS — M79.642 PAIN OF LEFT HAND: ICD-10-CM

## 2019-02-19 DIAGNOSIS — M79.642 LEFT HAND PAIN: ICD-10-CM

## 2019-02-19 DIAGNOSIS — G89.21 CHRONIC PAIN DUE TO TRAUMA: Primary | ICD-10-CM

## 2019-02-19 DIAGNOSIS — G89.4 CHRONIC PAIN DISORDER: ICD-10-CM

## 2019-02-19 DIAGNOSIS — S68.119A TRAUMATIC AMPUTATION OF DIGIT OF ONE HAND WITHOUT COMPLICATION: ICD-10-CM

## 2019-02-19 PROCEDURE — 99214 PR OFFICE/OUTPT VISIT, EST, LEVL IV, 30-39 MIN: ICD-10-PCS | Mod: S$PBB,,, | Performed by: PHYSICIAN ASSISTANT

## 2019-02-19 PROCEDURE — 99999 PR PBB SHADOW E&M-EST. PATIENT-LVL IV: ICD-10-PCS | Mod: PBBFAC,,, | Performed by: PHYSICIAN ASSISTANT

## 2019-02-19 PROCEDURE — 99214 OFFICE O/P EST MOD 30 MIN: CPT | Mod: S$PBB,,, | Performed by: PHYSICIAN ASSISTANT

## 2019-02-19 PROCEDURE — 99214 OFFICE O/P EST MOD 30 MIN: CPT | Mod: PBBFAC | Performed by: PHYSICIAN ASSISTANT

## 2019-02-19 PROCEDURE — 99999 PR PBB SHADOW E&M-EST. PATIENT-LVL IV: CPT | Mod: PBBFAC,,, | Performed by: PHYSICIAN ASSISTANT

## 2019-02-19 NOTE — PROGRESS NOTES
Subjective:     Chief Complaint:  Left Hand Pain    History of Present Illness:  This patient is a 65 year old male who presents today for f/u complaining of the above noted pains. The patient describes this pain as follows.    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 work, left lower quadrant pain since hernia surgery in 2004  - timing (constant, intermittent): Constant  - character (sharp, dull, aching, burning): Throbbing  - radiating: No  - dermatomal distribution: No  - side: Left   - aggravating factors: Nothing worsens left digit pain, left lower quadrant pain worse with exercise or walking  - relieving factors: Medication / Norco  - antecedent trauma: Amputation via skill saw and 1997, hernia repair in 2004  - prior spinal surgery: None  - pertinent negatives: No fever, No chills, No weight loss, No bladder dysfunction, No bowel dysfunction, No extremity weakness, No saddle anesthesia  - medications tried: Norco, Percocet, over-the-counter medications, compound pain cream  - other therapies tried (physical therapy, injections):     >> physical therapy tried in the past    >> no prior injections        Imaging/ Labs (reviewed on 2/19/2019):    7/12/2018 US ABDOMINAL AORTA  CLINICAL HISTORY:  Abnormal findings on diagnostic imaging of other specified body structures  TECHNIQUE:  Limited ultrasound was performed of the abdominal aorta, with cross sectional diameter measurements obtained.  COMPARISON:  01/10/2018  FINDINGS:  The proximal abdominal aorta measures 2.7 cm.  The mid abdominal aorta measures 1.8 cm.  The distal abdominal aorta measures 1.9 cm, slightly ectatic and unchanged from prior.  The right iliac artery measures 1.0 cm.  The left iliac artery measures 1.1 cm.  Aortoiliac atherosclerosis: Mild  Impression: Stable examination with slight distal abdominal aortic ectasia.  Negative for aneurysm.         ROS:  CONSTITUTIONAL: No  "fever, chills, weight loss  SKIN: No rash or itching  CARDIOVASCULAR:  No chest pain, palpitations  RESPIRATORY: No shortness of breath  GASTROINTESTINAL: No diarrhea, No constipation, abdominal pain, left lower quadrant  GENITOURINARY: No urinary incontinence    MUSCULOSKELETAL:  - patient reports pain as above, see chief complaint     NEUROLOGICAL:   - Pain as above  - Strength in lower extremities is normal  - Sensation in lower extremities is normal  - No bowel or bladder incontinence     PSYCHIATRIC: No change in mood noticed         Objective:   Vitals:  BP (!) 146/87 (BP Location: Right arm, Patient Position: Sitting, BP Method: Medium (Automatic))   Pulse 92   Resp 18   Ht 6' 3" (1.905 m)   Wt 93 kg (205 lb)   BMI 25.62 kg/m²    (reviewed on 2/19/2019)     General: alert and oriented, in no apparent distress  Gait: normal gait  Skin: No rashes, No discoloration   HEENT: EOMI  Respiratory: respirations nonlabored  Cardiology: No obvious peripheral edema    Musculoskeletal:  - Lumbar ROM intact  - Full cervical ROM  - Left upper extremity range of motion intact throughout  - Digital stumps are hypersensitive to palpation, hypersensitivity does not extend further proximal  - No color change, no swelling, no changes in hair growth along left hand     Neuro:  - Lower extremities:      >> 5/5 strength bilaterally, throughout, symmetric  - Upper extremities:    >> 5/5 strength bilaterally    Psych: mood and affect appropriate        Assessment:     Encounter Diagnoses   Name Primary?   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 Interventional: None.    2. Pharmacologic:   - Refill Norco 10/325 mg PO BID PRN (60 tabs) x 2 months. Paper Rx given. Patient tolerating opioids with no side effects, obtaining good pain control with functional improvement. He tolerates this " medication well, and he denies any drowsiness or constipation.  - Opioid contract signed on 5/7/2018.     - [place]  reviewed and appropriate. Last filled 1-29-19.  Will post date accordingly.  - Will order UDS next visit to ensure medication compliance.      3. Rehabilitative: Encouraged regular exercise.    4. Diagnostic: None for now.    5. Follow up: 9 weeks for med refill      - I discussed the risks, benefits, and alternatives to potential treatment options. All questions and concerns were fully addressed today in clinic. Dr. Olmedo was consulted regarding the patient plan and agrees.           >> UDS:  8-18-15 :: appropriate  12-29-15 :: appropriate  5-3-16 :: appropriate  10-18-16 :: appropriate  4/13/2017 :: appropriate  9/29/2017 :: appropriate  3/9/2018 :: appropriate  9/4/2018 :: appropriate

## 2019-02-27 ENCOUNTER — TELEPHONE (OUTPATIENT)
Dept: INTERNAL MEDICINE | Facility: CLINIC | Age: 69
End: 2019-02-27

## 2019-02-27 NOTE — TELEPHONE ENCOUNTER
----- Message from Evelyn Shultz sent at 2/27/2019  2:11 PM CST -----  Contact: self  Pt is calling to let nurse know he spoke with pharmacy and blood pressure medication, Losartan, 100 mg was not on [facility] recall list. Please call pt back at [phone].      Thanks,   Evelyn Shultz

## 2019-02-27 NOTE — TELEPHONE ENCOUNTER
----- Message from Shanna Esteban sent at 2/27/2019  8:34 AM CST -----  Contact: Patient  Type:  Needs Medical Advice    Who Called: Ulysses  Symptoms (please be specific): n/a  How long has patient had these symptoms:  n/a  Pharmacy name and phone #:    [facility]  [place] - [address] AT [facility]  [address] [address]  Phone: [phone] Fax: [phone]    Would the patient rather a call back or a response via [facility]? Call back  Best Call Back Number: [phone]  Additional Information: The patient thinks he has some medication that is recalled. (Losartan 100 mg)    ThanksShanna

## 2019-03-21 ENCOUNTER — PATIENT OUTREACH (OUTPATIENT)
Dept: ADMINISTRATIVE | Facility: HOSPITAL | Age: 69
End: 2019-03-21

## 2019-03-28 DIAGNOSIS — G47.00 INSOMNIA, UNSPECIFIED TYPE: ICD-10-CM

## 2019-03-29 RX ORDER — CLONAZEPAM 1 MG/1
TABLET ORAL
Qty: 30 TABLET | Refills: 0 | OUTPATIENT
Start: 2019-03-29

## 2019-03-29 NOTE — TELEPHONE ENCOUNTER
Too early for refill, last Rx was given early October 2018 #30 with 5 refills. I can refill at visit next week. Thank you.

## 2019-04-01 NOTE — PROGRESS NOTES
Subjective:     Chief Complaint:  Left Hand Pain    History of Present Illness:  This patient is a 65 year old male who presents today for f/u complaining of the above noted pains. The patient describes this pain as follows.    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 work, left lower quadrant pain since hernia surgery in 2004  - timing (constant, intermittent): Constant  - character (sharp, dull, aching, burning): Throbbing  - radiating: No  - dermatomal distribution: No  - side: Left   - aggravating factors: Nothing worsens left digit pain, left lower quadrant pain worse with exercise or walking  - relieving factors: Medication / Norco  - antecedent trauma: Amputation via skill saw and 1997, hernia repair in 2004  - prior spinal surgery: None  - pertinent negatives: No fever, No chills, No weight loss, No bladder dysfunction, No bowel dysfunction, No extremity weakness, No saddle anesthesia  - medications tried: Norco, Percocet, over-the-counter medications, compound pain cream  - other therapies tried (physical therapy, injections):     >> physical therapy tried in the past    >> no prior injections        Imaging/ Labs (reviewed on 9/29/2017):    Comprehensive metabolic panel     Ref Range & Units 3d ago  (9/26/17) 2mo ago  (7/27/17) 5mo ago  (4/13/17) 5mo ago  (4/13/17)    Sodium 136 - 145 mmol/L 137  139  141  141     Potassium 3.5 - 5.1 mmol/L 3.6  3.9  3.6  3.6     Chloride 95 - 110 mmol/L 98  101  104  104     CO2 23 - 29 mmol/L 30   30   27  27     Glucose 70 - 110 mg/dL 142   349   100  100     BUN, Bld 8 - 23 mg/dL 22  19  20  20     Creatinine 0.5 - 1.4 mg/dL 1.4  1.6   1.3  1.3     Calcium 8.7 - 10.5 mg/dL 9.5  9.9  9.6  9.6     Total Protein 6.0 - 8.4 g/dL 7.4  7.5  7.6  7.6     Albumin 3.5 - 5.2 g/dL 3.6  3.6  3.8  3.8     Total Bilirubin 0.1 - 1.0 mg/dL 0.7  0.6CM  0.6CM  0.6CM    Comments: For infants and newborns,  "interpretation of results should be based   on gestational age, weight and in agreement with clinical   observations.   Premature Infant recommended reference ranges:   Up to 24 hours.............<8.0 mg/dL   Up to 48 hours............<12.0 mg/dL   3-5 days..................<15.0 mg/dL   6-29 days.................<15.0 mg/dL     Alkaline Phosphatase 55 - 135 U/L 71  72  65  65     AST 10 - 40 U/L 73   88   89   89      ALT 10 - 44 U/L 77   97   87   87      Anion Gap 8 - 16 mmol/L 9  8  10  10     eGFR if African American >60 mL/min/1.73 m^2 >60.0  52   >60.0  >60.0     eGFR if non African American >60 mL/min/1.73 m^2 52.4   45CM   57.7CM   57.7CM                 ROS:  CONSTITUTIONAL: No fever, chills, weight loss  SKIN: No rash or itching  CARDIOVASCULAR:  No chest pain, palpitations  RESPIRATORY: No shortness of breath  GASTROINTESTINAL: No diarrhea, No constipation, abdominal pain, left lower quadrant  GENITOURINARY: No urinary incontinence    MUSCULOSKELETAL:  - patient reports pain as above, see chief complaint     NEUROLOGICAL:   - Pain as above  - Strength in lower extremities is normal  - Sensation in lower extremities is normal  - No bowel or bladder incontinence     PSYCHIATRIC: No change in mood noticed         Objective:     Vitals:  /80 (BP Location: Right arm, Patient Position: Sitting, BP Method: Large (Automatic))   Pulse 79   Resp 16   Ht 6' 3" (1.905 m)   Wt 86.2 kg (190 lb)   BMI 23.75 kg/m²    (reviewed on 9/29/2017)     General: alert and oriented, in no apparent distress  Gait: normal gait  Skin: No rashes, No discoloration   HEENT: EOMI  Respiratory: respirations nonlabored    Musculoskeletal:  - Cervical range of motion intact  - Left upper extremity range of motion intact throughout  - Digital stumps are hypersensitive to palpation, hypersensitivity does not extend further proximal  - No color change, no swelling, no changes in hair growth along left hand     Neuro:  - Lower " extremities:      >> 5/5 strength bilaterally, throughout, symmetric  - Reflexes:    >> Upper extremity DTRs 2+ throughout  - Sensory:    >> sensation to light touch intact bilaterally, hypersensitivity as described above    Psych: mood and affect appropriate        Assessment:   - Chronic pain due to trauma      Plan:   - This patient presents today for follow-up visit.  He complains chronic left digital pain along stumps at site of previous amputation.    - Refill Norco 10/325 BID PRN pain x 3 months. He feels this helps his pain. He tolerates this medication well, and he denies any drowsiness or constipation.    - LA  appropriate (filled on 9-9-17).  - Will order UDS today to ensure medication compliance.    RTC in 3 months for med refill. I discussed the risks, benefits, and alternatives to potential treatment options. All questions and concerns were fully addressed today in clinic. Dr. Howell was consulted regarding the patient plan and agrees.             >> UDS:  8-18-15 :: appropriate  12-29-15 :: appropriate  5-3-16 :: appropriate  10-18-16 :: appropriate  4/13/2017 :: appropriate  9/29/2017 :: pending     Pneumonia, unspecified organism

## 2019-04-04 ENCOUNTER — OFFICE VISIT (OUTPATIENT)
Dept: INTERNAL MEDICINE | Facility: CLINIC | Age: 69
End: 2019-04-04
Payer: MEDICARE

## 2019-04-04 VITALS
DIASTOLIC BLOOD PRESSURE: 80 MMHG | WEIGHT: 203.69 LBS | SYSTOLIC BLOOD PRESSURE: 134 MMHG | TEMPERATURE: 98 F | HEART RATE: 72 BPM | OXYGEN SATURATION: 98 % | BODY MASS INDEX: 25.33 KG/M2 | HEIGHT: 75 IN

## 2019-04-04 DIAGNOSIS — E78.5 HYPERLIPIDEMIA ASSOCIATED WITH TYPE 2 DIABETES MELLITUS: ICD-10-CM

## 2019-04-04 DIAGNOSIS — B18.2 CHRONIC HEPATITIS C WITHOUT HEPATIC COMA: ICD-10-CM

## 2019-04-04 DIAGNOSIS — E11.59 HYPERTENSION ASSOCIATED WITH DIABETES: Primary | ICD-10-CM

## 2019-04-04 DIAGNOSIS — E11.69 HYPERLIPIDEMIA ASSOCIATED WITH TYPE 2 DIABETES MELLITUS: ICD-10-CM

## 2019-04-04 DIAGNOSIS — I15.2 HYPERTENSION ASSOCIATED WITH DIABETES: Primary | ICD-10-CM

## 2019-04-04 DIAGNOSIS — Z00.00 ROUTINE GENERAL MEDICAL EXAMINATION AT A HEALTH CARE FACILITY: ICD-10-CM

## 2019-04-04 DIAGNOSIS — Z12.11 COLON CANCER SCREENING: ICD-10-CM

## 2019-04-04 DIAGNOSIS — G47.00 INSOMNIA, UNSPECIFIED TYPE: ICD-10-CM

## 2019-04-04 DIAGNOSIS — Z86.010 HISTORY OF COLON POLYPS: ICD-10-CM

## 2019-04-04 PROCEDURE — 99999 PR PBB SHADOW E&M-EST. PATIENT-LVL IV: CPT | Mod: PBBFAC,,, | Performed by: NURSE PRACTITIONER

## 2019-04-04 PROCEDURE — 99214 PR OFFICE/OUTPT VISIT, EST, LEVL IV, 30-39 MIN: ICD-10-PCS | Mod: S$PBB,,, | Performed by: NURSE PRACTITIONER

## 2019-04-04 PROCEDURE — 99214 OFFICE O/P EST MOD 30 MIN: CPT | Mod: S$PBB,,, | Performed by: NURSE PRACTITIONER

## 2019-04-04 PROCEDURE — 99999 PR PBB SHADOW E&M-EST. PATIENT-LVL IV: ICD-10-PCS | Mod: PBBFAC,,, | Performed by: NURSE PRACTITIONER

## 2019-04-04 PROCEDURE — 99214 OFFICE O/P EST MOD 30 MIN: CPT | Mod: PBBFAC | Performed by: NURSE PRACTITIONER

## 2019-04-04 RX ORDER — CLONAZEPAM 1 MG/1
1 TABLET ORAL NIGHTLY
Qty: 30 TABLET | Refills: 5 | Status: SHIPPED | OUTPATIENT
Start: 2019-04-04 | End: 2019-11-05 | Stop reason: SDUPTHER

## 2019-04-04 RX ORDER — SODIUM, POTASSIUM,MAG SULFATES 17.5-3.13G
SOLUTION, RECONSTITUTED, ORAL ORAL
Qty: 354 ML | Refills: 0 | Status: SHIPPED | OUTPATIENT
Start: 2019-04-04 | End: 2019-06-10

## 2019-04-04 NOTE — PROGRESS NOTES
Ulysses Boreaux  04/04/2019  [id number]    Elza Pantoja MD  Patient Care Team:  Elza Pantoja MD as PCP - General (Family Medicine)  Elza Pantoja MD as PCP - [facility]  Mu Sharma RD as Dietitian (Endocrinology)  Yaz Love LPN as Care Coordinator (Internal Medicine)  Has the patient seen any provider outside of the [facility] network since the last visit? (no). If yes, HIPPA forms completed and records requested.        Visit Type:a scheduled routine follow-up visit    Chief Complaint:  Chief Complaint   Patient presents with    Annual Exam       History of Present Illness:    Patient presents to clinic today for follow up of chronic conditions including HTN, HLD, and DMII. 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 He is otherwise without concern.     History:  Past Medical History:   Diagnosis Date    Allergy     Amputation of finger of left hand     all fingers except for thumb    Cataract     OS NGCL     Chronic pain due to trauma     traumatic amputations left hand    Diabetes mellitus, type 2     Hepatitis C, chronic 2/13/2014    Hyperlipidemia     Hypertension     Refractive error      Past Surgical History:   Procedure Laterality Date    APPENDECTOMY      2003    CATARACT EXTRACTION      OD     COLONOSCOPY N/A 2/24/2014    Performed by Lu [facility]nett [facility] at [facility] ENDO    HERNIA REPAIR       Family History   Problem Relation Age of Onset    Cancer Mother     Hypertension Mother     Hypertension Father     Diabetes Sister     Hypertension Sister     Heart disease Sister     Diabetes Brother     Hypertension Brother     Cancer Brother      Social History     Socioeconomic History    Marital status:      Spouse name: Not on file    Number of children: 2    Years of education: Not on file    Highest education level: Not on file   Occupational History    Occupation: retired   Social Needs    Financial  "resource strain: Not on file    Food insecurity:     Worry: Not on file     Inability: Not on file    Transportation needs:     Medical: Not on file     Non-medical: Not on file   Tobacco Use    Smoking status: Former Smoker     Packs/day: 0.50     Types: Cigarettes     Last attempt to quit: 1972     Years since quittin.1    Smokeless tobacco: Never Used   Substance and Sexual Activity    Alcohol use: Yes     Alcohol/week: 0.0 oz     Comment: " Every blue moon"    Drug use: No    Sexual activity: Yes     Partners: Female   Lifestyle    Physical activity:     Days per week: Not on file     Minutes per session: Not on file    Stress: Not on file   Relationships    Social connections:     Talks on phone: Not on file     Gets together: Not on file     Attends [employer] service: Not on file     Active member of club or organization: Not on file     Attends meetings of clubs or organizations: Not on file     Relationship status: Not on file   Other Topics Concern    Not on file   Social History Narrative     . Has 2 children. Patient disabled- was .      Patient Active Problem List   Diagnosis    Uncontrolled type 2 diabetes mellitus without complication, with long-term current use of insulin    Hypertension associated with diabetes    Chronic pain due to trauma    Lymphocytosis    Chronic hepatitis C without hepatic coma    Hyperlipidemia associated with type 2 diabetes mellitus    Insomnia    Allergic rhinitis     Review of patient's allergies indicates:  No Known Allergies    The following were reviewed at this visit: active problem list, medication list, allergies, family history, social history, and health maintenance.    Medications:  Current Outpatient Medications on File Prior to Visit   Medication Sig Dispense Refill    amLODIPine (NORVASC) 10 MG tablet TAKE 1 TABLET(10 MG) BY MOUTH EVERY DAY 90 tablet 1    atorvastatin (LIPITOR) 20 MG tablet TAKE 1 TABLET(20 MG) " BY MOUTH EVERY NIGHT 90 tablet 1    blood sugar diagnostic (TRUE METRIX GLUCOSE TEST STRIP) Strp 1 strip by Misc.(Non-Drug; Combo Route) route 3 (three) times daily. True Metrix Strips- ICD:10 - E11.9 100 strip 11    fluticasone (FLONASE) 50 mcg/actuation nasal spray SHAKE LIQUID AND USE 2 SPRAYS IN EACH NOSTRIL EVERY DAY 16 mL 5    HYDROcodone-acetaminophen (NORCO)  mg per tablet Take 1 tablet by mouth every 12 (twelve) hours as needed for Pain. 60 tablet 0    insulin NPH-insulin regular, 70/30, (NOVOLIN 70/30 U-100 INSULIN) 100 unit/mL (70-30) injection INJECT 21 UNITS UNDER THE SKIN EVERY MORNING, THEN 23 UNITS EVERY EVENING 20 mL 6    insulin syringe-needle U-100 1 mL 31 gauge x 5/16 Syrg USE AS DIRECTED WITH INSULIN 100 each 11    lancets (TRUEPLUS LANCETS) 33 gauge Misc 1 lancet by Misc.(Non-Drug; Combo Route) route 3 (three) times daily. ICD 10 Code: E11.9 100 each 11    ledipasvir-sofosbuvir (HARVONI)  mg Tab Take 1 tablet by mouth once daily. 30 tablet 2    losartan-hydrochlorothiazide 100-25 mg (HYZAAR) 100-25 mg per tablet TAKE 1 TABLET BY MOUTH EVERY DAY 30 tablet 5    nebivolol (BYSTOLIC) 10 MG Tab Take 1 tablet (10 mg total) by mouth once daily. 30 tablet 6    SITagliptin (JANUVIA) 100 MG Tab Take 1 tablet (100 mg total) by mouth once daily. 30 tablet 6    [DISCONTINUED] clonazePAM (KLONOPIN) 1 MG tablet Take 1 tablet (1 mg total) by mouth every evening. 30 tablet 5     No current facility-administered medications on file prior to visit.        Medications have been reviewed and reconciled with patient at this visit.  Barriers to medications present (no)    Adverse reactions to current medications (no)    Over the counter medications reviewed (Yes ), and if needed added to active Medication list at this visit.     Exam:  Wt Readings from Last 3 Encounters:   04/04/19 92.4 kg (203 lb 11.3 oz)   02/19/19 93 kg (205 lb)   02/12/19 93.3 kg (205 lb 11 oz)     Temp Readings from Last 3  Encounters:   04/04/19 97.7 °F (36.5 °C) (Tympanic)   10/04/18 97.7 °F (36.5 °C) (Tympanic)   04/04/18 97.8 °F (36.6 °C) (Tympanic)     BP Readings from Last 3 Encounters:   04/04/19 134/80   02/19/19 (!) 146/87   02/12/19 (!) 140/80     Pulse Readings from Last 3 Encounters:   04/04/19 72   02/19/19 92   12/17/18 92     Body mass index is 25.46 kg/m².      Review of Systems   Constitutional: Negative for chills, fever and weight loss.   Respiratory: Negative for cough and shortness of breath.    Cardiovascular: Negative for chest pain and palpitations.   Musculoskeletal: Positive for joint pain (followed by PM).   Skin: Negative for rash.        Cyst     Neurological: Negative for headaches.     Physical Exam   Constitutional: He is oriented to person, place, and time. He appears well-developed and well-nourished. No distress.   HENT:   Head: Normocephalic and atraumatic.       Right Ear: Tympanic membrane and ear canal normal.   Left Ear: Tympanic membrane and ear canal normal.   Nose: Nose normal.   Mouth/Throat: Uvula is midline, oropharynx is clear and moist and mucous membranes are normal.   Eyes: Pupils are equal, round, and reactive to light. Conjunctivae and EOM are normal. No scleral icterus.   Neck: Normal range of motion.   Cardiovascular: Normal rate and regular rhythm. Exam reveals no gallop and no friction rub.   No murmur heard.  Pulmonary/Chest: Effort normal and breath sounds normal. No stridor. No respiratory distress.   Abdominal: Soft. Bowel sounds are normal. He exhibits no distension. There is no tenderness.   Lymphadenopathy:     He has no cervical adenopathy.   Neurological: He is alert and oriented to person, place, and time.   Skin: Skin is warm and dry.   Psychiatric: He has a normal mood and affect.   Vitals reviewed.  Protective Sensation (w/ 10 gram monofilament):  Right: Intact  Left: Intact    Visual Inspection:  Normal -  Bilateral and Nails Intact - without Evidence of Foot  Deformity- Bilateral    Pedal Pulses:   Right: Present  Left: Present    Posterior tibialis:   Right:Present  Left: Present        Laboratory Reviewed ({Yes)  Lab Results   Component Value Date    WBC 3.94 06/18/2018    HGB 15.2 06/18/2018    HCT 45.8 06/18/2018     06/18/2018    CHOL 153 10/10/2018    TRIG 53 10/10/2018    HDL 53 10/10/2018    ALT 26 12/10/2018    AST 26 12/10/2018     12/10/2018    K 3.8 12/10/2018     12/10/2018    CREATININE 1.3 12/10/2018    BUN 21 12/10/2018    CO2 30 (H) 12/10/2018    TSH 0.231 (L) 05/07/2018    INR 1.0 12/10/2018    HGBA1C 7.0 (H) 02/05/2019       Ulysses was seen today for annual exam.    Diagnoses and all orders for this visit:    Hypertension associated with diabetes  Comments:  blood pressure controlled, continue current medications  Orders:  -     CBC auto differential; Future  -     Comprehensive metabolic panel; Future    Hyperlipidemia associated with type 2 diabetes mellitus  Comments:  status pending labs, continue current medications  Orders:  -     Comprehensive metabolic panel; Future  -     Lipid panel; Future    Chronic hepatitis C without hepatic coma  Comments:  followed by GI    Colon cancer screening  -     Case request GI: COLONOSCOPY  -     sodium,potassium,mag sulfates (SUPREP BOWEL PREP KIT) 17.5-3.13-1.6 gram SolR; Follow instructions per pharmacy    History of colon polyps  -     Case request GI: COLONOSCOPY  -     sodium,potassium,mag sulfates (SUPREP BOWEL PREP KIT) 17.5-3.13-1.6 gram SolR; Follow instructions per pharmacy    Routine general medical examination at a health care facility      Eye exam is scheduled and due to financial concerns, will be done later in the year.  Labs 4/8            Care Plan/Goals: Reviewed  (N/A)  Goals     None          Follow up: Follow up in about 6 months (around 10/4/2019), or if symptoms worsen or fail to improve, for annual wellness/EPP with Dr. Pantoja.    After visit summary was printed  and given to patient upon discharge today.  Patient goals and care plan are included in After Visit Summary.

## 2019-04-08 ENCOUNTER — LAB VISIT (OUTPATIENT)
Dept: LAB | Facility: HOSPITAL | Age: 69
End: 2019-04-08
Attending: NURSE PRACTITIONER
Payer: MEDICARE

## 2019-04-08 ENCOUNTER — NUTRITION (OUTPATIENT)
Dept: DIABETES | Facility: CLINIC | Age: 69
End: 2019-04-08
Payer: MEDICARE

## 2019-04-08 ENCOUNTER — TELEPHONE (OUTPATIENT)
Dept: INTERNAL MEDICINE | Facility: CLINIC | Age: 69
End: 2019-04-08

## 2019-04-08 VITALS — BODY MASS INDEX: 25.56 KG/M2 | HEIGHT: 75 IN | WEIGHT: 205.56 LBS

## 2019-04-08 DIAGNOSIS — E11.9 TYPE 2 DIABETES MELLITUS WITHOUT COMPLICATION, UNSPECIFIED WHETHER LONG TERM INSULIN USE: Primary | ICD-10-CM

## 2019-04-08 DIAGNOSIS — E78.5 HYPERLIPIDEMIA ASSOCIATED WITH TYPE 2 DIABETES MELLITUS: ICD-10-CM

## 2019-04-08 DIAGNOSIS — E11.59 HYPERTENSION ASSOCIATED WITH DIABETES: ICD-10-CM

## 2019-04-08 DIAGNOSIS — I15.2 HYPERTENSION ASSOCIATED WITH DIABETES: ICD-10-CM

## 2019-04-08 DIAGNOSIS — E11.69 HYPERLIPIDEMIA ASSOCIATED WITH TYPE 2 DIABETES MELLITUS: ICD-10-CM

## 2019-04-08 LAB
ALBUMIN SERPL BCP-MCNC: 4 G/DL (ref 3.5–5.2)
ALP SERPL-CCNC: 64 U/L (ref 55–135)
ALT SERPL W/O P-5'-P-CCNC: 32 U/L (ref 10–44)
ANION GAP SERPL CALC-SCNC: 6 MMOL/L (ref 8–16)
AST SERPL-CCNC: 25 U/L (ref 10–40)
BASOPHILS # BLD AUTO: 0.06 K/UL (ref 0–0.2)
BASOPHILS NFR BLD: 1.1 % (ref 0–1.9)
BILIRUB SERPL-MCNC: 0.7 MG/DL (ref 0.1–1)
BUN SERPL-MCNC: 20 MG/DL (ref 8–23)
CALCIUM SERPL-MCNC: 10.4 MG/DL (ref 8.7–10.5)
CHLORIDE SERPL-SCNC: 102 MMOL/L (ref 95–110)
CHOLEST SERPL-MCNC: 156 MG/DL (ref 120–199)
CHOLEST/HDLC SERPL: 2.3 {RATIO} (ref 2–5)
CO2 SERPL-SCNC: 32 MMOL/L (ref 23–29)
CREAT SERPL-MCNC: 1.3 MG/DL (ref 0.5–1.4)
DIFFERENTIAL METHOD: ABNORMAL
EOSINOPHIL # BLD AUTO: 0.2 K/UL (ref 0–0.5)
EOSINOPHIL NFR BLD: 4.4 % (ref 0–8)
ERYTHROCYTE [DISTWIDTH] IN BLOOD BY AUTOMATED COUNT: 12.8 % (ref 11.5–14.5)
EST. GFR  (AFRICAN AMERICAN): >60 ML/MIN/1.73 M^2
EST. GFR  (NON AFRICAN AMERICAN): 56.9 ML/MIN/1.73 M^2
GLUCOSE SERPL-MCNC: 86 MG/DL (ref 70–110)
HCT VFR BLD AUTO: 44.6 % (ref 40–54)
HDLC SERPL-MCNC: 67 MG/DL (ref 40–75)
HDLC SERPL: 42.9 % (ref 20–50)
HGB BLD-MCNC: 14.4 G/DL (ref 14–18)
IMM GRANULOCYTES # BLD AUTO: 0.01 K/UL (ref 0–0.04)
IMM GRANULOCYTES NFR BLD AUTO: 0.2 % (ref 0–0.5)
LDLC SERPL CALC-MCNC: 77.4 MG/DL (ref 63–159)
LYMPHOCYTES # BLD AUTO: 3.2 K/UL (ref 1–4.8)
LYMPHOCYTES NFR BLD: 60.9 % (ref 18–48)
MCH RBC QN AUTO: 30 PG (ref 27–31)
MCHC RBC AUTO-ENTMCNC: 32.3 G/DL (ref 32–36)
MCV RBC AUTO: 93 FL (ref 82–98)
MONOCYTES # BLD AUTO: 0.7 K/UL (ref 0.3–1)
MONOCYTES NFR BLD: 12.6 % (ref 4–15)
NEUTROPHILS # BLD AUTO: 1.1 K/UL (ref 1.8–7.7)
NEUTROPHILS NFR BLD: 20.8 % (ref 38–73)
NONHDLC SERPL-MCNC: 89 MG/DL
NRBC BLD-RTO: 0 /100 WBC
PLATELET # BLD AUTO: 240 K/UL (ref 150–350)
PMV BLD AUTO: 10.4 FL (ref 9.2–12.9)
POTASSIUM SERPL-SCNC: 4.6 MMOL/L (ref 3.5–5.1)
PROT SERPL-MCNC: 7.9 G/DL (ref 6–8.4)
RBC # BLD AUTO: 4.8 M/UL (ref 4.6–6.2)
SODIUM SERPL-SCNC: 140 MMOL/L (ref 136–145)
TRIGL SERPL-MCNC: 58 MG/DL (ref 30–150)
WBC # BLD AUTO: 5.22 K/UL (ref 3.9–12.7)

## 2019-04-08 PROCEDURE — 36415 COLL VENOUS BLD VENIPUNCTURE: CPT

## 2019-04-08 PROCEDURE — 85025 COMPLETE CBC W/AUTO DIFF WBC: CPT

## 2019-04-08 PROCEDURE — 80061 LIPID PANEL: CPT

## 2019-04-08 PROCEDURE — 80053 COMPREHEN METABOLIC PANEL: CPT

## 2019-04-08 PROCEDURE — 99999 PR PBB SHADOW E&M-EST. PATIENT-LVL III: CPT | Mod: PBBFAC,,, | Performed by: DIETITIAN, REGISTERED

## 2019-04-08 PROCEDURE — 99999 PR PBB SHADOW E&M-EST. PATIENT-LVL III: ICD-10-PCS | Mod: PBBFAC,,, | Performed by: DIETITIAN, REGISTERED

## 2019-04-08 PROCEDURE — 99213 OFFICE O/P EST LOW 20 MIN: CPT | Mod: PBBFAC | Performed by: DIETITIAN, REGISTERED

## 2019-04-08 PROCEDURE — G0108 DIAB MANAGE TRN  PER INDIV: HCPCS | Mod: PBBFAC | Performed by: DIETITIAN, REGISTERED

## 2019-04-08 NOTE — TELEPHONE ENCOUNTER
----- Message from Soheila Tolbert NP sent at 4/8/2019  2:51 PM CDT -----  Please notify patient CBC within acceptable limits

## 2019-04-08 NOTE — PROGRESS NOTES
"Diabetes Education  Author: Mu hSarma, MICHELLE  Date: 4/8/2019    Diabetes Care Management Summary  Diabetes Education Record Assessment/Progress: Comprehensive/Group  Current Diabetes Risk Level: Moderate     Referring Provider: Elza Pantoja MD  68 y.o. male in clinic today for diabetes education f/u.     Weight: 93.2 kg (205 lb 9.3 oz)   Height: 6' 3" (190.5 cm)   BMI = 25.7     2 lbs lost since last visit.    Last A1c:   Lab Results   Component Value Date    HGBA1C 7.0 (H) 02/05/2019     Last visit with Diabetes Educator: : 04/08/2019      Diabetes Type  Diabetes Type : Type II    Diabetes History  Diabetes Diagnosis: >10 years  Current Treatment: Diet  Reviewed Problem List with Patient: Yes   DM medications:  Novolin 70/30, 21 units in am, 23 units pm (before meals)  Januvia, 100 mg    Health Maintenance was reviewed today with patient. Discussed with patient importance of routine eye exams, foot exams/foot care, blood work (i.e.: A1c, microalbumin, and lipid), dental visits, yearly flu vaccine, and pneumonia vaccine as indicated by PCP. Patient verbalized understanding.     Health Maintenance Topics with due status: Not Due       Topic Last Completion Date    Pneumococcal Vaccine (65+ Low/Medium Risk) 10/03/2017    Lipid Panel 10/10/2018    Hemoglobin A1c 02/05/2019    Low Dose Statin 04/04/2019    Foot Exam 04/04/2019     Health Maintenance Due   Topic Date Due    TETANUS VACCINE  08/16/1970    Colonoscopy  02/24/2017    Eye Exam  08/22/2018       Nutrition  Meal Planning: water, skipping meals  What type of sweetener do you use?: Equal  What type of beverages do you drink?: diet soda/tea, water  Meal Plan 24 Hour Recall - Breakfast: 2 eggs, 2 sl toast, sausage, water  Meal Plan 24 Hour Recall - Lunch: none  Meal Plan 24 Hour Recall - Dinner: steak, baked potato, peas, 2 sl ww bread, water  Meal Plan 24 Hour Recall - Snack: none    Monitoring   Self Monitoring : checking bid - fasting and 6p " (before dinner) // per meter review - fastin-138; acdinner:    Blood Glucose Logs: No  Do you use a personal continuous glucose monitor?: No  In the last month, how often have you had a low blood sugar reaction?: once  What are your symptoms of low blood sugar?: shaking  How do you treat low blood sugar?: eat something - cookies and candy bar  Can you tell when your blood sugar is too high?: no    Exercise   Exercise Type: none(no formal exercise, but has been physically active)    Current Diabetes Treatment   Current Treatment: Diet                                     Barriers to Change  Barriers to Change: None  Learning Challenges : None              Diabetes Education Assessment/Progress  Diabetes Disease Process (diabetes disease process and treatment options): Discussion, Individual Session, Demonstrates Understanding/Competency(verbalizes/demonstrates)  Nutrition (Incorporating nutritional management into one's lifestyle): Discussion, Individual Session, Demonstrates Understanding/Competency (verbalizes/demonstrates)  Physical Activity (incorporating physical activity into one's lifestyle): Discussion, Individual Session, Demonstrates Understanding/Competency (verbalizes/demonstrates)  Medications (states correct name, dose, onset, peak, duration, side effects & timing of meds): Discussion, Individual Session, Demonstrates Understanding/Competency(verbalizes/demonstrates)  Monitoring (monitoring blood glucose/other parameters & using results): Discussion, Individual Session, Demonstrates Understanding/Competency (verbalizes/demonstrates)  Acute Complications (preventing, detecting, and treating acute complications): Discussion, Individual Session, Demonstrates Understanding/Competency (verbalizes/demonstrates)  Chronic Complications (preventing, detecting, and treating chronic complications): Discussion, Individual Session, Demonstrates Understanding/Competency (verbalizes/demonstrates)  Clinical  (diabetes, other pertinent medical history, and relevant comorbidities reviewed during visit): Discussion, Individual Session  Cognitive (knowledge of self-management skills, functional health literacy): Discussion, Individual Session  Psychosocial (emotional response to diabetes): Discussion, Individual Session  Diabetes Distress and Support Systems: Not Covered/Deferred  Behavioral (readiness for change, lifestyle practices, self-care behaviors): Discussion, Individual Session    Goals  Patient has selected/evaluated goals during today's session: Yes, selected  Healthy Eating: % Met(Eat 3 meals per day, limiting carbs to 45-60 grams per meal; cont to avoid soft drinks and juice except when treating hypoglycemia)  Met Percentage : 75%  Physical Activity: % Met(Increase walking from 2-3 days/wk to 3-4 days/wk)  Met Percentage : 25%  Monitoring: In Progress(check bg 2-3 times/day)    Pt has appt for eye exam in August        Diabetes Care Plan/Intervention  Education Plan/Intervention: Individual Follow-Up DSMT   a1c scheduled in May  F/u in 6 months    Diabetes Meal Plan  Calories: 2200  Carbohydrate Per Meal: 45-60g  Carbohydrate Per Snack : 15-20g    Today's Self-Management Care Plan was developed with the patient's input and is based on barriers identified during today's assessment.    The long and short-term goals in the care plan were written with the patient/caregiver's input. The patient has agreed to work toward these goals to improve his overall diabetes control.      The patient received a copy of today's self-management plan and verbalized understanding of the care plan, goals, and all of today's instructions.      The patient was encouraged to communicate with his physician and care team regarding his condition(s) and treatment.  I provided the patient with my contact information today and encouraged him to contact me via phone or patient portal as needed.     Education Units of Time   Time Spent: 30  min

## 2019-04-09 ENCOUNTER — TELEPHONE (OUTPATIENT)
Dept: INTERNAL MEDICINE | Facility: CLINIC | Age: 69
End: 2019-04-09

## 2019-04-16 DIAGNOSIS — E78.2 COMBINED HYPERLIPIDEMIA ASSOCIATED WITH TYPE 2 DIABETES MELLITUS: ICD-10-CM

## 2019-04-16 DIAGNOSIS — E11.69 COMBINED HYPERLIPIDEMIA ASSOCIATED WITH TYPE 2 DIABETES MELLITUS: ICD-10-CM

## 2019-04-16 RX ORDER — ATORVASTATIN CALCIUM 20 MG/1
TABLET, FILM COATED ORAL
Qty: 30 TABLET | Refills: 5 | Status: SHIPPED | OUTPATIENT
Start: 2019-04-16 | End: 2019-05-14 | Stop reason: SDUPTHER

## 2019-04-22 ENCOUNTER — OFFICE VISIT (OUTPATIENT)
Dept: SURGERY | Facility: CLINIC | Age: 69
End: 2019-04-22
Payer: MEDICARE

## 2019-04-22 VITALS
SYSTOLIC BLOOD PRESSURE: 140 MMHG | WEIGHT: 205 LBS | HEART RATE: 81 BPM | BODY MASS INDEX: 25.63 KG/M2 | TEMPERATURE: 98 F | DIASTOLIC BLOOD PRESSURE: 78 MMHG

## 2019-04-22 DIAGNOSIS — L72.11 PILAR CYST: Primary | ICD-10-CM

## 2019-04-22 PROCEDURE — 99213 OFFICE O/P EST LOW 20 MIN: CPT | Mod: PBBFAC | Performed by: SURGERY

## 2019-04-22 PROCEDURE — 99999 PR PBB SHADOW E&M-EST. PATIENT-LVL III: ICD-10-PCS | Mod: PBBFAC,,, | Performed by: SURGERY

## 2019-04-22 PROCEDURE — 99999 PR PBB SHADOW E&M-EST. PATIENT-LVL III: CPT | Mod: PBBFAC,,, | Performed by: SURGERY

## 2019-04-22 PROCEDURE — 11400 EXC TR-EXT B9+MARG 0.5 CM<: CPT | Mod: S$PBB,,, | Performed by: SURGERY

## 2019-04-22 PROCEDURE — 11400 EXC TR-EXT B9+MARG 0.5 CM<: CPT | Mod: PBBFAC | Performed by: SURGERY

## 2019-04-22 PROCEDURE — 11400 PR EXC SKIN BENIG <0.5 CM TRUNK,ARM,LEG: ICD-10-PCS | Mod: S$PBB,,, | Performed by: SURGERY

## 2019-04-22 RX ORDER — HYDROCODONE BITARTRATE AND ACETAMINOPHEN 10; 325 MG/1; MG/1
1 TABLET ORAL EVERY 8 HOURS PRN
Qty: 90 TABLET | Refills: 0 | Status: SHIPPED | OUTPATIENT
Start: 2019-05-26 | End: 2019-06-25

## 2019-04-22 RX ORDER — HYDROCODONE BITARTRATE AND ACETAMINOPHEN 10; 325 MG/1; MG/1
1 TABLET ORAL EVERY 8 HOURS PRN
Qty: 90 TABLET | Refills: 0 | Status: SHIPPED | OUTPATIENT
Start: 2019-04-26 | End: 2019-05-26

## 2019-04-22 NOTE — PROGRESS NOTES
Subjective:       Patient ID: Ulysses Boreaux is a 68 y.o. male.    Small lump on the forehead    Chief Complaint: Cyst (pilar cyst)    Patient presents with a small lump on his forehead.  It causes occasional discomfort.  He would like to have it removed    Review of Systems   Skin:        Small mass on the right anterior forehead       Objective:      Physical Exam   Constitutional: He appears well-developed and well-nourished.   Skin:   On the right anterior forehead there is a 1 cm firm mobile mass   Vitals reviewed.      Assessment:      Pilar cyst of the forehead  Plan:       Excision in clinic.  The risks include infection bleeding

## 2019-04-22 NOTE — PATIENT INSTRUCTIONS
It is okay to wash your hair and get the area wet.    Please apply a small amount of antibiotic ointment to the sutures

## 2019-04-22 NOTE — PROCEDURES
"Excision of Cyst  Date/Time: 4/22/2019 2:32 PM  Performed by: Chito Carolina MD  Authorized by: Chito Carolina MD     Consent Done?:  Yes (Written)  Time out: Immediately prior to procedure a "time out" was called to verify the correct patient, procedure, equipment, support staff and site/side marked as required.      STAFF:  Assistants?: No    INDICATIONS:cyst  LOCATION:  Body area:  Scalpright    PREP:Position:  Supine    ANESTHESIA:  Anesthesia:  Local infiltration  Local anesthetic:  Lidocaine 1% with epinephrine    PROCEDURE DETAILS:  Excision type:  Skin  Excision size (cm):  1.5  Scalpel size:  15  Incision type:  Single straight  Specimens?: No      Patient was discharged and will follow up for wound check.   Once incision was made the pylorus/sebaceous cyst was identified.  The cyst was approximately 1/2 cm in size.  The cyst was excised including its capsule using iris scissors.    The wound was closed with vertical mattresses of 4 0 nylon x4 sutures      "

## 2019-04-23 ENCOUNTER — OFFICE VISIT (OUTPATIENT)
Dept: PAIN MEDICINE | Facility: CLINIC | Age: 69
End: 2019-04-23
Payer: MEDICARE

## 2019-04-23 VITALS
BODY MASS INDEX: 25.57 KG/M2 | SYSTOLIC BLOOD PRESSURE: 130 MMHG | WEIGHT: 205.69 LBS | HEIGHT: 75 IN | DIASTOLIC BLOOD PRESSURE: 77 MMHG | TEMPERATURE: 98 F | HEART RATE: 67 BPM

## 2019-04-23 DIAGNOSIS — G89.21 CHRONIC PAIN DUE TO TRAUMA: Primary | ICD-10-CM

## 2019-04-23 DIAGNOSIS — M79.642 LEFT HAND PAIN: ICD-10-CM

## 2019-04-23 DIAGNOSIS — M79.642 PAIN OF LEFT HAND: ICD-10-CM

## 2019-04-23 DIAGNOSIS — G89.4 CHRONIC PAIN DISORDER: ICD-10-CM

## 2019-04-23 DIAGNOSIS — S68.119A TRAUMATIC AMPUTATION OF DIGIT OF ONE HAND WITHOUT COMPLICATION: ICD-10-CM

## 2019-04-23 PROCEDURE — 99999 PR PBB SHADOW E&M-EST. PATIENT-LVL IV: ICD-10-PCS | Mod: PBBFAC,,, | Performed by: PHYSICIAN ASSISTANT

## 2019-04-23 PROCEDURE — 99214 OFFICE O/P EST MOD 30 MIN: CPT | Mod: PBBFAC | Performed by: PHYSICIAN ASSISTANT

## 2019-04-23 PROCEDURE — 99214 PR OFFICE/OUTPT VISIT, EST, LEVL IV, 30-39 MIN: ICD-10-PCS | Mod: S$PBB,,, | Performed by: PHYSICIAN ASSISTANT

## 2019-04-23 PROCEDURE — 99214 OFFICE O/P EST MOD 30 MIN: CPT | Mod: S$PBB,,, | Performed by: PHYSICIAN ASSISTANT

## 2019-04-23 PROCEDURE — 99999 PR PBB SHADOW E&M-EST. PATIENT-LVL IV: CPT | Mod: PBBFAC,,, | Performed by: PHYSICIAN ASSISTANT

## 2019-04-23 NOTE — PROGRESS NOTES
Subjective:     Chief Complaint:  Left Hand Pain    History of Present Illness:  This patient is a 65 year old male who presents today for f/u complaining of the above noted pains. The patient describes this pain as follows.    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 work, left lower quadrant pain since hernia surgery in 2004  - timing (constant, intermittent): Constant  - character (sharp, dull, aching, burning): Throbbing  - radiating: No  - dermatomal distribution: No  - side: Left   - aggravating factors: Nothing worsens left digit pain, left lower quadrant pain worse with exercise or walking  - relieving factors: Medication / Norco  - antecedent trauma: Amputation via skill saw and 1997, hernia repair in 2004  - prior spinal surgery: None  - pertinent negatives: No fever, No chills, No weight loss, No bladder dysfunction, No bowel dysfunction, No extremity weakness, No saddle anesthesia  - medications tried: Norco, Percocet, over-the-counter medications, compound pain cream  - other therapies tried (physical therapy, injections):     >> physical therapy tried in the past    >> no prior injections        Imaging/ Labs (reviewed on 4/23/2019):    7/12/2018 US ABDOMINAL AORTA  CLINICAL HISTORY:  Abnormal findings on diagnostic imaging of other specified body structures  TECHNIQUE:  Limited ultrasound was performed of the abdominal aorta, with cross sectional diameter measurements obtained.  COMPARISON:  01/10/2018  FINDINGS:  The proximal abdominal aorta measures 2.7 cm.  The mid abdominal aorta measures 1.8 cm.  The distal abdominal aorta measures 1.9 cm, slightly ectatic and unchanged from prior.  The right iliac artery measures 1.0 cm.  The left iliac artery measures 1.1 cm.  Aortoiliac atherosclerosis: Mild  Impression: Stable examination with slight distal abdominal aortic ectasia.  Negative for aneurysm.         ROS:  CONSTITUTIONAL: No  "fever, chills, weight loss  SKIN: No rash or itching  CARDIOVASCULAR:  No chest pain, palpitations  RESPIRATORY: No shortness of breath  GASTROINTESTINAL: No diarrhea, No constipation, abdominal pain, left lower quadrant  GENITOURINARY: No urinary incontinence    MUSCULOSKELETAL:  - patient reports pain as above, see chief complaint     NEUROLOGICAL:   - Pain as above  - Strength in lower extremities is normal  - Sensation in lower extremities is normal  - No bowel or bladder incontinence     PSYCHIATRIC: No change in mood noticed         Objective:   Vitals:  /77 (BP Location: Right arm, Patient Position: Sitting, BP Method: Large (Automatic))   Pulse 67   Temp 97.6 °F (36.4 °C) (Oral)   Ht 6' 3" (1.905 m)   Wt 93.3 kg (205 lb 11 oz)   BMI 25.71 kg/m²    (reviewed on 4/23/2019)     General: alert and oriented, in no apparent distress  Gait: normal gait  Skin: No rashes, No discoloration   HEENT: EOMI  Respiratory: respirations nonlabored  Cardiology: No obvious peripheral edema    Musculoskeletal:  - Lumbar ROM intact  - No pain with lumbar flexion/ extension  - Full cervical ROM  - Left upper extremity range of motion intact throughout  - Digital stumps are hypersensitive to palpation, hypersensitivity does not extend further proximal  - No color change, no swelling, no changes in hair growth along left hand     Neuro:  - Lower extremities:      >> 5/5 strength bilaterally, throughout, symmetric  - Upper extremities:    >> 5/5 strength bilaterally    Psych: mood and affect appropriate        Assessment:     Encounter Diagnoses   Name Primary?   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 Interventional: None.    2. Pharmacologic:   - Refill Norco 10/325 mg PO BID PRN (60 tabs) x 2 months. Paper Rx given. Patient tolerating opioids with no side effects, " obtaining good pain control with functional improvement. He tolerates this medication well, and he denies any drowsiness or constipation.  - Opioid contract signed on 5/7/2018.     - [place]  reviewed and appropriate. Last filled 3-28-19.  Will post date accordingly.  - Will order UDS today to ensure medication compliance.      3. Rehabilitative: Encouraged regular exercise.    4. Diagnostic: None for now.    5. Follow up: 8 weeks for med refill      - I discussed the risks, benefits, and alternatives to potential treatment options. All questions and concerns were fully addressed today in clinic. Dr. Olmedo was consulted regarding the patient plan and agrees.           >> UDS:  8-18-15 :: appropriate  12-29-15 :: appropriate  5-3-16 :: appropriate  10-18-16 :: appropriate  4/13/2017 :: appropriate  9/29/2017 :: appropriate  3/9/2018 :: appropriate  9/4/2018 :: appropriate  4/23/2019 :: pending

## 2019-05-06 ENCOUNTER — OFFICE VISIT (OUTPATIENT)
Dept: SURGERY | Facility: CLINIC | Age: 69
End: 2019-05-06
Payer: MEDICARE

## 2019-05-06 VITALS
BODY MASS INDEX: 25.62 KG/M2 | HEART RATE: 76 BPM | TEMPERATURE: 98 F | DIASTOLIC BLOOD PRESSURE: 72 MMHG | SYSTOLIC BLOOD PRESSURE: 126 MMHG | WEIGHT: 205 LBS

## 2019-05-06 DIAGNOSIS — Z48.02 VISIT FOR SUTURE REMOVAL: Primary | ICD-10-CM

## 2019-05-06 PROCEDURE — 99999 PR PBB SHADOW E&M-EST. PATIENT-LVL III: CPT | Mod: PBBFAC,,, | Performed by: SURGERY

## 2019-05-06 PROCEDURE — 99024 PR POST-OP FOLLOW-UP VISIT: ICD-10-PCS | Mod: POP,,, | Performed by: SURGERY

## 2019-05-06 PROCEDURE — 99999 PR PBB SHADOW E&M-EST. PATIENT-LVL III: ICD-10-PCS | Mod: PBBFAC,,, | Performed by: SURGERY

## 2019-05-06 PROCEDURE — 99213 OFFICE O/P EST LOW 20 MIN: CPT | Mod: PBBFAC | Performed by: SURGERY

## 2019-05-06 PROCEDURE — 99024 POSTOP FOLLOW-UP VISIT: CPT | Mod: POP,,, | Performed by: SURGERY

## 2019-05-06 NOTE — PROGRESS NOTES
Subjective:       Patient ID: Ulysses Boreaux is a 68 y.o. male.    Suture removal    Chief Complaint: Post-op Evaluation (suture removal)    Presents for suture removal after a scalp sebaceous cyst excision    Review of Systems   Genitourinary: Positive for urgency.   Skin:        Incision is healed       Objective:      Physical Exam   Constitutional: He appears well-developed and well-nourished.   Skin:   Right forehead sebaceous cyst excision site is healing well.  The sutures were removed       Assessment:      sutures removed after Pilar cyst excision  Plan:      Follow up with surgery as needed

## 2019-05-13 ENCOUNTER — LAB VISIT (OUTPATIENT)
Dept: LAB | Facility: HOSPITAL | Age: 69
End: 2019-05-13
Attending: NURSE PRACTITIONER
Payer: MEDICARE

## 2019-05-13 DIAGNOSIS — E11.69 HYPERLIPIDEMIA ASSOCIATED WITH TYPE 2 DIABETES MELLITUS: ICD-10-CM

## 2019-05-13 DIAGNOSIS — E78.5 HYPERLIPIDEMIA ASSOCIATED WITH TYPE 2 DIABETES MELLITUS: ICD-10-CM

## 2019-05-13 LAB
CHOLEST SERPL-MCNC: 153 MG/DL (ref 120–199)
CHOLEST/HDLC SERPL: 2.4 {RATIO} (ref 2–5)
ESTIMATED AVG GLUCOSE: 143 MG/DL (ref 68–131)
HBA1C MFR BLD HPLC: 6.6 % (ref 4–5.6)
HDLC SERPL-MCNC: 64 MG/DL (ref 40–75)
HDLC SERPL: 41.8 % (ref 20–50)
LDLC SERPL CALC-MCNC: 78.6 MG/DL (ref 63–159)
NONHDLC SERPL-MCNC: 89 MG/DL
T4 FREE SERPL-MCNC: 1.65 NG/DL (ref 0.71–1.51)
TRIGL SERPL-MCNC: 52 MG/DL (ref 30–150)
TSH SERPL DL<=0.005 MIU/L-ACNC: <0.01 UIU/ML (ref 0.4–4)

## 2019-05-13 PROCEDURE — 84439 ASSAY OF FREE THYROXINE: CPT

## 2019-05-13 PROCEDURE — 36415 COLL VENOUS BLD VENIPUNCTURE: CPT

## 2019-05-13 PROCEDURE — 84443 ASSAY THYROID STIM HORMONE: CPT

## 2019-05-13 PROCEDURE — 83036 HEMOGLOBIN GLYCOSYLATED A1C: CPT

## 2019-05-13 PROCEDURE — 80061 LIPID PANEL: CPT

## 2019-05-14 ENCOUNTER — OFFICE VISIT (OUTPATIENT)
Dept: DIABETES | Facility: CLINIC | Age: 69
End: 2019-05-14
Payer: MEDICARE

## 2019-05-14 VITALS
WEIGHT: 206.81 LBS | SYSTOLIC BLOOD PRESSURE: 145 MMHG | BODY MASS INDEX: 25.71 KG/M2 | HEIGHT: 75 IN | DIASTOLIC BLOOD PRESSURE: 82 MMHG

## 2019-05-14 DIAGNOSIS — E11.69 TYPE 2 DIABETES MELLITUS WITH OTHER SPECIFIED COMPLICATION, WITH LONG-TERM CURRENT USE OF INSULIN: Primary | ICD-10-CM

## 2019-05-14 DIAGNOSIS — E11.59 HYPERTENSION ASSOCIATED WITH DIABETES: ICD-10-CM

## 2019-05-14 DIAGNOSIS — E11.69 HYPERLIPIDEMIA ASSOCIATED WITH TYPE 2 DIABETES MELLITUS: ICD-10-CM

## 2019-05-14 DIAGNOSIS — Z79.4 TYPE 2 DIABETES MELLITUS WITH OTHER SPECIFIED COMPLICATION, WITH LONG-TERM CURRENT USE OF INSULIN: Primary | ICD-10-CM

## 2019-05-14 DIAGNOSIS — I15.2 HYPERTENSION ASSOCIATED WITH DIABETES: ICD-10-CM

## 2019-05-14 DIAGNOSIS — E78.5 HYPERLIPIDEMIA ASSOCIATED WITH TYPE 2 DIABETES MELLITUS: ICD-10-CM

## 2019-05-14 LAB — GLUCOSE SERPL-MCNC: 252 MG/DL (ref 70–110)

## 2019-05-14 PROCEDURE — 99214 PR OFFICE/OUTPT VISIT, EST, LEVL IV, 30-39 MIN: ICD-10-PCS | Mod: S$PBB,,, | Performed by: NURSE PRACTITIONER

## 2019-05-14 PROCEDURE — 99214 OFFICE O/P EST MOD 30 MIN: CPT | Mod: S$PBB,,, | Performed by: NURSE PRACTITIONER

## 2019-05-14 PROCEDURE — 99213 OFFICE O/P EST LOW 20 MIN: CPT | Mod: PBBFAC,PN | Performed by: NURSE PRACTITIONER

## 2019-05-14 PROCEDURE — 99999 PR PBB SHADOW E&M-EST. PATIENT-LVL III: CPT | Mod: PBBFAC,,, | Performed by: NURSE PRACTITIONER

## 2019-05-14 PROCEDURE — 99999 PR PBB SHADOW E&M-EST. PATIENT-LVL III: ICD-10-PCS | Mod: PBBFAC,,, | Performed by: NURSE PRACTITIONER

## 2019-05-14 PROCEDURE — 82962 GLUCOSE BLOOD TEST: CPT | Mod: PBBFAC | Performed by: NURSE PRACTITIONER

## 2019-05-14 NOTE — PROGRESS NOTES
"Subjective:         Patient ID: Ulysses Boreaux is a 68 y.o. male.  Patient's current PCP is Elza Pantoja MD.       Chief Complaint: Diabetes Mellitus      Ulysses Boreaux is a 68 y.o. Black or  male presenting for follow up for diabetes. Patient has been diagnosed with diabetes since 2004 and has the following complications associated diabetes: hypertension, hyperlipidemia. Blood glucose testing is performed regularly. In the past 1 week patient reports blood glucose values to have approximately ranged from 100-130s. Condition is controlled.     He denies any recent hospital admissions, emergency room visits, hypoglycemia.      Height: 6' 3" (190.5 cm)  //  Weight: 93.8 kg (206 lb 12.7 oz), Body mass index is 25.85 kg/m².  Home Blood Glucose reading this AM: 111 mg/dl fasting.  His blood sugar in clinic today is:   Lab Results   Component Value Date    POCGLU 252 (A) 05/14/2019   Patient reports that he had a cheat meal this morning.     Labs reviewed and are noted below.    His most recent A1C is:   Lab Results   Component Value Date    HGBA1C 6.6 (H) 05/13/2019     Lab Results   Component Value Date    CPEPTIDE 1.33 10/20/2017     Lab Results   Component Value Date    GLUTAMICACID 0.00 10/20/2017         CURRENT DM MEDICATIONS:   Current Outpatient Medications   Medication Sig Dispense Refill    insulin NPH-insulin regular, 70/30, (NOVOLIN 70/30 U-100 INSULIN) 100 unit/mL (70-30) injection INJECT 21 UNITS UNDER THE SKIN EVERY MORNING, THEN 23 UNITS EVERY EVENING 20 mL 1    SITagliptin (JANUVIA) 100 MG Tab Take 1 tablet (100 mg total) by mouth once daily. 30 tablet 3     No current facility-administered medications for this visit.        Health Maintenance   Topic Date Due    TETANUS VACCINE  08/16/1970    Colonoscopy  02/24/2017    Eye Exam  08/22/2018    Influenza Vaccine  08/01/2019    Hemoglobin A1c  11/13/2019    Foot Exam  04/04/2020    Low Dose Statin  05/06/2020    Lipid " Panel  05/13/2020    Pneumococcal Vaccine (65+ Low/Medium Risk) (2 of 2 - PPSV23) 04/01/2021    Hepatitis C Screening  Completed    Abdominal Aortic Aneurysm Screening  Completed       STANDARDS OF CARE:  Current Ophthalmologist/Optometrist: MAURA Adams. Last exam 2017.  Current Podiatrist: No  ACE/ARB: Yes  Statin: Yes  He  has attended diabetes education in the past.     LIFESTYLE:    BLOOD GLUCOSE TESTING: Patient reports testing on average a total of 2 times per day.    Review of Systems   Constitutional: Negative for activity change, appetite change and fatigue.   Eyes: Negative for visual disturbance.   Gastrointestinal: Negative for diarrhea, nausea and rectal pain.   Endocrine: Negative for polydipsia, polyphagia and polyuria.   Psychiatric/Behavioral: Negative for confusion.         Objective:      Physical Exam   Constitutional: He is oriented to person, place, and time. He appears well-developed and well-nourished.   HENT:   Head: Normocephalic and atraumatic.   Neck: Normal range of motion.   Cardiovascular: Normal rate.   Pulmonary/Chest: Effort normal.   Musculoskeletal: Normal range of motion.   Neurological: He is alert and oriented to person, place, and time.   Skin: Skin is warm and dry.   Psychiatric: He has a normal mood and affect. His behavior is normal. Judgment and thought content normal.   Nursing note and vitals reviewed.      Assessment:       1. Type 2 diabetes mellitus with other specified complication, with long-term current use of insulin    2. Hyperlipidemia associated with type 2 diabetes mellitus    3. Hypertension associated with diabetes        Plan:   Type 2 diabetes mellitus with other specified complication, with long-term current use of insulin  -     POCT Glucose, Hand-Held Device    - Condition controlled. Continue current regimen. DM education reviewed. Patient encouraged to carb count and exercise per recommendations. Labs and Referrals as noted. Patient instructed to  send in log weekly for review and potential medication adjustments. RV scheduled in 3 months.     Hyperlipidemia associated with type 2 diabetes mellitus      - LDL stable, patient working on diet and exercise to further decrease. Patient intolerant of Lipitor above 20mg.     Hypertension associated with diabetes    - Stable    Additional Plan Details:    1.) Patient was instructed to monitor blood glucose 2 - 3 x daily, fasting and ac dinner or at bedtime. Discussed ADA goal for fasting blood sugar, 80 - 130mg/dL; pp blood sugars below 180 mg/dl. Also, discussed prevention of hypoglycemia and the need to adjust goals to higher levels if persistent hypoglycemia.  Reminded to bring BG records or meter to each visit for review.    2.) The patient was explained the above plan and given opportunity to ask questions.  He understands, chooses and consents to this plan and accepts all the risks, which include but are not limited to the risks mentioned above. He understands the alternative of having no testing, interventions or treatments at this time. He left content and without further questions.     A total of 30 minutes was spent in face to face time, of which over 50% was spent in counseling patient on disease process, complications, treatment, and side effects of medications.        BEN Aguilar

## 2019-05-15 ENCOUNTER — TELEPHONE (OUTPATIENT)
Dept: INTERNAL MEDICINE | Facility: CLINIC | Age: 69
End: 2019-05-15

## 2019-05-15 DIAGNOSIS — E05.90 HYPERTHYROIDISM: Primary | ICD-10-CM

## 2019-05-15 NOTE — TELEPHONE ENCOUNTER
Informed patient that his recent thyroid labs show it is overactive. Endocrinology appt has been scheduled and patient stated that he isn't having any symptoms. Patient verbalized understanding.

## 2019-05-15 NOTE — TELEPHONE ENCOUNTER
Please contact patient. His recent thyroid labs show it is overactive. Please schedule with Endocrinology asap and let me know when his appointment will be. Is he having any symptoms? Weight loss, racing heart rate, sweating, diarrhea?

## 2019-06-10 ENCOUNTER — OFFICE VISIT (OUTPATIENT)
Dept: GASTROENTEROLOGY | Facility: CLINIC | Age: 69
End: 2019-06-10
Payer: MEDICARE

## 2019-06-10 VITALS
SYSTOLIC BLOOD PRESSURE: 132 MMHG | BODY MASS INDEX: 25.27 KG/M2 | HEIGHT: 75 IN | HEART RATE: 95 BPM | DIASTOLIC BLOOD PRESSURE: 70 MMHG | WEIGHT: 203.25 LBS

## 2019-06-10 DIAGNOSIS — Z86.19 HISTORY OF HEPATITIS C: Primary | ICD-10-CM

## 2019-06-10 DIAGNOSIS — Z86.010 HISTORY OF COLON POLYPS: ICD-10-CM

## 2019-06-10 PROBLEM — B18.2 CHRONIC HEPATITIS C WITHOUT HEPATIC COMA: Status: RESOLVED | Noted: 2017-03-13 | Resolved: 2019-06-10

## 2019-06-10 PROCEDURE — 99214 OFFICE O/P EST MOD 30 MIN: CPT | Mod: PBBFAC | Performed by: PHYSICIAN ASSISTANT

## 2019-06-10 PROCEDURE — 99213 OFFICE O/P EST LOW 20 MIN: CPT | Mod: S$PBB,,, | Performed by: PHYSICIAN ASSISTANT

## 2019-06-10 PROCEDURE — 99213 PR OFFICE/OUTPT VISIT, EST, LEVL III, 20-29 MIN: ICD-10-PCS | Mod: S$PBB,,, | Performed by: PHYSICIAN ASSISTANT

## 2019-06-10 PROCEDURE — 99999 PR PBB SHADOW E&M-EST. PATIENT-LVL IV: CPT | Mod: PBBFAC,,, | Performed by: PHYSICIAN ASSISTANT

## 2019-06-10 PROCEDURE — 99999 PR PBB SHADOW E&M-EST. PATIENT-LVL IV: ICD-10-PCS | Mod: PBBFAC,,, | Performed by: PHYSICIAN ASSISTANT

## 2019-06-10 NOTE — PROGRESS NOTES
Subjective:      Patient ID: Ulysses Boreaux is a 68 y.o. male.    Chief Complaint: Hepatitis C and Heartburn    HPI  The patient has a history of hepatitis C and colon polyps. He is here today to schedule a surveillance coloscopy. His last colonoscopy was 2014 and a three year repeat was recommended due to polys. He wasn't able to do it last year because of transportation. He now has this worked our and ready to schedule. He denies nausea, vomiting, change in appetite, abdominal pain, change in bowel habits, hematochezia or melena.   In June 2018, he was treated with Harvoni for 12 weeks and achieved SVR. Fibrosure was F3-F4. Last LFTs and PLT within normal range.     Review of Systems  As per HPI.     Objective:     Physical Exam   Constitutional: He is oriented to person, place, and time. He appears well-developed and well-nourished. No distress.   HENT:   Head: Normocephalic and atraumatic.   Eyes: EOM are normal.   Cardiovascular: Normal rate and regular rhythm.   Pulmonary/Chest: Effort normal and breath sounds normal. No respiratory distress. He has no wheezes.   Abdominal: Soft. Bowel sounds are normal. He exhibits no distension. There is no tenderness.   Neurological: He is alert and oriented to person, place, and time. No cranial nerve deficit.   Skin: He is not diaphoretic.   Psychiatric: His behavior is normal.       Assessment:     1. History of hepatitis C    2. History of colon polyps        Plan:     1. Colonoscopy.   The risks, benefits, alternatives and possible complications of the above procedure(s) were discussed with the patient to include but not limited to infection, bleeding, heart complications, respiratory complications, or perforation which may require surgical intervention. The patient's questions were answered. The patient verbally reported understanding of the discussion.  2. Further recommendations after above.   3. Follow up with PCP as previously scheduled.     Orders Placed This  Encounter   Procedures    US Elastography Liver       Follow up in about 1 year (around 6/10/2020).    Thank you for the opportunity to participate in the care of this patient.   Jeferson Vega PA-C.

## 2019-06-12 ENCOUNTER — OFFICE VISIT (OUTPATIENT)
Dept: ENDOCRINOLOGY | Facility: CLINIC | Age: 69
End: 2019-06-12
Payer: MEDICARE

## 2019-06-12 ENCOUNTER — LAB VISIT (OUTPATIENT)
Dept: LAB | Facility: HOSPITAL | Age: 69
End: 2019-06-12
Attending: INTERNAL MEDICINE
Payer: MEDICARE

## 2019-06-12 VITALS
BODY MASS INDEX: 25.19 KG/M2 | HEIGHT: 75 IN | SYSTOLIC BLOOD PRESSURE: 130 MMHG | OXYGEN SATURATION: 97 % | WEIGHT: 202.63 LBS | HEART RATE: 72 BPM | DIASTOLIC BLOOD PRESSURE: 72 MMHG

## 2019-06-12 DIAGNOSIS — E05.90 HYPERTHYROIDISM: Primary | ICD-10-CM

## 2019-06-12 DIAGNOSIS — E05.90 HYPERTHYROIDISM: ICD-10-CM

## 2019-06-12 LAB
T4 FREE SERPL-MCNC: 1.77 NG/DL (ref 0.71–1.51)
TSH SERPL DL<=0.005 MIU/L-ACNC: <0.01 UIU/ML (ref 0.4–4)

## 2019-06-12 PROCEDURE — 84481 FREE ASSAY (FT-3): CPT

## 2019-06-12 PROCEDURE — 99999 PR PBB SHADOW E&M-EST. PATIENT-LVL III: CPT | Mod: PBBFAC,,, | Performed by: INTERNAL MEDICINE

## 2019-06-12 PROCEDURE — 99214 PR OFFICE/OUTPT VISIT, EST, LEVL IV, 30-39 MIN: ICD-10-PCS | Mod: S$PBB,,, | Performed by: INTERNAL MEDICINE

## 2019-06-12 PROCEDURE — 84445 ASSAY OF TSI GLOBULIN: CPT

## 2019-06-12 PROCEDURE — 84443 ASSAY THYROID STIM HORMONE: CPT

## 2019-06-12 PROCEDURE — 99999 PR PBB SHADOW E&M-EST. PATIENT-LVL III: ICD-10-PCS | Mod: PBBFAC,,, | Performed by: INTERNAL MEDICINE

## 2019-06-12 PROCEDURE — 99213 OFFICE O/P EST LOW 20 MIN: CPT | Mod: PBBFAC | Performed by: INTERNAL MEDICINE

## 2019-06-12 PROCEDURE — 84439 ASSAY OF FREE THYROXINE: CPT

## 2019-06-12 PROCEDURE — 99214 OFFICE O/P EST MOD 30 MIN: CPT | Mod: S$PBB,,, | Performed by: INTERNAL MEDICINE

## 2019-06-12 RX ORDER — HYDROCODONE BITARTRATE AND ACETAMINOPHEN 10; 325 MG/1; MG/1
1 TABLET ORAL EVERY 12 HOURS PRN
Qty: 60 TABLET | Refills: 0 | Status: SHIPPED | OUTPATIENT
Start: 2019-07-24 | End: 2019-08-23

## 2019-06-12 RX ORDER — HYDROCODONE BITARTRATE AND ACETAMINOPHEN 10; 325 MG/1; MG/1
1 TABLET ORAL EVERY 12 HOURS PRN
Qty: 60 TABLET | Refills: 0 | Status: SHIPPED | OUTPATIENT
Start: 2019-06-24 | End: 2019-07-24

## 2019-06-12 NOTE — LETTER
June 12, 2019      Elza Pantoja MD  [address] [place] [address] [address] [place] [id number]           [facility]  [address] [facility]  [place] [address]  Phone: [phone]  Fax: [phone]          Patient: Ulysses Boreaux   MR Number: [id number]   YOB: 1950   Date of Visit: 6/12/2019       Dear Dr. Elza Pantoja:    Thank you for referring Ulysses Boreaux to me for evaluation. Attached you will find relevant portions of my assessment and plan of care.    If you have questions, please do not hesitate to call me. I look forward to following Ulysses Boreaux along with you.    Sincerely,    Andreia Jackson MD    Enclosure  CC:  No Recipients    If you would like to receive this communication electronically, please contact [email or web address] or [phone] to request more information on [email or web address] Link access.    For providers and/or their staff who would like to refer a patient to [facility], please contact us through our one-stop-shop provider referral line, [facility], at [phone].    If you feel you have received this communication in error or would no longer like to receive these types of communications, please e-mail [email or web address]

## 2019-06-12 NOTE — PROGRESS NOTES
Subjective:       Patient ID: Ulysses Boreaux is a 68 y.o. male.  Patient is new to me  Chief Complaint: Establish Care and Hyperthyroidism    HPI    Consultation was requested by Dr. Elza Pantoja       Diagnosed:  Patient has had a suppressed TSH dating back to 2014 in review of epic and his free T4 was normal up until recently his free T4 was elevated         Previous radiology tests:  Thyroid ultrasound : No   NM uptake and scan: No    Previous thyroid surgery: No      Thyroid symptoms:tremulousness and sweating and occasionally some problems sleeping    Weight has been stable, denies any nausea vomiting diarrhea or anxiety or mood changes    He does have shakiness of his arms but he thinks it is related to the pain medication    History of left for fingers being amputated from a previous job    Currently retired  Thyroid medications: none      Takes medication appropriately: n/a     Sister has thyroid issues   Sandra Dale, NP is monitoring his diabetes which is well-controlled  I have reviewed the past medical, family and social history  Review of Systems   Constitutional: Positive for diaphoresis. Negative for appetite change, fatigue, fever and unexpected weight change.   HENT: Negative for sore throat and trouble swallowing.    Eyes: Negative for visual disturbance.   Respiratory: Negative for shortness of breath and wheezing.    Cardiovascular: Negative for chest pain, palpitations and leg swelling.   Gastrointestinal: Negative for abdominal pain, diarrhea, nausea and vomiting.   Endocrine: Negative for cold intolerance, heat intolerance, polydipsia, polyphagia and polyuria.   Genitourinary: Negative for difficulty urinating and dysuria.   Musculoskeletal: Negative for arthralgias and joint swelling.   Skin: Negative for color change and rash.   Neurological: Positive for tremors. Negative for dizziness, numbness and headaches.   Hematological: Negative for adenopathy.   Psychiatric/Behavioral:  Negative for confusion, dysphoric mood and sleep disturbance. The patient is not nervous/anxious.        Objective:      Physical Exam   Constitutional: He is oriented to person, place, and time. He appears well-developed and well-nourished. No distress.   HENT:   Head: Normocephalic and atraumatic.   Right Ear: External ear normal.   Left Ear: External ear normal.   Mouth/Throat: Oropharynx is clear and moist. No oropharyngeal exudate.   Eyes: Pupils are equal, round, and reactive to light. Conjunctivae and EOM are normal. Right eye exhibits no discharge. Left eye exhibits no discharge. No scleral icterus.   No exophthalmos   Neck: No tracheal deviation present. No thyromegaly present.   Cardiovascular: Normal rate, regular rhythm, normal heart sounds and intact distal pulses. Exam reveals no gallop and no friction rub.   No murmur heard.  Pulmonary/Chest: Effort normal and breath sounds normal. No respiratory distress. He has no wheezes. He has no rales.   Abdominal: Soft. Bowel sounds are normal. He exhibits no distension and no mass. There is no tenderness. There is no rebound and no guarding. No hernia.   Musculoskeletal: He exhibits no edema or deformity.   Amputated left 1st 4 digits   Lymphadenopathy:     He has no cervical adenopathy.   Neurological: He is alert and oriented to person, place, and time. He has normal reflexes. No cranial nerve deficit.   Tremor of outstretched hand   Skin: Skin is warm and dry. No rash noted. He is not diaphoretic. No erythema.   Psychiatric: He has a normal mood and affect. His behavior is normal.   Vitals reviewed.        Lab Review:   Lab Results   Component Value Date    TSH <0.010 (L) 05/13/2019    TSH 0.231 (L) 05/07/2018    TSH 0.021 (L) 03/26/2018    TSH 0.033 (L) 04/13/2017     Lab Results   Component Value Date    FREET4 1.65 (H) 05/13/2019    FREET4 1.36 05/07/2018    FREET4 1.22 03/26/2018    FREET4 1.33 04/13/2017     No components found for:  FREET3      Assessment:     1. Hyperthyroidism  T4, free    T3, free    TSH    Thyroid stimulating immunoglobulin    Thyrotropin receptor antibody    US Soft Tissue Head Neck Thyroid    NM Thyroid Uptake and Scan    longstanding hyperthyroidism that seems to have worsened as his free T4 recently was elevated.  Repeat thyroid function tests with T3 and check antibody levels, ultrasound and thyroid uptake and scan to help distinguish cost. Differential for hyperthyroidism includes Grave's disease, thyroiditis, and toxic nodule and this was discussed with patient    He will follow-up to discuss results of tests and further management  Plan:   Hyperthyroidism  -     T4, free; Future; Expected date: 06/12/2019  -     T3, free; Future; Expected date: 06/12/2019  -     TSH; Future; Expected date: 06/12/2019  -     Thyroid stimulating immunoglobulin; Future; Expected date: 06/12/2019  -     Thyrotropin receptor antibody; Future; Expected date: 06/12/2019  -     US Soft Tissue Head Neck Thyroid; Future; Expected date: 06/12/2019  -     NM Thyroid Uptake and Scan; Future; Expected date: 06/12/2019        No follow-ups on file.     Thank you to Dr. Elza Pantoja for this consultation      Disclaimer:  This note may have been partially prepared using voice recognition software and  it may have not been extensively proofed, as such there could be errors within the text such as sound alike errors.

## 2019-06-13 LAB
T3FREE SERPL-MCNC: 6 PG/ML (ref 2.3–4.2)
TSH RECEP AB SER-ACNC: 1.29 IU/L (ref 0–1.75)

## 2019-06-14 LAB — TSI SER-ACNC: 2.05 IU/L

## 2019-06-17 ENCOUNTER — TELEPHONE (OUTPATIENT)
Dept: GASTROENTEROLOGY | Facility: CLINIC | Age: 69
End: 2019-06-17

## 2019-06-17 ENCOUNTER — HOSPITAL ENCOUNTER (OUTPATIENT)
Dept: RADIOLOGY | Facility: HOSPITAL | Age: 69
Discharge: HOME | End: 2019-06-17
Attending: INTERNAL MEDICINE
Payer: MEDICARE

## 2019-06-17 DIAGNOSIS — E05.90 HYPERTHYROIDISM: ICD-10-CM

## 2019-06-17 PROCEDURE — 76536 US EXAM OF HEAD AND NECK: CPT | Mod: 26,,, | Performed by: RADIOLOGY

## 2019-06-17 PROCEDURE — 76536 US SOFT TISSUE HEAD NECK THYROID: ICD-10-PCS | Mod: 26,,, | Performed by: RADIOLOGY

## 2019-06-17 PROCEDURE — 76536 US EXAM OF HEAD AND NECK: CPT | Mod: TC

## 2019-06-17 NOTE — TELEPHONE ENCOUNTER
Spoke to pt and rescheduled his appt for the fibroscan from 06/19 to 06/20 per pt request. He has other appts on that day.

## 2019-06-17 NOTE — TELEPHONE ENCOUNTER
----- Message from Kiesha Cox sent at 6/17/2019 12:44 PM CDT -----  Contact: pT  Pt is calling in regards to reschedule fibroscan appt that's scheduled for 06/19.      Pls call pt back at .[phone]

## 2019-06-19 ENCOUNTER — OFFICE VISIT (OUTPATIENT)
Dept: PAIN MEDICINE | Facility: CLINIC | Age: 69
End: 2019-06-19
Payer: MEDICARE

## 2019-06-19 ENCOUNTER — IMMUNIZATION (OUTPATIENT)
Dept: PHARMACY | Facility: CLINIC | Age: 69
End: 2019-06-19
Payer: MEDICAID

## 2019-06-19 VITALS
HEART RATE: 98 BPM | SYSTOLIC BLOOD PRESSURE: 140 MMHG | RESPIRATION RATE: 18 BRPM | BODY MASS INDEX: 25.12 KG/M2 | WEIGHT: 202 LBS | HEIGHT: 75 IN | DIASTOLIC BLOOD PRESSURE: 84 MMHG

## 2019-06-19 DIAGNOSIS — S68.119A TRAUMATIC AMPUTATION OF DIGIT OF ONE HAND WITHOUT COMPLICATION: ICD-10-CM

## 2019-06-19 DIAGNOSIS — G89.4 CHRONIC PAIN DISORDER: ICD-10-CM

## 2019-06-19 DIAGNOSIS — M79.642 PAIN OF LEFT HAND: ICD-10-CM

## 2019-06-19 DIAGNOSIS — M79.642 LEFT HAND PAIN: ICD-10-CM

## 2019-06-19 DIAGNOSIS — G89.21 CHRONIC PAIN DUE TO TRAUMA: Primary | ICD-10-CM

## 2019-06-19 PROCEDURE — 99214 OFFICE O/P EST MOD 30 MIN: CPT | Mod: S$PBB,,, | Performed by: PHYSICIAN ASSISTANT

## 2019-06-19 PROCEDURE — 99213 OFFICE O/P EST LOW 20 MIN: CPT | Mod: PBBFAC | Performed by: PHYSICIAN ASSISTANT

## 2019-06-19 PROCEDURE — 99999 PR PBB SHADOW E&M-EST. PATIENT-LVL III: CPT | Mod: PBBFAC,,, | Performed by: PHYSICIAN ASSISTANT

## 2019-06-19 PROCEDURE — 99214 PR OFFICE/OUTPT VISIT, EST, LEVL IV, 30-39 MIN: ICD-10-PCS | Mod: S$PBB,,, | Performed by: PHYSICIAN ASSISTANT

## 2019-06-19 PROCEDURE — 99999 PR PBB SHADOW E&M-EST. PATIENT-LVL III: ICD-10-PCS | Mod: PBBFAC,,, | Performed by: PHYSICIAN ASSISTANT

## 2019-06-19 NOTE — PROGRESS NOTES
Subjective:     Chief Complaint:  Left Hand Pain    History of Present Illness:  This patient is a 65 year old male who presents today for f/u complaining of the above noted pains. The patient describes this pain as follows.    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 work, left lower quadrant pain since hernia surgery in 2004  - timing (constant, intermittent): Constant  - character (sharp, dull, aching, burning): Throbbing  - radiating: No  - dermatomal distribution: No  - side: Left   - aggravating factors: Nothing worsens left digit pain, left lower quadrant pain worse with exercise or walking  - relieving factors: Medication / Norco  - antecedent trauma: Amputation via skill saw and 1997, hernia repair in 2004  - prior spinal surgery: None  - pertinent negatives: No fever, No chills, No weight loss, No bladder dysfunction, No bowel dysfunction, No extremity weakness, No saddle anesthesia  - medications tried: Norco, Percocet, over-the-counter medications, compound pain cream  - other therapies tried (physical therapy, injections):     >> physical therapy tried in the past    >> no prior injections        Imaging/ Labs (reviewed on 6/19/2019):    7/12/2018 US ABDOMINAL AORTA  CLINICAL HISTORY:  Abnormal findings on diagnostic imaging of other specified body structures  TECHNIQUE:  Limited ultrasound was performed of the abdominal aorta, with cross sectional diameter measurements obtained.  COMPARISON:  01/10/2018  FINDINGS:  The proximal abdominal aorta measures 2.7 cm.  The mid abdominal aorta measures 1.8 cm.  The distal abdominal aorta measures 1.9 cm, slightly ectatic and unchanged from prior.  The right iliac artery measures 1.0 cm.  The left iliac artery measures 1.1 cm.  Aortoiliac atherosclerosis: Mild  Impression: Stable examination with slight distal abdominal aortic ectasia.  Negative for aneurysm.         ROS:  CONSTITUTIONAL: No  "fever, chills, weight loss  SKIN: No rash or itching  CARDIOVASCULAR:  No chest pain, palpitations  RESPIRATORY: No shortness of breath  GASTROINTESTINAL: No diarrhea, No constipation, abdominal pain, left lower quadrant  GENITOURINARY: No urinary incontinence    MUSCULOSKELETAL:  - patient reports pain as above, see chief complaint     NEUROLOGICAL:   - Pain as above  - Strength in lower extremities is normal  - Sensation in lower extremities is normal  - No bowel or bladder incontinence     PSYCHIATRIC: No change in mood noticed         Objective:   Vitals:  BP (!) 140/84 (BP Location: Right arm, Patient Position: Sitting, BP Method: Medium (Automatic))   Pulse 98   Resp 18   Ht 6' 3" (1.905 m)   Wt 91.6 kg (202 lb)   BMI 25.25 kg/m²    (reviewed on 6/19/2019)     General: alert and oriented, in no apparent distress  Gait: normal gait  Skin: No rashes, No discoloration   HEENT: EOMI  Respiratory: respirations nonlabored  Cardiology: No obvious peripheral edema    Musculoskeletal:  - No pain with lumbar flexion/ extension  - Full cervical ROM  - Left upper extremity range of motion intact throughout  - Digital stumps are hypersensitive to palpation, hypersensitivity does not extend further proximal  - No color change, no swelling, no changes in hair growth along left hand     Neuro:  - Lower extremities:      >> 5/5 strength bilaterally, throughout, symmetric  - Upper extremities:    >> 5/5 strength bilaterally    Psych: mood and affect appropriate        Assessment:     Encounter Diagnoses   Name Primary?   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 Interventional: None.    2. Pharmacologic:   - Refill Norco 10/325 mg PO BID PRN (60 tabs) x 2 months. Paper Rx given. Patient tolerating opioids with no side effects, obtaining good pain control with functional " improvement. He tolerates this medication well, and he denies any drowsiness or constipation.  - Opioid contract signed on 5/7/2018.     - [place]  reviewed and appropriate. Last filled 5-26-19.  Will post date accordingly.  - Will order UDS today to ensure medication compliance.      3. Rehabilitative: Encouraged regular exercise.    4. Diagnostic: None for now.    5. Follow up: 8 weeks for med refill      - I discussed the risks, benefits, and alternatives to potential treatment options. All questions and concerns were fully addressed today in clinic. Dr. Olmedo was consulted regarding the patient plan and agrees.           >> UDS:  8-18-15 :: appropriate  12-29-15 :: appropriate  5-3-16 :: appropriate  10-18-16 :: appropriate  4/13/2017 :: appropriate  9/29/2017 :: appropriate  3/9/2018 :: appropriate  9/4/2018 :: appropriate  6/19/2019 :: pending

## 2019-06-20 ENCOUNTER — PROCEDURE VISIT (OUTPATIENT)
Dept: GASTROENTEROLOGY | Facility: CLINIC | Age: 69
End: 2019-06-20
Attending: PHYSICIAN ASSISTANT
Payer: MEDICARE

## 2019-06-20 ENCOUNTER — TELEPHONE (OUTPATIENT)
Dept: GASTROENTEROLOGY | Facility: CLINIC | Age: 69
End: 2019-06-20

## 2019-06-20 VITALS
BODY MASS INDEX: 25 KG/M2 | DIASTOLIC BLOOD PRESSURE: 76 MMHG | WEIGHT: 201.06 LBS | HEART RATE: 78 BPM | SYSTOLIC BLOOD PRESSURE: 120 MMHG | HEIGHT: 75 IN

## 2019-06-20 DIAGNOSIS — Z86.19 HISTORY OF HEPATITIS C: ICD-10-CM

## 2019-06-20 PROCEDURE — 91200 LIVER ELASTOGRAPHY: CPT | Mod: PBBFAC | Performed by: NURSE PRACTITIONER

## 2019-06-20 PROCEDURE — 91200 LIVER ELASTOGRAPHY: CPT | Mod: 26,S$PBB,, | Performed by: NURSE PRACTITIONER

## 2019-06-20 PROCEDURE — 91200 PR LIVER ELASTOGRAPHY W/OUT IMAG W/INTERP & REPORT: ICD-10-PCS | Mod: 26,S$PBB,, | Performed by: NURSE PRACTITIONER

## 2019-06-20 RX ORDER — METHIMAZOLE 10 MG/1
10 TABLET ORAL 2 TIMES DAILY
Qty: 60 TABLET | Refills: 2 | Status: SHIPPED | OUTPATIENT
Start: 2019-06-20 | End: 2019-07-01

## 2019-06-20 NOTE — TELEPHONE ENCOUNTER
Notified pt of results and recommendations. Pt verbalized understanding. appt for colonoscopy rescheduled from 08/23/19 to 08/28/19 due to not having transportation.

## 2019-06-20 NOTE — PROCEDURES
Fibroscan Procedure     Name: Ulysses Boreaux  Date of Procedure : 2019   :: ANABEL Brush  Diagnosis: HCV    Probe: M    Fibroscan readin.9 KPa    Fibrosis:F3     CAP readin dB/m    Steatosis: :<S1       Interpretation: Advanced fibrosis with no significant steatosis.

## 2019-06-20 NOTE — TELEPHONE ENCOUNTER
----- Message from ANABEL Mcgowan sent at 6/20/2019 12:50 PM CDT -----   Advanced fibrosis with no significant steatosis.

## 2019-06-20 NOTE — TELEPHONE ENCOUNTER
Please let patient know that there is advanced liver scarring but no cirrhosis. Continue to follow up with us as scheduled.   Jeferson

## 2019-06-23 DIAGNOSIS — I15.2 HYPERTENSION ASSOCIATED WITH DIABETES: ICD-10-CM

## 2019-06-23 DIAGNOSIS — E11.59 HYPERTENSION ASSOCIATED WITH DIABETES: ICD-10-CM

## 2019-06-24 RX ORDER — AMLODIPINE BESYLATE 10 MG/1
TABLET ORAL
Qty: 90 TABLET | Refills: 0 | Status: SHIPPED | OUTPATIENT
Start: 2019-06-24 | End: 2019-09-18 | Stop reason: SDUPTHER

## 2019-06-25 ENCOUNTER — HOSPITAL ENCOUNTER (OUTPATIENT)
Dept: RADIOLOGY | Facility: HOSPITAL | Age: 69
Discharge: HOME | End: 2019-06-25
Attending: INTERNAL MEDICINE
Payer: MEDICARE

## 2019-06-25 DIAGNOSIS — E05.90 HYPERTHYROIDISM: ICD-10-CM

## 2019-06-25 PROCEDURE — 78014 THYROID IMAGING W/BLOOD FLOW: CPT | Mod: 26,,, | Performed by: RADIOLOGY

## 2019-06-25 PROCEDURE — 78014 NM THYROID UPTAKE AND SCAN: ICD-10-PCS | Mod: 26,,, | Performed by: RADIOLOGY

## 2019-06-25 PROCEDURE — A9516 IODINE I-123 SOD IODIDE MIC: HCPCS

## 2019-06-26 ENCOUNTER — HOSPITAL ENCOUNTER (OUTPATIENT)
Dept: RADIOLOGY | Facility: HOSPITAL | Age: 69
Discharge: HOME | End: 2019-06-26
Attending: INTERNAL MEDICINE
Payer: MEDICARE

## 2019-07-01 ENCOUNTER — OFFICE VISIT (OUTPATIENT)
Dept: ENDOCRINOLOGY | Facility: CLINIC | Age: 69
End: 2019-07-01
Payer: MEDICARE

## 2019-07-01 VITALS
BODY MASS INDEX: 25.19 KG/M2 | OXYGEN SATURATION: 97 % | SYSTOLIC BLOOD PRESSURE: 140 MMHG | HEIGHT: 75 IN | HEART RATE: 85 BPM | DIASTOLIC BLOOD PRESSURE: 70 MMHG | WEIGHT: 202.63 LBS

## 2019-07-01 DIAGNOSIS — E05.90 HYPERTHYROIDISM: Primary | ICD-10-CM

## 2019-07-01 PROCEDURE — 99214 OFFICE O/P EST MOD 30 MIN: CPT | Mod: PBBFAC | Performed by: INTERNAL MEDICINE

## 2019-07-01 PROCEDURE — 99213 OFFICE O/P EST LOW 20 MIN: CPT | Mod: S$PBB,,, | Performed by: INTERNAL MEDICINE

## 2019-07-01 PROCEDURE — 99999 PR PBB SHADOW E&M-EST. PATIENT-LVL IV: CPT | Mod: PBBFAC,,, | Performed by: INTERNAL MEDICINE

## 2019-07-01 PROCEDURE — 99999 PR PBB SHADOW E&M-EST. PATIENT-LVL IV: ICD-10-PCS | Mod: PBBFAC,,, | Performed by: INTERNAL MEDICINE

## 2019-07-01 PROCEDURE — 99213 PR OFFICE/OUTPT VISIT, EST, LEVL III, 20-29 MIN: ICD-10-PCS | Mod: S$PBB,,, | Performed by: INTERNAL MEDICINE

## 2019-07-01 RX ORDER — METHIMAZOLE 10 MG/1
10 TABLET ORAL 3 TIMES DAILY
Qty: 30 TABLET | Refills: 2 | Status: SHIPPED | OUTPATIENT
Start: 2019-07-01 | End: 2019-10-10 | Stop reason: SDUPTHER

## 2019-07-01 NOTE — PATIENT INSTRUCTIONS
Hyperthyroidism    You have hyperthyroidism. This means you have a thyroid gland that makes too much thyroid hormone. This hormone is vital to body growth and metabolism. If you make too much thyroid hormone, many body processes speed up and may not work right. This can cause symptoms throughout the body.  There are a number of causes of hyperthyroidism. The most common cause is Graves disease. This occurs when the bodys immune system causes the thyroid to grow and make more thyroid hormone than needed.  Symptoms of hyperthyroidism include:  · Nervousness, anxiety, irritability  · Shaking (tremors) that affects the hands and fingers  · Weight loss despite having a normal or increased appetite  · Low tolerance to heat  · Sweating more than normal  · Fast or irregular heartbeat  · Lighter or irregular periods (women only)  · More frequent bowel movements  · Enlarged thyroid gland (goiter)  · Bulging eyes  · Problems sleeping  · Muscle weakness  · Fatigue  · Swelling of the hands, ankles, or feet (older adults only)  Treatment for hyperthyroidism may include taking medicines. For instance, antithyroid medicines may be prescribed. These help lower the amount of thyroid hormone made by the thyroid gland. Beta-blockers may be prescribed as well. Tips for taking medicines are given below.  Radioiodine ablation or surgery may also be advised. Your healthcare provider will tell you more about these options if needed.  Home care  Tips for taking medicines  · Take any medicines youre prescribed as directed.  · Take your medicine at the same times each day.  · Use a pillbox labeled with the days of the week. This will help you remember to take your medicine each day.  · Tell your provider if you have any side effects from your medicines that bother you.  · Never stop taking medicines on your own. If you do, your symptoms will return.  General care  · Always talk with your provider before trying other medicines or  treatments for your thyroid problem.  · If you see other healthcare providers, be sure to let them know about your thyroid problem.  Follow-up care  See your healthcare provider for checkups as advised. You may need regular tests to check the level of thyroid hormone in your blood.  When to seek medical advice  Call your healthcare provider right away if any of these occur:  · New symptoms occur  · Symptoms return, continue, or worsen even after treatment  · Extreme fatigue  · Puffy hands, face, or feet  · Confusion  Call 911  Call 911 right away if any of these occur:  · Fainting  · Chest pain  · Shortness of breath or trouble breathing  Date Last Reviewed: 8/24/2015 © 2000-2017 [employer]. [address] [place] [address], [place] [address]. All rights reserved. This information is not intended as a substitute for professional medical care. Always follow your healthcare professional's instructions.        Methimazole tablets  What is this medicine?  METHIMAZOLE (meth IM a zole) prevents the thyroid gland from producing too much thyroid hormone. It is used to treat a condition known as hyperthyroidism.  How should I use this medicine?  Take this medicine by mouth with a glass of water. Follow the directions on the prescription label. You can take this medicine with or without food. However, you should always take it the same way to make sure the effects are the same. Take your doses at regular intervals. Do not take your medicine more often than directed. Do not stop taking this medicine except on the advice of your doctor or health care professional.  Talk to your pediatrician regarding the use of this medicine in children. Special care may be needed. While this drug may be prescribed for children for selected conditions, precautions do apply.  What side effects may I notice from receiving this medicine?  Side effects that you should report to your doctor or health care professional as soon as  possible:  · black, tarry stools  · fever, sore throat, hoarseness  · numbness or tingling in the hands or feet  · severe redness or itching of the skin, or dry cracked skin  · stomach pain  · swelling of the feet or legs  · unusual bleeding or bruising, pinpoint red spots on the skin  · unusual or sudden weight increase  · unusually weak or tired  · yellowing of skin or eyes  Side effects that usually do not require medical attention (report to your doctor or health care professional if they continue or are bothersome):  · headache  · nausea, vomiting  · mild skin rash, itching  · muscle aches and pains  What may interact with this medicine?  Do not take this medicine with any of the following medications:  · sodium iodide  · thyroid hormones  This medicine may also interact with the following medications:  · certain medicines for high blood pressure like metoprolol and propranolol  · digoxin  · theophylline  · warfarin  What if I miss a dose?  If you miss a dose, take it as soon as you can. If it is almost time for your next dose, take only that dose. Do not take double or extra doses.  Where should I keep my medicine?  Keep out of the reach of children.  Store at room temperature between 15 and 30 degrees C (59 and 86 degrees F). Throw away any unused medicine after the expiration date.  What should I tell my health care provider before I take this medicine?  They need to know if you have any of these conditions:  · bone marrow disease  · liver disease  · an unusual or allergic reaction to methimazole, other medicines, foods, dyes, or preservatives  · pregnant or trying to get pregnant  · breast-feeding  What should I watch for while using this medicine?  Visit your doctor or health care professional for regular checks on your progress. Your thyroid hormone levels will need to be checked.  This medicine can reduce your resistance to infection. Contact your doctor or health care professional if you have any  infection or injury. Avoid people who have colds, flu, bronchitis or other infectious disease. Do not have any vaccinations without asking your doctor or health care professional. Avoid people who have recently received oral polio vaccine.  NOTE:This sheet is a summary. It may not cover all possible information. If you have questions about this medicine, talk to your doctor, pharmacist, or health care provider. Copyright© 2017 [employer]

## 2019-07-01 NOTE — PROGRESS NOTES
Patient ID: Ulysses Boreaux is a 68 y.o. male.  Patient is here for follow up        Chief Complaint: Follow-up      HPI    Consultation was requested by Dr. Elza Pantoja       Diagnosed:  Patient has had a suppressed TSH dating back to 2014 in review of epic and his free T4 was normal up until recently his free T4 was elevated      Lab tests done after initial evaluation showed positive TSI antibody and elevation of T3 and T4 with persistent suppression of TSH    Previous radiology tests:  Thyroid ultrasound :  Yes and it showed relatively small single thyroid nodule with no concerning features  NM uptake and scan:  Yes had normal but homogeneous distribution    Previous thyroid surgery: No      Thyroid symptoms:tremulousness and sweating and occasionally some problems sleeping but he is convinced that the shaking is due to the pain medication that he is on as he states he gets better later in the day    Weight has been stable, denies any nausea vomiting diarrhea or anxiety or mood changes    He does have shakiness of his arms but he thinks it is related to the pain medication    History of left for fingers being amputated from a previous job    Currently retired  Thyroid medications:  After last visit I spoke with patient and started methimazole after he completed his thyroid uptake and scan so he has only been taking for a few days 10 mg twice a day      Takes medication appropriately:  Yes    Sister has thyroid issues   Sandra Dale, NP is monitoring his diabetes which is well-controlled    I have reviewed the past medical, family and social history    Review of Systems   Constitutional: Negative for appetite change, diaphoresis, fatigue, fever and unexpected weight change.   HENT: Negative for sore throat and trouble swallowing.    Eyes: Negative for visual disturbance.   Respiratory: Negative for shortness of breath and wheezing.    Cardiovascular: Negative for chest pain, palpitations and leg  swelling.   Gastrointestinal: Negative for abdominal pain, diarrhea, nausea and vomiting.   Endocrine: Negative for cold intolerance, heat intolerance, polydipsia, polyphagia and polyuria.   Genitourinary: Negative for difficulty urinating and dysuria.   Musculoskeletal: Negative for arthralgias and joint swelling.   Skin: Negative for color change and rash.   Neurological: Positive for tremors. Negative for dizziness, numbness and headaches.   Hematological: Negative for adenopathy.   Psychiatric/Behavioral: Negative for confusion, dysphoric mood and sleep disturbance. The patient is not nervous/anxious.        Objective:      Physical Exam   Constitutional: He appears well-developed and well-nourished.   HENT:   Head: Normocephalic and atraumatic.   Eyes: Conjunctivae are normal. No scleral icterus.   Neck: No tracheal deviation present. No thyromegaly present.   Musculoskeletal: He exhibits no edema.   Lymphadenopathy:     He has no cervical adenopathy.   Neurological: He is alert. He displays no tremor.   Tremor of outstretched hands   Skin: Skin is warm and dry. No rash noted. He is not diaphoretic. No erythema.   Psychiatric: He has a normal mood and affect. His speech is normal and behavior is normal. His mood appears not anxious.         Lab Review:   Lab Visit on 06/12/2019   Component Date Value    Free T4 06/12/2019 1.77*    T3, Free 06/12/2019 6.0*    TSH 06/12/2019 <0.010*    Thyroid-Stim IG [facility]* 06/12/2019 2.05*    Thyrotropin Receptor Ab 06/12/2019 1.29    Office Visit on 05/14/2019   Component Date Value    POC Glucose 05/14/2019 252*   Lab Visit on 05/13/2019   Component Date Value    Microalbum.,U,Random 05/13/2019 7.0     Creatinine, Random Ur 05/13/2019 200.0     Microalb Creat Ratio 05/13/2019 3.5    Lab Visit on 05/13/2019   Component Date Value    Cholesterol 05/13/2019 153     Triglycerides 05/13/2019 52     HDL 05/13/2019 64     LDL Cholesterol 05/13/2019 78.6      Hdl/Cholesterol Ratio 05/13/2019 41.8     Total Cholesterol/HDL Ra* 05/13/2019 2.4     Non-HDL Cholesterol 05/13/2019 89     TSH 05/13/2019 <0.010*    Hemoglobin A1C 05/13/2019 6.6*    Estimated Avg Glucose 05/13/2019 143*    Free T4 05/13/2019 1.65*   Lab Visit on 04/08/2019   Component Date Value    WBC 04/08/2019 5.22     RBC 04/08/2019 4.80     Hemoglobin 04/08/2019 14.4     Hematocrit 04/08/2019 44.6     Mean Corpuscular Volume 04/08/2019 93     Mean Corpuscular Hemoglo* 04/08/2019 30.0     Mean Corpuscular Hemoglo* 04/08/2019 32.3     RDW 04/08/2019 12.8     Platelets 04/08/2019 240     MPV 04/08/2019 10.4     Immature Granulocytes 04/08/2019 0.2     Gran # (ANC) 04/08/2019 1.1*    Immature Grans (Abs) 04/08/2019 0.01     Lymph # 04/08/2019 3.2     Mono # 04/08/2019 0.7     Eos # 04/08/2019 0.2     Baso # 04/08/2019 0.06     nRBC 04/08/2019 0     Gran% 04/08/2019 20.8*    Lymph% 04/08/2019 60.9*    Mono% 04/08/2019 12.6     Eosinophil% 04/08/2019 4.4     Basophil% 04/08/2019 1.1     Differential Method 04/08/2019 Automated     Sodium 04/08/2019 140     Potassium 04/08/2019 4.6     Chloride 04/08/2019 102     CO2 04/08/2019 32*    Glucose 04/08/2019 86     BUN, Bld 04/08/2019 20     Creatinine 04/08/2019 1.3     Calcium 04/08/2019 10.4     Total Protein 04/08/2019 7.9     Albumin 04/08/2019 4.0     Total Bilirubin 04/08/2019 0.7     Alkaline Phosphatase 04/08/2019 64     AST 04/08/2019 25     ALT 04/08/2019 32     Anion Gap 04/08/2019 6*    eGFR if African American 04/08/2019 >60.0     eGFR if non African Amer* 04/08/2019 56.9*    Cholesterol 04/08/2019 156     Triglycerides 04/08/2019 58     HDL 04/08/2019 67     LDL Cholesterol 04/08/2019 77.4     Hdl/Cholesterol Ratio 04/08/2019 42.9     Total Cholesterol/HDL Ra* 04/08/2019 2.3     Non-HDL Cholesterol 04/08/2019 89    Office Visit on 02/12/2019   Component Date Value    POC Glucose 02/12/2019 260*   Lab  Visit on 02/05/2019   Component Date Value    Hemoglobin A1C 02/05/2019 7.0*    Estimated Avg Glucose 02/05/2019 154*       Assessment:     1. Hyperthyroidism  TSH    T4, free    T3, free    TSH    T4, free    T3, free    T3, free    T4, free    TSH    I reviewed treatment options for hyperthyroidism including antithyroid medications(ATD), or radioactive iodine(I-131) treatment or surgery(surgery isusually reserved for severe disease or allergy to ATD or if reason not to get I 131).     Discussed risks of worsening Graves orpitopathy with I131 especially in smokers and association of smoking with Grave's disease    Discussed the chances of Graves remission after prolonged use(12 to 18 months or more) of ATD therapy(50% remission if thyroid antibodies are normal )    Counseled patient that if on ATD therapy should call if fever, sore throat, rash, anorexia, nausea or vomiitng.  Reviewed side effects of ATDs are rare (agranulocytosis and liver failure) but can be very serious.     Discussed complications of radioactive iodine including nausea, local pain and thyroid storm(rare) and need for short term contact precautions    I also went over signs and symptoms of hyperthyroidism    He will continue the methimazole 10 mg twice a day and will repeat labs in 6 weeks  Plan:   Hyperthyroidism  -     TSH; Future; Expected date: 08/12/2019  -     T4, free; Future; Expected date: 08/12/2019  -     T3, free; Future; Expected date: 08/12/2019  -     T3, free; Future; Expected date: 07/01/2019  -     T4, free; Future; Expected date: 07/01/2019  -     TSH; Future; Expected date: 07/01/2019    Other orders  -     methIMAzole (TAPAZOLE) 10 MG Tab; Take 1 tablet (10 mg total) by mouth 3 (three) times daily.  Dispense: 30 tablet; Refill: 2          Follow up in about 3 months (around 10/1/2019).    Labs prior to appointment? not applicable     Disclaimer:  This note may have been partially prepared using voice recognition software  and  it may have not been extensively proofed, as such there could be errors within the text such as sound alike errors.

## 2019-07-31 ENCOUNTER — EXTERNAL CHRONIC CARE MANAGEMENT (OUTPATIENT)
Dept: PRIMARY CARE CLINIC | Facility: CLINIC | Age: 69
End: 2019-07-31
Payer: MEDICARE

## 2019-07-31 PROCEDURE — 99490 CHRNC CARE MGMT STAFF 1ST 20: CPT | Mod: PBBFAC | Performed by: FAMILY MEDICINE

## 2019-07-31 PROCEDURE — 99490 CHRNC CARE MGMT STAFF 1ST 20: CPT | Mod: S$PBB,,, | Performed by: FAMILY MEDICINE

## 2019-07-31 PROCEDURE — 99490 PR CHRONIC CARE MGMT, 1ST 20 MIN: ICD-10-PCS | Mod: S$PBB,,, | Performed by: FAMILY MEDICINE

## 2019-08-08 DIAGNOSIS — J30.9 ALLERGIC RHINITIS: ICD-10-CM

## 2019-08-08 RX ORDER — FLUTICASONE PROPIONATE 50 MCG
SPRAY, SUSPENSION (ML) NASAL
Qty: 16 ML | Refills: 2 | Status: SHIPPED | OUTPATIENT
Start: 2019-08-08 | End: 2019-11-04 | Stop reason: SDUPTHER

## 2019-08-12 ENCOUNTER — LAB VISIT (OUTPATIENT)
Dept: LAB | Facility: HOSPITAL | Age: 69
End: 2019-08-12
Attending: INTERNAL MEDICINE
Payer: MEDICARE

## 2019-08-12 DIAGNOSIS — E05.90 HYPERTHYROIDISM: ICD-10-CM

## 2019-08-12 LAB
T3FREE SERPL-MCNC: 2.8 PG/ML (ref 2.3–4.2)
T4 FREE SERPL-MCNC: 0.96 NG/DL (ref 0.71–1.51)
TSH SERPL DL<=0.005 MIU/L-ACNC: <0.01 UIU/ML (ref 0.4–4)

## 2019-08-12 PROCEDURE — 84439 ASSAY OF FREE THYROXINE: CPT

## 2019-08-12 PROCEDURE — 36415 COLL VENOUS BLD VENIPUNCTURE: CPT

## 2019-08-12 PROCEDURE — 84443 ASSAY THYROID STIM HORMONE: CPT

## 2019-08-12 PROCEDURE — 84481 FREE ASSAY (FT-3): CPT

## 2019-08-12 RX ORDER — HYDROCODONE BITARTRATE AND ACETAMINOPHEN 10; 325 MG/1; MG/1
1 TABLET ORAL EVERY 12 HOURS PRN
Qty: 60 TABLET | Refills: 0 | Status: SHIPPED | OUTPATIENT
Start: 2019-09-21 | End: 2019-10-21

## 2019-08-12 RX ORDER — HYDROCODONE BITARTRATE AND ACETAMINOPHEN 10; 325 MG/1; MG/1
1 TABLET ORAL EVERY 12 HOURS PRN
Qty: 60 TABLET | Refills: 0 | Status: SHIPPED | OUTPATIENT
Start: 2019-08-22 | End: 2019-09-21

## 2019-08-14 ENCOUNTER — OFFICE VISIT (OUTPATIENT)
Dept: PAIN MEDICINE | Facility: CLINIC | Age: 69
End: 2019-08-14
Payer: MEDICARE

## 2019-08-14 VITALS
SYSTOLIC BLOOD PRESSURE: 153 MMHG | BODY MASS INDEX: 25.12 KG/M2 | HEART RATE: 76 BPM | DIASTOLIC BLOOD PRESSURE: 77 MMHG | WEIGHT: 202 LBS | HEIGHT: 75 IN

## 2019-08-14 DIAGNOSIS — G89.4 CHRONIC PAIN DISORDER: ICD-10-CM

## 2019-08-14 DIAGNOSIS — M79.642 LEFT HAND PAIN: ICD-10-CM

## 2019-08-14 DIAGNOSIS — M79.642 PAIN OF LEFT HAND: ICD-10-CM

## 2019-08-14 DIAGNOSIS — G89.21 CHRONIC PAIN DUE TO TRAUMA: ICD-10-CM

## 2019-08-14 DIAGNOSIS — S68.119A TRAUMATIC AMPUTATION OF DIGIT OF ONE HAND WITHOUT COMPLICATION: Primary | ICD-10-CM

## 2019-08-14 PROCEDURE — 99214 OFFICE O/P EST MOD 30 MIN: CPT | Mod: S$PBB,,, | Performed by: PHYSICIAN ASSISTANT

## 2019-08-14 PROCEDURE — 99214 OFFICE O/P EST MOD 30 MIN: CPT | Mod: PBBFAC | Performed by: PHYSICIAN ASSISTANT

## 2019-08-14 PROCEDURE — 99999 PR PBB SHADOW E&M-EST. PATIENT-LVL IV: ICD-10-PCS | Mod: PBBFAC,,, | Performed by: PHYSICIAN ASSISTANT

## 2019-08-14 PROCEDURE — 99999 PR PBB SHADOW E&M-EST. PATIENT-LVL IV: CPT | Mod: PBBFAC,,, | Performed by: PHYSICIAN ASSISTANT

## 2019-08-14 PROCEDURE — 99214 PR OFFICE/OUTPT VISIT, EST, LEVL IV, 30-39 MIN: ICD-10-PCS | Mod: S$PBB,,, | Performed by: PHYSICIAN ASSISTANT

## 2019-08-14 NOTE — PROGRESS NOTES
Subjective:     Chief Complaint:  Left Hand Pain    History of Present Illness:  This patient is a 65 year old male who presents today for f/u complaining of the above noted pains. The patient describes this pain as follows.    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 work, left lower quadrant pain since hernia surgery in 2004  - timing (constant, intermittent): Constant  - character (sharp, dull, aching, burning): Throbbing  - radiating: No  - dermatomal distribution: No  - side: Left   - aggravating factors: Nothing worsens left digit pain, left lower quadrant pain worse with exercise or walking  - relieving factors: Medication / Norco  - antecedent trauma: Amputation via skill saw and 1997, hernia repair in 2004  - prior spinal surgery: None  - pertinent negatives: No fever, No chills, No weight loss, No bladder dysfunction, No bowel dysfunction, No extremity weakness, No saddle anesthesia  - medications tried: Norco, Percocet, over-the-counter medications, compound pain cream  - other therapies tried (physical therapy, injections):     >> physical therapy tried in the past    >> no prior injections        Imaging/ Labs (reviewed on 8/14/2019):    7/12/2018 US ABDOMINAL AORTA  CLINICAL HISTORY:  Abnormal findings on diagnostic imaging of other specified body structures  TECHNIQUE:  Limited ultrasound was performed of the abdominal aorta, with cross sectional diameter measurements obtained.  COMPARISON:  01/10/2018  FINDINGS:  The proximal abdominal aorta measures 2.7 cm.  The mid abdominal aorta measures 1.8 cm.  The distal abdominal aorta measures 1.9 cm, slightly ectatic and unchanged from prior.  The right iliac artery measures 1.0 cm.  The left iliac artery measures 1.1 cm.  Aortoiliac atherosclerosis: Mild  Impression: Stable examination with slight distal abdominal aortic ectasia.  Negative for aneurysm.         ROS:  CONSTITUTIONAL: No  "fever, chills, weight loss  SKIN: No rash or itching  CARDIOVASCULAR:  No chest pain, palpitations  RESPIRATORY: No shortness of breath  GASTROINTESTINAL: No diarrhea, No constipation, abdominal pain, left lower quadrant  GENITOURINARY: No urinary incontinence    MUSCULOSKELETAL:  - patient reports pain as above, see chief complaint     NEUROLOGICAL:   - Pain as above  - Strength in lower extremities is normal  - Sensation in lower extremities is normal  - No bowel or bladder incontinence     PSYCHIATRIC: No change in mood noticed         Objective:   Vitals:  BP (!) 153/77 (BP Location: Right arm, Patient Position: Sitting)   Pulse 76   Ht 6' 3" (1.905 m)   Wt 91.6 kg (202 lb)   BMI 25.25 kg/m²    (reviewed on 8/14/2019)     General: alert and oriented, in no apparent distress  Gait: normal gait  Skin: No rashes, No discoloration   HEENT: EOMI  Respiratory: respirations nonlabored  Cardiology: No obvious peripheral edema    Musculoskeletal:  - No pain with lumbar flexion/ extension  - Left upper extremity range of motion intact throughout  - Digital stumps are hypersensitive to palpation, hypersensitivity does not extend further proximal  - No color change, no swelling, no changes in hair growth along left hand     Neuro:  - Lower extremities:      >> 5/5 strength bilaterally, throughout, symmetric  - Upper extremities:    >> 5/5 strength bilaterally    Psych: mood and affect appropriate        Assessment:     Encounter Diagnoses   Name Primary?    Traumatic amputation of digit of one hand without complication Yes    Pain of left hand     Chronic pain disorder     Left hand pain     Chronic pain due to trauma        Plan:     1. Interventional: None.    2. Pharmacologic:   - Refill Norco 10/325 mg PO BID PRN (60 tabs) x 2 months. Paper Rx given. Patient tolerating opioids with no side effects, obtaining good pain control with functional improvement. He tolerates this medication well, and he denies any " drowsiness or constipation.  - Opioid contract signed on 5/7/2018.     - [place]  reviewed and appropriate. Last filled 7-24-19.  Will post date accordingly.    3. Rehabilitative: Encouraged regular exercise.    4. Diagnostic: None for now.    5. Follow up: 9 weeks for med refill      - I discussed the risks, benefits, and alternatives to potential treatment options. All questions and concerns were fully addressed today in clinic. Dr. Olmedo was consulted regarding the patient plan and agrees.           >> UDS:  8-18-15 :: appropriate  12-29-15 :: appropriate  5-3-16 :: appropriate  10-18-16 :: appropriate  4/13/2017 :: appropriate  9/29/2017 :: appropriate  3/9/2018 :: appropriate  9/4/2018 :: appropriate  6/19/2019 :: pending - results not loaded

## 2019-08-23 DIAGNOSIS — Z86.010 HISTORY OF COLON POLYPS: ICD-10-CM

## 2019-08-23 DIAGNOSIS — Z12.11 COLON CANCER SCREENING: ICD-10-CM

## 2019-08-23 RX ORDER — SODIUM, POTASSIUM,MAG SULFATES 17.5-3.13G
SOLUTION, RECONSTITUTED, ORAL ORAL
Qty: 354 ML | Refills: 0 | Status: SHIPPED | OUTPATIENT
Start: 2019-08-23 | End: 2019-10-10 | Stop reason: SDUPTHER

## 2019-08-23 NOTE — TELEPHONE ENCOUNTER
Pt request his prep for colonoscopy to be sent to [facility]'s pharmacy off [address]. Refill request sent to Ms Vega.

## 2019-08-23 NOTE — TELEPHONE ENCOUNTER
----- Message from Linda Burns sent at 8/23/2019  6:56 AM CDT -----  Contact: Patient  Patient has a question about his colonoscopy prep kit, please call him back at [phone]. Thank you

## 2019-08-28 ENCOUNTER — ANESTHESIA (OUTPATIENT)
Dept: ENDOSCOPY | Facility: HOSPITAL | Age: 69
End: 2019-08-28
Payer: MEDICARE

## 2019-08-28 ENCOUNTER — HOSPITAL ENCOUNTER (OUTPATIENT)
Facility: HOSPITAL | Age: 69
Discharge: HOME | End: 2019-08-28
Attending: INTERNAL MEDICINE | Admitting: INTERNAL MEDICINE
Payer: MEDICARE

## 2019-08-28 ENCOUNTER — ANESTHESIA EVENT (OUTPATIENT)
Dept: ENDOSCOPY | Facility: HOSPITAL | Age: 69
End: 2019-08-28
Payer: MEDICARE

## 2019-08-28 VITALS
HEART RATE: 57 BPM | HEIGHT: 75 IN | WEIGHT: 196 LBS | TEMPERATURE: 99 F | SYSTOLIC BLOOD PRESSURE: 122 MMHG | OXYGEN SATURATION: 98 % | DIASTOLIC BLOOD PRESSURE: 72 MMHG | BODY MASS INDEX: 24.37 KG/M2 | RESPIRATION RATE: 18 BRPM

## 2019-08-28 DIAGNOSIS — Z12.11 COLON CANCER SCREENING: Primary | ICD-10-CM

## 2019-08-28 LAB — POCT GLUCOSE: 126 MG/DL (ref 70–110)

## 2019-08-28 PROCEDURE — 82962 GLUCOSE BLOOD TEST: CPT | Performed by: INTERNAL MEDICINE

## 2019-08-28 PROCEDURE — 88305 TISSUE SPECIMEN TO PATHOLOGY - SURGERY: ICD-10-PCS | Mod: 26,,, | Performed by: PATHOLOGY

## 2019-08-28 PROCEDURE — 63600175 PHARM REV CODE 636 W HCPCS: Performed by: INTERNAL MEDICINE

## 2019-08-28 PROCEDURE — 37000008 HC ANESTHESIA 1ST 15 MINUTES: Performed by: INTERNAL MEDICINE

## 2019-08-28 PROCEDURE — 45385 PR COLONOSCOPY,REMV LESN,SNARE: ICD-10-PCS | Mod: PT,,, | Performed by: INTERNAL MEDICINE

## 2019-08-28 PROCEDURE — 27201089 HC SNARE, DISP (ANY): Performed by: INTERNAL MEDICINE

## 2019-08-28 PROCEDURE — 88305 TISSUE EXAM BY PATHOLOGIST: CPT | Mod: 59 | Performed by: PATHOLOGY

## 2019-08-28 PROCEDURE — 88305 TISSUE EXAM BY PATHOLOGIST: CPT | Mod: 26,,, | Performed by: PATHOLOGY

## 2019-08-28 PROCEDURE — 37000009 HC ANESTHESIA EA ADD 15 MINS: Performed by: INTERNAL MEDICINE

## 2019-08-28 PROCEDURE — 63600175 PHARM REV CODE 636 W HCPCS: Performed by: NURSE ANESTHETIST, CERTIFIED REGISTERED

## 2019-08-28 PROCEDURE — 45385 COLONOSCOPY W/LESION REMOVAL: CPT | Mod: PT,,, | Performed by: INTERNAL MEDICINE

## 2019-08-28 PROCEDURE — 45385 COLONOSCOPY W/LESION REMOVAL: CPT | Performed by: INTERNAL MEDICINE

## 2019-08-28 RX ORDER — SODIUM CHLORIDE, SODIUM LACTATE, POTASSIUM CHLORIDE, CALCIUM CHLORIDE 600; 310; 30; 20 MG/100ML; MG/100ML; MG/100ML; MG/100ML
INJECTION, SOLUTION INTRAVENOUS CONTINUOUS
Status: DISCONTINUED | OUTPATIENT
Start: 2019-08-28 | End: 2019-08-28 | Stop reason: HOSPADM

## 2019-08-28 RX ORDER — PROPOFOL 10 MG/ML
INJECTION, EMULSION INTRAVENOUS
Status: DISCONTINUED | OUTPATIENT
Start: 2019-08-28 | End: 2019-08-28

## 2019-08-28 RX ADMIN — SODIUM CHLORIDE, SODIUM LACTATE, POTASSIUM CHLORIDE, AND CALCIUM CHLORIDE: 600; 310; 30; 20 INJECTION, SOLUTION INTRAVENOUS at 08:08

## 2019-08-28 RX ADMIN — PROPOFOL 30 MG: 10 INJECTION, EMULSION INTRAVENOUS at 10:08

## 2019-08-28 RX ADMIN — SODIUM CHLORIDE, SODIUM LACTATE, POTASSIUM CHLORIDE, AND CALCIUM CHLORIDE: 600; 310; 30; 20 INJECTION, SOLUTION INTRAVENOUS at 09:08

## 2019-08-28 RX ADMIN — PROPOFOL 20 MG: 10 INJECTION, EMULSION INTRAVENOUS at 10:08

## 2019-08-28 RX ADMIN — PROPOFOL 80 MG: 10 INJECTION, EMULSION INTRAVENOUS at 09:08

## 2019-08-28 NOTE — TRANSFER OF CARE
"Anesthesia Transfer of Care Note    Patient: Ulysses Boreaux    Procedure(s) Performed: Procedure(s) (LRB):  COLONOSCOPY (N/A)    Patient location: PACU    Anesthesia Type: MAC    Transport from OR: Transported from OR on room air with adequate spontaneous ventilation    Post pain: adequate analgesia    Post assessment: no apparent anesthetic complications    Post vital signs: stable    Level of consciousness: awake    Nausea/Vomiting: no nausea/vomiting    Complications: none    Transfer of care protocol was followed      Last vitals:   Visit Vitals  BP (!) 96/59   Pulse 60   Temp 37.1 °C (98.8 °F) (Oral)   Resp 16   Ht 6' 3" (1.905 m)   Wt 88.9 kg (195 lb 15.8 oz)   SpO2 95%   BMI 24.50 kg/m²     "

## 2019-08-28 NOTE — DISCHARGE INSTRUCTIONS
Colonoscopy     A camera attached to a flexible tube with a viewing lens is used to take video pictures.     Colonoscopy is a test to view the inside of your lower digestive tract (colon and rectum). Sometimes it can show the last part of the small intestine (ileum). During the test, small pieces of tissue may be removed for testing. This is called a biopsy. Small growths, such as polyps, may also be removed.   Why is colonoscopy done?  The test is done to help look for colon cancer. And it can help find the source of abdominal pain, bleeding, and changes in bowel habits. It may be needed once a year, depending on factors such as your:  · Age  · Health history  · Family health history  · Symptoms  · Results from any prior colonoscopy  Risks and possible complications  These include:  · Bleeding               · A puncture or tear in the colon   · Risks of anesthesia  · A cancer lesion not being seen  Getting ready   To prepare for the test:  · Talk with your healthcare provider about the risks of the test (see below). Also ask your healthcare provider about alternatives to the test.  · Tell your healthcare provider about any medicines you take. Also tell him or her about any health conditions you may have.  · Make sure your rectum and colon are empty for the test. Follow the diet and bowel prep instructions exactly. If you dont, the test may need to be rescheduled.  · Plan for a friend or family member to drive you home after the test.     Colonoscopy provides an inside view of the entire colon.     You may discuss the results with your doctor right away or at a future visit.  During the test   The test is usually done in the hospital on an outpatient basis. This means you go home the same day. The procedure takes about 30 minutes. During that time:  · You are given relaxing (sedating) medicine through an IV line. You may be drowsy, or fully asleep.  · The healthcare provider will first give you a physical exam to  check for anal and rectal problems.  · Then the anus is lubricated and the scope inserted.  · If you are awake, you may have a feeling similar to needing to have a bowel movement. You may also feel pressure as air is pumped into the colon. Its OK to pass gas during the procedure.  · Biopsy, polyp removal, or other treatments may be done during the test.  After the test   You may have gas right after the test. It can help to try to pass it to help prevent later bloating. Your healthcare provider may discuss the results with you right away. Or you may need to schedule a follow-up visit to talk about the results. After the test, you can go back to your normal eating and other activities. You may be tired from the sedation and need to rest for a few hours.  Date Last Reviewed: 11/1/2016 © 2000-2017 [employer]. [address] [place] [address]. All rights reserved. This information is not intended as a substitute for professional medical care. Always follow your healthcare professional's instructions.        Understanding Colon and Rectal Polyps    The colon (also called the large intestine) is a muscular tube that forms the last part of the digestive tract. It absorbs water and stores food waste. The colon is about 4 to 6 feet long. The rectum is the last 6 inches of the colon. The colon and rectum have a smooth lining composed of millions of cells. Changes in these cells can lead to growths in the colon that can become cancerous and should be removed. Multiple tests are available to screen for colon cancer, but the colonoscopy is the most recommended test. During colonoscopy, these polyps can be removed. How often you need this test depends on many things including your condition, your family history, symptoms, and what the findings were at the previous colonoscopy.   When the colon lining changes  Changes that happen in the cells that line the colon or rectum can lead to growths called  polyps. Over a period of years, polyps can turn cancerous. Removing polyps early may prevent cancer from ever forming.  Polyps  Polyps are fleshy clumps of tissue that form on the lining of the colon or rectum. Small polyps are usually benign (not cancerous). However, over time, cells in a polyp can change and become cancerous. Certain types of polyps known as adenomatous polyps are premalignant. The risk for invasive cancer increases with the size of the polyp and certain cell and gene features. This means that they can become cancerous if they're not removed. Hyperplastic polyps are benign. They can grow quite large and not turn cancerous.   Cancer  Almost all colorectal cancers start when polyp cells begin growing abnormally. As a cancerous tumor grows, it may involve more and more of the colon or rectum. In time, cancer can also grow beyond the colon or rectum and spread to nearby organs or to glands called lymph nodes. The cells can also travel to other parts of the body. This is known as metastasis. The earlier a cancerous tumor is removed, the better the chance of preventing its spread.    Date Last Reviewed: 8/1/2016  © 6062-5046 [employer]. [address] [place] [address], [place] [address]. All rights reserved. This information is not intended as a substitute for professional medical care. Always follow your healthcare professional's instructions.

## 2019-08-28 NOTE — ANESTHESIA PREPROCEDURE EVALUATION
08/28/2019  Ulysses Boreaux is a 69 y.o., male.    Anesthesia Evaluation    I have reviewed the Patient Summary Reports.    I have reviewed the Nursing Notes.   I have reviewed the Medications.     Review of Systems  Anesthesia Hx:  No problems with previous Anesthesia  Denies Family Hx of Anesthesia complications.   Denies Personal Hx of Anesthesia complications.   Social:  Former Smoker, Social Alcohol Use    Hematology/Oncology:     Oncology Normal   Hematology Comments: Lymphocytosis   EENT/Dental:   chronic allergic rhinitis cataract   Cardiovascular:   Hypertension Denies MI.   Denies CABG/stent.      hyperlipidemia    Pulmonary:   Denies COPD.  Denies Asthma.  Denies Sleep Apnea.    Renal/:  Renal/ Normal     Hepatic/GI:   Bowel Prep. Denies GERD. Hepatitis, C    Musculoskeletal:  Musculoskeletal Normal    Neurological:   Denies CVA. Denies Seizures.   Chronic Pain Syndrome   Endocrine:   Diabetes, type 2 Denies Hypothyroidism. Denies Hyperthyroidism.    Psych:   insomnia         Physical Exam  General:  Well nourished    Airway/Jaw/Neck:  Airway Findings: Mouth Opening: Normal Tongue: Normal  General Airway Assessment: Adult  Mallampati: II      Dental:  Dental Findings: In tact, Upper partial dentures   Chest/Lungs:  Chest/Lungs Findings: Clear to auscultation, Normal Respiratory Rate     Heart/Vascular:  Heart Findings: Rate: Normal  Rhythm: Regular Rhythm             Anesthesia Plan  Type of Anesthesia, risks & benefits discussed:  Anesthesia Type:  MAC  Patient's Preference:   Intra-op Monitoring Plan: standard ASA monitors  Intra-op Monitoring Plan Comments:   Post Op Pain Control Plan:   Post Op Pain Control Plan Comments:   Induction:   IV  Beta Blocker:  Patient is on a Beta-Blocker and has received one dose within the past 24 hours (No further documentation required).       Informed  Consent: Patient understands risks and agrees with Anesthesia plan.  Questions answered. Anesthesia consent signed with patient.  ASA Score: 2     Day of Surgery Review of History & Physical: I have interviewed and examined the patient. I have reviewed the patient's H&P dated:  There are no significant changes.  H&P update referred to the surgeon.         Ready For Surgery From Anesthesia Perspective.

## 2019-08-28 NOTE — ANESTHESIA POSTPROCEDURE EVALUATION
Anesthesia Post Evaluation    Patient: Ulysses Boreaux    Procedure(s) Performed: Procedure(s) (LRB):  COLONOSCOPY (N/A)    Final Anesthesia Type: MAC  Patient location during evaluation: PACU  Patient participation: Yes- Able to Participate  Level of consciousness: awake and alert  Post-procedure vital signs: reviewed and not stable  Pain management: adequate  Airway patency: patent  PONV status at discharge: No PONV  Anesthetic complications: no      Cardiovascular status: blood pressure returned to baseline, hemodynamically stable and stable  Respiratory status: unassisted  Hydration status: euvolemic  Follow-up not needed.          Vitals Value Taken Time   /72 8/28/2019 10:47 AM   Temp 37.1 °C (98.8 °F) 8/28/2019  8:45 AM   Pulse 57 8/28/2019 10:47 AM   Resp 18 8/28/2019 10:47 AM   SpO2 98 % 8/28/2019 10:47 AM         Event Time     Out of Recovery 10:49:10          Pain/Lorna Score: Lorna Score: 10 (8/28/2019 10:47 AM)

## 2019-08-28 NOTE — H&P
PRE PROCEDURE H&P    Patient Name: Ulysses Boreaux  MRN: [id number]  : 1950  Date of Procedure:  2019  Referring Physician: Soheila Tolbert NP  Primary Physician: Elza Pantoja MD  Procedure Physician: Elif Toribio MD       Planned Procedure: EGD  Diagnosis: dysphagia   Chief Complaint: Same as above    HPI: Patient is an 69 y.o. male is here for the above.       Past Medical History:   Past Medical History:   Diagnosis Date    Allergy     Amputation of finger of left hand     all fingers except for thumb    Cataract     OS NGCL     Chronic pain due to trauma     traumatic amputations left hand    Diabetes mellitus, type 2     History of hepatitis C 2014    tx'd w/ Harvoni     Hyperlipidemia     Hypertension     Refractive error         Past Surgical History:  Past Surgical History:   Procedure Laterality Date    APPENDECTOMY          CATARACT EXTRACTION      OD     COLONOSCOPY N/A 2014    Performed [facility] Lu [facility]nett [facility]    HERNIA REPAIR          Home Medications:  Prior to Admission medications    Medication Sig Start Date End Date Taking? Authorizing Provider   amLODIPine (NORVASC) 10 MG tablet TAKE 1 TABLET BY MOUTH EVERY DAY 19  Yes Mikki Tolbert MD   atorvastatin (LIPITOR) 20 MG tablet TAKE 1 TABLET(20 MG) BY MOUTH EVERY NIGHT 10/4/18  Yes Elza Pantoja MD   blood sugar diagnostic (TRUE METRIX GLUCOSE TEST STRIP) Strp 1 strip by Misc.(Non-Drug; Combo Route) route 3 (three) times daily. True Metrix Strips- ICD:10 - E11.9 19  Yes Sandra Dale NP   clonazePAM (KLONOPIN) 1 MG tablet Take 1 tablet (1 mg total) by mouth every evening. 19  Yes Soheila Tolbert NP   fluticasone propionate (FLONASE) 50 mcg/actuation nasal spray SHAKE LIQUID AND USE 2 SPRAYS IN EACH NOSTRIL EVERY DAY 19  Yes Elza Pantoja MD   HYDROcodone-acetaminophen (NORCO)  mg per tablet Take 1 tablet by mouth every 12 (twelve) hours as  needed for Pain. 8/22/19 9/21/19 Yes Ysabel Garcia PA-C   HYDROcodone-acetaminophen (NORCO)  mg per tablet Take 1 tablet by mouth every 12 (twelve) hours as needed for Pain. 9/21/19 10/21/19 Yes Ysabel Garcia PA-C   insulin NPH-insulin regular, 70/30, (NOVOLIN 70/30 U-100 INSULIN) 100 unit/mL (70-30) injection INJECT 21 UNITS UNDER THE SKIN EVERY MORNING, THEN 23 UNITS EVERY EVENING 2/12/19  Yes Sandra Dale NP   insulin syringe-needle U-100 1 mL 31 gauge x 5/16 Syrg USE AS DIRECTED WITH INSULIN 2/12/19  Yes Sandra Dale NP   lancets (TRUEPLUS LANCETS) 33 gauge Misc 1 lancet by Misc.(Non-Drug; Combo Route) route 3 (three) times daily. ICD 10 Code: E11.9 2/12/19  Yes Sandra Dale NP   ledipasvir-sofosbuvir (HARVONI)  mg Tab Take 1 tablet by mouth once daily. 6/18/18  Yes Jeferson Vega PA-C   losartan-hydrochlorothiazide 100-25 mg (HYZAAR) 100-25 mg per tablet TAKE 1 TABLET BY MOUTH EVERY DAY 12/20/18  Yes Soheila Tolbert NP   methIMAzole (TAPAZOLE) 10 MG Tab Take 1 tablet (10 mg total) by mouth 3 (three) times daily. 7/1/19 6/30/20 Yes Andreia Jackson MD   SITagliptin (JANUVIA) 100 MG Tab Take 1 tablet (100 mg total) by mouth once daily. 2/12/19 2/12/20 Yes Sandra Dale NP   nebivolol (BYSTOLIC) 10 MG Tab Take 1 tablet (10 mg total) by mouth once daily. 10/11/17   Soheila Tolbetr NP   sodium,potassium,mag sulfates (SUPREP BOWEL PREP KIT) 17.5-3.13-1.6 gram SolR Follow instructions per pharmacy 8/23/19   Jeferson Vega PA-C        Allergies:  Review of patient's allergies indicates:  No Known Allergies     Social History:   Social History     Socioeconomic History    Marital status:      Spouse name: Not on file    Number of children: 2    Years of education: Not on file    Highest education level: Not on file   Occupational History    Occupation: retired   Social Needs    Financial resource strain: Not on file    Food insecurity:     Worry:  "Not on file     Inability: Not on file    Transportation needs:     Medical: Not on file     Non-medical: Not on file   Tobacco Use    Smoking status: Former Smoker     Packs/day: 0.50     Types: Cigarettes     Last attempt to quit: 1972     Years since quittin.5    Smokeless tobacco: Never Used   Substance and Sexual Activity    Alcohol use: Yes     Alcohol/week: 0.0 oz     Comment: " Every blue moon"    Drug use: No    Sexual activity: Yes     Partners: Female   Lifestyle    Physical activity:     Days per week: Not on file     Minutes per session: Not on file    Stress: Not on file   Relationships    Social connections:     Talks on phone: Not on file     Gets together: Not on file     Attends Yazdanism service: Not on file     Active member of club or organization: Not on file     Attends meetings of clubs or organizations: Not on file     Relationship status: Not on file   Other Topics Concern    Not on file   Social History Narrative     . Has 2 children. Patient disabled- was .        Family History:  Family History   Problem Relation Age of Onset    Cancer Mother     Hypertension Mother     Hypertension Father     Diabetes Sister     Hypertension Sister     Heart disease Sister     Diabetes Brother     Hypertension Brother     Cancer Brother        ROS: No acute cardiac events, no acute respiratory complaints.     Physical Exam (all patients):    BP (!) 136/94 (BP Location: Left arm, Patient Position: Lying)   Pulse 90   Temp 98.8 °F (37.1 °C) (Oral)   Resp 18   Ht 6' 3" (1.905 m)   Wt 88.9 kg (195 lb 15.8 oz)   SpO2 96%   BMI 24.50 kg/m²   Lungs: Clear to auscultation bilaterally, respirations unlabored  Heart: Regular rate and rhythm, S1 and S2 normal, no obvious murmurs  Abdomen:         Soft, non-tender, bowel sounds normal, no masses, no organomegaly    Lab Results   Component Value Date    WBC 5.22 2019    MCV 93 2019    RDW 12.8 " 04/08/2019     04/08/2019    INR 1.0 12/10/2018    GLU 86 04/08/2019    HGBA1C 6.6 (H) 05/13/2019    BUN 20 04/08/2019     04/08/2019    K 4.6 04/08/2019     04/08/2019        SEDATION PLAN: per anesthesia      History reviewed, vital signs satisfactory, cardiopulmonary status satisfactory, sedation options, risks and plans have been discussed with the patient  All their questions were answered and the patient agrees to the sedation procedures as planned and the patient is deemed an appropriate candidate for the sedation as planned.    Procedure explained to patient, informed consent obtained and placed in chart.    Elif Toribio  8/28/2019  9:29 AM

## 2019-08-28 NOTE — DISCHARGE SUMMARY
Endoscopy Discharge Summary      Admit Date: 8/28/2019    Discharge Date and Time:  8/28/2019 10:31 AM    Attending Physician: Elif Toribio MD     Discharge Physician: Elif Toribio MD     Principal Admitting Diagnoses: Colon cancer screening         Discharge Diagnosis: The encounter diagnosis was Colon cancer screening.     Discharged Condition: Good    Indication for Admission: Colon cancer screening     Hospital Course: Patient was admitted for an inpatient procedure and tolerated the procedure well with no complications.    Significant Diagnostic Studies: Colonoscopy with polypectomy    Pathology (if any):  Specimen (12h ago, onward)    Start     Ordered    08/28/19 1002  Specimen to Pathology - Surgery  Once     Comments:  #1 rectal polyp#2 transverse colon polyp     Start Status     08/28/19 1002 Collected (08/28/19 1024) Order ID: [id number]       08/28/19 1023          Estimated Blood Loss: 1 ml.    Discussed with: patient.    Disposition: Home.    Follow Up/Patient Instructions:   Current Discharge Medication List      CONTINUE these medications which have NOT CHANGED    Details   amLODIPine (NORVASC) 10 MG tablet TAKE 1 TABLET BY MOUTH EVERY DAY  Qty: 90 tablet, Refills: 0    Associated Diagnoses: Hypertension associated with diabetes      atorvastatin (LIPITOR) 20 MG tablet TAKE 1 TABLET(20 MG) BY MOUTH EVERY NIGHT  Qty: 90 tablet, Refills: 1    Associated Diagnoses: Combined hyperlipidemia associated with type 2 diabetes mellitus      blood sugar diagnostic (TRUE METRIX GLUCOSE TEST STRIP) Strp 1 strip by Misc.(Non-Drug; Combo Route) route 3 (three) times daily. True Metrix Strips- ICD:10 - E11.9  Qty: 100 strip, Refills: 11    Associated Diagnoses: Uncontrolled type 2 diabetes mellitus without complication, with long-term current use of insulin      clonazePAM (KLONOPIN) 1 MG tablet Take 1 tablet (1 mg total) by mouth every evening.  Qty: 30 tablet, Refills: 5    Associated Diagnoses: Insomnia,  unspecified type      fluticasone propionate (FLONASE) 50 mcg/actuation nasal spray SHAKE LIQUID AND USE 2 SPRAYS IN EACH NOSTRIL EVERY DAY  Qty: 16 mL, Refills: 2    Associated Diagnoses: Allergic rhinitis      !! HYDROcodone-acetaminophen (NORCO)  mg per tablet Take 1 tablet by mouth every 12 (twelve) hours as needed for Pain.  Qty: 60 tablet, Refills: 0      !! HYDROcodone-acetaminophen (NORCO)  mg per tablet Take 1 tablet by mouth every 12 (twelve) hours as needed for Pain.  Qty: 60 tablet, Refills: 0      insulin NPH-insulin regular, 70/30, (NOVOLIN 70/30 U-100 INSULIN) 100 unit/mL (70-30) injection INJECT 21 UNITS UNDER THE SKIN EVERY MORNING, THEN 23 UNITS EVERY EVENING  Qty: 20 mL, Refills: 6    Associated Diagnoses: Uncontrolled type 2 diabetes mellitus without complication, with long-term current use of insulin      insulin syringe-needle U-100 1 mL 31 gauge x 5/16 Syrg USE AS DIRECTED WITH INSULIN  Qty: 100 each, Refills: 11    Associated Diagnoses: Uncontrolled type 2 diabetes mellitus without complication, with long-term current use of insulin      lancets (TRUEPLUS LANCETS) 33 gauge Misc 1 lancet by Misc.(Non-Drug; Combo Route) route 3 (three) times daily. ICD 10 Code: E11.9  Qty: 100 each, Refills: 11    Associated Diagnoses: Uncontrolled type 2 diabetes mellitus without complication, with long-term current use of insulin      ledipasvir-sofosbuvir (HARVONI)  mg Tab Take 1 tablet by mouth once daily.  Qty: 30 tablet, Refills: 2      losartan-hydrochlorothiazide 100-25 mg (HYZAAR) 100-25 mg per tablet TAKE 1 TABLET BY MOUTH EVERY DAY  Qty: 30 tablet, Refills: 5    Associated Diagnoses: Hypertension associated with diabetes      methIMAzole (TAPAZOLE) 10 MG Tab Take 1 tablet (10 mg total) by mouth 3 (three) times daily.  Qty: 30 tablet, Refills: 2      SITagliptin (JANUVIA) 100 MG Tab Take 1 tablet (100 mg total) by mouth once daily.  Qty: 30 tablet, Refills: 6    Associated Diagnoses:  Uncontrolled type 2 diabetes mellitus without complication, with long-term current use of insulin      nebivolol (BYSTOLIC) 10 MG Tab Take 1 tablet (10 mg total) by mouth once daily.  Qty: 30 tablet, Refills: 6    Associated Diagnoses: Hypertension associated with diabetes      sodium,potassium,mag sulfates (SUPREP BOWEL PREP KIT) 17.5-3.13-1.6 gram SolR Follow instructions per pharmacy  Qty: 354 mL, Refills: 0    Associated Diagnoses: Colon cancer screening; History of colon polyps       !! - Potential duplicate medications found. Please discuss with provider.          Discharge Procedure Orders   Diet general     Call MD for:  temperature >100.4     Call MD for:  persistent nausea and vomiting     Call MD for:  severe uncontrolled pain     Call MD for:  difficulty breathing, headache or visual disturbances     Activity as tolerated       Follow-up Information     Elif Toribio MD. Call in 1 week.    Specialty:  Gastroenterology  Why:  For pathology results  Contact information:  [address] [id number] [phone]

## 2019-08-31 ENCOUNTER — EXTERNAL CHRONIC CARE MANAGEMENT (OUTPATIENT)
Dept: PRIMARY CARE CLINIC | Facility: CLINIC | Age: 69
End: 2019-08-31
Payer: MEDICARE

## 2019-08-31 PROCEDURE — 99490 PR CHRONIC CARE MGMT, 1ST 20 MIN: ICD-10-PCS | Mod: S$PBB,,, | Performed by: FAMILY MEDICINE

## 2019-08-31 PROCEDURE — 99490 CHRNC CARE MGMT STAFF 1ST 20: CPT | Mod: PBBFAC | Performed by: FAMILY MEDICINE

## 2019-08-31 PROCEDURE — 99490 CHRNC CARE MGMT STAFF 1ST 20: CPT | Mod: S$PBB,,, | Performed by: FAMILY MEDICINE

## 2019-09-05 ENCOUNTER — LAB VISIT (OUTPATIENT)
Dept: LAB | Facility: HOSPITAL | Age: 69
End: 2019-09-05
Attending: NURSE PRACTITIONER
Payer: MEDICARE

## 2019-09-05 ENCOUNTER — OFFICE VISIT (OUTPATIENT)
Dept: DIABETES | Facility: CLINIC | Age: 69
End: 2019-09-05
Payer: MEDICARE

## 2019-09-05 VITALS
SYSTOLIC BLOOD PRESSURE: 136 MMHG | HEIGHT: 75 IN | HEART RATE: 81 BPM | BODY MASS INDEX: 25.33 KG/M2 | WEIGHT: 203.69 LBS | DIASTOLIC BLOOD PRESSURE: 74 MMHG

## 2019-09-05 DIAGNOSIS — E11.69 TYPE 2 DIABETES MELLITUS WITH OTHER SPECIFIED COMPLICATION, WITH LONG-TERM CURRENT USE OF INSULIN: ICD-10-CM

## 2019-09-05 DIAGNOSIS — E11.59 HYPERTENSION ASSOCIATED WITH DIABETES: ICD-10-CM

## 2019-09-05 DIAGNOSIS — Z79.4 TYPE 2 DIABETES MELLITUS WITH OTHER SPECIFIED COMPLICATION, WITH LONG-TERM CURRENT USE OF INSULIN: Primary | ICD-10-CM

## 2019-09-05 DIAGNOSIS — I15.2 HYPERTENSION ASSOCIATED WITH DIABETES: ICD-10-CM

## 2019-09-05 DIAGNOSIS — E78.5 HYPERLIPIDEMIA ASSOCIATED WITH TYPE 2 DIABETES MELLITUS: ICD-10-CM

## 2019-09-05 DIAGNOSIS — Z79.4 TYPE 2 DIABETES MELLITUS WITH OTHER SPECIFIED COMPLICATION, WITH LONG-TERM CURRENT USE OF INSULIN: ICD-10-CM

## 2019-09-05 DIAGNOSIS — E11.69 HYPERLIPIDEMIA ASSOCIATED WITH TYPE 2 DIABETES MELLITUS: ICD-10-CM

## 2019-09-05 DIAGNOSIS — E11.69 TYPE 2 DIABETES MELLITUS WITH OTHER SPECIFIED COMPLICATION, WITH LONG-TERM CURRENT USE OF INSULIN: Primary | ICD-10-CM

## 2019-09-05 LAB
ESTIMATED AVG GLUCOSE: 148 MG/DL (ref 68–131)
GLUCOSE SERPL-MCNC: 184 MG/DL (ref 70–110)
HBA1C MFR BLD HPLC: 6.8 % (ref 4–5.6)

## 2019-09-05 PROCEDURE — 99214 OFFICE O/P EST MOD 30 MIN: CPT | Mod: S$PBB,,, | Performed by: NURSE PRACTITIONER

## 2019-09-05 PROCEDURE — 99214 PR OFFICE/OUTPT VISIT, EST, LEVL IV, 30-39 MIN: ICD-10-PCS | Mod: S$PBB,,, | Performed by: NURSE PRACTITIONER

## 2019-09-05 PROCEDURE — 82962 GLUCOSE BLOOD TEST: CPT | Mod: PBBFAC | Performed by: NURSE PRACTITIONER

## 2019-09-05 PROCEDURE — 36415 COLL VENOUS BLD VENIPUNCTURE: CPT

## 2019-09-05 PROCEDURE — 99999 PR PBB SHADOW E&M-EST. PATIENT-LVL III: CPT | Mod: PBBFAC,,, | Performed by: NURSE PRACTITIONER

## 2019-09-05 PROCEDURE — 99999 PR PBB SHADOW E&M-EST. PATIENT-LVL III: ICD-10-PCS | Mod: PBBFAC,,, | Performed by: NURSE PRACTITIONER

## 2019-09-05 PROCEDURE — 83036 HEMOGLOBIN GLYCOSYLATED A1C: CPT

## 2019-09-05 PROCEDURE — 99213 OFFICE O/P EST LOW 20 MIN: CPT | Mod: PBBFAC | Performed by: NURSE PRACTITIONER

## 2019-09-05 NOTE — PROGRESS NOTES
"Subjective:         Patient ID: Ulysses Boreaux is a 69 y.o. male.  Patient's current PCP is Elza Pantoja MD.       Chief Complaint: Diabetes Mellitus      Ulysses Boreaux is a 69 y.o. Black or  male presenting for follow up for diabetes. Patient has been diagnosed with diabetes since 2004 and has the following complications associated diabetes: hypertension, hyperlipidemia. Blood glucose testing is performed regularly. In the past 1 week patient reports blood glucose values to have approximately ranged from 100-130s. Condition is controlled.     He denies any recent hospital admissions, emergency room visits, hypoglycemia.      Height: 6' 3" (190.5 cm)  //  Weight: 92.4 kg (203 lb 11.3 oz), Body mass index is 25.46 kg/m².  His blood sugar in clinic today is:   Lab Results   Component Value Date    POCGLU 184 (A) 09/05/2019       Labs reviewed and are noted below.    His most recent A1C is:   Lab Results   Component Value Date    HGBA1C 6.6 (H) 05/13/2019     Lab Results   Component Value Date    CPEPTIDE 1.33 10/20/2017     Lab Results   Component Value Date    GLUTAMICACID 0.00 10/20/2017         CURRENT DM MEDICATIONS:   Current Outpatient Medications   Medication Sig Dispense Refill    insulin NPH-insulin regular, 70/30, (NOVOLIN 70/30 U-100 INSULIN) 100 unit/mL (70-30) injection INJECT 21 UNITS UNDER THE SKIN EVERY MORNING, THEN 23 UNITS EVERY EVENING 20 mL 1    SITagliptin (JANUVIA) 100 MG Tab Take 1 tablet (100 mg total) by mouth once daily. 30 tablet 3     No current facility-administered medications for this visit.        Health Maintenance   Topic Date Due    Eye Exam  05/26/2018    Hemoglobin A1c  11/13/2019    Foot Exam  04/04/2020    Lipid Panel  05/13/2020    Low Dose Statin  09/05/2020    Pneumococcal Vaccine (65+ Low/Medium Risk) (2 of 2 - PPSV23) 04/01/2021    Colonoscopy  08/28/2024    TETANUS VACCINE  06/19/2029    Hepatitis C Screening  Completed    Abdominal " Aortic Aneurysm Screening  Completed       STANDARDS OF CARE:  Current Ophthalmologist/Optometrist: MAURA Adams. Last exam 2017.  Current Podiatrist: No  ACE/ARB: Yes  Statin: Yes  He  has attended diabetes education in the past.     LIFESTYLE:    BLOOD GLUCOSE TESTING: Patient reports testing on average a total of 2 times per day.    Review of Systems   Constitutional: Negative for activity change, appetite change and fatigue.   Eyes: Negative for visual disturbance.   Gastrointestinal: Negative for diarrhea, nausea and rectal pain.   Endocrine: Negative for polydipsia, polyphagia and polyuria.   Psychiatric/Behavioral: Negative for confusion.         Objective:      Physical Exam   Constitutional: He is oriented to person, place, and time. He appears well-developed and well-nourished.   HENT:   Head: Normocephalic and atraumatic.   Neck: Normal range of motion.   Cardiovascular: Normal rate.   Pulmonary/Chest: Effort normal.   Musculoskeletal: Normal range of motion.   Neurological: He is alert and oriented to person, place, and time.   Skin: Skin is warm and dry.   Psychiatric: He has a normal mood and affect. His behavior is normal. Judgment and thought content normal.   Nursing note and vitals reviewed.      Assessment:       1. Type 2 diabetes mellitus with other specified complication, with long-term current use of insulin    2. Hypertension associated with diabetes    3. Hyperlipidemia associated with type 2 diabetes mellitus        Plan:   Type 2 diabetes mellitus with other specified complication, with long-term current use of insulin  -     POCT Glucose, Hand-Held Device    - Condition controlled. Continue current regimen. DM education reviewed. Patient encouraged to carb count and exercise per recommendations. Labs and Referrals as noted. Patient instructed to send in log weekly for review and potential medication adjustments. RV scheduled in 3 months.     Hyperlipidemia associated with type 2 diabetes  mellitus    - LDL stable, patient working on diet and exercise to further decrease. Patient intolerant of Lipitor above 20mg.     Hypertension associated with diabetes    - Stable    Additional Plan Details:    1.) Patient was instructed to monitor blood glucose 2 - 3 x daily, fasting and ac dinner or at bedtime. Discussed ADA goal for fasting blood sugar, 80 - 130mg/dL; pp blood sugars below 180 mg/dl. Also, discussed prevention of hypoglycemia and the need to adjust goals to higher levels if persistent hypoglycemia.  Reminded to bring BG records or meter to each visit for review.    2.) The patient was explained the above plan and given opportunity to ask questions.  He understands, chooses and consents to this plan and accepts all the risks, which include but are not limited to the risks mentioned above. He understands the alternative of having no testing, interventions or treatments at this time. He left content and without further questions.     A total of 30 minutes was spent in face to face time, of which over 50% was spent in counseling patient on disease process, complications, treatment, and side effects of medications.        BEN Aguilar

## 2019-09-18 DIAGNOSIS — I15.2 HYPERTENSION ASSOCIATED WITH DIABETES: ICD-10-CM

## 2019-09-18 DIAGNOSIS — E11.59 HYPERTENSION ASSOCIATED WITH DIABETES: ICD-10-CM

## 2019-09-18 RX ORDER — AMLODIPINE BESYLATE 10 MG/1
TABLET ORAL
Qty: 90 TABLET | Refills: 0 | Status: SHIPPED | OUTPATIENT
Start: 2019-09-18 | End: 2019-10-30 | Stop reason: SDUPTHER

## 2019-09-18 RX ORDER — METHIMAZOLE 10 MG/1
TABLET ORAL
Qty: 60 TABLET | Refills: 0 | Status: SHIPPED | OUTPATIENT
Start: 2019-09-18 | End: 2019-10-16 | Stop reason: SDUPTHER

## 2019-09-26 ENCOUNTER — PATIENT OUTREACH (OUTPATIENT)
Dept: ADMINISTRATIVE | Facility: HOSPITAL | Age: 69
End: 2019-09-26

## 2019-09-30 ENCOUNTER — EXTERNAL CHRONIC CARE MANAGEMENT (OUTPATIENT)
Dept: PRIMARY CARE CLINIC | Facility: CLINIC | Age: 69
End: 2019-09-30
Payer: MEDICARE

## 2019-09-30 DIAGNOSIS — G47.00 INSOMNIA, UNSPECIFIED TYPE: ICD-10-CM

## 2019-09-30 PROCEDURE — 99487 CPLX CHRNC CARE 1ST 60 MIN: CPT | Mod: PBBFAC | Performed by: FAMILY MEDICINE

## 2019-09-30 PROCEDURE — 99487 CPLX CHRNC CARE 1ST 60 MIN: CPT | Mod: S$PBB,,, | Performed by: FAMILY MEDICINE

## 2019-09-30 PROCEDURE — 99487 PR COMPLX CHRON CARE MGMT, 1ST HR, PER MONTH: ICD-10-PCS | Mod: S$PBB,,, | Performed by: FAMILY MEDICINE

## 2019-10-01 RX ORDER — CLONAZEPAM 1 MG/1
TABLET ORAL
Qty: 30 TABLET | Refills: 0 | Status: SHIPPED | OUTPATIENT
Start: 2019-10-01 | End: 2019-10-10 | Stop reason: SDUPTHER

## 2019-10-01 NOTE — TELEPHONE ENCOUNTER
----- Message from Muriel Sheikh sent at 10/1/2019 10:55 AM CDT -----  Contact: pt   Type:  RX Refill Request    Who Called: pt   Refill or New Rx:refill   RX Name and Strength:clinopin 1 mg   How is the patient currently taking it? (ex. 1XDay):once daily   Is this a 30 day or 90 day RX: 30 days   Preferred [facility] with phone number[facility]   Local or Mail Order: local   Ordering Provider:Dr Pantoja   Would the patient rather a call back or a response via [facility]? Either   Best Call Back Number: [phone]  Additional Information:     [id number] - [place] - [address] AT [facility]  [address] [address]  Phone: [phone] Fax: [phone]

## 2019-10-03 ENCOUNTER — OFFICE VISIT (OUTPATIENT)
Dept: ENDOCRINOLOGY | Facility: CLINIC | Age: 69
End: 2019-10-03
Payer: MEDICARE

## 2019-10-03 VITALS
SYSTOLIC BLOOD PRESSURE: 138 MMHG | DIASTOLIC BLOOD PRESSURE: 78 MMHG | HEIGHT: 75 IN | WEIGHT: 205.69 LBS | RESPIRATION RATE: 18 BRPM | HEART RATE: 73 BPM | BODY MASS INDEX: 25.57 KG/M2

## 2019-10-03 DIAGNOSIS — E05.90 HYPERTHYROIDISM: Primary | ICD-10-CM

## 2019-10-03 DIAGNOSIS — R25.1 TREMOR OF BOTH HANDS: ICD-10-CM

## 2019-10-03 DIAGNOSIS — E04.1 THYROID NODULE: ICD-10-CM

## 2019-10-03 PROCEDURE — 99999 PR PBB SHADOW E&M-EST. PATIENT-LVL III: ICD-10-PCS | Mod: PBBFAC,,, | Performed by: INTERNAL MEDICINE

## 2019-10-03 PROCEDURE — 99213 OFFICE O/P EST LOW 20 MIN: CPT | Mod: PBBFAC | Performed by: INTERNAL MEDICINE

## 2019-10-03 PROCEDURE — 99999 PR PBB SHADOW E&M-EST. PATIENT-LVL III: CPT | Mod: PBBFAC,,, | Performed by: INTERNAL MEDICINE

## 2019-10-03 PROCEDURE — 99214 OFFICE O/P EST MOD 30 MIN: CPT | Mod: S$PBB,,, | Performed by: INTERNAL MEDICINE

## 2019-10-03 PROCEDURE — 99214 PR OFFICE/OUTPT VISIT, EST, LEVL IV, 30-39 MIN: ICD-10-PCS | Mod: S$PBB,,, | Performed by: INTERNAL MEDICINE

## 2019-10-03 NOTE — PROGRESS NOTES
Patient ID: Ulysses Boreaux is a 69 y.o. male.  Patient is here for follow up        Chief Complaint: Follow-up (3 month ) and Hyperthyroidism      HPI      Consultation was requested by Dr. Elza Pantoja       Diagnosed:  Patient has had a suppressed TSH dating back to 2014 in review of epic and his free T4 was normal up until recently his free T4 was elevated      Lab tests done after initial evaluation showed positive TSI antibody and elevation of T3 and T4 with persistent suppression of TSH    Previous radiology tests:  Thyroid ultrasound :  Yes and it showed relatively small single thyroid nodule with no concerning features  NM uptake and scan:  Yes had normal but homogeneous distribution    Previous thyroid surgery: No      Thyroid symptoms:tremulousness and sweating and occasionally some problems sleeping but he is convinced that the shaking is due to the pain medication that he is on as he states he gets better later in the day and today he reports the tremulousness is better    Labs regarding his thyroid function were better in August but his TSH was still suppressed    Weight has been stable, denies any nausea vomiting diarrhea or anxiety or mood changes    He does have shakiness of his arms but he thinks it is related to the pain medication    History of left for fingers being amputated from a previous job    Currently retired  Thyroid medications:  Methimazole  10 mg twice a day      Takes medication appropriately:  Yes    Sister has thyroid issues   Sandra Dale, NP is monitoring his diabetes which is well-controlled      I have reviewed the past medical, family and social history    Review of Systems   Constitutional: Negative for appetite change, diaphoresis, fatigue, fever and unexpected weight change.   HENT: Negative for sore throat and trouble swallowing.    Eyes: Negative for visual disturbance.   Respiratory: Negative for shortness of breath and wheezing.    Cardiovascular: Negative for  chest pain, palpitations and leg swelling.   Gastrointestinal: Negative for abdominal pain, diarrhea, nausea and vomiting.   Endocrine: Negative for cold intolerance, heat intolerance, polydipsia, polyphagia and polyuria.   Genitourinary: Negative for difficulty urinating and dysuria.   Musculoskeletal: Negative for arthralgias and joint swelling.   Skin: Negative for color change and rash.   Neurological: Negative for dizziness, tremors, numbness and headaches.   Hematological: Negative for adenopathy.   Psychiatric/Behavioral: Negative for confusion, dysphoric mood and sleep disturbance. The patient is not nervous/anxious.        Objective:      Physical Exam   Constitutional: He appears well-developed and well-nourished. No distress.   HENT:   Head: Normocephalic and atraumatic.   Eyes: Conjunctivae are normal. No scleral icterus.   Neck: No JVD present. No tracheal deviation present. No thyromegaly present.   Cardiovascular: Normal rate, regular rhythm, normal heart sounds and intact distal pulses. Exam reveals no gallop and no friction rub.   No murmur heard.  Pulmonary/Chest: Effort normal and breath sounds normal. No stridor. No respiratory distress. He has no wheezes. He has no rales. He exhibits no tenderness.   Musculoskeletal: He exhibits no edema or deformity.   Lymphadenopathy:     He has no cervical adenopathy.   Neurological: He is alert.   Does have mild tremor of outstretched hand   Skin: Skin is warm and dry. He is not diaphoretic.   Vitals reviewed.        Lab Review:   Lab Visit on 09/05/2019   Component Date Value    Hemoglobin A1C 09/05/2019 6.8*    Estimated Avg Glucose 09/05/2019 148*   Office Visit on 09/05/2019   Component Date Value    POC Glucose 09/05/2019 184*   Admission on 08/28/2019, Discharged on 08/28/2019   Component Date Value    POCT Glucose 08/28/2019 126*   Lab Visit on 08/12/2019   Component Date Value    T3, Free 08/12/2019 2.8     Free T4 08/12/2019 0.96     TSH  08/12/2019 <0.010*   Lab Visit on 06/12/2019   Component Date Value    Free T4 06/12/2019 1.77*    T3, Free 06/12/2019 6.0*    TSH 06/12/2019 <0.010*    Thyroid-Stim IG [facility]* 06/12/2019 2.05*    Thyrotropin Receptor Ab 06/12/2019 1.29    Office Visit on 05/14/2019   Component Date Value    POC Glucose 05/14/2019 252*   Lab Visit on 05/13/2019   Component Date Value    Microalbum.,U,Random 05/13/2019 7.0     Creatinine, Random Ur 05/13/2019 200.0     Microalb Creat Ratio 05/13/2019 3.5    Lab Visit on 05/13/2019   Component Date Value    Cholesterol 05/13/2019 153     Triglycerides 05/13/2019 52     HDL 05/13/2019 64     LDL Cholesterol 05/13/2019 78.6     Hdl/Cholesterol Ratio 05/13/2019 41.8     Total Cholesterol/HDL Ra* 05/13/2019 2.4     Non-HDL Cholesterol 05/13/2019 89     TSH 05/13/2019 <0.010*    Hemoglobin A1C 05/13/2019 6.6*    Estimated Avg Glucose 05/13/2019 143*    Free T4 05/13/2019 1.65*   Lab Visit on 04/08/2019   Component Date Value    WBC 04/08/2019 5.22     RBC 04/08/2019 4.80     Hemoglobin 04/08/2019 14.4     Hematocrit 04/08/2019 44.6     Mean Corpuscular Volume 04/08/2019 93     Mean Corpuscular Hemoglo* 04/08/2019 30.0     Mean Corpuscular Hemoglo* 04/08/2019 32.3     RDW 04/08/2019 12.8     Platelets 04/08/2019 240     MPV 04/08/2019 10.4     Immature Granulocytes 04/08/2019 0.2     Gran # (ANC) 04/08/2019 1.1*    Immature Grans (Abs) 04/08/2019 0.01     Lymph # 04/08/2019 3.2     Mono # 04/08/2019 0.7     Eos # 04/08/2019 0.2     Baso # 04/08/2019 0.06     nRBC 04/08/2019 0     Gran% 04/08/2019 20.8*    Lymph% 04/08/2019 60.9*    Mono% 04/08/2019 12.6     Eosinophil% 04/08/2019 4.4     Basophil% 04/08/2019 1.1     Differential Method 04/08/2019 Automated     Sodium 04/08/2019 140     Potassium 04/08/2019 4.6     Chloride 04/08/2019 102     CO2 04/08/2019 32*    Glucose 04/08/2019 86     BUN, Bld 04/08/2019 20     Creatinine 04/08/2019  1.3     Calcium 04/08/2019 10.4     Total Protein 04/08/2019 7.9     Albumin 04/08/2019 4.0     Total Bilirubin 04/08/2019 0.7     Alkaline Phosphatase 04/08/2019 64     AST 04/08/2019 25     ALT 04/08/2019 32     Anion Gap 04/08/2019 6*    eGFR if African American 04/08/2019 >60.0     eGFR if non African Amer* 04/08/2019 56.9*    Cholesterol 04/08/2019 156     Triglycerides 04/08/2019 58     HDL 04/08/2019 67     LDL Cholesterol 04/08/2019 77.4     Hdl/Cholesterol Ratio 04/08/2019 42.9     Total Cholesterol/HDL Ra* 04/08/2019 2.3     Non-HDL Cholesterol 04/08/2019 89        Assessment:     1. Hyperthyroidism  T4, free    TSH    T3, free    Continue methimazole, check thyroid labs today   2. Thyroid nodule      Asymptomatic   3. Tremor of both hands      Patient reports had this while hyperthyroidism may just have exacerbated as it is better but still present      Plan:   Hyperthyroidism  Comments:  Continue methimazole, check thyroid labs today  Orders:  -     T4, free; Future; Expected date: 10/03/2019  -     TSH; Future; Expected date: 10/03/2019  -     T3, free; Future; Expected date: 10/03/2019    Thyroid nodule  Comments:  Asymptomatic    Tremor of both hands  Comments:  Patient reports had this while hyperthyroidism may just have exacerbated as it is better but still present          Follow up in about 3 months (around 1/3/2020).    Labs prior to appointment? not applicable     Disclaimer:  This note may have been partially prepared using voice recognition software and  it may have not been extensively proofed, as such there could be errors within the text such as sound alike errors.

## 2019-10-09 ENCOUNTER — NUTRITION (OUTPATIENT)
Dept: DIABETES | Facility: CLINIC | Age: 69
End: 2019-10-09
Payer: MEDICARE

## 2019-10-09 VITALS — BODY MASS INDEX: 25.34 KG/M2 | WEIGHT: 203.81 LBS | HEIGHT: 75 IN

## 2019-10-09 DIAGNOSIS — E11.9 TYPE 2 DIABETES MELLITUS WITHOUT COMPLICATION, WITH LONG-TERM CURRENT USE OF INSULIN: Primary | ICD-10-CM

## 2019-10-09 DIAGNOSIS — Z79.4 TYPE 2 DIABETES MELLITUS WITHOUT COMPLICATION, WITH LONG-TERM CURRENT USE OF INSULIN: Primary | ICD-10-CM

## 2019-10-09 PROCEDURE — 99999 PR PBB SHADOW E&M-EST. PATIENT-LVL I: ICD-10-PCS | Mod: PBBFAC,,, | Performed by: DIETITIAN, REGISTERED

## 2019-10-09 PROCEDURE — G0108 DIAB MANAGE TRN  PER INDIV: HCPCS | Mod: PBBFAC | Performed by: DIETITIAN, REGISTERED

## 2019-10-09 PROCEDURE — 99211 OFF/OP EST MAY X REQ PHY/QHP: CPT | Mod: PBBFAC | Performed by: DIETITIAN, REGISTERED

## 2019-10-09 PROCEDURE — 99999 PR PBB SHADOW E&M-EST. PATIENT-LVL I: CPT | Mod: PBBFAC,,, | Performed by: DIETITIAN, REGISTERED

## 2019-10-09 RX ORDER — HYDROCODONE BITARTRATE AND ACETAMINOPHEN 10; 325 MG/1; MG/1
1 TABLET ORAL EVERY 12 HOURS PRN
Qty: 60 TABLET | Refills: 0 | Status: SHIPPED | OUTPATIENT
Start: 2019-11-18 | End: 2019-12-18

## 2019-10-09 RX ORDER — HYDROCODONE BITARTRATE AND ACETAMINOPHEN 10; 325 MG/1; MG/1
1 TABLET ORAL EVERY 12 HOURS PRN
Qty: 60 TABLET | Refills: 0 | Status: SHIPPED | OUTPATIENT
Start: 2019-10-19 | End: 2019-11-18

## 2019-10-09 NOTE — PROGRESS NOTES
"Diabetes Education  Author: Mu Sharma RD  Date: 10/9/2019    Diabetes Care Management Summary  Diabetes Education Record Assessment/Progress: Comprehensive/Group  Current Diabetes Risk Level: Moderate     Referring Provider: Sandra Dale NP  69 y.o. male in clinic today for diabetes education f/u.     Changes that pt has made to improve BG:  Eater smaller portions, sky of rice and pasta, no sodas, small portions of juice and sweet tea, very little snacking       Weight: 92.4 kg (203 lb 13 oz)   Height: 6' 3" (190.5 cm)   Body mass index is 25.48 kg/m².       Last A1c:   Lab Results   Component Value Date    HGBA1C 6.8 (H) 09/05/2019       Lab Results   Component Value Date    HGBA1C 7.0 (H) 02/05/2019     Last visit with Diabetes Educator: : 04/08/2019      Diabetes Type  Diabetes Type : Type II    Diabetes History  Current Treatment: Diet, Oral Medication, Insulin(Novolin 70/30, 21 units acbreakfast, 23 units acdinner  // Januvia, 100 mg )  Reviewed Problem List with Patient: Yes       Health Maintenance was reviewed today with patient. Discussed with patient importance of routine eye exams, foot exams/foot care, blood work (i.e.: A1c, microalbumin, and lipid), dental visits, yearly flu vaccine, and pneumonia vaccine as indicated by PCP. Patient verbalized understanding.     Health Maintenance Topics with due status: Not Due       Topic Last Completion Date    Pneumococcal Vaccine (65+ Low/Medium Risk) 10/03/2017    Foot Exam 04/04/2019    Lipid Panel 05/13/2019    Urine Microalbumin 05/13/2019    TETANUS VACCINE 06/19/2019    Colonoscopy 08/28/2019    Hemoglobin A1c 09/05/2019    Low Dose Statin 10/03/2019     Health Maintenance Due   Topic Date Due    Eye Exam  05/26/2018       Nutrition  Meal Planning: skipping meals(2 meals per day - if pt has breakfast, skips lunch )  What type of sweetener do you use?: Equal  What type of beverages do you drink?: diet soda/tea, water  Meal Plan 24 Hour Recall " - Breakfast: hash brown, smoked sausage, 2 eggs, toast, 4 oz orange juice   Meal Plan 24 Hour Recall - Lunch: none  Meal Plan 24 Hour Recall - Dinner: creamed potatoes, 1 pc fried chicken, green beans, 1 sl [place] toast, 4 oz tea w/Equal   Meal Plan 24 Hour Recall - Snack: none    Monitoring   Self Monitoring : checking bid - fasting and 6p (before dinner) // per meter review - fastin-114; acdinner:  155, 126, 91, 138, 79, 83, 128  Blood Glucose Logs: No  Do you use a personal continuous glucose monitor?: No  In the last month, how often have you had a low blood sugar reaction?: once(4 am one morning)  What are your symptoms of low blood sugar?: sweating, shaking   How do you treat low blood sugar?: ate cookies   Can you tell when your blood sugar is too high?: no    Exercise   Exercise Type: none(washes car and truck once a week; may start walking once cooler )    Current Diabetes Treatment   Current Treatment: Diet, Oral Medication, Insulin(Novolin 70/30, 21 units acbreakfast, 23 units acdinner  // Januvia, 100 mg )    Social History  Primary Support: Self                                Barriers to Change  Barriers to Change: None  Learning Challenges : None    Readiness to Learn   Readiness to Learn : Eager         Diabetes Education Assessment/Progress  Diabetes Disease Process (diabetes disease process and treatment options): Not Covered/Deferred  Nutrition (Incorporating nutritional management into one's lifestyle): Discussion, Individual Session, Demonstrates Understanding/Competency (verbalizes/demonstrates)  Physical Activity (incorporating physical activity into one's lifestyle): Discussion, Individual Session  Medications (states correct name, dose, onset, peak, duration, side effects & timing of meds): Discussion, Individual Session, Demonstrates Understanding/Competency(verbalizes/demonstrates)  Monitoring (monitoring blood glucose/other parameters & using results): Discussion, Individual Session,  Demonstrates Understanding/Competency (verbalizes/demonstrates)  Acute Complications (preventing, detecting, and treating acute complications): Discussion, Individual Session, Demonstrates Understanding/Competency (verbalizes/demonstrates)  Chronic Complications (preventing, detecting, and treating chronic complications): Discussion, Individual Session, Demonstrates Understanding/Competency (verbalizes/demonstrates)  Clinical (diabetes, other pertinent medical history, and relevant comorbidities reviewed during visit): Discussion, Individual Session  Cognitive (knowledge of self-management skills, functional health literacy): Discussion, Individual Session  Psychosocial (emotional response to diabetes): Not Covered/Deferred  Diabetes Distress and Support Systems: Not Covered/Deferred  Behavioral (readiness for change, lifestyle practices, self-care behaviors): Discussion, Individual Session    Goals  Patient has selected/evaluated goals during today's session: Yes, selected  Healthy Eating: % Met(Eat 3 meals per day, limiting carbs to 45-60 grams per meal; cont to avoid soft drinks and juice except when treating hypoglycemia)  Met Percentage : 75%  Physical Activity: % Met(Increase walking from 2-3 days/wk to 3-4 days/wk)  Met Percentage : 0%  Monitoring: % Met(check bg 2 times/day)  Met Percentage : 100%      Diabetes Self-Management Support Plan  Exercise/Nutrition: other  Other exercise/nutrition: start walking in neighborhood // refer to carbohydrate list for appropriate serving sizes  Review Status: Patient has selected and agrees to support plan.    Diabetes Care Plan/Intervention  Education Plan/Intervention: Individual Follow-Up DSMT, Eye Exam(1 year recall // pt to schedule eye exam )   Pt is waiting until the 1st of the year for eye exam     Diabetes Meal Plan  Calories: 2200  Carbohydrate Per Meal: 45-60g  Carbohydrate Per Snack : 15-20g    Today's Self-Management Care Plan was developed with the  patient's input and is based on barriers identified during today's assessment.    The long and short-term goals in the care plan were written with the patient/caregiver's input. The patient has agreed to work toward these goals to improve his overall diabetes control.      The patient received a copy of today's self-management plan and verbalized understanding of the care plan, goals, and all of today's instructions.      The patient was encouraged to communicate with his physician and care team regarding his condition(s) and treatment.  I provided the patient with my contact information today and encouraged him to contact me via phone or patient portal as needed.     Education Units of Time   Time Spent: 30 min

## 2019-10-10 ENCOUNTER — OFFICE VISIT (OUTPATIENT)
Dept: INTERNAL MEDICINE | Facility: CLINIC | Age: 69
End: 2019-10-10
Payer: MEDICARE

## 2019-10-10 VITALS
BODY MASS INDEX: 25.49 KG/M2 | DIASTOLIC BLOOD PRESSURE: 84 MMHG | TEMPERATURE: 98 F | HEIGHT: 75 IN | HEART RATE: 83 BPM | SYSTOLIC BLOOD PRESSURE: 146 MMHG | WEIGHT: 205 LBS | OXYGEN SATURATION: 97 %

## 2019-10-10 DIAGNOSIS — G89.21 CHRONIC PAIN DUE TO TRAUMA: ICD-10-CM

## 2019-10-10 DIAGNOSIS — E11.69 HYPERLIPIDEMIA ASSOCIATED WITH TYPE 2 DIABETES MELLITUS: ICD-10-CM

## 2019-10-10 DIAGNOSIS — E05.90 HYPERTHYROIDISM: ICD-10-CM

## 2019-10-10 DIAGNOSIS — E11.59 HYPERTENSION ASSOCIATED WITH DIABETES: ICD-10-CM

## 2019-10-10 DIAGNOSIS — I15.2 HYPERTENSION ASSOCIATED WITH DIABETES: ICD-10-CM

## 2019-10-10 DIAGNOSIS — E11.8 CONTROLLED TYPE 2 DIABETES MELLITUS WITH COMPLICATION, WITHOUT LONG-TERM CURRENT USE OF INSULIN: Primary | ICD-10-CM

## 2019-10-10 DIAGNOSIS — E78.5 HYPERLIPIDEMIA ASSOCIATED WITH TYPE 2 DIABETES MELLITUS: ICD-10-CM

## 2019-10-10 PROCEDURE — 99999 PR PBB SHADOW E&M-EST. PATIENT-LVL V: ICD-10-PCS | Mod: PBBFAC,,, | Performed by: FAMILY MEDICINE

## 2019-10-10 PROCEDURE — 99999 PR PBB SHADOW E&M-EST. PATIENT-LVL V: CPT | Mod: PBBFAC,,, | Performed by: FAMILY MEDICINE

## 2019-10-10 PROCEDURE — 90662 IIV NO PRSV INCREASED AG IM: CPT | Mod: PBBFAC

## 2019-10-10 PROCEDURE — 99214 OFFICE O/P EST MOD 30 MIN: CPT | Mod: S$PBB,,, | Performed by: FAMILY MEDICINE

## 2019-10-10 PROCEDURE — 99215 OFFICE O/P EST HI 40 MIN: CPT | Mod: PBBFAC | Performed by: FAMILY MEDICINE

## 2019-10-10 PROCEDURE — 99214 PR OFFICE/OUTPT VISIT, EST, LEVL IV, 30-39 MIN: ICD-10-PCS | Mod: S$PBB,,, | Performed by: FAMILY MEDICINE

## 2019-10-10 NOTE — PROGRESS NOTES
Subjective:       Patient ID: Ulysses Boreaux is a 69 y.o. male.    Chief Complaint: Follow-up    Patient presents to clinic today for followup of chronic conditions.     Review of Systems   Constitutional: Negative for chills, fatigue, fever and unexpected weight change.   Eyes: Negative for visual disturbance.   Respiratory: Negative for shortness of breath.    Cardiovascular: Negative for chest pain.   Musculoskeletal: Negative for myalgias.   Neurological: Negative for headaches.       Objective:      Physical Exam   Constitutional: He is oriented to person, place, and time. He appears well-developed and well-nourished. No distress.   HENT:   Head: Normocephalic and atraumatic.   Eyes: Pupils are equal, round, and reactive to light. Conjunctivae and EOM are normal. No scleral icterus.   Cardiovascular: Normal rate and regular rhythm. Exam reveals no gallop and no friction rub.   No murmur heard.  Pulmonary/Chest: Effort normal and breath sounds normal.   Neurological: He is alert and oriented to person, place, and time. No cranial nerve deficit. Gait normal.   Psychiatric: He has a normal mood and affect.   Vitals reviewed.      Assessment:       1. Controlled type 2 diabetes mellitus with complication, without long-term current use of insulin    2. Hyperlipidemia associated with type 2 diabetes mellitus    3. Hypertension associated with diabetes    4. Chronic pain due to trauma    5. Hyperthyroidism        Plan:     Problem List Items Addressed This Visit     Chronic pain due to trauma    Overview     Followed by pain clinic         Controlled type 2 diabetes mellitus with complication, without long-term current use of insulin - Primary    Current Assessment & Plan     a1c 6.8; continue current medications         Relevant Orders    Ambulatory referral to Optometry    Hyperlipidemia associated with type 2 diabetes mellitus    Current Assessment & Plan     Status pending labs, continue lipitor            Hypertension associated with diabetes    Current Assessment & Plan     Borderline, continue current medications and monitoring         Hyperthyroidism    Overview     Followed by Dr. Jackson, Endocrinology               Health Maintenance reviewed/updated. Flu vaccine today.

## 2019-10-16 RX ORDER — METHIMAZOLE 10 MG/1
TABLET ORAL
Qty: 60 TABLET | Refills: 0 | Status: SHIPPED | OUTPATIENT
Start: 2019-10-16 | End: 2019-11-30 | Stop reason: SDUPTHER

## 2019-10-17 ENCOUNTER — OFFICE VISIT (OUTPATIENT)
Dept: PAIN MEDICINE | Facility: CLINIC | Age: 69
End: 2019-10-17
Payer: MEDICARE

## 2019-10-17 ENCOUNTER — LAB VISIT (OUTPATIENT)
Dept: LAB | Facility: HOSPITAL | Age: 69
End: 2019-10-17
Attending: FAMILY MEDICINE
Payer: MEDICARE

## 2019-10-17 VITALS
BODY MASS INDEX: 25.49 KG/M2 | WEIGHT: 205 LBS | HEART RATE: 88 BPM | RESPIRATION RATE: 18 BRPM | HEIGHT: 75 IN | DIASTOLIC BLOOD PRESSURE: 84 MMHG | SYSTOLIC BLOOD PRESSURE: 135 MMHG

## 2019-10-17 DIAGNOSIS — G89.4 CHRONIC PAIN DISORDER: ICD-10-CM

## 2019-10-17 DIAGNOSIS — E11.69 HYPERLIPIDEMIA ASSOCIATED WITH TYPE 2 DIABETES MELLITUS: ICD-10-CM

## 2019-10-17 DIAGNOSIS — E78.5 HYPERLIPIDEMIA ASSOCIATED WITH TYPE 2 DIABETES MELLITUS: ICD-10-CM

## 2019-10-17 DIAGNOSIS — S68.119A TRAUMATIC AMPUTATION OF DIGIT OF ONE HAND WITHOUT COMPLICATION: Primary | ICD-10-CM

## 2019-10-17 DIAGNOSIS — M79.642 LEFT HAND PAIN: ICD-10-CM

## 2019-10-17 DIAGNOSIS — M79.642 PAIN OF LEFT HAND: ICD-10-CM

## 2019-10-17 DIAGNOSIS — G89.21 CHRONIC PAIN DUE TO TRAUMA: ICD-10-CM

## 2019-10-17 LAB
ALBUMIN SERPL BCP-MCNC: 4.1 G/DL (ref 3.5–5.2)
ALP SERPL-CCNC: 75 U/L (ref 55–135)
ALT SERPL W/O P-5'-P-CCNC: 23 U/L (ref 10–44)
ANION GAP SERPL CALC-SCNC: 10 MMOL/L (ref 8–16)
AST SERPL-CCNC: 26 U/L (ref 10–40)
BILIRUB SERPL-MCNC: 0.4 MG/DL (ref 0.1–1)
BUN SERPL-MCNC: 16 MG/DL (ref 8–23)
CALCIUM SERPL-MCNC: 9.8 MG/DL (ref 8.7–10.5)
CHLORIDE SERPL-SCNC: 101 MMOL/L (ref 95–110)
CHOLEST SERPL-MCNC: 170 MG/DL (ref 120–199)
CHOLEST/HDLC SERPL: 2.6 {RATIO} (ref 2–5)
CO2 SERPL-SCNC: 28 MMOL/L (ref 23–29)
CREAT SERPL-MCNC: 1.4 MG/DL (ref 0.5–1.4)
EST. GFR  (AFRICAN AMERICAN): 59.7 ML/MIN/1.73 M^2
EST. GFR  (NON AFRICAN AMERICAN): 51.6 ML/MIN/1.73 M^2
GLUCOSE SERPL-MCNC: 100 MG/DL (ref 70–110)
HDLC SERPL-MCNC: 66 MG/DL (ref 40–75)
HDLC SERPL: 38.8 % (ref 20–50)
LDLC SERPL CALC-MCNC: 94.4 MG/DL (ref 63–159)
NONHDLC SERPL-MCNC: 104 MG/DL
POTASSIUM SERPL-SCNC: 3.8 MMOL/L (ref 3.5–5.1)
PROT SERPL-MCNC: 8 G/DL (ref 6–8.4)
SODIUM SERPL-SCNC: 139 MMOL/L (ref 136–145)
TRIGL SERPL-MCNC: 48 MG/DL (ref 30–150)

## 2019-10-17 PROCEDURE — 80061 LIPID PANEL: CPT

## 2019-10-17 PROCEDURE — 99999 PR PBB SHADOW E&M-EST. PATIENT-LVL IV: ICD-10-PCS | Mod: PBBFAC,,, | Performed by: PHYSICIAN ASSISTANT

## 2019-10-17 PROCEDURE — 99999 PR PBB SHADOW E&M-EST. PATIENT-LVL IV: CPT | Mod: PBBFAC,,, | Performed by: PHYSICIAN ASSISTANT

## 2019-10-17 PROCEDURE — 36415 COLL VENOUS BLD VENIPUNCTURE: CPT

## 2019-10-17 PROCEDURE — 99214 OFFICE O/P EST MOD 30 MIN: CPT | Mod: PBBFAC | Performed by: PHYSICIAN ASSISTANT

## 2019-10-17 PROCEDURE — 99214 OFFICE O/P EST MOD 30 MIN: CPT | Mod: S$PBB,,, | Performed by: PHYSICIAN ASSISTANT

## 2019-10-17 PROCEDURE — 99214 PR OFFICE/OUTPT VISIT, EST, LEVL IV, 30-39 MIN: ICD-10-PCS | Mod: S$PBB,,, | Performed by: PHYSICIAN ASSISTANT

## 2019-10-17 PROCEDURE — 80053 COMPREHEN METABOLIC PANEL: CPT

## 2019-10-17 NOTE — PROGRESS NOTES
Subjective:     Chief Complaint:  Left Hand Pain    History of Present Illness:  This patient is a 65 year old male who presents today for f/u complaining of the above noted pains. The patient describes this pain as follows.    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 work, left lower quadrant pain since hernia surgery in 2004  - timing (constant, intermittent): Constant  - character (sharp, dull, aching, burning): Throbbing  - radiating: No  - dermatomal distribution: No  - side: Left   - aggravating factors: Nothing worsens left digit pain, left lower quadrant pain worse with exercise or walking  - relieving factors: Medication / Norco  - antecedent trauma: Amputation via skill saw and 1997, hernia repair in 2004  - prior spinal surgery: None  - pertinent negatives: No fever, No chills, No weight loss, No bladder dysfunction, No bowel dysfunction, No extremity weakness, No saddle anesthesia  - medications tried: Norco, Percocet, over-the-counter medications, compound pain cream  - other therapies tried (physical therapy, injections):     >> physical therapy tried in the past    >> no prior injections        Imaging/ Labs (reviewed on 10/17/2019):    7/12/2018 US ABDOMINAL AORTA  CLINICAL HISTORY:  Abnormal findings on diagnostic imaging of other specified body structures  TECHNIQUE:  Limited ultrasound was performed of the abdominal aorta, with cross sectional diameter measurements obtained.  COMPARISON:  01/10/2018  FINDINGS:  The proximal abdominal aorta measures 2.7 cm.  The mid abdominal aorta measures 1.8 cm.  The distal abdominal aorta measures 1.9 cm, slightly ectatic and unchanged from prior.  The right iliac artery measures 1.0 cm.  The left iliac artery measures 1.1 cm.  Aortoiliac atherosclerosis: Mild  Impression: Stable examination with slight distal abdominal aortic ectasia.  Negative for aneurysm.         ROS:  CONSTITUTIONAL:  "No fever, chills, weight loss  SKIN: No rash or itching  CARDIOVASCULAR:  No chest pain, palpitations  RESPIRATORY: No shortness of breath  GASTROINTESTINAL: No diarrhea, No constipation, abdominal pain, left lower quadrant  GENITOURINARY: No urinary incontinence    MUSCULOSKELETAL:  - patient reports pain as above, see chief complaint     NEUROLOGICAL:   - Pain as above  - Strength in lower extremities is normal  - Sensation in lower extremities is normal  - No bowel or bladder incontinence     PSYCHIATRIC: No change in mood noticed         Objective:   Vitals:  /84 (BP Location: Right arm, Patient Position: Sitting, BP Method: Medium (Automatic))   Pulse 88   Resp 18   Ht 6' 3" (1.905 m)   Wt 93 kg (205 lb)   BMI 25.62 kg/m²    (reviewed on 10/17/2019)     General: alert and oriented, in no apparent distress  Gait: normal gait  Skin: No rashes, No discoloration   HEENT: EOMI  Respiratory: respirations nonlabored  Cardiology: No obvious peripheral edema    Musculoskeletal:  - Full cervical ROM  - No pain with lumbar flexion/ extension  - Left upper extremity range of motion intact throughout  - Digital stumps are hypersensitive to palpation, hypersensitivity does not extend further proximal  - No color change, no swelling, no changes in hair growth along left hand     Neuro:  - Lower extremities:      >> 5/5 strength bilaterally, throughout, symmetric  - Upper extremities:    >> 5/5 strength bilaterally    Psych: mood and affect appropriate        Assessment:     Encounter Diagnoses   Name Primary?   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 Interventional: None.    2. Pharmacologic:   - Refill Norco 10/325 mg PO BID PRN (60 tabs) x 2 months. Paper Rx given. Patient tolerating opioids with no side effects, obtaining good pain control with functional " improvement. He tolerates this medication well, and he denies any drowsiness or constipation.  - Opioid contract signed on 5/7/2018.     - LA  reviewed and appropriate. Last filled 9-21-19.  Will post date accordingly.    3. Rehabilitative: Encouraged regular exercise.    4. Diagnostic: None for now.    5. Follow up: 8 weeks for med refill      - I discussed the risks, benefits, and alternatives to potential treatment options. All questions and concerns were fully addressed today in clinic. Dr. Olmedo was consulted regarding the patient plan and agrees.           >> UDS:  8-18-15 :: appropriate  12-29-15 :: appropriate  5-3-16 :: appropriate  10-18-16 :: appropriate  4/13/2017 :: appropriate  9/29/2017 :: appropriate  3/9/2018 :: appropriate  9/4/2018 :: appropriate  6/19/2019 :: appropriate

## 2019-10-30 DIAGNOSIS — E11.59 HYPERTENSION ASSOCIATED WITH DIABETES: ICD-10-CM

## 2019-10-30 DIAGNOSIS — I15.2 HYPERTENSION ASSOCIATED WITH DIABETES: ICD-10-CM

## 2019-10-30 DIAGNOSIS — E78.2 COMBINED HYPERLIPIDEMIA ASSOCIATED WITH TYPE 2 DIABETES MELLITUS: ICD-10-CM

## 2019-10-30 DIAGNOSIS — E11.69 COMBINED HYPERLIPIDEMIA ASSOCIATED WITH TYPE 2 DIABETES MELLITUS: ICD-10-CM

## 2019-10-30 RX ORDER — ATORVASTATIN CALCIUM 20 MG/1
TABLET, FILM COATED ORAL
Qty: 90 TABLET | Refills: 1 | Status: SHIPPED | OUTPATIENT
Start: 2019-10-30 | End: 2019-11-05 | Stop reason: SDUPTHER

## 2019-10-30 NOTE — TELEPHONE ENCOUNTER
----- Message from Damien Adam sent at 10/29/2019  5:08 PM CDT -----  Contact: Patient   Patient is waiting on atorvastatin (LIPITOR) 20 MG tablet to be refilled    [id number] - [place] - [address] AT [facility] [address]    [phone]

## 2019-10-31 ENCOUNTER — EXTERNAL CHRONIC CARE MANAGEMENT (OUTPATIENT)
Dept: PRIMARY CARE CLINIC | Facility: CLINIC | Age: 69
End: 2019-10-31
Payer: MEDICARE

## 2019-10-31 PROCEDURE — 99490 CHRNC CARE MGMT STAFF 1ST 20: CPT | Mod: PBBFAC | Performed by: FAMILY MEDICINE

## 2019-10-31 PROCEDURE — 99490 PR CHRONIC CARE MGMT, 1ST 20 MIN: ICD-10-PCS | Mod: S$PBB,,, | Performed by: FAMILY MEDICINE

## 2019-10-31 PROCEDURE — 99490 CHRNC CARE MGMT STAFF 1ST 20: CPT | Mod: S$PBB,,, | Performed by: FAMILY MEDICINE

## 2019-11-01 RX ORDER — NEBIVOLOL 10 MG/1
10 TABLET ORAL DAILY
Qty: 90 TABLET | Refills: 1 | Status: SHIPPED | OUTPATIENT
Start: 2019-11-01 | End: 2020-04-15 | Stop reason: SDUPTHER

## 2019-11-01 RX ORDER — LOSARTAN POTASSIUM AND HYDROCHLOROTHIAZIDE 25; 100 MG/1; MG/1
1 TABLET ORAL DAILY
Qty: 90 TABLET | Refills: 1 | Status: SHIPPED | OUTPATIENT
Start: 2019-11-01 | End: 2020-05-26

## 2019-11-01 RX ORDER — AMLODIPINE BESYLATE 10 MG/1
TABLET ORAL
Qty: 90 TABLET | Refills: 1 | Status: SHIPPED | OUTPATIENT
Start: 2019-11-01 | End: 2020-03-25

## 2019-11-04 DIAGNOSIS — J30.9 ALLERGIC RHINITIS: ICD-10-CM

## 2019-11-04 DIAGNOSIS — G47.00 INSOMNIA, UNSPECIFIED TYPE: ICD-10-CM

## 2019-11-04 RX ORDER — FLUTICASONE PROPIONATE 50 MCG
SPRAY, SUSPENSION (ML) NASAL
Qty: 16 ML | Refills: 11 | Status: SHIPPED | OUTPATIENT
Start: 2019-11-04 | End: 2019-11-05 | Stop reason: SDUPTHER

## 2019-11-05 DIAGNOSIS — E11.69 COMBINED HYPERLIPIDEMIA ASSOCIATED WITH TYPE 2 DIABETES MELLITUS: ICD-10-CM

## 2019-11-05 DIAGNOSIS — E78.2 COMBINED HYPERLIPIDEMIA ASSOCIATED WITH TYPE 2 DIABETES MELLITUS: ICD-10-CM

## 2019-11-05 DIAGNOSIS — J30.9 ALLERGIC RHINITIS: ICD-10-CM

## 2019-11-05 RX ORDER — ATORVASTATIN CALCIUM 20 MG/1
TABLET, FILM COATED ORAL
Qty: 30 TABLET | Refills: 5 | Status: SHIPPED | OUTPATIENT
Start: 2019-11-05 | End: 2020-04-15 | Stop reason: SDUPTHER

## 2019-11-05 RX ORDER — CLONAZEPAM 1 MG/1
TABLET ORAL
Qty: 30 TABLET | Refills: 5 | Status: SHIPPED | OUTPATIENT
Start: 2019-11-05 | End: 2020-05-28

## 2019-11-05 RX ORDER — FLUTICASONE PROPIONATE 50 MCG
SPRAY, SUSPENSION (ML) NASAL
Qty: 16 ML | Refills: 11 | Status: SHIPPED | OUTPATIENT
Start: 2019-11-05 | End: 2020-04-15 | Stop reason: SDUPTHER

## 2019-11-08 ENCOUNTER — OFFICE VISIT (OUTPATIENT)
Dept: OPHTHALMOLOGY | Facility: CLINIC | Age: 69
End: 2019-11-08
Payer: MEDICARE

## 2019-11-08 DIAGNOSIS — Z96.1 PSEUDOPHAKIA OF RIGHT EYE: ICD-10-CM

## 2019-11-08 DIAGNOSIS — H52.7 REFRACTIVE ERROR: ICD-10-CM

## 2019-11-08 DIAGNOSIS — Z13.5 GLAUCOMA SCREENING: ICD-10-CM

## 2019-11-08 DIAGNOSIS — H25.12 NUCLEAR SCLEROSIS OF LEFT EYE: ICD-10-CM

## 2019-11-08 DIAGNOSIS — I15.2 HYPERTENSION ASSOCIATED WITH DIABETES: Primary | ICD-10-CM

## 2019-11-08 DIAGNOSIS — E11.9 DIABETES MELLITUS TYPE 2 WITHOUT RETINOPATHY: ICD-10-CM

## 2019-11-08 DIAGNOSIS — E11.59 HYPERTENSION ASSOCIATED WITH DIABETES: Primary | ICD-10-CM

## 2019-11-08 PROCEDURE — 99212 OFFICE O/P EST SF 10 MIN: CPT | Mod: PBBFAC | Performed by: OPTOMETRIST

## 2019-11-08 PROCEDURE — 99999 PR PBB SHADOW E&M-EST. PATIENT-LVL II: ICD-10-PCS | Mod: PBBFAC,,, | Performed by: OPTOMETRIST

## 2019-11-08 PROCEDURE — 92015 DETERMINE REFRACTIVE STATE: CPT | Mod: ,,, | Performed by: OPTOMETRIST

## 2019-11-08 PROCEDURE — 99999 PR PBB SHADOW E&M-EST. PATIENT-LVL II: CPT | Mod: PBBFAC,,, | Performed by: OPTOMETRIST

## 2019-11-08 PROCEDURE — 92014 PR EYE EXAM, EST PATIENT,COMPREHESV: ICD-10-PCS | Mod: S$PBB,,, | Performed by: OPTOMETRIST

## 2019-11-08 PROCEDURE — 92014 COMPRE OPH EXAM EST PT 1/>: CPT | Mod: S$PBB,,, | Performed by: OPTOMETRIST

## 2019-11-08 PROCEDURE — 92015 PR REFRACTION: ICD-10-PCS | Mod: ,,, | Performed by: OPTOMETRIST

## 2019-11-08 NOTE — PROGRESS NOTES
HPI     Last [facility] exam 05/26/2017  Diabetic/IDDM  Pseudophakia, OD  Cataract, nuclear, OS  Screening for glaucoma  RE    Last edited by Daniel Tolbert MA on 11/8/2019 10:24 AM. (History)            Assessment /Plan     For exam results, see Encounter Report.    Hypertension associated with diabetes    Diabetes mellitus type 2 without retinopathy    Pseudophakia of right eye    Nuclear sclerosis of left eye    Glaucoma screening    Refractive error      No diabetic/HTN retinopathy OU.  Stable EMILY OD.  Moderate NS cataract OS = will follow.  OH OK OU otherwise.  Spec Rx given versus OTC readers +2.25.  RTC one year.

## 2019-11-08 NOTE — LETTER
November 8, 2019      Elza Pantoja MD  [address] [place] [address] [address]en [place] [id number]           [facility]  [address] [place] [address] [address] [place] [address]  Phone: [phone]  Fax: [phone]          Patient: Ulysses Boreaux   MR Number: [id number]   YOB: 1950   Date of Visit: 11/8/2019       Dear Dr. Elza Pantoja:    Thank you for referring Ulysses Boreaux to me for evaluation. Attached you will find relevant portions of my assessment and plan of care.    If you have questions, please do not hesitate to call me. I look forward to following Ulysses Boreaux along with you.    Sincerely,    MAURA Adams, OD    Enclosure  CC:  No Recipients    If you would like to receive this communication electronically, please contact [email or web address] or [phone] to request more information on [employer] Link access.    For providers and/or their staff who would like to refer a patient to [facility], please contact us through our one-stop-shop provider referral line, Srinivas Moss, at [phone].    If you feel you have received this communication in error or would no longer like to receive these types of communications, please e-mail [email or web address]

## 2019-11-30 ENCOUNTER — EXTERNAL CHRONIC CARE MANAGEMENT (OUTPATIENT)
Dept: PRIMARY CARE CLINIC | Facility: CLINIC | Age: 69
End: 2019-11-30
Payer: MEDICARE

## 2019-11-30 PROCEDURE — 99490 CHRNC CARE MGMT STAFF 1ST 20: CPT | Mod: PBBFAC | Performed by: FAMILY MEDICINE

## 2019-11-30 PROCEDURE — 99490 CHRNC CARE MGMT STAFF 1ST 20: CPT | Mod: S$PBB,,, | Performed by: FAMILY MEDICINE

## 2019-11-30 PROCEDURE — 99490 PR CHRONIC CARE MGMT, 1ST 20 MIN: ICD-10-PCS | Mod: S$PBB,,, | Performed by: FAMILY MEDICINE

## 2019-12-02 RX ORDER — METHIMAZOLE 10 MG/1
TABLET ORAL
Qty: 60 TABLET | Refills: 0 | Status: SHIPPED | OUTPATIENT
Start: 2019-12-02 | End: 2019-12-26 | Stop reason: SDUPTHER

## 2019-12-09 RX ORDER — HYDROCODONE BITARTRATE AND ACETAMINOPHEN 10; 325 MG/1; MG/1
1 TABLET ORAL EVERY 12 HOURS PRN
Qty: 60 TABLET | Refills: 0 | Status: SHIPPED | OUTPATIENT
Start: 2020-01-15 | End: 2020-02-14

## 2019-12-09 RX ORDER — HYDROCODONE BITARTRATE AND ACETAMINOPHEN 10; 325 MG/1; MG/1
1 TABLET ORAL EVERY 12 HOURS PRN
Qty: 60 TABLET | Refills: 0 | Status: SHIPPED | OUTPATIENT
Start: 2019-12-16 | End: 2020-01-15

## 2019-12-12 ENCOUNTER — OFFICE VISIT (OUTPATIENT)
Dept: PAIN MEDICINE | Facility: CLINIC | Age: 69
End: 2019-12-12
Payer: MEDICARE

## 2019-12-12 VITALS
BODY MASS INDEX: 25.49 KG/M2 | RESPIRATION RATE: 18 BRPM | DIASTOLIC BLOOD PRESSURE: 88 MMHG | WEIGHT: 205 LBS | HEIGHT: 75 IN | HEART RATE: 81 BPM | SYSTOLIC BLOOD PRESSURE: 148 MMHG

## 2019-12-12 DIAGNOSIS — M79.642 LEFT HAND PAIN: ICD-10-CM

## 2019-12-12 DIAGNOSIS — M79.642 PAIN OF LEFT HAND: ICD-10-CM

## 2019-12-12 DIAGNOSIS — G89.21 CHRONIC PAIN DUE TO TRAUMA: ICD-10-CM

## 2019-12-12 DIAGNOSIS — S68.119A TRAUMATIC AMPUTATION OF DIGIT OF ONE HAND WITHOUT COMPLICATION: Primary | ICD-10-CM

## 2019-12-12 PROCEDURE — 99999 PR PBB SHADOW E&M-EST. PATIENT-LVL IV: CPT | Mod: PBBFAC,,, | Performed by: PHYSICIAN ASSISTANT

## 2019-12-12 PROCEDURE — 1125F AMNT PAIN NOTED PAIN PRSNT: CPT | Mod: ,,, | Performed by: PHYSICIAN ASSISTANT

## 2019-12-12 PROCEDURE — 1159F PR MEDICATION LIST DOCUMENTED IN MEDICAL RECORD: ICD-10-PCS | Mod: ,,, | Performed by: PHYSICIAN ASSISTANT

## 2019-12-12 PROCEDURE — 99214 PR OFFICE/OUTPT VISIT, EST, LEVL IV, 30-39 MIN: ICD-10-PCS | Mod: S$PBB,,, | Performed by: PHYSICIAN ASSISTANT

## 2019-12-12 PROCEDURE — 99214 OFFICE O/P EST MOD 30 MIN: CPT | Mod: S$PBB,,, | Performed by: PHYSICIAN ASSISTANT

## 2019-12-12 PROCEDURE — 99214 OFFICE O/P EST MOD 30 MIN: CPT | Mod: PBBFAC | Performed by: PHYSICIAN ASSISTANT

## 2019-12-12 PROCEDURE — 1125F PR PAIN SEVERITY QUANTIFIED, PAIN PRESENT: ICD-10-PCS | Mod: ,,, | Performed by: PHYSICIAN ASSISTANT

## 2019-12-12 PROCEDURE — 99999 PR PBB SHADOW E&M-EST. PATIENT-LVL IV: ICD-10-PCS | Mod: PBBFAC,,, | Performed by: PHYSICIAN ASSISTANT

## 2019-12-12 PROCEDURE — 1159F MED LIST DOCD IN RCRD: CPT | Mod: ,,, | Performed by: PHYSICIAN ASSISTANT

## 2019-12-12 NOTE — PROGRESS NOTES
Subjective:     Chief Complaint:  Left Hand Pain    History of Present Illness:  This patient is a 65 year old male who presents today for f/u complaining of the above noted pains. The patient describes this pain as follows.    - duration (acute, chronic): left hand pain since 1997 after table saw amputation of digits 2 through 5 at work, left lower quadrant pain since hernia surgery in 2004  - timing (constant, intermittent): Constant  - character (sharp, dull, aching, burning): Throbbing  - radiating: No  - dermatomal distribution: No  - side: Left   - aggravating factors: Nothing worsens left digit pain, left lower quadrant pain worse with exercise or walking  - relieving factors: Medication / Norco  - antecedent trauma: Amputation via skill saw and 1997, hernia repair in 2004  - prior spinal surgery: None  - pertinent negatives: No fever, No chills, No weight loss, No bladder dysfunction, No bowel dysfunction, No extremity weakness, No saddle anesthesia  - medications tried: Norco, Percocet, over-the-counter medications, compound pain cream  - other therapies tried (physical therapy, injections):     >> physical therapy tried in the past    >> no prior injections        Imaging/ Labs (reviewed on 12/12/2019):    7/12/2018 US ABDOMINAL AORTA  CLINICAL HISTORY:  Abnormal findings on diagnostic imaging of other specified body structures  TECHNIQUE:  Limited ultrasound was performed of the abdominal aorta, with cross sectional diameter measurements obtained.  COMPARISON:  01/10/2018  FINDINGS:  The proximal abdominal aorta measures 2.7 cm.  The mid abdominal aorta measures 1.8 cm.  The distal abdominal aorta measures 1.9 cm, slightly ectatic and unchanged from prior.  The right iliac artery measures 1.0 cm.  The left iliac artery measures 1.1 cm.  Aortoiliac atherosclerosis: Mild  Impression: Stable examination with slight distal abdominal aortic ectasia.  Negative for aneurysm.         ROS:  CONSTITUTIONAL: No  "fever, chills, weight loss  SKIN: No rash or itching  CARDIOVASCULAR:  No chest pain, palpitations  RESPIRATORY: No shortness of breath  GASTROINTESTINAL: No diarrhea, No constipation, abdominal pain, left lower quadrant  GENITOURINARY: No urinary incontinence    MUSCULOSKELETAL:  - patient reports pain as above, see chief complaint     NEUROLOGICAL:   - Pain as above  - Strength in lower extremities is normal  - Sensation in lower extremities is normal  - No bowel or bladder incontinence     PSYCHIATRIC: No change in mood noticed         Objective:   Vitals:  BP (!) 148/88 (BP Location: Right arm, Patient Position: Sitting, BP Method: Medium (Automatic))   Pulse 81   Resp 18   Ht 6' 3" (1.905 m)   Wt 93 kg (205 lb)   BMI 25.62 kg/m²    (reviewed on 12/12/2019)     General: alert and oriented, in no apparent distress  Gait: normal gait  Skin: No rashes, No discoloration   HEENT: EOMI  Respiratory: respirations nonlabored  Cardiology: No obvious peripheral edema    Musculoskeletal:  - No pain with lumbar flexion/ extension  - Left upper extremity range of motion intact throughout  - Digital stumps are hypersensitive to palpation, hypersensitivity does not extend further proximal  - No color change, no swelling, no changes in hair growth along left hand     Neuro:  - Lower extremities:      >> 5/5 strength bilaterally, throughout, symmetric  - Upper extremities:    >> 5/5 strength bilaterally    Psych: mood and affect appropriate        Assessment:     Encounter Diagnoses   Name Primary?   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 Interventional: None.    2. Pharmacologic:   - Refill Norco 10/325 mg PO BID PRN (60 tabs) x 2 months. Paper Rx given. Patient tolerating opioids with no side effects, obtaining good pain control with functional improvement. He tolerates this medication well, and he " denies any drowsiness or constipation.  - Opioid contract signed on 5/7/2018. New opioid contract signed on 12/12/2019.    - LA  reviewed and appropriate. Last filled 11-18-19.  Will post date accordingly.  - Will order UDS today to ensure medication compliance.      3. Rehabilitative: Encouraged regular exercise.    4. Diagnostic: None for now.    5. Follow up: 8 weeks for med refill      - I discussed the risks, benefits, and alternatives to potential treatment options. All questions and concerns were fully addressed today in clinic. Dr. Olmedo was consulted regarding the patient plan and agrees.           >> UDS:  8-18-15 :: appropriate  12-29-15 :: appropriate  5-3-16 :: appropriate  10-18-16 :: appropriate  4/13/2017 :: appropriate  9/29/2017 :: appropriate  3/9/2018 :: appropriate  9/4/2018 :: appropriate  6/19/2019 :: appropriate  12/12/2019 :: pending

## 2019-12-26 RX ORDER — METHIMAZOLE 10 MG/1
TABLET ORAL
Qty: 60 TABLET | Refills: 0 | Status: SHIPPED | OUTPATIENT
Start: 2019-12-26 | End: 2019-12-27 | Stop reason: SDUPTHER

## 2019-12-27 RX ORDER — METHIMAZOLE 10 MG/1
TABLET ORAL
Qty: 180 TABLET | Refills: 0 | Status: SHIPPED | OUTPATIENT
Start: 2019-12-27 | End: 2020-06-09

## 2019-12-31 ENCOUNTER — EXTERNAL CHRONIC CARE MANAGEMENT (OUTPATIENT)
Dept: PRIMARY CARE CLINIC | Facility: CLINIC | Age: 69
End: 2019-12-31
Payer: MEDICARE

## 2019-12-31 PROCEDURE — 99490 CHRNC CARE MGMT STAFF 1ST 20: CPT | Mod: S$PBB,,, | Performed by: FAMILY MEDICINE

## 2019-12-31 PROCEDURE — 99490 PR CHRONIC CARE MGMT, 1ST 20 MIN: ICD-10-PCS | Mod: S$PBB,,, | Performed by: FAMILY MEDICINE

## 2019-12-31 PROCEDURE — 99490 CHRNC CARE MGMT STAFF 1ST 20: CPT | Mod: PBBFAC | Performed by: FAMILY MEDICINE

## 2020-01-07 ENCOUNTER — OFFICE VISIT (OUTPATIENT)
Dept: DIABETES | Facility: CLINIC | Age: 70
End: 2020-01-07
Payer: MEDICARE

## 2020-01-07 VITALS
HEART RATE: 83 BPM | BODY MASS INDEX: 26.83 KG/M2 | WEIGHT: 215.81 LBS | SYSTOLIC BLOOD PRESSURE: 153 MMHG | HEIGHT: 75 IN | DIASTOLIC BLOOD PRESSURE: 91 MMHG

## 2020-01-07 DIAGNOSIS — E78.5 HYPERLIPIDEMIA ASSOCIATED WITH TYPE 2 DIABETES MELLITUS: ICD-10-CM

## 2020-01-07 DIAGNOSIS — E11.8 CONTROLLED TYPE 2 DIABETES MELLITUS WITH COMPLICATION, WITHOUT LONG-TERM CURRENT USE OF INSULIN: Primary | ICD-10-CM

## 2020-01-07 DIAGNOSIS — E11.69 HYPERLIPIDEMIA ASSOCIATED WITH TYPE 2 DIABETES MELLITUS: ICD-10-CM

## 2020-01-07 DIAGNOSIS — E11.59 HYPERTENSION ASSOCIATED WITH DIABETES: ICD-10-CM

## 2020-01-07 DIAGNOSIS — I15.2 HYPERTENSION ASSOCIATED WITH DIABETES: ICD-10-CM

## 2020-01-07 LAB — GLUCOSE SERPL-MCNC: 157 MG/DL (ref 70–110)

## 2020-01-07 PROCEDURE — 99214 OFFICE O/P EST MOD 30 MIN: CPT | Mod: S$PBB,,, | Performed by: NURSE PRACTITIONER

## 2020-01-07 PROCEDURE — 1126F PR PAIN SEVERITY QUANTIFIED, NO PAIN PRESENT: ICD-10-PCS | Mod: ,,, | Performed by: NURSE PRACTITIONER

## 2020-01-07 PROCEDURE — 1126F AMNT PAIN NOTED NONE PRSNT: CPT | Mod: ,,, | Performed by: NURSE PRACTITIONER

## 2020-01-07 PROCEDURE — 82962 GLUCOSE BLOOD TEST: CPT | Mod: PBBFAC | Performed by: NURSE PRACTITIONER

## 2020-01-07 PROCEDURE — 99999 PR PBB SHADOW E&M-EST. PATIENT-LVL III: ICD-10-PCS | Mod: PBBFAC,,, | Performed by: NURSE PRACTITIONER

## 2020-01-07 PROCEDURE — 1159F PR MEDICATION LIST DOCUMENTED IN MEDICAL RECORD: ICD-10-PCS | Mod: ,,, | Performed by: NURSE PRACTITIONER

## 2020-01-07 PROCEDURE — 99999 PR PBB SHADOW E&M-EST. PATIENT-LVL III: CPT | Mod: PBBFAC,,, | Performed by: NURSE PRACTITIONER

## 2020-01-07 PROCEDURE — 1159F MED LIST DOCD IN RCRD: CPT | Mod: ,,, | Performed by: NURSE PRACTITIONER

## 2020-01-07 PROCEDURE — 99213 OFFICE O/P EST LOW 20 MIN: CPT | Mod: PBBFAC | Performed by: NURSE PRACTITIONER

## 2020-01-07 PROCEDURE — 99214 PR OFFICE/OUTPT VISIT, EST, LEVL IV, 30-39 MIN: ICD-10-PCS | Mod: S$PBB,,, | Performed by: NURSE PRACTITIONER

## 2020-01-07 NOTE — PROGRESS NOTES
"Subjective:         Patient ID: Ulysses Boreaux is a 69 y.o. male.  Patient's current PCP is Elza Pantoja MD.       Chief Complaint: Follow-up (4 month follow up )      Ulysses Boreaux is a 69 y.o. Black or  male presenting for follow up for diabetes. Patient has been diagnosed with diabetes since 2004 and has the following complications associated diabetes: hypertension, hyperlipidemia. Blood glucose testing is performed regularly. In the past 1 week patient reports blood glucose values to have approximately ranged from 70-130s. Condition is controlled.     He denies any recent hospital admissions, emergency room visits, hypoglycemia.      Height: 6' 3" (190.5 cm)  //  Weight: 97.9 kg (215 lb 13.3 oz), Body mass index is 26.98 kg/m².  His blood sugar in clinic today is:   Lab Results   Component Value Date    POCGLU 184 (A) 09/05/2019       Labs reviewed and are noted below.    His most recent A1C is:   Lab Results   Component Value Date    HGBA1C 6.8 (H) 09/05/2019     Lab Results   Component Value Date    CPEPTIDE 1.33 10/20/2017     Lab Results   Component Value Date    GLUTAMICACID 0.00 10/20/2017         CURRENT DM MEDICATIONS:   Current Outpatient Medications   Medication Sig Dispense Refill    insulin NPH-insulin regular, 70/30, (NOVOLIN 70/30 U-100 INSULIN) 100 unit/mL (70-30) injection INJECT 21 UNITS UNDER THE SKIN EVERY MORNING, THEN 23 UNITS EVERY EVENING 20 mL 1    SITagliptin (JANUVIA) 100 MG Tab Take 1 tablet (100 mg total) by mouth once daily. 30 tablet 3     No current facility-administered medications for this visit.        Health Maintenance   Topic Date Due    Hemoglobin A1c  03/05/2020    Foot Exam  04/04/2020    Urine Microalbumin  05/13/2020    Lipid Panel  10/17/2020    Eye Exam  11/08/2020    Low Dose Statin  12/12/2020    Pneumococcal Vaccine (65+ Low/Medium Risk) (2 of 2 - PPSV23) 04/01/2021    Colonoscopy  08/28/2024    TETANUS VACCINE  06/19/2029    " Hepatitis C Screening  Completed    Abdominal Aortic Aneurysm Screening  Completed       STANDARDS OF CARE:  Current Ophthalmologist/Optometrist: MAURA Adams.  Current Podiatrist: No  ACE/ARB: Yes  Statin: Yes  He  has attended diabetes education in the past.     LIFESTYLE:    BLOOD GLUCOSE TESTING: Patient reports testing 2 times per day.    Review of Systems   Constitutional: Negative for activity change, appetite change and fatigue.   Eyes: Negative for visual disturbance.   Gastrointestinal: Negative for diarrhea, nausea and rectal pain.   Endocrine: Negative for polydipsia, polyphagia and polyuria.   Psychiatric/Behavioral: Negative for confusion.         Objective:      Physical Exam   Constitutional: He is oriented to person, place, and time. He appears well-developed and well-nourished.   HENT:   Head: Normocephalic and atraumatic.   Neck: Normal range of motion.   Cardiovascular: Normal rate.   Pulmonary/Chest: Effort normal.   Musculoskeletal: Normal range of motion.   Neurological: He is alert and oriented to person, place, and time.   Skin: Skin is warm and dry.   Psychiatric: He has a normal mood and affect. His behavior is normal. Judgment and thought content normal.   Nursing note and vitals reviewed.      Assessment:       1. Controlled type 2 diabetes mellitus with complication, without long-term current use of insulin    2. Hypertension associated with diabetes    3. Hyperlipidemia associated with type 2 diabetes mellitus        Plan:   Controlled type 2 diabetes mellitus with complication, without long-term current use of insulin  -     POCT Glucose, Hand-Held Device  -     Hemoglobin A1c; Future; Expected date: 01/07/2020  -     insulin NPH-insulin regular, 70/30, (NOVOLIN 70/30 U-100 INSULIN) 100 unit/mL (70-30) injection; INJECT 21 UNITS UNDER THE SKIN EVERY MORNING, THEN 23 UNITS EVERY EVENING  Dispense: 20 mL; Refill: 6    - Condition controlled. Continue current regimen. DM education  reviewed. Patient encouraged to carb count and exercise per recommendations. Labs and Referrals as noted. Patient instructed to send in log weekly for review and potential medication adjustments. RV scheduled in 3 months.     Hyperlipidemia associated with type 2 diabetes mellitus    - LDL at goal. Patient intolerant of Lipitor above 20mg.     Hypertension associated with diabetes    -  Not at goal today. Patient instructed to test BP regularly and return to PCP within one week if not at goal.     Additional Plan Details:    1.) Patient was instructed to monitor blood glucose 2 - 3 x daily, fasting and ac dinner or at bedtime. Discussed ADA goal for fasting blood sugar, 80 - 130mg/dL; pp blood sugars below 180 mg/dl. Also, discussed prevention of hypoglycemia and the need to adjust goals to higher levels if persistent hypoglycemia.  Reminded to bring BG records or meter to each visit for review.    2.) The patient was explained the above plan and given opportunity to ask questions.  He understands, chooses and consents to this plan and accepts all the risks, which include but are not limited to the risks mentioned above. He understands the alternative of having no testing, interventions or treatments at this time. He left content and without further questions.     A total of 30 minutes was spent in face to face time, of which over 50% was spent in counseling patient on disease process, complications, treatment, and side effects of medications.        BEN gAuilar

## 2020-01-07 NOTE — PATIENT INSTRUCTIONS
Continue current regimen!      IF YOU ARE HAPPY WITH YOUR VISIT TODAY, PLEASE FILL OUT THE SURVEY THAT MAY BE SENT TO YOUR EMAIL ADDRESS OR MAILBOX FOLLOWING THIS VISIT. WE LOVE TO HEAR YOUR COMMENTS! HAVE A WONDERFUL DAY!

## 2020-01-09 ENCOUNTER — OFFICE VISIT (OUTPATIENT)
Dept: ENDOCRINOLOGY | Facility: CLINIC | Age: 70
End: 2020-01-09
Payer: MEDICARE

## 2020-01-09 ENCOUNTER — LAB VISIT (OUTPATIENT)
Dept: LAB | Facility: HOSPITAL | Age: 70
End: 2020-01-09
Attending: INTERNAL MEDICINE
Payer: MEDICARE

## 2020-01-09 VITALS
DIASTOLIC BLOOD PRESSURE: 82 MMHG | BODY MASS INDEX: 26.76 KG/M2 | HEART RATE: 96 BPM | HEIGHT: 75 IN | TEMPERATURE: 98 F | SYSTOLIC BLOOD PRESSURE: 140 MMHG | WEIGHT: 215.19 LBS | RESPIRATION RATE: 18 BRPM

## 2020-01-09 DIAGNOSIS — E05.90 HYPERTHYROIDISM: Primary | ICD-10-CM

## 2020-01-09 DIAGNOSIS — E04.1 THYROID NODULE: ICD-10-CM

## 2020-01-09 DIAGNOSIS — E05.90 HYPERTHYROIDISM: ICD-10-CM

## 2020-01-09 DIAGNOSIS — E11.8 CONTROLLED TYPE 2 DIABETES MELLITUS WITH COMPLICATION, WITHOUT LONG-TERM CURRENT USE OF INSULIN: ICD-10-CM

## 2020-01-09 LAB
BASOPHILS # BLD AUTO: 0.08 K/UL (ref 0–0.2)
BASOPHILS NFR BLD: 1.3 % (ref 0–1.9)
DIFFERENTIAL METHOD: ABNORMAL
EOSINOPHIL # BLD AUTO: 0.2 K/UL (ref 0–0.5)
EOSINOPHIL NFR BLD: 2.7 % (ref 0–8)
ERYTHROCYTE [DISTWIDTH] IN BLOOD BY AUTOMATED COUNT: 13.2 % (ref 11.5–14.5)
HCT VFR BLD AUTO: 48.4 % (ref 40–54)
HGB BLD-MCNC: 16 G/DL (ref 14–18)
IMM GRANULOCYTES # BLD AUTO: 0.01 K/UL (ref 0–0.04)
IMM GRANULOCYTES NFR BLD AUTO: 0.2 % (ref 0–0.5)
LYMPHOCYTES # BLD AUTO: 2.7 K/UL (ref 1–4.8)
LYMPHOCYTES NFR BLD: 45.4 % (ref 18–48)
MCH RBC QN AUTO: 32.2 PG (ref 27–31)
MCHC RBC AUTO-ENTMCNC: 33.1 G/DL (ref 32–36)
MCV RBC AUTO: 97 FL (ref 82–98)
MONOCYTES # BLD AUTO: 0.5 K/UL (ref 0.3–1)
MONOCYTES NFR BLD: 8.4 % (ref 4–15)
NEUTROPHILS # BLD AUTO: 2.5 K/UL (ref 1.8–7.7)
NEUTROPHILS NFR BLD: 42 % (ref 38–73)
NRBC BLD-RTO: 0 /100 WBC
PLATELET # BLD AUTO: 201 K/UL (ref 150–350)
PMV BLD AUTO: 11.2 FL (ref 9.2–12.9)
RBC # BLD AUTO: 4.97 M/UL (ref 4.6–6.2)
T3FREE SERPL-MCNC: <1 PG/ML (ref 2.3–4.2)
T4 FREE SERPL-MCNC: <0.4 NG/DL (ref 0.71–1.51)
TSH SERPL DL<=0.005 MIU/L-ACNC: 27.99 UIU/ML (ref 0.4–4)
WBC # BLD AUTO: 5.95 K/UL (ref 3.9–12.7)

## 2020-01-09 PROCEDURE — 99213 PR OFFICE/OUTPT VISIT, EST, LEVL III, 20-29 MIN: ICD-10-PCS | Mod: S$PBB,,, | Performed by: INTERNAL MEDICINE

## 2020-01-09 PROCEDURE — 1126F PR PAIN SEVERITY QUANTIFIED, NO PAIN PRESENT: ICD-10-PCS | Mod: ,,, | Performed by: INTERNAL MEDICINE

## 2020-01-09 PROCEDURE — 1159F PR MEDICATION LIST DOCUMENTED IN MEDICAL RECORD: ICD-10-PCS | Mod: ,,, | Performed by: INTERNAL MEDICINE

## 2020-01-09 PROCEDURE — 99213 OFFICE O/P EST LOW 20 MIN: CPT | Mod: PBBFAC | Performed by: INTERNAL MEDICINE

## 2020-01-09 PROCEDURE — 84481 FREE ASSAY (FT-3): CPT

## 2020-01-09 PROCEDURE — 99999 PR PBB SHADOW E&M-EST. PATIENT-LVL III: CPT | Mod: PBBFAC,,, | Performed by: INTERNAL MEDICINE

## 2020-01-09 PROCEDURE — 1126F AMNT PAIN NOTED NONE PRSNT: CPT | Mod: ,,, | Performed by: INTERNAL MEDICINE

## 2020-01-09 PROCEDURE — 83036 HEMOGLOBIN GLYCOSYLATED A1C: CPT

## 2020-01-09 PROCEDURE — 84439 ASSAY OF FREE THYROXINE: CPT

## 2020-01-09 PROCEDURE — 99999 PR PBB SHADOW E&M-EST. PATIENT-LVL III: ICD-10-PCS | Mod: PBBFAC,,, | Performed by: INTERNAL MEDICINE

## 2020-01-09 PROCEDURE — 1159F MED LIST DOCD IN RCRD: CPT | Mod: ,,, | Performed by: INTERNAL MEDICINE

## 2020-01-09 PROCEDURE — 84443 ASSAY THYROID STIM HORMONE: CPT

## 2020-01-09 PROCEDURE — 85025 COMPLETE CBC W/AUTO DIFF WBC: CPT

## 2020-01-09 PROCEDURE — 99213 OFFICE O/P EST LOW 20 MIN: CPT | Mod: S$PBB,,, | Performed by: INTERNAL MEDICINE

## 2020-01-09 PROCEDURE — 36415 COLL VENOUS BLD VENIPUNCTURE: CPT

## 2020-01-09 NOTE — PROGRESS NOTES
Patient ID: Ulysses Boreaux is a 69 y.o. male.  Patient is here for follow up        Chief Complaint: Follow-up      Follow-up   Pertinent negatives include no abdominal pain, arthralgias, chest pain, diaphoresis, fatigue, fever, headaches, joint swelling, nausea, numbness, rash, sore throat or vomiting.         Consultation was requested by Dr. Elza Pantoja       Diagnosed:  Patient has had a suppressed TSH dating back to 2014 in review of epic and his free T4 was normal up until recently his free T4 was elevated      Lab tests done after initial evaluation showed positive TSI antibody and elevation of T3 and T4 with persistent suppression of TSH    Previous radiology tests:  Thyroid ultrasound :  Yes and it showed relatively small single thyroid nodule with no concerning features  NM uptake and scan:  Yes had normal but homogeneous distribution    Previous thyroid surgery: No      Thyroid symptoms:  He states he feels fine, denies fatigue and states his energy is good    He has a chronic tremor tremulousness but he is convinced that the shaking is due to the pain medication that he is on as he states he gets better later in the day and today he reports the tremulousness is better      Thyroid labs were not done after last visit    Weight is up from last visit , denies any nausea vomiting diarrhea or anxiety or mood changes    History of left for fingers being amputated from a previous job    Currently retired  Thyroid medications:  Methimazole  10 mg twice a day      Takes medication appropriately:  Yes    Sister has thyroid issues   Sandra Dale, NP is monitoring his diabetes which is well-controlled      I have reviewed the past medical, family and social history    Review of Systems   Constitutional: Negative for appetite change, diaphoresis, fatigue, fever and unexpected weight change.   HENT: Negative for sore throat and trouble swallowing.    Eyes: Negative for visual disturbance.   Respiratory:  Negative for shortness of breath and wheezing.    Cardiovascular: Negative for chest pain, palpitations and leg swelling.   Gastrointestinal: Negative for abdominal pain, diarrhea, nausea and vomiting.   Endocrine: Negative for cold intolerance, heat intolerance, polydipsia, polyphagia and polyuria.   Genitourinary: Negative for difficulty urinating and dysuria.   Musculoskeletal: Negative for arthralgias and joint swelling.   Skin: Negative for color change and rash.   Neurological: Negative for dizziness, tremors, numbness and headaches.   Hematological: Negative for adenopathy.   Psychiatric/Behavioral: Negative for confusion, dysphoric mood and sleep disturbance. The patient is not nervous/anxious.        Objective:      Physical Exam   Constitutional: He appears well-developed and well-nourished. No distress.   HENT:   Head: Normocephalic and atraumatic.   Eyes: Conjunctivae are normal. No scleral icterus.   Neck: No JVD present. No tracheal deviation present. No thyromegaly present.   Cardiovascular: Normal rate, regular rhythm, normal heart sounds and intact distal pulses. Exam reveals no gallop and no friction rub.   No murmur heard.  Pulmonary/Chest: Effort normal and breath sounds normal. No stridor. No respiratory distress. He has no wheezes. He has no rales. He exhibits no tenderness.   Musculoskeletal: He exhibits no edema or deformity.   Lymphadenopathy:     He has no cervical adenopathy.   Neurological: He is alert.   Does have mild tremor of outstretched hand   Skin: Skin is warm and dry. He is not diaphoretic.   Vitals reviewed.        Lab Review:   Office Visit on 01/07/2020   Component Date Value    POC Glucose 01/07/2020 157*   Lab Visit on 10/17/2019   Component Date Value    Sodium 10/17/2019 139     Potassium 10/17/2019 3.8     Chloride 10/17/2019 101     CO2 10/17/2019 28     Glucose 10/17/2019 100     BUN, Bld 10/17/2019 16     Creatinine 10/17/2019 1.4     Calcium 10/17/2019 9.8      Total Protein 10/17/2019 8.0     Albumin 10/17/2019 4.1     Total Bilirubin 10/17/2019 0.4     Alkaline Phosphatase 10/17/2019 75     AST 10/17/2019 26     ALT 10/17/2019 23     Anion Gap 10/17/2019 10     eGFR if  10/17/2019 59.7*    eGFR if non African Amer* 10/17/2019 51.6*    Cholesterol 10/17/2019 170     Triglycerides 10/17/2019 48     HDL 10/17/2019 66     LDL Cholesterol 10/17/2019 94.4     Hdl/Cholesterol Ratio 10/17/2019 38.8     Total Cholesterol/HDL Ra* 10/17/2019 2.6     Non-HDL Cholesterol 10/17/2019 104    Lab Visit on 09/05/2019   Component Date Value    Hemoglobin A1C 09/05/2019 6.8*    Estimated Avg Glucose 09/05/2019 148*   Office Visit on 09/05/2019   Component Date Value    POC Glucose 09/05/2019 184*   Admission on 08/28/2019, Discharged on 08/28/2019   Component Date Value    POCT Glucose 08/28/2019 126*   Lab Visit on 08/12/2019   Component Date Value    T3, Free 08/12/2019 2.8     Free T4 08/12/2019 0.96     TSH 08/12/2019 <0.010*       Assessment:     1. Hyperthyroidism  TSH    T4, free    T3, free    CBC auto differential   2. Thyroid nodule  TSH    T4, free    T3, free    CBC auto differential    check labs today and adjust dose of methimazole based on test results  Plan:   Hyperthyroidism  -     TSH; Future; Expected date: 01/09/2020  -     T4, free; Future; Expected date: 01/09/2020  -     T3, free; Future; Expected date: 01/09/2020  -     CBC auto differential; Future; Expected date: 01/09/2020    Thyroid nodule  -     TSH; Future; Expected date: 01/09/2020  -     T4, free; Future; Expected date: 01/09/2020  -     T3, free; Future; Expected date: 01/09/2020  -     CBC auto differential; Future; Expected date: 01/09/2020          Follow up in about 4 months (around 5/9/2020) for hyperthyroidism.    Labs prior to appointment? not applicable     Disclaimer:  This note may have been partially prepared using voice recognition software and  it may  have not been extensively proofed, as such there could be errors within the text such as sound alike errors.

## 2020-01-10 ENCOUNTER — TELEPHONE (OUTPATIENT)
Dept: DIABETES | Facility: CLINIC | Age: 70
End: 2020-01-10

## 2020-01-10 LAB
ESTIMATED AVG GLUCOSE: 126 MG/DL (ref 68–131)
HBA1C MFR BLD HPLC: 6 % (ref 4–5.6)

## 2020-01-10 NOTE — TELEPHONE ENCOUNTER
----- Message from Sandra Dale NP sent at 1/10/2020  8:25 AM CST -----  Please let patient know A1C remains at goal.

## 2020-01-10 NOTE — TELEPHONE ENCOUNTER
----- Message from Faby Cheng sent at 1/10/2020  9:55 AM CST -----  Contact: patient  Type:  Patient Returning Call    Who Called:patient  Who Left Message for Patient:nurse  Does the patient know what this is regarding?:not sure  Would the patient rather a call back or a response via [employer]? call  Best Call Back Number:[phone]  Additional Information: please return call

## 2020-01-10 NOTE — TELEPHONE ENCOUNTER
Returned pt call I let pt know that his A1c remains at gao and to keep up the good work pt stated he understood his results

## 2020-01-23 ENCOUNTER — TELEPHONE (OUTPATIENT)
Dept: ENDOCRINOLOGY | Facility: CLINIC | Age: 70
End: 2020-01-23

## 2020-01-23 DIAGNOSIS — E05.90 HYPERTHYROIDISM: Primary | ICD-10-CM

## 2020-01-23 NOTE — TELEPHONE ENCOUNTER
Called pt to stop methimazole   Labs 2 weeks spoke with patient with return verbalization     ----- Message from Andreia Jackson MD sent at 1/23/2020  2:14 PM CST -----  I sent a message regarding patient's labs-however if you can take care this today since his labs were very abnormal that would be great.  He needs to stop his methimazole is his medication is slowing his thyroid down to much and repeat labs in 2 weeks

## 2020-01-28 ENCOUNTER — TELEPHONE (OUTPATIENT)
Dept: DIABETES | Facility: CLINIC | Age: 70
End: 2020-01-28

## 2020-01-28 NOTE — TELEPHONE ENCOUNTER
Returned pt call he was wonder who had left him a message I let him know that I called him on January 09 to give him his lab results

## 2020-01-28 NOTE — TELEPHONE ENCOUNTER
----- Message from Faby Cheng sent at 1/28/2020  3:46 PM CST -----  Contact: PATIENT  Type:  Patient Returning Call    Who Called:PATIENT  Who Left Message for Patient:NURSE  Does the patient know what this is regarding? NO// LABS  Would the patient rather a call back or a response via [employer]? CALL  Best Call Back Number:[phone]  Additional Information: PLEASE RETURN PATIENT CALL

## 2020-01-31 ENCOUNTER — EXTERNAL CHRONIC CARE MANAGEMENT (OUTPATIENT)
Dept: PRIMARY CARE CLINIC | Facility: CLINIC | Age: 70
End: 2020-01-31
Payer: MEDICARE

## 2020-01-31 PROCEDURE — 99490 PR CHRONIC CARE MGMT, 1ST 20 MIN: ICD-10-PCS | Mod: S$PBB,,, | Performed by: FAMILY MEDICINE

## 2020-01-31 PROCEDURE — G2058 PR CHRON CARE MGMT, EA ADDTL 20 MINS: ICD-10-PCS | Mod: S$PBB,,, | Performed by: FAMILY MEDICINE

## 2020-01-31 PROCEDURE — 99490 CHRNC CARE MGMT STAFF 1ST 20: CPT | Mod: S$PBB,,, | Performed by: FAMILY MEDICINE

## 2020-01-31 PROCEDURE — 99490 CHRNC CARE MGMT STAFF 1ST 20: CPT | Mod: PBBFAC | Performed by: FAMILY MEDICINE

## 2020-01-31 PROCEDURE — G2058 CCM ADD 20MIN: HCPCS | Mod: S$PBB,,, | Performed by: FAMILY MEDICINE

## 2020-02-03 RX ORDER — HYDROCODONE BITARTRATE AND ACETAMINOPHEN 10; 325 MG/1; MG/1
1 TABLET ORAL EVERY 12 HOURS PRN
Qty: 60 TABLET | Refills: 0 | Status: SHIPPED | OUTPATIENT
Start: 2020-02-14 | End: 2020-03-15

## 2020-02-03 RX ORDER — HYDROCODONE BITARTRATE AND ACETAMINOPHEN 10; 325 MG/1; MG/1
1 TABLET ORAL EVERY 12 HOURS PRN
Qty: 60 TABLET | Refills: 0 | Status: SHIPPED | OUTPATIENT
Start: 2020-03-15 | End: 2020-02-06 | Stop reason: SDUPTHER

## 2020-02-06 ENCOUNTER — LAB VISIT (OUTPATIENT)
Dept: LAB | Facility: HOSPITAL | Age: 70
End: 2020-02-06
Attending: INTERNAL MEDICINE
Payer: MEDICARE

## 2020-02-06 ENCOUNTER — OFFICE VISIT (OUTPATIENT)
Dept: PAIN MEDICINE | Facility: CLINIC | Age: 70
End: 2020-02-06
Payer: MEDICARE

## 2020-02-06 VITALS
BODY MASS INDEX: 26.73 KG/M2 | WEIGHT: 215 LBS | HEART RATE: 78 BPM | RESPIRATION RATE: 18 BRPM | HEIGHT: 75 IN | DIASTOLIC BLOOD PRESSURE: 90 MMHG | SYSTOLIC BLOOD PRESSURE: 148 MMHG

## 2020-02-06 DIAGNOSIS — E05.90 HYPERTHYROIDISM: ICD-10-CM

## 2020-02-06 DIAGNOSIS — S68.119A TRAUMATIC AMPUTATION OF DIGIT OF ONE HAND WITHOUT COMPLICATION: ICD-10-CM

## 2020-02-06 DIAGNOSIS — M79.642 LEFT HAND PAIN: ICD-10-CM

## 2020-02-06 DIAGNOSIS — G89.21 CHRONIC PAIN DUE TO TRAUMA: ICD-10-CM

## 2020-02-06 DIAGNOSIS — M79.642 PAIN OF LEFT HAND: Primary | ICD-10-CM

## 2020-02-06 LAB
T3FREE SERPL-MCNC: 4 PG/ML (ref 2.3–4.2)
T4 FREE SERPL-MCNC: 0.98 NG/DL (ref 0.71–1.51)
TSH SERPL DL<=0.005 MIU/L-ACNC: 3.92 UIU/ML (ref 0.4–4)

## 2020-02-06 PROCEDURE — 84439 ASSAY OF FREE THYROXINE: CPT

## 2020-02-06 PROCEDURE — 99999 PR PBB SHADOW E&M-EST. PATIENT-LVL IV: CPT | Mod: PBBFAC,,, | Performed by: PHYSICIAN ASSISTANT

## 2020-02-06 PROCEDURE — 99999 PR PBB SHADOW E&M-EST. PATIENT-LVL IV: ICD-10-PCS | Mod: PBBFAC,,, | Performed by: PHYSICIAN ASSISTANT

## 2020-02-06 PROCEDURE — 84481 FREE ASSAY (FT-3): CPT

## 2020-02-06 PROCEDURE — 99214 PR OFFICE/OUTPT VISIT, EST, LEVL IV, 30-39 MIN: ICD-10-PCS | Mod: S$PBB,,, | Performed by: PHYSICIAN ASSISTANT

## 2020-02-06 PROCEDURE — 99214 OFFICE O/P EST MOD 30 MIN: CPT | Mod: S$PBB,,, | Performed by: PHYSICIAN ASSISTANT

## 2020-02-06 PROCEDURE — 36415 COLL VENOUS BLD VENIPUNCTURE: CPT

## 2020-02-06 PROCEDURE — 84443 ASSAY THYROID STIM HORMONE: CPT

## 2020-02-06 PROCEDURE — 99214 OFFICE O/P EST MOD 30 MIN: CPT | Mod: PBBFAC | Performed by: PHYSICIAN ASSISTANT

## 2020-02-06 NOTE — PROGRESS NOTES
Subjective:     Chief Complaint:  Left Hand Pain    History of Present Illness:  This patient is a 65 year old male who presents today for f/u complaining of the above noted pains. The patient describes this pain as follows.    - duration (acute, chronic): left hand pain since 1997 after table saw amputation of digits 2 through 5 at work, left lower quadrant pain since hernia surgery in 2004  - timing (constant, intermittent): Constant  - character (sharp, dull, aching, burning): Throbbing  - radiating: No  - dermatomal distribution: No  - side: Left   - aggravating factors: Nothing worsens left digit pain, left lower quadrant pain worse with exercise or walking  - relieving factors: Medication / Norco  - antecedent trauma: Amputation via skill saw and 1997, hernia repair in 2004  - prior spinal surgery: None  - pertinent negatives: No fever, No chills, No weight loss, No bladder dysfunction, No bowel dysfunction, No extremity weakness, No saddle anesthesia  - medications tried: Norco, Percocet, over-the-counter medications, compound pain cream  - other therapies tried (physical therapy, injections):     >> physical therapy tried in the past    >> no prior injections        Imaging/ Labs (reviewed on 2/6/2020):    7/12/2018 US ABDOMINAL AORTA  CLINICAL HISTORY:  Abnormal findings on diagnostic imaging of other specified body structures  TECHNIQUE:  Limited ultrasound was performed of the abdominal aorta, with cross sectional diameter measurements obtained.  COMPARISON:  01/10/2018  FINDINGS:  The proximal abdominal aorta measures 2.7 cm.  The mid abdominal aorta measures 1.8 cm.  The distal abdominal aorta measures 1.9 cm, slightly ectatic and unchanged from prior.  The right iliac artery measures 1.0 cm.  The left iliac artery measures 1.1 cm.  Aortoiliac atherosclerosis: Mild  Impression: Stable examination with slight distal abdominal aortic ectasia.  Negative for aneurysm.         ROS:  CONSTITUTIONAL: No fever,  "chills, weight loss  SKIN: No rash or itching  CARDIOVASCULAR:  No chest pain, palpitations  RESPIRATORY: No shortness of breath  GASTROINTESTINAL: No diarrhea, No constipation, abdominal pain, left lower quadrant  GENITOURINARY: No urinary incontinence    MUSCULOSKELETAL:  - patient reports pain as above, see chief complaint     NEUROLOGICAL:   - Pain as above  - Strength in lower extremities is normal  - Sensation in lower extremities is normal  - No bowel or bladder incontinence     PSYCHIATRIC: No change in mood noticed         Objective:   Vitals:  BP (!) 148/90 (BP Location: Right arm, Patient Position: Sitting, BP Method: Medium (Automatic))   Pulse 78   Resp 18   Ht 6' 3" (1.905 m)   Wt 97.5 kg (215 lb)   BMI 26.87 kg/m²    (reviewed on 2/6/2020)     General: alert and oriented, in no apparent distress  Gait: normal gait  Skin: No rashes, No discoloration   HEENT: EOMI  Respiratory: respirations nonlabored  Cardiology: No obvious peripheral edema    Musculoskeletal:  - Full cervical ROM  - No pain with lumbar flexion/ extension  - Left upper extremity range of motion intact throughout  - Digital stumps are hypersensitive to palpation, hypersensitivity does not extend further proximal  - No color change, no swelling, no changes in hair growth along left hand     Neuro:  - Lower extremities:      >> 5/5 strength bilaterally, throughout, symmetric  - Upper extremities:    >> 5/5 strength bilaterally    Psych: mood and affect appropriate        Assessment:     Encounter Diagnoses   Name Primary?   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 Interventional: None.    2. Pharmacologic:   - Refill Norco 10/325 mg PO BID PRN (60 tabs) x 2 months. Paper Rx given (post dated to 2/14, 3/15). Patient tolerating opioids with no side effects, obtaining good pain control with functional improvement. He " tolerates this medication well, and he denies any drowsiness or constipation.  *Patient informed not to take concurrently with Klonopin.   - Opioid contract signed on 5/7/2018. New opioid contract signed on 12/12/2019.    - LA  reviewed and appropriate. Last filled 1-15-20.  Will post date accordingly.  - Last UDS was appropriate.     3. Rehabilitative: Encouraged regular exercise.    4. Diagnostic: None for now.    5. Follow up: 4 weeks for med refill      - I discussed the risks, benefits, and alternatives to potential treatment options. All questions and concerns were fully addressed today in clinic. Dr. Olmedo was consulted regarding the patient plan and agrees.           >> UDS:  8-18-15 :: appropriate  12-29-15 :: appropriate  5-3-16 :: appropriate  10-18-16 :: appropriate  4/13/2017 :: appropriate  9/29/2017 :: appropriate  3/9/2018 :: appropriate  9/4/2018 :: appropriate  6/19/2019 :: appropriate  12/12/2019 :: appropriate

## 2020-02-26 ENCOUNTER — TELEPHONE (OUTPATIENT)
Dept: ENDOCRINOLOGY | Facility: CLINIC | Age: 70
End: 2020-02-26

## 2020-02-26 DIAGNOSIS — E05.90 HYPERTHYROIDISM: Primary | ICD-10-CM

## 2020-02-26 NOTE — TELEPHONE ENCOUNTER
Return verbalization from pt with labs scheduled with primary care appointment        ----- Message from Andreia Jackson MD sent at 2/26/2020  7:44 AM CST -----  Notify patient his labs are now normal and to remain off of methimazole for now but schedule labs in 6 weeks to repeat thyroid labs

## 2020-02-29 ENCOUNTER — EXTERNAL CHRONIC CARE MANAGEMENT (OUTPATIENT)
Dept: PRIMARY CARE CLINIC | Facility: CLINIC | Age: 70
End: 2020-02-29
Payer: MEDICARE

## 2020-02-29 PROCEDURE — 99490 PR CHRONIC CARE MGMT, 1ST 20 MIN: ICD-10-PCS | Mod: S$PBB,,, | Performed by: FAMILY MEDICINE

## 2020-02-29 PROCEDURE — 99490 CHRNC CARE MGMT STAFF 1ST 20: CPT | Mod: S$PBB,,, | Performed by: FAMILY MEDICINE

## 2020-02-29 PROCEDURE — 99490 CHRNC CARE MGMT STAFF 1ST 20: CPT | Mod: PBBFAC | Performed by: FAMILY MEDICINE

## 2020-03-03 RX ORDER — HYDROCODONE BITARTRATE AND ACETAMINOPHEN 10; 325 MG/1; MG/1
1 TABLET ORAL EVERY 12 HOURS PRN
Qty: 60 TABLET | Refills: 0 | Status: SHIPPED | OUTPATIENT
Start: 2020-04-14 | End: 2020-05-14

## 2020-03-03 RX ORDER — HYDROCODONE BITARTRATE AND ACETAMINOPHEN 10; 325 MG/1; MG/1
1 TABLET ORAL EVERY 12 HOURS PRN
Qty: 60 TABLET | Refills: 0 | Status: SHIPPED | OUTPATIENT
Start: 2020-06-13 | End: 2020-03-05

## 2020-03-03 RX ORDER — HYDROCODONE BITARTRATE AND ACETAMINOPHEN 10; 325 MG/1; MG/1
1 TABLET ORAL EVERY 12 HOURS PRN
Qty: 60 TABLET | Refills: 0 | Status: SHIPPED | OUTPATIENT
Start: 2020-05-14 | End: 2020-06-13

## 2020-03-05 ENCOUNTER — NURSE TRIAGE (OUTPATIENT)
Dept: ADMINISTRATIVE | Facility: CLINIC | Age: 70
End: 2020-03-05

## 2020-03-05 ENCOUNTER — OFFICE VISIT (OUTPATIENT)
Dept: PAIN MEDICINE | Facility: CLINIC | Age: 70
End: 2020-03-05
Payer: MEDICARE

## 2020-03-05 VITALS
HEART RATE: 83 BPM | HEIGHT: 75 IN | BODY MASS INDEX: 26.73 KG/M2 | RESPIRATION RATE: 18 BRPM | SYSTOLIC BLOOD PRESSURE: 145 MMHG | WEIGHT: 215 LBS | DIASTOLIC BLOOD PRESSURE: 85 MMHG

## 2020-03-05 DIAGNOSIS — G89.21 CHRONIC PAIN DUE TO TRAUMA: ICD-10-CM

## 2020-03-05 DIAGNOSIS — S68.119A TRAUMATIC AMPUTATION OF DIGIT OF ONE HAND WITHOUT COMPLICATION: ICD-10-CM

## 2020-03-05 DIAGNOSIS — M79.642 PAIN OF LEFT HAND: Primary | ICD-10-CM

## 2020-03-05 DIAGNOSIS — G89.4 CHRONIC PAIN DISORDER: ICD-10-CM

## 2020-03-05 DIAGNOSIS — M79.642 LEFT HAND PAIN: ICD-10-CM

## 2020-03-05 PROCEDURE — 99214 OFFICE O/P EST MOD 30 MIN: CPT | Mod: PBBFAC | Performed by: PHYSICIAN ASSISTANT

## 2020-03-05 PROCEDURE — 99999 PR PBB SHADOW E&M-EST. PATIENT-LVL IV: CPT | Mod: PBBFAC,,, | Performed by: PHYSICIAN ASSISTANT

## 2020-03-05 PROCEDURE — 99214 OFFICE O/P EST MOD 30 MIN: CPT | Mod: S$PBB,,, | Performed by: PHYSICIAN ASSISTANT

## 2020-03-05 PROCEDURE — 99214 PR OFFICE/OUTPT VISIT, EST, LEVL IV, 30-39 MIN: ICD-10-PCS | Mod: S$PBB,,, | Performed by: PHYSICIAN ASSISTANT

## 2020-03-05 PROCEDURE — 99999 PR PBB SHADOW E&M-EST. PATIENT-LVL IV: ICD-10-PCS | Mod: PBBFAC,,, | Performed by: PHYSICIAN ASSISTANT

## 2020-03-05 NOTE — PROGRESS NOTES
Subjective:     Chief Complaint:  Left Hand Pain    History of Present Illness:  This patient is a 65 year old male who presents today for f/u complaining of the above noted pains. The patient describes this pain as follows.    - duration (acute, chronic): left hand pain since 1997 after table saw amputation of digits 2 through 5 at work, left lower quadrant pain since hernia surgery in 2004  - timing (constant, intermittent): Constant  - character (sharp, dull, aching, burning): Throbbing  - radiating: No  - dermatomal distribution: No  - side: Left   - aggravating factors: Nothing worsens left digit pain, left lower quadrant pain worse with exercise or walking  - relieving factors: Medication / Norco  - antecedent trauma: Amputation via skill saw and 1997, hernia repair in 2004  - prior spinal surgery: None  - pertinent negatives: No fever, No chills, No weight loss, No bladder dysfunction, No bowel dysfunction, No extremity weakness, No saddle anesthesia  - medications tried: Norco, Percocet, over-the-counter medications, compound pain cream  - other therapies tried (physical therapy, injections):     >> physical therapy tried in the past    >> no prior injections        Imaging/ Labs (reviewed on 3/5/2020):    7/12/2018 US ABDOMINAL AORTA  CLINICAL HISTORY:  Abnormal findings on diagnostic imaging of other specified body structures  TECHNIQUE:  Limited ultrasound was performed of the abdominal aorta, with cross sectional diameter measurements obtained.  COMPARISON:  01/10/2018  FINDINGS:  The proximal abdominal aorta measures 2.7 cm.  The mid abdominal aorta measures 1.8 cm.  The distal abdominal aorta measures 1.9 cm, slightly ectatic and unchanged from prior.  The right iliac artery measures 1.0 cm.  The left iliac artery measures 1.1 cm.  Aortoiliac atherosclerosis: Mild  Impression: Stable examination with slight distal abdominal aortic ectasia.  Negative for aneurysm.         ROS:  CONSTITUTIONAL: No fever,  "chills, weight loss  SKIN: No rash or itching  CARDIOVASCULAR:  No chest pain, palpitations  RESPIRATORY: No shortness of breath  GASTROINTESTINAL: No diarrhea, No constipation, abdominal pain, left lower quadrant  GENITOURINARY: No urinary incontinence    MUSCULOSKELETAL:  - patient reports pain as above, see chief complaint     NEUROLOGICAL:   - Pain as above  - Strength in lower extremities is normal  - Sensation in lower extremities is normal  - No bowel or bladder incontinence     PSYCHIATRIC: No change in mood noticed         Objective:   Vitals:  BP (!) 145/85 (BP Location: Right arm, Patient Position: Sitting, BP Method: Medium (Automatic))   Pulse 83   Resp 18   Ht 6' 3" (1.905 m)   Wt 97.5 kg (215 lb)   BMI 26.87 kg/m²    (reviewed on 3/5/2020)     General: alert and oriented, in no apparent distress  Gait: normal gait  Skin: No rashes, No discoloration   HEENT: EOMI  Respiratory: respirations nonlabored  Cardiology: No obvious peripheral edema    Musculoskeletal:  - No pain with lumbar flexion/ extension  - Left upper extremity range of motion intact throughout  - Digital stumps are hypersensitive to palpation, hypersensitivity does not extend further proximal  - No color change, no swelling, no changes in hair growth along left hand     Neuro:  - Lower extremities:      >> 5/5 strength bilaterally, throughout, symmetric  - Upper extremities:    >> 5/5 strength bilaterally    Psych: mood and affect appropriate        Assessment:     Encounter Diagnoses   Name Primary?   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 Interventional: None.    2. Pharmacologic:   - Refill Norco 10/325 mg PO BID PRN (60 tabs) x 2 months. Paper Rx given (post dated to 4/14, 5/14). Patient tolerating opioids with no side effects, obtaining good pain control with functional " improvement. He tolerates this medication well, and he denies any drowsiness or constipation.   *He still has one Rx refill to  at pharmacy on or around 3/15/2020.   - Patient informed not to take Norco concurrently with Klonopin to reduce risk of respiratory depression.   - Opioid contract signed on 5/7/2018. New opioid contract signed on 12/12/2019.    - LA  reviewed and appropriate. Last filled 2-14-20.  Will post date accordingly.  - Will order UDS next visit to ensure medication compliance.      3. Rehabilitative: Encouraged regular exercise.    4. Diagnostic: None for now.    5. Follow up: med refill - June 12th    - I discussed the risks, benefits, and alternatives to potential treatment options. All questions and concerns were fully addressed today in clinic.            >> UDS:  8-18-15 :: appropriate  12-29-15 :: appropriate  5-3-16 :: appropriate  10-18-16 :: appropriate  4/13/2017 :: appropriate  9/29/2017 :: appropriate  3/9/2018 :: appropriate  9/4/2018 :: appropriate  6/19/2019 :: appropriate  12/12/2019 :: appropriate

## 2020-03-06 RX ORDER — SYRINGE,SAFETY WITH NEEDLE,1ML 25GX1"
SYRINGE (EA) MISCELLANEOUS
Qty: 100 EACH | Refills: 11 | Status: SHIPPED | OUTPATIENT
Start: 2020-03-06 | End: 2020-12-24 | Stop reason: SDUPTHER

## 2020-03-06 NOTE — TELEPHONE ENCOUNTER
Has been trying to get needles since Monday. Need Rx for syringes. Short insulin syringes. Attempted to contact on call provider.  advised no one is on call for that dept. Please call in Rx to pt's [email or web address] on pt's profile.     Reason for Disposition   Caller requesting a NON-URGENT new prescription or refill and triager unable to refill per unit policy    Protocols used: MEDICATION QUESTION CALL-A-AH

## 2020-03-25 DIAGNOSIS — I15.2 HYPERTENSION ASSOCIATED WITH DIABETES: ICD-10-CM

## 2020-03-25 DIAGNOSIS — E11.59 HYPERTENSION ASSOCIATED WITH DIABETES: ICD-10-CM

## 2020-03-25 RX ORDER — AMLODIPINE BESYLATE 10 MG/1
TABLET ORAL
Qty: 90 TABLET | Refills: 1 | Status: SHIPPED | OUTPATIENT
Start: 2020-03-25 | End: 2020-04-15 | Stop reason: SDUPTHER

## 2020-03-31 ENCOUNTER — EXTERNAL CHRONIC CARE MANAGEMENT (OUTPATIENT)
Dept: PRIMARY CARE CLINIC | Facility: CLINIC | Age: 70
End: 2020-03-31
Payer: MEDICARE

## 2020-03-31 PROCEDURE — 99490 CHRNC CARE MGMT STAFF 1ST 20: CPT | Mod: S$PBB,,, | Performed by: FAMILY MEDICINE

## 2020-03-31 PROCEDURE — 99490 CHRNC CARE MGMT STAFF 1ST 20: CPT | Mod: PBBFAC | Performed by: FAMILY MEDICINE

## 2020-03-31 PROCEDURE — 99490 PR CHRONIC CARE MGMT, 1ST 20 MIN: ICD-10-PCS | Mod: S$PBB,,, | Performed by: FAMILY MEDICINE

## 2020-04-14 DIAGNOSIS — E11.69 TYPE 2 DIABETES MELLITUS WITH OTHER SPECIFIED COMPLICATION, WITH LONG-TERM CURRENT USE OF INSULIN: ICD-10-CM

## 2020-04-14 DIAGNOSIS — Z79.4 TYPE 2 DIABETES MELLITUS WITH OTHER SPECIFIED COMPLICATION, WITH LONG-TERM CURRENT USE OF INSULIN: ICD-10-CM

## 2020-04-14 RX ORDER — SITAGLIPTIN 100 MG/1
TABLET, FILM COATED ORAL
Qty: 30 TABLET | Refills: 6 | Status: SHIPPED | OUTPATIENT
Start: 2020-04-14 | End: 2020-11-12 | Stop reason: SDUPTHER

## 2020-04-15 DIAGNOSIS — E11.59 HYPERTENSION ASSOCIATED WITH DIABETES: ICD-10-CM

## 2020-04-15 DIAGNOSIS — E78.2 COMBINED HYPERLIPIDEMIA ASSOCIATED WITH TYPE 2 DIABETES MELLITUS: ICD-10-CM

## 2020-04-15 DIAGNOSIS — J30.9 ALLERGIC RHINITIS: ICD-10-CM

## 2020-04-15 DIAGNOSIS — E11.69 COMBINED HYPERLIPIDEMIA ASSOCIATED WITH TYPE 2 DIABETES MELLITUS: ICD-10-CM

## 2020-04-15 DIAGNOSIS — I15.2 HYPERTENSION ASSOCIATED WITH DIABETES: ICD-10-CM

## 2020-04-15 DIAGNOSIS — G47.00 INSOMNIA, UNSPECIFIED TYPE: ICD-10-CM

## 2020-04-16 RX ORDER — AMLODIPINE BESYLATE 10 MG/1
10 TABLET ORAL DAILY
Qty: 90 TABLET | Refills: 0 | Status: SHIPPED | OUTPATIENT
Start: 2020-04-16 | End: 2020-10-12

## 2020-04-16 RX ORDER — FLUTICASONE PROPIONATE 50 MCG
2 SPRAY, SUSPENSION (ML) NASAL DAILY PRN
Qty: 16 ML | Refills: 2 | Status: SHIPPED | OUTPATIENT
Start: 2020-04-16 | End: 2020-07-17

## 2020-04-16 RX ORDER — NEBIVOLOL 10 MG/1
10 TABLET ORAL DAILY
Qty: 90 TABLET | Refills: 0 | Status: SHIPPED | OUTPATIENT
Start: 2020-04-16 | End: 2020-07-16

## 2020-04-16 RX ORDER — ATORVASTATIN CALCIUM 20 MG/1
20 TABLET, FILM COATED ORAL DAILY
Qty: 90 TABLET | Refills: 0 | Status: SHIPPED | OUTPATIENT
Start: 2020-04-16 | End: 2020-12-23

## 2020-04-16 RX ORDER — CLONAZEPAM 1 MG/1
1 TABLET ORAL NIGHTLY
Qty: 30 TABLET | Refills: 5 | OUTPATIENT
Start: 2020-04-16

## 2020-04-16 NOTE — TELEPHONE ENCOUNTER
Clonazepam cannot be refilled until approximately 5/5/2020. Also, he will have to have visit for that refill at that time. If unable to do virtual visit, we can arrange audio only visit.

## 2020-04-16 NOTE — TELEPHONE ENCOUNTER
Spoke with pt voiced understanding of results.   States he will call back when rx is needed for telephone call.

## 2020-04-22 ENCOUNTER — TELEPHONE (OUTPATIENT)
Dept: ENDOCRINOLOGY | Facility: CLINIC | Age: 70
End: 2020-04-22

## 2020-04-30 ENCOUNTER — EXTERNAL CHRONIC CARE MANAGEMENT (OUTPATIENT)
Dept: PRIMARY CARE CLINIC | Facility: CLINIC | Age: 70
End: 2020-04-30
Payer: MEDICARE

## 2020-04-30 PROCEDURE — 99490 PR CHRONIC CARE MGMT, 1ST 20 MIN: ICD-10-PCS | Mod: S$PBB,,, | Performed by: FAMILY MEDICINE

## 2020-04-30 PROCEDURE — 99490 CHRNC CARE MGMT STAFF 1ST 20: CPT | Mod: PBBFAC | Performed by: FAMILY MEDICINE

## 2020-04-30 PROCEDURE — 99490 CHRNC CARE MGMT STAFF 1ST 20: CPT | Mod: S$PBB,,, | Performed by: FAMILY MEDICINE

## 2020-05-26 DIAGNOSIS — E11.59 HYPERTENSION ASSOCIATED WITH DIABETES: ICD-10-CM

## 2020-05-26 DIAGNOSIS — I15.2 HYPERTENSION ASSOCIATED WITH DIABETES: ICD-10-CM

## 2020-05-26 RX ORDER — LOSARTAN POTASSIUM AND HYDROCHLOROTHIAZIDE 25; 100 MG/1; MG/1
TABLET ORAL
Qty: 90 TABLET | Refills: 0 | Status: SHIPPED | OUTPATIENT
Start: 2020-05-26 | End: 2020-08-17

## 2020-05-28 DIAGNOSIS — G47.00 INSOMNIA, UNSPECIFIED TYPE: ICD-10-CM

## 2020-05-28 RX ORDER — CLONAZEPAM 1 MG/1
TABLET ORAL
Qty: 30 TABLET | Refills: 0 | Status: SHIPPED | OUTPATIENT
Start: 2020-05-28 | End: 2021-12-21

## 2020-05-31 ENCOUNTER — EXTERNAL CHRONIC CARE MANAGEMENT (OUTPATIENT)
Dept: PRIMARY CARE CLINIC | Facility: CLINIC | Age: 70
End: 2020-05-31
Payer: MEDICARE

## 2020-05-31 PROCEDURE — 99490 CHRNC CARE MGMT STAFF 1ST 20: CPT | Mod: PBBFAC | Performed by: FAMILY MEDICINE

## 2020-05-31 PROCEDURE — 99490 CHRNC CARE MGMT STAFF 1ST 20: CPT | Mod: S$PBB,,, | Performed by: FAMILY MEDICINE

## 2020-05-31 PROCEDURE — 99490 PR CHRONIC CARE MGMT, 1ST 20 MIN: ICD-10-PCS | Mod: S$PBB,,, | Performed by: FAMILY MEDICINE

## 2020-06-01 ENCOUNTER — TELEPHONE (OUTPATIENT)
Dept: INTERNAL MEDICINE | Facility: CLINIC | Age: 70
End: 2020-06-01

## 2020-06-01 ENCOUNTER — OFFICE VISIT (OUTPATIENT)
Dept: INTERNAL MEDICINE | Facility: CLINIC | Age: 70
End: 2020-06-01
Payer: MEDICARE

## 2020-06-01 DIAGNOSIS — Z79.899 OTHER LONG TERM (CURRENT) DRUG THERAPY: ICD-10-CM

## 2020-06-01 DIAGNOSIS — G47.00 INSOMNIA, UNSPECIFIED TYPE: Primary | ICD-10-CM

## 2020-06-01 DIAGNOSIS — E11.8 CONTROLLED TYPE 2 DIABETES MELLITUS WITH COMPLICATION, WITHOUT LONG-TERM CURRENT USE OF INSULIN: ICD-10-CM

## 2020-06-01 DIAGNOSIS — F19.20 CONTROLLED DRUG DEPENDENCE: ICD-10-CM

## 2020-06-01 DIAGNOSIS — I15.2 HYPERTENSION ASSOCIATED WITH DIABETES: ICD-10-CM

## 2020-06-01 DIAGNOSIS — E78.5 HYPERLIPIDEMIA ASSOCIATED WITH TYPE 2 DIABETES MELLITUS: ICD-10-CM

## 2020-06-01 DIAGNOSIS — E11.59 HYPERTENSION ASSOCIATED WITH DIABETES: ICD-10-CM

## 2020-06-01 DIAGNOSIS — E11.69 HYPERLIPIDEMIA ASSOCIATED WITH TYPE 2 DIABETES MELLITUS: ICD-10-CM

## 2020-06-01 PROBLEM — Z12.11 COLON CANCER SCREENING: Status: RESOLVED | Noted: 2019-08-28 | Resolved: 2020-06-01

## 2020-06-01 PROCEDURE — 99441 PR PHYSICIAN TELEPHONE EVALUATION 5-10 MIN: CPT | Mod: 95,,, | Performed by: NURSE PRACTITIONER

## 2020-06-01 PROCEDURE — 99441 PR PHYSICIAN TELEPHONE EVALUATION 5-10 MIN: ICD-10-PCS | Mod: 95,,, | Performed by: NURSE PRACTITIONER

## 2020-06-01 NOTE — PROGRESS NOTES
Ulysses Boremolly 69 y.o. male     History of Present Illness:  The patient location is: home, [place]   The chief complaint leading to consultation is: med refills   Visit type: AUDIO ONLY  Total time spent with patient: 5 minutes   Each patient to whom he or she provides medical services by telemedicine is:  (1) informed of the relationship between the physician and patient and the respective role of any other health care provider with respect to management of the patient; and (2) notified that he or she may decline to receive medical services by telemedicine and may withdraw from such care at any time.    Pt has not complaints other than needing his clonopin   Needs 6mo f/u chronic conditions - HTN, DMII, HLD, hyperthyroid     Most Recent Laboratory Results Reviewed ({Yes)  Lab Results   Component Value Date    WBC 5.95 01/09/2020    HGB 16.0 01/09/2020    HCT 48.4 01/09/2020     01/09/2020    CHOL 170 10/17/2019    TRIG 48 10/17/2019    HDL 66 10/17/2019    ALT 23 10/17/2019    AST 26 10/17/2019     10/17/2019    K 3.8 10/17/2019     10/17/2019    CREATININE 1.4 10/17/2019    BUN 16 10/17/2019    CO2 28 10/17/2019    TSH 3.924 02/06/2020    INR 1.0 12/10/2018    HGBA1C 6.0 (H) 01/09/2020       Assessment     ICD-10-CM ICD-9-CM   1. Insomnia, unspecified type G47.00 780.52   2. Hypertension associated with diabetes E11.59 250.80    I10 401.9   3. Controlled type 2 diabetes mellitus with complication, without long-term current use of insulin E11.8 250.90        Plan     Insomnia, unspecified type  pts only complaint today is needing clonopin refill   Sent last week, instructed to recheck with pharmacy     Hypertension associated with diabetes  -     Comprehensive metabolic panel; Future; Expected date: 06/01/2020    Controlled type 2 diabetes mellitus with complication, without long-term current use of insulin  -     CBC auto differential; Future; Expected date: 06/01/2020  -      Microalbumin/creatinine urine ratio; Future; Expected date: 06/01/2020    Hyperlipidemia associated with type 2 diabetes mellitus  -     Lipid Panel; Future; Expected date: 06/01/2020  -     Vitamin D; Future; Expected date: 06/01/2020    Controlled drug dependence  -     POCT RAPID DRUG SCREEN; Future; Expected date: 06/01/2020    Other long term (current) drug therapy   -     Vitamin D; Future; Expected date: 06/01/2020      Follow up in about 1 week (around 6/8/2020) for 6 month follow up.  Future Appointments     Date Provider Specialty Appt Notes    6/12/2020 Ysabel Garcia PA-C Pain Medicine med refill/UDS    7/1/2020 Elza Pantoja MD Internal Medicine annual    7/14/2020 Andreia Jackson MD Endocrinology 4 month follow up hyperthyroidism.     11/13/2020 MAURA Adams OD Ophthalmology Annual/RTC

## 2020-06-01 NOTE — TELEPHONE ENCOUNTER
----- Message from Saskia Green sent at 6/1/2020  8:37 AM CDT -----  Contact: pt  .Type:  RX Refill Request    Who Called: pt   Refill or New Rx: refill   RX Name and Strength: KLONOPIN  How is the patient currently taking it? (ex. 1XDay):   Is this a 30 day or 90 day RX:   Preferred Pharmacy with phone number:     [id number] - [place] - [address] AT [facility]  [address] [address]  Phone: [phone] Fax: [phone]       Local or Mail Order: local   Ordering Provider:   Would the patient rather a call back or a response via [facility]? # call back   Best Call Back Number:  [phone] (home)    Additional Information:

## 2020-06-09 ENCOUNTER — OFFICE VISIT (OUTPATIENT)
Dept: INTERNAL MEDICINE | Facility: CLINIC | Age: 70
End: 2020-06-09
Payer: MEDICARE

## 2020-06-09 ENCOUNTER — LAB VISIT (OUTPATIENT)
Dept: LAB | Facility: HOSPITAL | Age: 70
End: 2020-06-09
Attending: FAMILY MEDICINE
Payer: MEDICARE

## 2020-06-09 VITALS
HEART RATE: 96 BPM | HEIGHT: 75 IN | SYSTOLIC BLOOD PRESSURE: 130 MMHG | TEMPERATURE: 98 F | DIASTOLIC BLOOD PRESSURE: 78 MMHG | WEIGHT: 210 LBS | BODY MASS INDEX: 26.11 KG/M2 | OXYGEN SATURATION: 97 %

## 2020-06-09 DIAGNOSIS — E05.90 HYPERTHYROIDISM: ICD-10-CM

## 2020-06-09 DIAGNOSIS — E11.8 CONTROLLED TYPE 2 DIABETES MELLITUS WITH COMPLICATION, WITHOUT LONG-TERM CURRENT USE OF INSULIN: ICD-10-CM

## 2020-06-09 DIAGNOSIS — E78.5 HYPERLIPIDEMIA ASSOCIATED WITH TYPE 2 DIABETES MELLITUS: ICD-10-CM

## 2020-06-09 DIAGNOSIS — E11.69 HYPERLIPIDEMIA ASSOCIATED WITH TYPE 2 DIABETES MELLITUS: ICD-10-CM

## 2020-06-09 DIAGNOSIS — I15.2 HYPERTENSION ASSOCIATED WITH DIABETES: ICD-10-CM

## 2020-06-09 DIAGNOSIS — Z23 NEED FOR SHINGLES VACCINE: ICD-10-CM

## 2020-06-09 DIAGNOSIS — G47.00 INSOMNIA, UNSPECIFIED TYPE: ICD-10-CM

## 2020-06-09 DIAGNOSIS — E11.59 HYPERTENSION ASSOCIATED WITH DIABETES: ICD-10-CM

## 2020-06-09 DIAGNOSIS — J30.9 ALLERGIC RHINITIS, UNSPECIFIED SEASONALITY, UNSPECIFIED TRIGGER: ICD-10-CM

## 2020-06-09 DIAGNOSIS — E11.8 CONTROLLED TYPE 2 DIABETES MELLITUS WITH COMPLICATION, WITHOUT LONG-TERM CURRENT USE OF INSULIN: Primary | ICD-10-CM

## 2020-06-09 DIAGNOSIS — Z79.899 OTHER LONG TERM (CURRENT) DRUG THERAPY: ICD-10-CM

## 2020-06-09 LAB
ALBUMIN SERPL BCP-MCNC: 4.2 G/DL (ref 3.5–5.2)
ALP SERPL-CCNC: 62 U/L (ref 55–135)
ALT SERPL W/O P-5'-P-CCNC: 31 U/L (ref 10–44)
ANION GAP SERPL CALC-SCNC: 7 MMOL/L (ref 8–16)
AST SERPL-CCNC: 32 U/L (ref 10–40)
BASOPHILS # BLD AUTO: 0.06 K/UL (ref 0–0.2)
BASOPHILS NFR BLD: 1.3 % (ref 0–1.9)
BILIRUB SERPL-MCNC: 0.5 MG/DL (ref 0.1–1)
BUN SERPL-MCNC: 15 MG/DL (ref 8–23)
CALCIUM SERPL-MCNC: 10 MG/DL (ref 8.7–10.5)
CHLORIDE SERPL-SCNC: 102 MMOL/L (ref 95–110)
CHOLEST SERPL-MCNC: 186 MG/DL (ref 120–199)
CHOLEST/HDLC SERPL: 2.5 {RATIO} (ref 2–5)
CO2 SERPL-SCNC: 31 MMOL/L (ref 23–29)
CREAT SERPL-MCNC: 1.3 MG/DL (ref 0.5–1.4)
DIFFERENTIAL METHOD: ABNORMAL
EOSINOPHIL # BLD AUTO: 0.2 K/UL (ref 0–0.5)
EOSINOPHIL NFR BLD: 3.2 % (ref 0–8)
ERYTHROCYTE [DISTWIDTH] IN BLOOD BY AUTOMATED COUNT: 12.2 % (ref 11.5–14.5)
EST. GFR  (AFRICAN AMERICAN): >60 ML/MIN/1.73 M^2
EST. GFR  (NON AFRICAN AMERICAN): 56.5 ML/MIN/1.73 M^2
ESTIMATED AVG GLUCOSE: 134 MG/DL (ref 68–131)
GLUCOSE SERPL-MCNC: 112 MG/DL (ref 70–110)
HBA1C MFR BLD HPLC: 6.3 % (ref 4–5.6)
HCT VFR BLD AUTO: 48.1 % (ref 40–54)
HDLC SERPL-MCNC: 74 MG/DL (ref 40–75)
HDLC SERPL: 39.8 % (ref 20–50)
HGB BLD-MCNC: 15.3 G/DL (ref 14–18)
IMM GRANULOCYTES # BLD AUTO: 0.01 K/UL (ref 0–0.04)
IMM GRANULOCYTES NFR BLD AUTO: 0.2 % (ref 0–0.5)
LDLC SERPL CALC-MCNC: 100.8 MG/DL (ref 63–159)
LYMPHOCYTES # BLD AUTO: 2 K/UL (ref 1–4.8)
LYMPHOCYTES NFR BLD: 43.8 % (ref 18–48)
MCH RBC QN AUTO: 31.2 PG (ref 27–31)
MCHC RBC AUTO-ENTMCNC: 31.8 G/DL (ref 32–36)
MCV RBC AUTO: 98 FL (ref 82–98)
MONOCYTES # BLD AUTO: 0.5 K/UL (ref 0.3–1)
MONOCYTES NFR BLD: 11.2 % (ref 4–15)
NEUTROPHILS # BLD AUTO: 1.9 K/UL (ref 1.8–7.7)
NEUTROPHILS NFR BLD: 40.3 % (ref 38–73)
NONHDLC SERPL-MCNC: 112 MG/DL
NRBC BLD-RTO: 0 /100 WBC
PLATELET # BLD AUTO: 203 K/UL (ref 150–350)
PMV BLD AUTO: 10.6 FL (ref 9.2–12.9)
POTASSIUM SERPL-SCNC: 4.3 MMOL/L (ref 3.5–5.1)
PROT SERPL-MCNC: 8 G/DL (ref 6–8.4)
RBC # BLD AUTO: 4.9 M/UL (ref 4.6–6.2)
SODIUM SERPL-SCNC: 140 MMOL/L (ref 136–145)
T3FREE SERPL-MCNC: 3.3 PG/ML (ref 2.3–4.2)
T4 FREE SERPL-MCNC: 0.93 NG/DL (ref 0.71–1.51)
TRIGL SERPL-MCNC: 56 MG/DL (ref 30–150)
TSH SERPL DL<=0.005 MIU/L-ACNC: 1.02 UIU/ML (ref 0.4–4)
WBC # BLD AUTO: 4.64 K/UL (ref 3.9–12.7)

## 2020-06-09 PROCEDURE — 82306 VITAMIN D 25 HYDROXY: CPT

## 2020-06-09 PROCEDURE — 84481 FREE ASSAY (FT-3): CPT

## 2020-06-09 PROCEDURE — 99999 PR PBB SHADOW E&M-EST. PATIENT-LVL IV: CPT | Mod: PBBFAC,,, | Performed by: NURSE PRACTITIONER

## 2020-06-09 PROCEDURE — 83036 HEMOGLOBIN GLYCOSYLATED A1C: CPT

## 2020-06-09 PROCEDURE — 36415 COLL VENOUS BLD VENIPUNCTURE: CPT

## 2020-06-09 PROCEDURE — 80061 LIPID PANEL: CPT

## 2020-06-09 PROCEDURE — 84439 ASSAY OF FREE THYROXINE: CPT

## 2020-06-09 PROCEDURE — 99999 PR PBB SHADOW E&M-EST. PATIENT-LVL IV: ICD-10-PCS | Mod: PBBFAC,,, | Performed by: NURSE PRACTITIONER

## 2020-06-09 PROCEDURE — 99214 OFFICE O/P EST MOD 30 MIN: CPT | Mod: S$PBB,,, | Performed by: NURSE PRACTITIONER

## 2020-06-09 PROCEDURE — 99214 PR OFFICE/OUTPT VISIT, EST, LEVL IV, 30-39 MIN: ICD-10-PCS | Mod: S$PBB,,, | Performed by: NURSE PRACTITIONER

## 2020-06-09 PROCEDURE — 80053 COMPREHEN METABOLIC PANEL: CPT

## 2020-06-09 PROCEDURE — 84443 ASSAY THYROID STIM HORMONE: CPT

## 2020-06-09 PROCEDURE — 99214 OFFICE O/P EST MOD 30 MIN: CPT | Mod: PBBFAC | Performed by: NURSE PRACTITIONER

## 2020-06-09 PROCEDURE — 85025 COMPLETE CBC W/AUTO DIFF WBC: CPT

## 2020-06-09 NOTE — PROGRESS NOTES
Ulysses Boreaux 69 y.o. male     Chief Complaint:  Chief Complaint   Patient presents with    Follow-up     6 mos.       History of Present Illness:  Pt is new to provider, but established in practice and c/o:      6 mo f/u chronic conditions   No complaints today   Home glucose 73-140s fasting     BP mildly up initially 146/80- has taken meds   Rechecked after sitting 10 minutes 130/78    Exam:  Review of Systems   Constitutional: Negative for chills, diaphoresis, fever and unexpected weight change.   HENT: Negative for trouble swallowing.    Eyes: Negative for visual disturbance.   Respiratory: Negative for cough and shortness of breath.    Cardiovascular: Negative for chest pain.   Gastrointestinal: Negative for abdominal pain, diarrhea, nausea and vomiting.   Endocrine: Positive for polyuria.   Genitourinary: Negative for difficulty urinating.   Skin: Negative for wound.   Neurological: Negative for speech difficulty.   Psychiatric/Behavioral: Negative for confusion.     Physical Exam   Constitutional: He is oriented to person, place, and time. He appears well-developed and well-nourished. He is cooperative.  Non-toxic appearance. He does not have a sickly appearance. He does not appear ill. No distress.   HENT:   Head: Normocephalic and atraumatic.   Right Ear: External ear normal.   Left Ear: External ear normal.   Eyes: Pupils are equal, round, and reactive to light. Conjunctivae and EOM are normal. Right eye exhibits no discharge. Left eye exhibits no discharge. No scleral icterus.   Neck: Normal range of motion. No tracheal deviation present.   Cardiovascular: Normal rate and regular rhythm.   Pulmonary/Chest: Effort normal and breath sounds normal. No stridor. No respiratory distress. He has no wheezes. He has no rales. He exhibits no tenderness.   Abdominal: Soft. Bowel sounds are normal.   Musculoskeletal: Normal range of motion. He exhibits no edema.   Neurological: He is alert and oriented to person,  place, and time.   Skin: Skin is warm, dry and intact. No rash noted. He is not diaphoretic. No erythema. No pallor.   Psychiatric: He has a normal mood and affect. His speech is normal and behavior is normal. Judgment and thought content normal. Cognition and memory are normal.   Nursing note and vitals reviewed.       Protective Sensation (w/ 10 gram monofilament):  Right: Intact  Left: Intact    Visual Inspection:  Normal -  Bilateral    Pedal Pulses:   Right: Diminshed  Left: Diminshed    Posterior tibialis:   Right:Diminshed  Left: Diminshed         Most Recent Laboratory Results Reviewed ({Yes)  Lab Results   Component Value Date    WBC 5.95 01/09/2020    HGB 16.0 01/09/2020    HCT 48.4 01/09/2020     01/09/2020    CHOL 170 10/17/2019    TRIG 48 10/17/2019    HDL 66 10/17/2019    ALT 23 10/17/2019    AST 26 10/17/2019     10/17/2019    K 3.8 10/17/2019     10/17/2019    CREATININE 1.4 10/17/2019    BUN 16 10/17/2019    CO2 28 10/17/2019    TSH 3.924 02/06/2020    INR 1.0 12/10/2018    HGBA1C 6.0 (H) 01/09/2020       Assessment     ICD-10-CM ICD-9-CM   1. Controlled type 2 diabetes mellitus with complication, without long-term current use of insulin E11.8 250.90   2. Hypertension associated with diabetes E11.59 250.80    I10 401.9   3. Hyperlipidemia associated with type 2 diabetes mellitus E11.69 250.80    E78.5 272.4   4. Insomnia, unspecified type G47.00 780.52   5. Allergic rhinitis, unspecified seasonality, unspecified trigger J30.9 477.9   6. Hyperthyroidism E05.90 242.90   7. Need for shingles vaccine Z23 V04.89        Plan   Controlled type 2 diabetes mellitus with complication, without long-term current use of insulin  a1c and microalbumin to be collected today   Last a1c 6.0 -  controlled, continue current meds     Hypertension associated with diabetes   controlled, continue current meds     Hyperlipidemia associated with type 2 diabetes mellitus  Continue statin   Lipids pending      Insomnia, unspecified type  Controlled on clonopin     Allergic rhinitis, unspecified seasonality, unspecified trigger   controlled, continue current meds  - flonase     Hyperthyroidism  Not on tapazole   Followed by endo - has not had labs drawn ordered from feb - will link to today's labs     Needs shingles vaccine   Instructed to obtain from pharmacy     Follow up in about 6 months (around 12/9/2020).  Future Appointments     Date Provider Specialty Appt Notes    6/9/2020  Lab urine    6/12/2020 Ysabel Garcia PA-C Pain Medicine med refill/UDS    7/1/2020 Elza Pantoja MD Internal Medicine annual    7/14/2020 Andreia Jackson MD Endocrinology 4 month follow up hyperthyroidism.     11/13/2020 MAURA Adams OD Ophthalmology Annual/RTC    12/9/2020 Annie Chau NP Internal Medicine 6 month f/u

## 2020-06-10 DIAGNOSIS — Z79.899 OTHER LONG TERM (CURRENT) DRUG THERAPY: Primary | ICD-10-CM

## 2020-06-10 DIAGNOSIS — F19.20 CONTROLLED DRUG DEPENDENCE: ICD-10-CM

## 2020-06-10 LAB — 25(OH)D3+25(OH)D2 SERPL-MCNC: 30 NG/ML (ref 30–96)

## 2020-06-11 RX ORDER — HYDROCODONE BITARTRATE AND ACETAMINOPHEN 10; 325 MG/1; MG/1
1 TABLET ORAL EVERY 12 HOURS PRN
Qty: 60 TABLET | Refills: 0 | Status: SHIPPED | OUTPATIENT
Start: 2020-06-12 | End: 2020-07-12

## 2020-06-11 RX ORDER — HYDROCODONE BITARTRATE AND ACETAMINOPHEN 10; 325 MG/1; MG/1
1 TABLET ORAL EVERY 12 HOURS PRN
Qty: 60 TABLET | Refills: 0 | Status: SHIPPED | OUTPATIENT
Start: 2020-07-11 | End: 2020-08-10

## 2020-06-12 ENCOUNTER — OFFICE VISIT (OUTPATIENT)
Dept: PAIN MEDICINE | Facility: CLINIC | Age: 70
End: 2020-06-12
Payer: MEDICARE

## 2020-06-12 VITALS
HEIGHT: 75 IN | SYSTOLIC BLOOD PRESSURE: 159 MMHG | BODY MASS INDEX: 25.36 KG/M2 | HEART RATE: 80 BPM | DIASTOLIC BLOOD PRESSURE: 85 MMHG | WEIGHT: 203.94 LBS

## 2020-06-12 DIAGNOSIS — M79.642 LEFT HAND PAIN: ICD-10-CM

## 2020-06-12 DIAGNOSIS — S68.119A TRAUMATIC AMPUTATION OF DIGIT OF ONE HAND WITHOUT COMPLICATION: ICD-10-CM

## 2020-06-12 DIAGNOSIS — G89.21 CHRONIC PAIN DUE TO TRAUMA: ICD-10-CM

## 2020-06-12 DIAGNOSIS — M79.642 PAIN OF LEFT HAND: Primary | ICD-10-CM

## 2020-06-12 DIAGNOSIS — G89.4 CHRONIC PAIN DISORDER: ICD-10-CM

## 2020-06-12 PROCEDURE — 99214 PR OFFICE/OUTPT VISIT, EST, LEVL IV, 30-39 MIN: ICD-10-PCS | Mod: S$PBB,,, | Performed by: PHYSICIAN ASSISTANT

## 2020-06-12 PROCEDURE — 99999 PR PBB SHADOW E&M-EST. PATIENT-LVL III: ICD-10-PCS | Mod: PBBFAC,,, | Performed by: PHYSICIAN ASSISTANT

## 2020-06-12 PROCEDURE — 99214 OFFICE O/P EST MOD 30 MIN: CPT | Mod: S$PBB,,, | Performed by: PHYSICIAN ASSISTANT

## 2020-06-12 PROCEDURE — 99999 PR PBB SHADOW E&M-EST. PATIENT-LVL III: CPT | Mod: PBBFAC,,, | Performed by: PHYSICIAN ASSISTANT

## 2020-06-12 PROCEDURE — 99213 OFFICE O/P EST LOW 20 MIN: CPT | Mod: PBBFAC | Performed by: PHYSICIAN ASSISTANT

## 2020-06-12 NOTE — PROGRESS NOTES
Subjective:     Chief Complaint:  Left Hand Pain    History of Present Illness:  This patient is a 65 year old male who presents today for f/u complaining of the above noted pains. The patient describes this pain as follows.    - duration (acute, chronic): left hand pain since 1997 after table saw amputation of digits 2 through 5 at work, left lower quadrant pain since hernia surgery in 2004  - timing (constant, intermittent): Constant  - character (sharp, dull, aching, burning): Throbbing  - radiating: No  - dermatomal distribution: No  - side: Left   - aggravating factors: Nothing worsens left digit pain, left lower quadrant pain worse with exercise or walking  - relieving factors: Medication / Norco  - antecedent trauma: Amputation via skill saw and 1997, hernia repair in 2004  - prior spinal surgery: None  - pertinent negatives: No fever, No chills, No weight loss, No bladder dysfunction, No bowel dysfunction, No extremity weakness, No saddle anesthesia  - medications tried: Norco, Percocet, over-the-counter medications, compound pain cream  - other therapies tried (physical therapy, injections):     >> physical therapy tried in the past    >> no prior injections        Imaging/ Labs (reviewed on 6/12/2020):    7/12/2018 US ABDOMINAL AORTA  CLINICAL HISTORY:  Abnormal findings on diagnostic imaging of other specified body structures  TECHNIQUE:  Limited ultrasound was performed of the abdominal aorta, with cross sectional diameter measurements obtained.  COMPARISON:  01/10/2018  FINDINGS:  The proximal abdominal aorta measures 2.7 cm.  The mid abdominal aorta measures 1.8 cm.  The distal abdominal aorta measures 1.9 cm, slightly ectatic and unchanged from prior.  The right iliac artery measures 1.0 cm.  The left iliac artery measures 1.1 cm.  Aortoiliac atherosclerosis: Mild  Impression: Stable examination with slight distal abdominal aortic ectasia.  Negative for aneurysm.         ROS:  CONSTITUTIONAL: No  "fever, chills, weight loss  SKIN: No rash or itching  CARDIOVASCULAR:  No chest pain, palpitations  RESPIRATORY: No shortness of breath  GASTROINTESTINAL: No diarrhea, No constipation, abdominal pain, left lower quadrant  GENITOURINARY: No urinary incontinence    MUSCULOSKELETAL:  - patient reports pain as above, see chief complaint     NEUROLOGICAL:   - Pain as above  - Strength in lower extremities is normal  - Sensation in lower extremities is normal  - No bowel or bladder incontinence     PSYCHIATRIC: No change in mood noticed         Objective:   Vitals:  BP (!) 159/85   Pulse 80   Ht 6' 3" (1.905 m)   Wt 92.5 kg (203 lb 14.8 oz)   BMI 25.49 kg/m²    (reviewed on 6/12/2020)     General: alert and oriented, in no apparent distress  Gait: normal gait  Skin: No rashes, No discoloration   HEENT: EOMI  Respiratory: respirations nonlabored  Cardiology: No obvious peripheral edema    Musculoskeletal:  - Full cervical ROM  - No pain with lumbar flexion/ extension  - Left upper extremity range of motion intact throughout  - Digital stumps are hypersensitive to palpation, hypersensitivity does not extend further proximal  - No color change, no swelling, no changes in hair growth along left hand     Neuro:  - Lower extremities:      >> 5/5 strength bilaterally, throughout, symmetric  - Upper extremities:    >> 5/5 strength bilaterally    Psych: mood and affect appropriate        Assessment:     Encounter Diagnoses   Name Primary?   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 Interventional: None.    2. Pharmacologic:   - Refill Norco 10/325 mg PO BID PRN (60 tabs) x 2 months. Paper Rx given. Patient tolerating opioids with no side effects, obtaining good pain control with functional improvement. He tolerates this medication well, and he denies any drowsiness or " constipation.  - Patient informed not to take Norco concurrently with Klonopin to reduce risk of respiratory depression.   - Opioid contract signed on 5/7/2018. New opioid contract signed on 12/12/2019.    - [place]  reviewed and appropriate. Last filled 5-14-20.     - Will order UDS today to ensure medication compliance.      3. Rehabilitative: Encouraged regular exercise.    4. Diagnostic: None for now.    5. Follow up: 8 weeks med refill      - I discussed the risks, benefits, and alternatives to potential treatment options. All questions and concerns were fully addressed today in clinic.            >> UDS:  8-18-15 :: appropriate  12-29-15 :: appropriate  5-3-16 :: appropriate  10-18-16 :: appropriate  4/13/2017 :: appropriate  9/29/2017 :: appropriate  3/9/2018 :: appropriate  9/4/2018 :: appropriate  6/19/2019 :: appropriate  12/12/2019 :: appropriate  6/12/2020 :: pending

## 2020-06-17 ENCOUNTER — PATIENT OUTREACH (OUTPATIENT)
Dept: ADMINISTRATIVE | Facility: HOSPITAL | Age: 70
End: 2020-06-17

## 2020-06-30 ENCOUNTER — EXTERNAL CHRONIC CARE MANAGEMENT (OUTPATIENT)
Dept: PRIMARY CARE CLINIC | Facility: CLINIC | Age: 70
End: 2020-06-30
Payer: MEDICARE

## 2020-06-30 PROCEDURE — 99490 CHRNC CARE MGMT STAFF 1ST 20: CPT | Mod: PBBFAC | Performed by: FAMILY MEDICINE

## 2020-06-30 PROCEDURE — 99490 CHRNC CARE MGMT STAFF 1ST 20: CPT | Mod: S$PBB,,, | Performed by: FAMILY MEDICINE

## 2020-06-30 PROCEDURE — 99490 PR CHRONIC CARE MGMT, 1ST 20 MIN: ICD-10-PCS | Mod: S$PBB,,, | Performed by: FAMILY MEDICINE

## 2020-07-01 ENCOUNTER — OFFICE VISIT (OUTPATIENT)
Dept: INTERNAL MEDICINE | Facility: CLINIC | Age: 70
End: 2020-07-01
Payer: MEDICARE

## 2020-07-01 VITALS
DIASTOLIC BLOOD PRESSURE: 80 MMHG | BODY MASS INDEX: 26.19 KG/M2 | HEART RATE: 67 BPM | WEIGHT: 209.56 LBS | TEMPERATURE: 98 F | OXYGEN SATURATION: 98 % | SYSTOLIC BLOOD PRESSURE: 130 MMHG

## 2020-07-01 DIAGNOSIS — Z79.899 OTHER LONG TERM (CURRENT) DRUG THERAPY: ICD-10-CM

## 2020-07-01 DIAGNOSIS — G47.00 INSOMNIA, UNSPECIFIED TYPE: Primary | ICD-10-CM

## 2020-07-01 LAB
AMPHET+METHAMPHET UR QL: NEGATIVE
BARBITURATES UR QL SCN>200 NG/ML: NEGATIVE
BENZODIAZ UR QL SCN>200 NG/ML: NEGATIVE
BZE UR QL SCN: NEGATIVE
CANNABINOIDS UR QL SCN: NEGATIVE
CREAT UR-MCNC: 121 MG/DL (ref 23–375)
ETHANOL UR-MCNC: <10 MG/DL
METHADONE UR QL SCN>300 NG/ML: NEGATIVE
OPIATES UR QL SCN: NEGATIVE
PCP UR QL SCN>25 NG/ML: NEGATIVE
TOXICOLOGY INFORMATION: NORMAL

## 2020-07-01 PROCEDURE — 99999 PR PBB SHADOW E&M-EST. PATIENT-LVL III: CPT | Mod: PBBFAC,,, | Performed by: FAMILY MEDICINE

## 2020-07-01 PROCEDURE — 80307 DRUG TEST PRSMV CHEM ANLYZR: CPT

## 2020-07-01 PROCEDURE — 99213 PR OFFICE/OUTPT VISIT, EST, LEVL III, 20-29 MIN: ICD-10-PCS | Mod: S$PBB,,, | Performed by: FAMILY MEDICINE

## 2020-07-01 PROCEDURE — 99999 PR PBB SHADOW E&M-EST. PATIENT-LVL III: ICD-10-PCS | Mod: PBBFAC,,, | Performed by: FAMILY MEDICINE

## 2020-07-01 PROCEDURE — 99213 OFFICE O/P EST LOW 20 MIN: CPT | Mod: PBBFAC | Performed by: FAMILY MEDICINE

## 2020-07-01 PROCEDURE — 99213 OFFICE O/P EST LOW 20 MIN: CPT | Mod: S$PBB,,, | Performed by: FAMILY MEDICINE

## 2020-07-01 NOTE — PROGRESS NOTES
Subjective:       Patient ID: Ulysses Boreaux is a 69 y.o. male.    Chief Complaint: Follow-up    Patient presents to clinic today for follow up. At recent visit he had UDS that was negative for benzodiazepines and opioids. He reports taking clonazepam every evening for insomnia. He reports taking pain medication as needed per Pain Clinic Provider. He reports he may have missed a couple of doses as he ran out of clonazepam prior to UDS done at visit in June. Patient is otherwise without concerns today.      Review of Systems   Constitutional: Negative for chills, fatigue, fever and unexpected weight change.   Eyes: Negative for visual disturbance.   Respiratory: Negative for shortness of breath.    Cardiovascular: Negative for chest pain.   Musculoskeletal: Negative for myalgias.   Neurological: Negative for headaches.       Objective:      Physical Exam  Vitals signs reviewed.   Constitutional:       General: He is not in acute distress.     Appearance: He is well-developed.   HENT:      Head: Normocephalic and atraumatic.   Eyes:      General: No scleral icterus.     Conjunctiva/sclera: Conjunctivae normal.      Pupils: Pupils are equal, round, and reactive to light.   Pulmonary:      Effort: Pulmonary effort is normal.   Neurological:      Mental Status: He is alert and oriented to person, place, and time.      Cranial Nerves: No cranial nerve deficit.      Gait: Gait normal.         Assessment:       1. Insomnia, unspecified type    2. Other long term (current) drug therapy         Plan:     Problem List Items Addressed This Visit     Insomnia - Primary      Other Visit Diagnoses     Other long term (current) drug therapy         Relevant Orders    TOXICOLOGY SCREEN, URINE, RANDOM (COMPLIANCE) (Completed)        Advised will refill clonazepam if UDS shows positive. Patient expressed understanding.

## 2020-07-14 DIAGNOSIS — E11.59 HYPERTENSION ASSOCIATED WITH DIABETES: ICD-10-CM

## 2020-07-14 DIAGNOSIS — I15.2 HYPERTENSION ASSOCIATED WITH DIABETES: ICD-10-CM

## 2020-07-16 DIAGNOSIS — J30.9 ALLERGIC RHINITIS: ICD-10-CM

## 2020-07-16 RX ORDER — NEBIVOLOL HYDROCHLORIDE 10 MG/1
TABLET ORAL
Qty: 90 TABLET | Refills: 1 | Status: SHIPPED | OUTPATIENT
Start: 2020-07-16 | End: 2020-12-23

## 2020-07-17 RX ORDER — FLUTICASONE PROPIONATE 50 MCG
SPRAY, SUSPENSION (ML) NASAL
Qty: 16 G | Refills: 11 | Status: SHIPPED | OUTPATIENT
Start: 2020-07-17 | End: 2021-09-02

## 2020-07-30 RX ORDER — CALCIUM CITRATE/VITAMIN D3 200MG-6.25
1 TABLET ORAL 3 TIMES DAILY
Qty: 100 STRIP | Refills: 11 | Status: SHIPPED | OUTPATIENT
Start: 2020-07-30 | End: 2021-09-28 | Stop reason: SDUPTHER

## 2020-07-31 ENCOUNTER — EXTERNAL CHRONIC CARE MANAGEMENT (OUTPATIENT)
Dept: PRIMARY CARE CLINIC | Facility: CLINIC | Age: 70
End: 2020-07-31
Payer: MEDICARE

## 2020-07-31 PROCEDURE — 99490 CHRNC CARE MGMT STAFF 1ST 20: CPT | Mod: S$PBB,,, | Performed by: FAMILY MEDICINE

## 2020-07-31 PROCEDURE — 99490 CHRNC CARE MGMT STAFF 1ST 20: CPT | Mod: PBBFAC | Performed by: FAMILY MEDICINE

## 2020-07-31 PROCEDURE — 99490 PR CHRONIC CARE MGMT, 1ST 20 MIN: ICD-10-PCS | Mod: S$PBB,,, | Performed by: FAMILY MEDICINE

## 2020-08-03 RX ORDER — HYDROCODONE BITARTRATE AND ACETAMINOPHEN 10; 325 MG/1; MG/1
1 TABLET ORAL EVERY 12 HOURS PRN
Qty: 60 TABLET | Refills: 0 | Status: SHIPPED | OUTPATIENT
Start: 2020-08-10 | End: 2020-09-09

## 2020-08-03 RX ORDER — HYDROCODONE BITARTRATE AND ACETAMINOPHEN 10; 325 MG/1; MG/1
1 TABLET ORAL EVERY 12 HOURS PRN
Qty: 60 TABLET | Refills: 0 | Status: SHIPPED | OUTPATIENT
Start: 2020-09-09 | End: 2020-10-09

## 2020-08-07 ENCOUNTER — OFFICE VISIT (OUTPATIENT)
Dept: PAIN MEDICINE | Facility: CLINIC | Age: 70
End: 2020-08-07
Payer: MEDICARE

## 2020-08-07 VITALS
SYSTOLIC BLOOD PRESSURE: 151 MMHG | WEIGHT: 209 LBS | HEIGHT: 75 IN | HEART RATE: 61 BPM | BODY MASS INDEX: 25.99 KG/M2 | DIASTOLIC BLOOD PRESSURE: 83 MMHG | RESPIRATION RATE: 20 BRPM

## 2020-08-07 DIAGNOSIS — S68.119A TRAUMATIC AMPUTATION OF DIGIT OF ONE HAND WITHOUT COMPLICATION: ICD-10-CM

## 2020-08-07 DIAGNOSIS — G89.4 CHRONIC PAIN DISORDER: ICD-10-CM

## 2020-08-07 DIAGNOSIS — M79.642 PAIN OF LEFT HAND: Primary | ICD-10-CM

## 2020-08-07 DIAGNOSIS — G89.21 CHRONIC PAIN DUE TO TRAUMA: ICD-10-CM

## 2020-08-07 PROCEDURE — 99214 PR OFFICE/OUTPT VISIT, EST, LEVL IV, 30-39 MIN: ICD-10-PCS | Mod: S$PBB,,, | Performed by: PHYSICIAN ASSISTANT

## 2020-08-07 PROCEDURE — 99999 PR PBB SHADOW E&M-EST. PATIENT-LVL III: ICD-10-PCS | Mod: PBBFAC,,, | Performed by: PHYSICIAN ASSISTANT

## 2020-08-07 PROCEDURE — 99999 PR PBB SHADOW E&M-EST. PATIENT-LVL III: CPT | Mod: PBBFAC,,, | Performed by: PHYSICIAN ASSISTANT

## 2020-08-07 PROCEDURE — 99213 OFFICE O/P EST LOW 20 MIN: CPT | Mod: PBBFAC | Performed by: PHYSICIAN ASSISTANT

## 2020-08-07 PROCEDURE — 99214 OFFICE O/P EST MOD 30 MIN: CPT | Mod: S$PBB,,, | Performed by: PHYSICIAN ASSISTANT

## 2020-08-07 NOTE — PROGRESS NOTES
Subjective:     Chief Complaint:  Left Hand Pain    Interval History (8/7/2020): Since last visit on 6/12/2020, no major changes.  He is stable overall medication.  He has been staying home due to the.    History of Present Illness:  This patient is a 65 year old male who presents today for f/u complaining of the above noted pains. The patient describes this pain as follows.    - duration (acute, chronic): left hand pain since 1997 after table saw amputation of digits 2 through 5 at work, left lower quadrant pain since hernia surgery in 2004  - timing (constant, intermittent): Constant  - character (sharp, dull, aching, burning): Throbbing  - radiating: No  - dermatomal distribution: No  - side: Left   - aggravating factors: Nothing worsens left digit pain, left lower quadrant pain worse with exercise or walking  - relieving factors: Medication / Norco  - antecedent trauma: Amputation via skill saw and 1997, hernia repair in 2004  - prior spinal surgery: None  - pertinent negatives: No fever, No chills, No weight loss, No bladder dysfunction, No bowel dysfunction, No extremity weakness, No saddle anesthesia  - medications tried: Norco, Percocet, over-the-counter medications, compound pain cream  - other therapies tried (physical therapy, injections):     >> physical therapy tried in the past    >> no prior injections        Imaging/ Labs (reviewed on 8/7/2020):    7/12/2018 US ABDOMINAL AORTA  CLINICAL HISTORY:  Abnormal findings on diagnostic imaging of other specified body structures  TECHNIQUE:  Limited ultrasound was performed of the abdominal aorta, with cross sectional diameter measurements obtained.  COMPARISON:  01/10/2018  FINDINGS:  The proximal abdominal aorta measures 2.7 cm.  The mid abdominal aorta measures 1.8 cm.  The distal abdominal aorta measures 1.9 cm, slightly ectatic and unchanged from prior.  The right iliac artery measures 1.0 cm.  The left iliac artery measures 1.1 cm.  Aortoiliac  "atherosclerosis: Mild  Impression: Stable examination with slight distal abdominal aortic ectasia.  Negative for aneurysm.         ROS:  CONSTITUTIONAL: No fever, chills, weight loss  SKIN: No rash or itching  CARDIOVASCULAR:  No chest pain, palpitations  RESPIRATORY: No shortness of breath  GASTROINTESTINAL: No diarrhea, No constipation, abdominal pain, left lower quadrant  GENITOURINARY: No urinary incontinence    MUSCULOSKELETAL:  - patient reports pain as above, see chief complaint     NEUROLOGICAL:   - Pain as above  - Strength in lower extremities is normal  - Sensation in lower extremities is normal  - No bowel or bladder incontinence     PSYCHIATRIC: No change in mood noticed         Objective:   Vitals:  BP (!) 151/83 (BP Location: Right arm, Patient Position: Sitting, BP Method: Medium (Automatic))   Pulse 61   Resp 20   Ht 6' 3" (1.905 m)   Wt 94.8 kg (209 lb)   BMI 26.12 kg/m²    (reviewed on 8/7/2020)     General: alert and oriented, in no apparent distress  Gait: normal gait  Skin: No rashes, No discoloration   HEENT: EOMI  Respiratory: respirations nonlabored  Cardiology: No obvious peripheral edema    Musculoskeletal:  - Full cervical ROM  - Full lumbar ROM   - No pain with lumbar flexion/ extension  - Left upper extremity range of motion intact throughout  - Digital stumps are hypersensitive to palpation, hypersensitivity does not extend further proximal  - No color change, no swelling, no changes in hair growth along left hand     Neuro:  - Lower extremities:      >> 5/5 strength bilaterally, throughout, symmetric  - Upper extremities:    >> 5/5 strength bilaterally    Psych: mood and affect appropriate        Assessment:     Encounter Diagnoses   Name Primary?   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 Interventional: None.    2. Pharmacologic:   - Refill Norco " 10/325 mg PO BID PRN (60 tabs) x 2 months. Paper Rx given. Patient tolerating opioids with no side effects, obtaining good pain control with functional improvement. He tolerates this medication well, and he denies any drowsiness or constipation.  - Patient informed not to take Norco concurrently with Klonopin to reduce risk of respiratory depression.   - Opioid contract signed on 5/7/2018. New opioid contract signed on 12/12/2019.    - [place]  reviewed and appropriate. Last filled 7-11-20.     - Will order UDS next visit to ensure medication compliance.      3. Rehabilitative: Encouraged regular exercise.    4. Diagnostic: None for now.    5. Follow up: 8 weeks med refill      - I discussed the risks, benefits, and alternatives to potential treatment options. All questions and concerns were fully addressed today in clinic.            >> UDS:  8-18-15 :: appropriate  12-29-15 :: appropriate  5-3-16 :: appropriate  10-18-16 :: appropriate  4/13/2017 :: appropriate  9/29/2017 :: appropriate  3/9/2018 :: appropriate  9/4/2018 :: appropriate  6/19/2019 :: appropriate  12/12/2019 :: appropriate  6/12/2020 ::  Appropriate

## 2020-08-13 ENCOUNTER — LAB VISIT (OUTPATIENT)
Dept: LAB | Facility: HOSPITAL | Age: 70
End: 2020-08-13
Attending: INTERNAL MEDICINE
Payer: MEDICARE

## 2020-08-13 ENCOUNTER — OFFICE VISIT (OUTPATIENT)
Dept: ENDOCRINOLOGY | Facility: CLINIC | Age: 70
End: 2020-08-13
Payer: MEDICARE

## 2020-08-13 VITALS
RESPIRATION RATE: 18 BRPM | SYSTOLIC BLOOD PRESSURE: 182 MMHG | BODY MASS INDEX: 26.48 KG/M2 | HEART RATE: 65 BPM | DIASTOLIC BLOOD PRESSURE: 88 MMHG | HEIGHT: 75 IN | WEIGHT: 212.94 LBS | TEMPERATURE: 98 F

## 2020-08-13 DIAGNOSIS — E05.90 HYPERTHYROIDISM: ICD-10-CM

## 2020-08-13 DIAGNOSIS — E05.90 HYPERTHYROIDISM: Primary | ICD-10-CM

## 2020-08-13 DIAGNOSIS — I10 ESSENTIAL HYPERTENSION: ICD-10-CM

## 2020-08-13 DIAGNOSIS — E04.1 THYROID NODULE: ICD-10-CM

## 2020-08-13 DIAGNOSIS — R25.1 TREMOR OF BOTH HANDS: ICD-10-CM

## 2020-08-13 PROCEDURE — 36415 COLL VENOUS BLD VENIPUNCTURE: CPT

## 2020-08-13 PROCEDURE — 84481 FREE ASSAY (FT-3): CPT

## 2020-08-13 PROCEDURE — 99214 PR OFFICE/OUTPT VISIT, EST, LEVL IV, 30-39 MIN: ICD-10-PCS | Mod: S$PBB,,, | Performed by: INTERNAL MEDICINE

## 2020-08-13 PROCEDURE — 84439 ASSAY OF FREE THYROXINE: CPT

## 2020-08-13 PROCEDURE — 84443 ASSAY THYROID STIM HORMONE: CPT

## 2020-08-13 PROCEDURE — 99214 OFFICE O/P EST MOD 30 MIN: CPT | Mod: PBBFAC | Performed by: INTERNAL MEDICINE

## 2020-08-13 PROCEDURE — 99999 PR PBB SHADOW E&M-EST. PATIENT-LVL IV: CPT | Mod: PBBFAC,,, | Performed by: INTERNAL MEDICINE

## 2020-08-13 PROCEDURE — 84445 ASSAY OF TSI GLOBULIN: CPT

## 2020-08-13 PROCEDURE — 99214 OFFICE O/P EST MOD 30 MIN: CPT | Mod: S$PBB,,, | Performed by: INTERNAL MEDICINE

## 2020-08-13 PROCEDURE — 99999 PR PBB SHADOW E&M-EST. PATIENT-LVL IV: ICD-10-PCS | Mod: PBBFAC,,, | Performed by: INTERNAL MEDICINE

## 2020-08-13 NOTE — PROGRESS NOTES
Patient ID: Ulysses Boreaux is a 69 y.o. male.  Patient is here for follow up        Chief Complaint: Follow-up and Hyperthyroidism      Follow-up  Pertinent negatives include no abdominal pain, arthralgias, chest pain, diaphoresis, fatigue, fever, headaches, joint swelling, nausea, numbness, rash, sore throat or vomiting.         Consultation was requested by Dr. Elza Pantoja       Diagnosed:  Patient has had a suppressed TSH dating back to 2014 in review of epic and his free T4 was normal up until recently his free T4 was elevated      Lab tests done after initial evaluation showed positive TSI antibody and elevation of T3 and T4 with persistent suppression of TSH    Previous radiology tests:  Thyroid ultrasound :  Yes and it showed relatively small single thyroid nodule with no concerning features  NM uptake and scan:  Yes had normal but homogeneous distribution    Previous thyroid surgery: No      Thyroid symptoms:  He states he feels fine, denies fatigue and states his energy is good    He has a chronic tremor tremulousness but he is convinced that the shaking is due to the pain medication that he is on as he states he gets better later in the day and today he reports the tremulousness is better          Weight has been stable denies any nausea vomiting diarrhea or anxiety or mood changes    History of left four fingers being amputated from a previous job    Currently retired  Thyroid medications:  None, I stopped his  Methimazole  10 mg twice a day after last visit because of elevated TSH and subsequent tests have been normal off of medication      Takes medication appropriately:  Yes    Sister has thyroid issues   Sandra Dale NP was monitoring his diabetes which is well-controlled      I have reviewed the past medical, family and social history    Review of Systems   Constitutional: Negative for appetite change, diaphoresis, fatigue, fever and unexpected weight change.   HENT: Negative for sore  throat and trouble swallowing.    Eyes: Negative for visual disturbance.   Respiratory: Negative for shortness of breath and wheezing.    Cardiovascular: Negative for chest pain, palpitations and leg swelling.   Gastrointestinal: Negative for abdominal pain, diarrhea, nausea and vomiting.   Endocrine: Negative for cold intolerance, heat intolerance, polydipsia, polyphagia and polyuria.   Genitourinary: Negative for difficulty urinating and dysuria.   Musculoskeletal: Negative for arthralgias and joint swelling.   Skin: Negative for color change and rash.   Neurological: Negative for dizziness, tremors, numbness and headaches.   Hematological: Negative for adenopathy.   Psychiatric/Behavioral: Negative for confusion, dysphoric mood and sleep disturbance. The patient is not nervous/anxious.        Objective:      Physical Exam  Vitals signs reviewed.   Constitutional:       General: He is not in acute distress.     Appearance: He is well-developed. He is not diaphoretic.   HENT:      Head: Normocephalic and atraumatic.   Eyes:      Conjunctiva/sclera: Conjunctivae normal.   Skin:     General: Skin is warm and dry.      Findings: No erythema or rash.   Neurological:      Mental Status: He is alert.      Sensory: No sensory deficit.      Comments:      Psychiatric:         Behavior: Behavior normal.           Lab Review:   Office Visit on 07/01/2020   Component Date Value    Alcohol, Urine 07/01/2020 <10     Benzodiazepines 07/01/2020 Negative     Methadone metabolites 07/01/2020 Negative     Cocaine (Metab.) 07/01/2020 Negative     Opiate Scrn, Ur 07/01/2020 Negative     Barbiturate Screen, Ur 07/01/2020 Negative     Amphetamine Screen, Ur 07/01/2020 Negative     THC 07/01/2020 Negative     Phencyclidine 07/01/2020 Negative     Creatinine, Random Ur 07/01/2020 121.0     Toxicology Information 07/01/2020 SEE COMMENT    Lab Visit on 06/09/2020   Component Date Value    Microalbum.,U,Random 06/09/2020 37.0      Creatinine, Random Ur 06/09/2020 95.0     Microalb Creat Ratio 06/09/2020 38.9*    Alcohol, Urine 06/10/2020 <10     Benzodiazepines 06/10/2020 Negative     Methadone metabolites 06/10/2020 Negative     Cocaine (Metab.) 06/10/2020 Negative     Opiate Scrn, Ur 06/10/2020 Presumptive Positive     Barbiturate Screen, Ur 06/10/2020 Negative     Amphetamine Screen, Ur 06/10/2020 Negative     THC 06/10/2020 Negative     Phencyclidine 06/10/2020 Negative     Creatinine, Random Ur 06/10/2020 96.0     Toxicology Information 06/10/2020 SEE COMMENT    Lab Visit on 06/09/2020   Component Date Value    Free T4 06/09/2020 0.93     T3, Free 06/09/2020 3.3     TSH 06/09/2020 1.020     WBC 06/09/2020 4.64     RBC 06/09/2020 4.90     Hemoglobin 06/09/2020 15.3     Hematocrit 06/09/2020 48.1     Mean Corpuscular Volume 06/09/2020 98     Mean Corpuscular Hemoglo* 06/09/2020 31.2*    Mean Corpuscular Hemoglo* 06/09/2020 31.8*    RDW 06/09/2020 12.2     Platelets 06/09/2020 203     MPV 06/09/2020 10.6     Immature Granulocytes 06/09/2020 0.2     Gran # (ANC) 06/09/2020 1.9     Immature Grans (Abs) 06/09/2020 0.01     Lymph # 06/09/2020 2.0     Mono # 06/09/2020 0.5     Eos # 06/09/2020 0.2     Baso # 06/09/2020 0.06     nRBC 06/09/2020 0     Gran% 06/09/2020 40.3     Lymph% 06/09/2020 43.8     Mono% 06/09/2020 11.2     Eosinophil% 06/09/2020 3.2     Basophil% 06/09/2020 1.3     Differential Method 06/09/2020 Automated     Sodium 06/09/2020 140     Potassium 06/09/2020 4.3     Chloride 06/09/2020 102     CO2 06/09/2020 31*    Glucose 06/09/2020 112*    BUN, Bld 06/09/2020 15     Creatinine 06/09/2020 1.3     Calcium 06/09/2020 10.0     Total Protein 06/09/2020 8.0     Albumin 06/09/2020 4.2     Total Bilirubin 06/09/2020 0.5     Alkaline Phosphatase 06/09/2020 62     AST 06/09/2020 32     ALT 06/09/2020 31     Anion Gap 06/09/2020 7*    eGFR if African American 06/09/2020 >60.0      eGFR if non African Amer* 06/09/2020 56.5*    Cholesterol 06/09/2020 186     Triglycerides 06/09/2020 56     HDL 06/09/2020 74     LDL Cholesterol 06/09/2020 100.8     Hdl/Cholesterol Ratio 06/09/2020 39.8     Total Cholesterol/HDL Ra* 06/09/2020 2.5     Non-HDL Cholesterol 06/09/2020 112     Vit D, 25-Hydroxy 06/09/2020 30     Hemoglobin A1C 06/09/2020 6.3*    Estimated Avg Glucose 06/09/2020 134*       Assessment:     1. Hyperthyroidism  T4, free    TSH    T3, free    Thyroid Stimulating Immunoglobulin   2. Thyroid nodule     3. Tremor of both hands     4. Essential hypertension      check labs today and if normal continue off of methimazole but monitor regularly to see if he has a relapse back with hyperthyroidism.  He likely will not have symptoms to indicate the return so would recommend at least every 6 months to a year monitoring and his blood pressure was elevated, he admits he added salt to his food this morning, continue to monitor and follow-up with his PCP  Plan:   Hyperthyroidism  -     T4, free; Future; Expected date: 08/13/2020  -     TSH; Future; Expected date: 08/13/2020  -     T3, free; Future; Expected date: 08/13/2020  -     Thyroid Stimulating Immunoglobulin; Future; Expected date: 08/13/2020    Thyroid nodule    Tremor of both hands    Essential hypertension          No follow-ups on file.    Labs prior to appointment? not applicable     Disclaimer:  This note may have been partially prepared using voice recognition software and  it may have not been extensively proofed, as such there could be errors within the text such as sound alike errors.          100.4/fahrenheit

## 2020-08-14 LAB
T3FREE SERPL-MCNC: 3.3 PG/ML (ref 2.3–4.2)
T4 FREE SERPL-MCNC: 1 NG/DL (ref 0.71–1.51)
TSH SERPL DL<=0.005 MIU/L-ACNC: 0.46 UIU/ML (ref 0.4–4)

## 2020-08-17 LAB — TSI SER-ACNC: 1.13 IU/L

## 2020-08-31 ENCOUNTER — EXTERNAL CHRONIC CARE MANAGEMENT (OUTPATIENT)
Dept: PRIMARY CARE CLINIC | Facility: CLINIC | Age: 70
End: 2020-08-31
Payer: MEDICARE

## 2020-08-31 PROCEDURE — 99490 CHRNC CARE MGMT STAFF 1ST 20: CPT | Mod: S$PBB,,, | Performed by: FAMILY MEDICINE

## 2020-08-31 PROCEDURE — 99490 CHRNC CARE MGMT STAFF 1ST 20: CPT | Mod: PBBFAC | Performed by: FAMILY MEDICINE

## 2020-08-31 PROCEDURE — 99490 PR CHRONIC CARE MGMT, 1ST 20 MIN: ICD-10-PCS | Mod: S$PBB,,, | Performed by: FAMILY MEDICINE

## 2020-09-30 ENCOUNTER — EXTERNAL CHRONIC CARE MANAGEMENT (OUTPATIENT)
Dept: PRIMARY CARE CLINIC | Facility: CLINIC | Age: 70
End: 2020-09-30
Payer: MEDICARE

## 2020-09-30 PROCEDURE — 99490 CHRNC CARE MGMT STAFF 1ST 20: CPT | Mod: PBBFAC | Performed by: FAMILY MEDICINE

## 2020-09-30 PROCEDURE — 99490 PR CHRONIC CARE MGMT, 1ST 20 MIN: ICD-10-PCS | Mod: S$PBB,,, | Performed by: FAMILY MEDICINE

## 2020-09-30 PROCEDURE — 99490 CHRNC CARE MGMT STAFF 1ST 20: CPT | Mod: S$PBB,,, | Performed by: FAMILY MEDICINE

## 2020-10-01 ENCOUNTER — PATIENT OUTREACH (OUTPATIENT)
Dept: ADMINISTRATIVE | Facility: OTHER | Age: 70
End: 2020-10-01

## 2020-10-02 ENCOUNTER — OFFICE VISIT (OUTPATIENT)
Dept: PAIN MEDICINE | Facility: CLINIC | Age: 70
End: 2020-10-02
Payer: MEDICARE

## 2020-10-02 VITALS
RESPIRATION RATE: 18 BRPM | HEART RATE: 79 BPM | DIASTOLIC BLOOD PRESSURE: 95 MMHG | BODY MASS INDEX: 26.24 KG/M2 | SYSTOLIC BLOOD PRESSURE: 156 MMHG | WEIGHT: 211 LBS | HEIGHT: 75 IN

## 2020-10-02 DIAGNOSIS — M79.642 LEFT HAND PAIN: ICD-10-CM

## 2020-10-02 DIAGNOSIS — G89.21 CHRONIC PAIN DUE TO TRAUMA: ICD-10-CM

## 2020-10-02 DIAGNOSIS — M79.642 PAIN OF LEFT HAND: Primary | ICD-10-CM

## 2020-10-02 DIAGNOSIS — G89.4 CHRONIC PAIN DISORDER: ICD-10-CM

## 2020-10-02 DIAGNOSIS — S68.119A TRAUMATIC AMPUTATION OF DIGIT OF ONE HAND WITHOUT COMPLICATION: ICD-10-CM

## 2020-10-02 PROCEDURE — 99999 PR PBB SHADOW E&M-EST. PATIENT-LVL IV: CPT | Mod: PBBFAC,,, | Performed by: PHYSICIAN ASSISTANT

## 2020-10-02 PROCEDURE — 99214 PR OFFICE/OUTPT VISIT, EST, LEVL IV, 30-39 MIN: ICD-10-PCS | Mod: S$PBB,,, | Performed by: PHYSICIAN ASSISTANT

## 2020-10-02 PROCEDURE — 99214 OFFICE O/P EST MOD 30 MIN: CPT | Mod: PBBFAC | Performed by: PHYSICIAN ASSISTANT

## 2020-10-02 PROCEDURE — 99214 OFFICE O/P EST MOD 30 MIN: CPT | Mod: S$PBB,,, | Performed by: PHYSICIAN ASSISTANT

## 2020-10-02 PROCEDURE — 99999 PR PBB SHADOW E&M-EST. PATIENT-LVL IV: ICD-10-PCS | Mod: PBBFAC,,, | Performed by: PHYSICIAN ASSISTANT

## 2020-10-02 NOTE — PROGRESS NOTES
Subjective:     Chief Complaint:  Left Hand Pain      History of Present Illness:  This patient is a 65 year old male who presents today for f/u complaining of the above noted pains. The patient describes this pain as follows.    - duration (acute, chronic): left hand pain since 1997 after table saw amputation of digits 2 through 5 at work, left lower quadrant pain since hernia surgery in 2004  - timing (constant, intermittent): Constant  - character (sharp, dull, aching, burning): Throbbing  - radiating: No  - dermatomal distribution: No  - side: Left   - aggravating factors: Nothing worsens left digit pain, left lower quadrant pain worse with exercise or walking  - relieving factors: Medication / Norco  - antecedent trauma: Amputation via skill saw and 1997, hernia repair in 2004  - prior spinal surgery: None  - pertinent negatives: No fever, No chills, No weight loss, No bladder dysfunction, No bowel dysfunction, No extremity weakness, No saddle anesthesia  - medications tried: Norco, Percocet, over-the-counter medications, compound pain cream  - other therapies tried (physical therapy, injections):     >> physical therapy tried in the past    >> no prior injections        Imaging/ Labs (reviewed on 10/2/2020):    7/12/2018 US ABDOMINAL AORTA  CLINICAL HISTORY:  Abnormal findings on diagnostic imaging of other specified body structures  TECHNIQUE:  Limited ultrasound was performed of the abdominal aorta, with cross sectional diameter measurements obtained.  COMPARISON:  01/10/2018  FINDINGS:  The proximal abdominal aorta measures 2.7 cm.  The mid abdominal aorta measures 1.8 cm.  The distal abdominal aorta measures 1.9 cm, slightly ectatic and unchanged from prior.  The right iliac artery measures 1.0 cm.  The left iliac artery measures 1.1 cm.  Aortoiliac atherosclerosis: Mild  Impression: Stable examination with slight distal abdominal aortic ectasia.  Negative for aneurysm.         ROS:  CONSTITUTIONAL: No  "fever, chills, weight loss  SKIN: No rash or itching  CARDIOVASCULAR:  No chest pain, palpitations  RESPIRATORY: No shortness of breath  GASTROINTESTINAL: No diarrhea, No constipation, abdominal pain, left lower quadrant  GENITOURINARY: No urinary incontinence    MUSCULOSKELETAL:  - patient reports pain as above, see chief complaint     NEUROLOGICAL:   - Pain as above  - Strength in lower extremities is normal  - Sensation in lower extremities is normal  - No bowel or bladder incontinence     PSYCHIATRIC: No change in mood noticed         Objective:   Vitals:  BP (!) 156/95 (BP Location: Right arm, Patient Position: Sitting, BP Method: Medium (Automatic))   Pulse 79   Resp 18   Ht 6' 3" (1.905 m)   Wt 95.7 kg (211 lb)   BMI 26.37 kg/m²    (reviewed on 10/2/2020)     General: alert and oriented, in no apparent distress  Gait: normal gait  Skin: No rashes, No discoloration   HEENT: EOMI  Respiratory: respirations nonlabored  Cardiology: No obvious peripheral edema    Musculoskeletal:  - Full lumbar ROM   - No pain with lumbar flexion/ extension  - Left upper extremity range of motion intact throughout  - Digital stumps are hypersensitive to palpation, hypersensitivity does not extend further proximal  - No color change, no swelling, no changes in hair growth along left hand     Neuro:  - Lower extremities:      >> 5/5 strength bilaterally, throughout, symmetric  - Upper extremities:    >> 5/5 strength bilaterally    Psych: mood and affect appropriate        Assessment:     Encounter Diagnoses   Name Primary?   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 Interventional: None.    2. Pharmacologic:   - Refill Norco 10/325 mg PO BID PRN (60 tabs) x 2 months. Will e-prescribe for 10/9 and 11/8. Patient tolerating opioids with no side effects, obtaining good pain control with " functional improvement. He tolerates this medication well, and he denies any drowsiness or constipation.  * Patient was informed about the risk of concurrent use of benzodiazepines and opioid medications, as they create a synergistic effect causing amplified effect of both substances in combination.  he was instructed not to take opioid pain medication at the same time as Klonopin.  These risks include extreme sleepiness, respiratory depression, coma, and even death. Last filled in June 2020.  - Opioid contract signed on 5/7/2018. New opioid contract signed on 12/12/2019 with Dr. Olmedo.    - [place] [facility] reviewed and appropriate. Last filled 9-9-20.     - Will order UDS today to ensure medication compliance.     3. Rehabilitative: Encouraged regular exercise.    4. Diagnostic: None for now.    5. Follow up:   - To establish care with Dr. Love and signed opioid contract if appropriate/ no med refills needed at that appointment  - 8 weeks med refill with me     - I discussed the risks, benefits, and alternatives to potential treatment options. All questions and concerns were fully addressed today in clinic.            >> UDS:  8-18-15 :: appropriate  12-29-15 :: appropriate  5-3-16 :: appropriate  10-18-16 :: appropriate  4/13/2017 :: appropriate  9/29/2017 :: appropriate  3/9/2018 :: appropriate  9/4/2018 :: appropriate  6/19/2019 :: appropriate  12/12/2019 :: appropriate  6/12/2020 ::  Appropriate  10/2/2020 :: pending

## 2020-10-02 NOTE — PROGRESS NOTES
Health Maintenance Due   Topic Date Due    Shingles Vaccine (1 of 2) 08/16/2002    Influenza Vaccine (1) 08/01/2020     Updates were requested from care everywhere.  Chart was reviewed for overdue Proactive [facility] Encounters (TAURUS) topics (CRS, Breast Cancer Screening, Eye exam)  Health Maintenance has been updated.  LINKS immunization registry triggered.  Immunizations were reconciled.

## 2020-10-06 RX ORDER — HYDROCODONE BITARTRATE AND ACETAMINOPHEN 10; 325 MG/1; MG/1
1 TABLET ORAL EVERY 12 HOURS PRN
Qty: 60 TABLET | Refills: 0 | Status: SHIPPED | OUTPATIENT
Start: 2020-10-09 | End: 2020-11-08

## 2020-10-06 RX ORDER — HYDROCODONE BITARTRATE AND ACETAMINOPHEN 10; 325 MG/1; MG/1
1 TABLET ORAL EVERY 12 HOURS PRN
Qty: 60 TABLET | Refills: 0 | Status: SHIPPED | OUTPATIENT
Start: 2020-11-08 | End: 2020-12-08

## 2020-10-28 ENCOUNTER — OFFICE VISIT (OUTPATIENT)
Dept: PAIN MEDICINE | Facility: CLINIC | Age: 70
End: 2020-10-28
Payer: MEDICARE

## 2020-10-28 VITALS
HEIGHT: 75 IN | HEART RATE: 61 BPM | BODY MASS INDEX: 26.42 KG/M2 | WEIGHT: 212.5 LBS | SYSTOLIC BLOOD PRESSURE: 166 MMHG | DIASTOLIC BLOOD PRESSURE: 86 MMHG

## 2020-10-28 DIAGNOSIS — S68.119A TRAUMATIC AMPUTATION OF DIGIT OF ONE HAND WITHOUT COMPLICATION: Primary | ICD-10-CM

## 2020-10-28 DIAGNOSIS — G89.4 CHRONIC PAIN DISORDER: ICD-10-CM

## 2020-10-28 DIAGNOSIS — G89.21 CHRONIC PAIN DUE TO TRAUMA: ICD-10-CM

## 2020-10-28 DIAGNOSIS — Z02.89 PAIN MANAGEMENT CONTRACT AGREEMENT: ICD-10-CM

## 2020-10-28 PROCEDURE — 99999 PR PBB SHADOW E&M-EST. PATIENT-LVL V: ICD-10-PCS | Mod: PBBFAC,,, | Performed by: PHYSICAL MEDICINE & REHABILITATION

## 2020-10-28 PROCEDURE — 99214 OFFICE O/P EST MOD 30 MIN: CPT | Mod: S$PBB,,, | Performed by: PHYSICAL MEDICINE & REHABILITATION

## 2020-10-28 PROCEDURE — 99215 OFFICE O/P EST HI 40 MIN: CPT | Mod: PBBFAC | Performed by: PHYSICAL MEDICINE & REHABILITATION

## 2020-10-28 PROCEDURE — 99999 PR PBB SHADOW E&M-EST. PATIENT-LVL V: CPT | Mod: PBBFAC,,, | Performed by: PHYSICAL MEDICINE & REHABILITATION

## 2020-10-28 PROCEDURE — 99214 PR OFFICE/OUTPT VISIT, EST, LEVL IV, 30-39 MIN: ICD-10-PCS | Mod: S$PBB,,, | Performed by: PHYSICAL MEDICINE & REHABILITATION

## 2020-10-28 NOTE — PROGRESS NOTES
Subjective:     Chief Complaint:  Left Hand Pain    Interval HPI:  Ulysses Boreaux is a 70 y.o. male who presents today for follow-up complaining of chronic left hand pain secondary to traumatic injury.  He reports that his pain is of the same type and quality.  His current medication regimen consist of Norco 10/325 mg b.i.d. p.r.n. which he reports provides at least 95% relief, and denies any adverse effects from these medications.  He reports that his current pain intensity is 0/10.    History of Present Illness:  This patient is a 65 year old male who presents today for f/u complaining of the above noted pains. The patient describes this pain as follows.    - duration (acute, chronic): left hand pain since 1997 after table saw amputation of digits 2 through 5 at work, left lower quadrant pain since hernia surgery in 2004  - timing (constant, intermittent): Constant  - character (sharp, dull, aching, burning): Throbbing  - radiating: No  - dermatomal distribution: No  - side: Left   - aggravating factors: Nothing worsens left digit pain, left lower quadrant pain worse with exercise or walking  - relieving factors: Medication / Norco  - antecedent trauma: Amputation via skill saw and 1997, hernia repair in 2004  - prior spinal surgery: None  - pertinent negatives: No fever, No chills, No weight loss, No bladder dysfunction, No bowel dysfunction, No extremity weakness, No saddle anesthesia  - medications tried: Norco, Percocet, over-the-counter medications, compound pain cream  - other therapies tried (physical therapy, injections):     >> physical therapy tried in the past    >> no prior injections        Imaging/ Labs (reviewed on 10/28/2020):    7/12/2018 US ABDOMINAL AORTA  CLINICAL HISTORY:  Abnormal findings on diagnostic imaging of other specified body structures  TECHNIQUE:  Limited ultrasound was performed of the abdominal aorta, with cross sectional diameter measurements  "obtained.  COMPARISON:  01/10/2018  FINDINGS:  The proximal abdominal aorta measures 2.7 cm.  The mid abdominal aorta measures 1.8 cm.  The distal abdominal aorta measures 1.9 cm, slightly ectatic and unchanged from prior.  The right iliac artery measures 1.0 cm.  The left iliac artery measures 1.1 cm.  Aortoiliac atherosclerosis: Mild  Impression: Stable examination with slight distal abdominal aortic ectasia.  Negative for aneurysm.         ROS:  CONSTITUTIONAL: No fever, chills, weight loss  SKIN: No rash or itching  CARDIOVASCULAR:  No chest pain, palpitations  RESPIRATORY: No shortness of breath  GASTROINTESTINAL: No diarrhea, No constipation, abdominal pain, left lower quadrant  GENITOURINARY: No urinary incontinence    MUSCULOSKELETAL:  - patient reports pain as above, see chief complaint     NEUROLOGICAL:   - Pain as above  - Strength in lower extremities is normal  - Sensation in lower extremities is normal  - No bowel or bladder incontinence     PSYCHIATRIC: No change in mood noticed         Objective:   Vitals:  BP (!) 166/86   Pulse 61   Ht 6' 3" (1.905 m)   Wt 96.4 kg (212 lb 8.4 oz)   BMI 26.56 kg/m²    (reviewed on 10/28/2020)     General: alert and oriented, in no apparent distress  Gait: normal gait  Skin: No rashes, No discoloration   HEENT: EOMI  Respiratory: respirations nonlabored  Cardiology: No obvious peripheral edema    Musculoskeletal:  - Full lumbar ROM   - No pain with lumbar flexion/ extension  - Left upper extremity range of motion intact throughout  - Digital stumps are hypersensitive to palpation, hypersensitivity does not extend further proximal  - No color change, no swelling, no changes in hair growth along left hand     Neuro:  - Lower extremities:      >> 5/5 strength bilaterally, throughout, symmetric  - Upper extremities:    >> 5/5 strength bilaterally    Psych: mood and affect appropriate        Assessment:     Encounter Diagnoses   Name Primary?    Traumatic amputation " of digit of one hand without complication Yes    Chronic pain due to trauma     Chronic pain disorder     Pain management contract agreement        Plan:     - Interventional: None at this time    - Pharmacologic:   - previously refilled Norco 10/325 mg PO BID PRN (60 tabs) x 2 months, e-prescribed and fill dates for 10/9 and 11/8. Patient tolerating opioids with no side effects, obtaining good pain control with functional improvement. He tolerates this medication well, and he denies any drowsiness or constipation.  * Patient was informed about the risk of concurrent use of benzodiazepines and opioid medications, as they create a synergistic effect causing amplified effect of both substances in combination.  he was instructed not to take opioid pain medication at the same time as Klonopin.  These risks include extreme sleepiness, respiratory depression, coma, and even death. Last filled in June 2020.  - Opioid contract renewed today on 10/28/2020    - [place]  reviewed and appropriate. Last filled 10/09/2020.       - UDS reviewed, consistent    - Rehabilitative: Encouraged regular exercise.    - Diagnostic: None for now.    - Follow up:  8 weeks med refill     - I discussed the risks, benefits, and alternatives to potential treatment options. All questions and concerns were fully addressed today in clinic.         Yvon Love MD  Interventional Pain Medicine  [facility] [place]        >> UDS:  8-18-15 :: appropriate  12-29-15 :: appropriate  5-3-16 :: appropriate  10-18-16 :: appropriate  4/13/2017 :: appropriate  9/29/2017 :: appropriate  3/9/2018 :: appropriate  9/4/2018 :: appropriate  6/19/2019 :: appropriate  12/12/2019 :: appropriate  6/12/2020 ::  Appropriate  10/2/2020 ::  Appropriate

## 2020-10-31 ENCOUNTER — EXTERNAL CHRONIC CARE MANAGEMENT (OUTPATIENT)
Dept: PRIMARY CARE CLINIC | Facility: CLINIC | Age: 70
End: 2020-10-31
Payer: MEDICARE

## 2020-10-31 PROCEDURE — 99490 CHRNC CARE MGMT STAFF 1ST 20: CPT | Mod: S$PBB,,, | Performed by: FAMILY MEDICINE

## 2020-10-31 PROCEDURE — 99490 CHRNC CARE MGMT STAFF 1ST 20: CPT | Mod: PBBFAC | Performed by: FAMILY MEDICINE

## 2020-10-31 PROCEDURE — 99490 PR CHRONIC CARE MGMT, 1ST 20 MIN: ICD-10-PCS | Mod: S$PBB,,, | Performed by: FAMILY MEDICINE

## 2020-11-12 ENCOUNTER — PATIENT OUTREACH (OUTPATIENT)
Dept: ADMINISTRATIVE | Facility: OTHER | Age: 70
End: 2020-11-12

## 2020-11-12 DIAGNOSIS — E11.69 TYPE 2 DIABETES MELLITUS WITH OTHER SPECIFIED COMPLICATION, WITH LONG-TERM CURRENT USE OF INSULIN: ICD-10-CM

## 2020-11-12 DIAGNOSIS — Z79.4 TYPE 2 DIABETES MELLITUS WITH OTHER SPECIFIED COMPLICATION, WITH LONG-TERM CURRENT USE OF INSULIN: ICD-10-CM

## 2020-11-12 NOTE — PROGRESS NOTES
Health Maintenance Due   Topic Date Due    Shingles Vaccine (1 of 2) 08/16/2002    Influenza Vaccine (1) 08/01/2020     Updates were requested from care everywhere.  Chart was reviewed for overdue Proactive [facility] Encounters (TAURUS) topics (CRS, Breast Cancer Screening, Eye exam)  Health Maintenance has been updated.  LINKS immunization registry triggered.  LINKS not responding.

## 2020-11-13 ENCOUNTER — OFFICE VISIT (OUTPATIENT)
Dept: OPHTHALMOLOGY | Facility: CLINIC | Age: 70
End: 2020-11-13
Payer: MEDICARE

## 2020-11-13 DIAGNOSIS — E11.9 DIABETES MELLITUS TYPE 2 WITHOUT RETINOPATHY: Primary | ICD-10-CM

## 2020-11-13 DIAGNOSIS — E11.59 HYPERTENSION ASSOCIATED WITH DIABETES: ICD-10-CM

## 2020-11-13 DIAGNOSIS — I15.2 HYPERTENSION ASSOCIATED WITH DIABETES: ICD-10-CM

## 2020-11-13 DIAGNOSIS — H52.7 REFRACTIVE ERROR: ICD-10-CM

## 2020-11-13 DIAGNOSIS — Z96.1 PSEUDOPHAKIA OF RIGHT EYE: ICD-10-CM

## 2020-11-13 DIAGNOSIS — H25.012 CORTICAL SENILE CATARACT, LEFT: ICD-10-CM

## 2020-11-13 PROCEDURE — 92014 COMPRE OPH EXAM EST PT 1/>: CPT | Mod: S$PBB,,, | Performed by: OPTOMETRIST

## 2020-11-13 PROCEDURE — 99213 OFFICE O/P EST LOW 20 MIN: CPT | Mod: PBBFAC | Performed by: OPTOMETRIST

## 2020-11-13 PROCEDURE — 92015 PR REFRACTION: ICD-10-PCS | Mod: ,,, | Performed by: OPTOMETRIST

## 2020-11-13 PROCEDURE — 92014 PR EYE EXAM, EST PATIENT,COMPREHESV: ICD-10-PCS | Mod: S$PBB,,, | Performed by: OPTOMETRIST

## 2020-11-13 PROCEDURE — 92015 DETERMINE REFRACTIVE STATE: CPT | Mod: ,,, | Performed by: OPTOMETRIST

## 2020-11-13 PROCEDURE — 99999 PR PBB SHADOW E&M-EST. PATIENT-LVL III: CPT | Mod: PBBFAC,,, | Performed by: OPTOMETRIST

## 2020-11-13 PROCEDURE — 99999 PR PBB SHADOW E&M-EST. PATIENT-LVL III: ICD-10-PCS | Mod: PBBFAC,,, | Performed by: OPTOMETRIST

## 2020-11-13 NOTE — PROGRESS NOTES
HPI     Diabetic Eye Exam      Additional comments: yearly              Comments     Last Exam w/ [facility] 11/08/2019  IDDM  No Visual Complaints  No Pain  .Lab Results       Component                Value               Date                       HGBA1C                   6.3 (H)             06/09/2020                    Last edited by Markus Bowman, OD on 11/13/2020 10:42 AM. (History)            Assessment /Plan     For exam results, see Encounter Report.    Diabetes mellitus type 2 without retinopathy    Hypertension associated with diabetes    Pseudophakia of right eye    Cortical senile cataract, left    Refractive error      No Background Diabetic Retinopathy    No HTN Retinopathy    Stable IOL OD    Cortical cataract OS    Dispense Final Rx for glasses or may use OTC glasses.  RTC 1 year  Discussed above and answered questions.

## 2020-11-23 DIAGNOSIS — E11.59 HYPERTENSION ASSOCIATED WITH DIABETES: ICD-10-CM

## 2020-11-23 DIAGNOSIS — I15.2 HYPERTENSION ASSOCIATED WITH DIABETES: ICD-10-CM

## 2020-11-24 RX ORDER — LOSARTAN POTASSIUM AND HYDROCHLOROTHIAZIDE 25; 100 MG/1; MG/1
1 TABLET ORAL DAILY
Qty: 90 TABLET | Refills: 0 | Status: SHIPPED | OUTPATIENT
Start: 2020-11-24 | End: 2021-03-05 | Stop reason: SDUPTHER

## 2020-11-25 NOTE — PROGRESS NOTES
Subjective:     Chief Complaint:  Left Hand Pain      History of Present Illness:  This patient is a 65 year old male who presents today for f/u complaining of the above noted pains. The patient describes this pain as follows.    - duration (acute, chronic): left hand pain since 1997 after table saw amputation of digits 2 through 5 at work, left lower quadrant pain since hernia surgery in 2004  - timing (constant, intermittent): Constant  - character (sharp, dull, aching, burning): Throbbing  - radiating: No  - dermatomal distribution: No  - side: Left   - aggravating factors: Nothing worsens left digit pain, left lower quadrant pain worse with exercise or walking  - relieving factors: Medication / Norco  - antecedent trauma: Amputation via skill saw and 1997, hernia repair in 2004  - prior spinal surgery: None  - pertinent negatives: No fever, No chills, No weight loss, No bladder dysfunction, No bowel dysfunction, No extremity weakness, No saddle anesthesia  - medications tried: Norco, Percocet, over-the-counter medications, compound pain cream  - other therapies tried (physical therapy, injections):     >> physical therapy tried in the past    >> no prior injections        Imaging/ Labs (reviewed on 11/27/2020):    7/12/2018 US ABDOMINAL AORTA  CLINICAL HISTORY:  Abnormal findings on diagnostic imaging of other specified body structures  TECHNIQUE:  Limited ultrasound was performed of the abdominal aorta, with cross sectional diameter measurements obtained.  COMPARISON:  01/10/2018  FINDINGS:  The proximal abdominal aorta measures 2.7 cm.  The mid abdominal aorta measures 1.8 cm.  The distal abdominal aorta measures 1.9 cm, slightly ectatic and unchanged from prior.  The right iliac artery measures 1.0 cm.  The left iliac artery measures 1.1 cm.  Aortoiliac atherosclerosis: Mild  Impression: Stable examination with slight distal abdominal aortic ectasia.  Negative for aneurysm.         ROS:  CONSTITUTIONAL: No  "fever, chills, weight loss  SKIN: No rash or itching  CARDIOVASCULAR:  No chest pain, palpitations  RESPIRATORY: No shortness of breath  GASTROINTESTINAL: No diarrhea, No constipation, abdominal pain, left lower quadrant  GENITOURINARY: No urinary incontinence    MUSCULOSKELETAL:  - patient reports pain as above, see chief complaint     NEUROLOGICAL:   - Pain as above  - Strength in lower extremities is normal  - Sensation in lower extremities is normal  - No bowel or bladder incontinence     PSYCHIATRIC: No change in mood noticed         Objective:     Physical Exam:  Vitals:    11/27/20 0818   BP: (!) 163/97   Pulse: 66   Weight: 95.4 kg (210 lb 3.3 oz)   Height: 6' 3" (1.905 m)   PainSc:   5   PainLoc: Back    (reviewed on 11/27/2020)    General: alert and oriented, in no apparent distress  Gait: normal gait  Skin: No rashes, No discoloration   HEENT: EOMI  Respiratory: respirations nonlabored  Cardiology: No obvious peripheral edema    Musculoskeletal:  - Full cervical ROM   - Full lumbar ROM   - No pain with lumbar flexion/ extension  - Left upper extremity range of motion intact throughout  - Digital stumps are hypersensitive to palpation, hypersensitivity does not extend further proximal  - No color change, no swelling, no changes in hair growth along left hand     Neuro:  - Lower extremities:      >> 5/5 strength bilaterally, throughout, symmetric  - Upper extremities:    >> 5/5 strength bilaterally    Psych: mood and affect appropriate        Assessment:     Ulysses Boreaux is a 70 y.o. male who presents with     ICD-10-CM ICD-9-CM   1. 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 Chronic pain due to trauma  G89.21 338.21   3. Chronic pain disorder  G89.4 338.4   4. Opioid use agreement exists  Z79.891 V58.69       Plan:     1. Interventional: None.    2. Pharmacologic:   - Refill Norco 10/325 mg PO BID PRN (60 tabs) x 3 months. Will e-prescribe for 12/7/20, 1/6/21, " and 2/5/2021. Patient tolerating opioids with no side effects, obtaining good pain control with functional improvement. He tolerates this medication well, and he denies any drowsiness or constipation.  * Patient was informed about the risk of concurrent use of benzodiazepines and opioid medications, as they create a synergistic effect causing amplified effect of both substances in combination.  he was instructed not to take opioid pain medication at the same time as Klonopin (clonazepam) .  These risks include extreme sleepiness, respiratory depression, coma, and even death.  Last filled in June 2020.  - Opioid contract signed on 12/12/2019 with Dr. Olmedo.  Updated opioid contract signed with Dr. Love on 10/28/20.  - LA  reviewed and appropriate.     - Will order UDS next visit to ensure medication compliance.     3. Rehabilitative: Encouraged regular exercise.    4. Diagnostic: None for now.    5. Follow up: 12 week medication Refill     - I discussed the risks, benefits, and alternatives to potential treatment options. All questions and concerns were fully addressed today in clinic.            >> UDS:  8-18-15 :: appropriate  12-29-15 :: appropriate  5-3-16 :: appropriate  10-18-16 :: appropriate  4/13/2017 :: appropriate  9/29/2017 :: appropriate  3/9/2018 :: appropriate  9/4/2018 :: appropriate  6/19/2019 :: appropriate  12/12/2019 :: appropriate  6/12/2020 ::  Appropriate  10/2/2020 :: Appropriate

## 2020-11-27 ENCOUNTER — OFFICE VISIT (OUTPATIENT)
Dept: PAIN MEDICINE | Facility: CLINIC | Age: 70
End: 2020-11-27
Payer: MEDICARE

## 2020-11-27 VITALS
HEART RATE: 66 BPM | SYSTOLIC BLOOD PRESSURE: 163 MMHG | HEIGHT: 75 IN | BODY MASS INDEX: 26.13 KG/M2 | DIASTOLIC BLOOD PRESSURE: 97 MMHG | WEIGHT: 210.19 LBS

## 2020-11-27 DIAGNOSIS — S68.119A TRAUMATIC AMPUTATION OF DIGIT OF ONE HAND WITHOUT COMPLICATION: Primary | ICD-10-CM

## 2020-11-27 DIAGNOSIS — Z79.891 OPIOID USE AGREEMENT EXISTS: ICD-10-CM

## 2020-11-27 DIAGNOSIS — G89.21 CHRONIC PAIN DUE TO TRAUMA: ICD-10-CM

## 2020-11-27 DIAGNOSIS — G89.4 CHRONIC PAIN DISORDER: ICD-10-CM

## 2020-11-27 PROCEDURE — 99999 PR PBB SHADOW E&M-EST. PATIENT-LVL IV: ICD-10-PCS | Mod: PBBFAC,,, | Performed by: PHYSICIAN ASSISTANT

## 2020-11-27 PROCEDURE — 99214 OFFICE O/P EST MOD 30 MIN: CPT | Mod: PBBFAC | Performed by: PHYSICIAN ASSISTANT

## 2020-11-27 PROCEDURE — 99214 OFFICE O/P EST MOD 30 MIN: CPT | Mod: S$PBB,,, | Performed by: PHYSICIAN ASSISTANT

## 2020-11-27 PROCEDURE — 99214 PR OFFICE/OUTPT VISIT, EST, LEVL IV, 30-39 MIN: ICD-10-PCS | Mod: S$PBB,,, | Performed by: PHYSICIAN ASSISTANT

## 2020-11-27 PROCEDURE — 99999 PR PBB SHADOW E&M-EST. PATIENT-LVL IV: CPT | Mod: PBBFAC,,, | Performed by: PHYSICIAN ASSISTANT

## 2020-11-30 ENCOUNTER — EXTERNAL CHRONIC CARE MANAGEMENT (OUTPATIENT)
Dept: PRIMARY CARE CLINIC | Facility: CLINIC | Age: 70
End: 2020-11-30
Payer: MEDICARE

## 2020-11-30 PROCEDURE — 99490 CHRNC CARE MGMT STAFF 1ST 20: CPT | Mod: S$PBB,,, | Performed by: FAMILY MEDICINE

## 2020-11-30 PROCEDURE — 99490 CHRNC CARE MGMT STAFF 1ST 20: CPT | Mod: PBBFAC | Performed by: FAMILY MEDICINE

## 2020-11-30 PROCEDURE — 99490 PR CHRONIC CARE MGMT, 1ST 20 MIN: ICD-10-PCS | Mod: S$PBB,,, | Performed by: FAMILY MEDICINE

## 2020-11-30 RX ORDER — HYDROCODONE BITARTRATE AND ACETAMINOPHEN 10; 325 MG/1; MG/1
1 TABLET ORAL EVERY 12 HOURS PRN
Qty: 60 TABLET | Refills: 0 | Status: SHIPPED | OUTPATIENT
Start: 2020-12-07 | End: 2021-01-06

## 2020-11-30 RX ORDER — HYDROCODONE BITARTRATE AND ACETAMINOPHEN 10; 325 MG/1; MG/1
1 TABLET ORAL EVERY 12 HOURS PRN
Qty: 60 TABLET | Refills: 0 | Status: SHIPPED | OUTPATIENT
Start: 2021-02-05 | End: 2021-03-07

## 2020-11-30 RX ORDER — HYDROCODONE BITARTRATE AND ACETAMINOPHEN 10; 325 MG/1; MG/1
1 TABLET ORAL EVERY 12 HOURS PRN
Qty: 60 TABLET | Refills: 0 | Status: SHIPPED | OUTPATIENT
Start: 2021-01-06 | End: 2021-02-01 | Stop reason: SDUPTHER

## 2020-12-09 ENCOUNTER — LAB VISIT (OUTPATIENT)
Dept: LAB | Facility: HOSPITAL | Age: 70
End: 2020-12-09
Attending: FAMILY MEDICINE
Payer: MEDICARE

## 2020-12-09 ENCOUNTER — OFFICE VISIT (OUTPATIENT)
Dept: INTERNAL MEDICINE | Facility: CLINIC | Age: 70
End: 2020-12-09
Payer: MEDICARE

## 2020-12-09 VITALS
TEMPERATURE: 98 F | BODY MASS INDEX: 26.85 KG/M2 | HEIGHT: 75 IN | DIASTOLIC BLOOD PRESSURE: 70 MMHG | OXYGEN SATURATION: 98 % | WEIGHT: 215.94 LBS | SYSTOLIC BLOOD PRESSURE: 138 MMHG | HEART RATE: 79 BPM

## 2020-12-09 DIAGNOSIS — E11.69 HYPERLIPIDEMIA ASSOCIATED WITH TYPE 2 DIABETES MELLITUS: ICD-10-CM

## 2020-12-09 DIAGNOSIS — E05.90 HYPERTHYROIDISM: ICD-10-CM

## 2020-12-09 DIAGNOSIS — E78.5 HYPERLIPIDEMIA ASSOCIATED WITH TYPE 2 DIABETES MELLITUS: ICD-10-CM

## 2020-12-09 DIAGNOSIS — E11.8 CONTROLLED TYPE 2 DIABETES MELLITUS WITH COMPLICATION, WITHOUT LONG-TERM CURRENT USE OF INSULIN: ICD-10-CM

## 2020-12-09 DIAGNOSIS — E11.59 HYPERTENSION ASSOCIATED WITH DIABETES: ICD-10-CM

## 2020-12-09 DIAGNOSIS — I15.2 HYPERTENSION ASSOCIATED WITH DIABETES: ICD-10-CM

## 2020-12-09 DIAGNOSIS — E11.8 CONTROLLED TYPE 2 DIABETES MELLITUS WITH COMPLICATION, WITHOUT LONG-TERM CURRENT USE OF INSULIN: Primary | ICD-10-CM

## 2020-12-09 PROCEDURE — 84443 ASSAY THYROID STIM HORMONE: CPT

## 2020-12-09 PROCEDURE — 84439 ASSAY OF FREE THYROXINE: CPT

## 2020-12-09 PROCEDURE — 99214 PR OFFICE/OUTPT VISIT, EST, LEVL IV, 30-39 MIN: ICD-10-PCS | Mod: S$PBB,,, | Performed by: FAMILY MEDICINE

## 2020-12-09 PROCEDURE — 80061 LIPID PANEL: CPT

## 2020-12-09 PROCEDURE — 83036 HEMOGLOBIN GLYCOSYLATED A1C: CPT

## 2020-12-09 PROCEDURE — 99999 PR PBB SHADOW E&M-EST. PATIENT-LVL V: ICD-10-PCS | Mod: PBBFAC,,, | Performed by: FAMILY MEDICINE

## 2020-12-09 PROCEDURE — 99215 OFFICE O/P EST HI 40 MIN: CPT | Mod: PBBFAC | Performed by: FAMILY MEDICINE

## 2020-12-09 PROCEDURE — 99999 PR PBB SHADOW E&M-EST. PATIENT-LVL V: CPT | Mod: PBBFAC,,, | Performed by: FAMILY MEDICINE

## 2020-12-09 PROCEDURE — G0008 ADMIN INFLUENZA VIRUS VAC: HCPCS | Mod: PBBFAC

## 2020-12-09 PROCEDURE — 90694 VACC AIIV4 NO PRSRV 0.5ML IM: CPT | Mod: PBBFAC

## 2020-12-09 PROCEDURE — 36415 COLL VENOUS BLD VENIPUNCTURE: CPT

## 2020-12-09 PROCEDURE — 80053 COMPREHEN METABOLIC PANEL: CPT

## 2020-12-09 PROCEDURE — 84481 FREE ASSAY (FT-3): CPT

## 2020-12-09 PROCEDURE — 99214 OFFICE O/P EST MOD 30 MIN: CPT | Mod: S$PBB,,, | Performed by: FAMILY MEDICINE

## 2020-12-09 NOTE — ASSESSMENT & PLAN NOTE
Referral to establish with new endocrinologist is Dr. Jackson has left [facility].  Will check thyroid labs with blood work.

## 2020-12-09 NOTE — PROGRESS NOTES
Subjective:       Patient ID: Ulysses Boreaux is a 70 y.o. male.    Chief Complaint: Follow-up (6 month)    Patient presents to clinic today for followup of chronic conditions.  Hypertension controlled on amlodipine and losartan-HCTZ.  Diabetes status pending labs on insulin and Januvia.  Hyperlipidemia status pending labs on atorvastatin.  Previously followed by Dr. Jackson for hyperthyroidism.  According to her last note he is due for follow-up this month.    Review of Systems   Constitutional: Negative for chills, fatigue, fever and unexpected weight change.   Eyes: Negative for visual disturbance.   Respiratory: Negative for shortness of breath.    Cardiovascular: Negative for chest pain.   Musculoskeletal: Negative for myalgias.   Neurological: Negative for headaches.         Objective:      Physical Exam  Vitals signs reviewed.   Constitutional:       General: He is not in acute distress.     Appearance: He is well-developed.   HENT:      Head: Normocephalic and atraumatic.   Eyes:      General: Lids are normal. No scleral icterus.     Extraocular Movements: Extraocular movements intact.      Conjunctiva/sclera: Conjunctivae normal.      Pupils: Pupils are equal, round, and reactive to light.   Pulmonary:      Effort: Pulmonary effort is normal.   Neurological:      Mental Status: He is alert and oriented to person, place, and time.      Cranial Nerves: No cranial nerve deficit.      Gait: Gait normal.   Psychiatric:         Mood and Affect: Mood and affect normal.         Assessment:       1. Controlled type 2 diabetes mellitus with complication, without long-term current use of insulin    2. Hypertension associated with diabetes    3. Hyperlipidemia associated with type 2 diabetes mellitus    4. Hyperthyroidism        Plan:     Problem List Items Addressed This Visit     Controlled type 2 diabetes mellitus with complication, without long-term current use of insulin - Primary    Current Assessment & Plan      Status pending labs, continue Januvia and insulin         Relevant Orders    Hemoglobin A1C    Hyperlipidemia associated with type 2 diabetes mellitus    Current Assessment & Plan     Status pending labs, continue atorvastatin         Relevant Orders    Comprehensive Metabolic Panel    Lipid Panel    Hypertension associated with diabetes    Current Assessment & Plan     Controlled, continue amlodipine, losartan and HCTZ         Hyperthyroidism    Overview     Followed by Dr. Jackson, Endocrinology         Current Assessment & Plan     Referral to establish with new endocrinologist is Dr. Jackson has left [facility].  Will check thyroid labs with blood work.         Relevant Orders    T4, Free    TSH    T3, Free    Ambulatory referral/consult to Endocrinology          Health Maintenance reviewed/updated. Flu vaccine today.

## 2020-12-10 LAB
ALBUMIN SERPL BCP-MCNC: 4.2 G/DL (ref 3.5–5.2)
ALP SERPL-CCNC: 56 U/L (ref 55–135)
ALT SERPL W/O P-5'-P-CCNC: 32 U/L (ref 10–44)
ANION GAP SERPL CALC-SCNC: 10 MMOL/L (ref 8–16)
AST SERPL-CCNC: 26 U/L (ref 10–40)
BILIRUB SERPL-MCNC: 0.5 MG/DL (ref 0.1–1)
BUN SERPL-MCNC: 21 MG/DL (ref 8–23)
CALCIUM SERPL-MCNC: 9.6 MG/DL (ref 8.7–10.5)
CHLORIDE SERPL-SCNC: 98 MMOL/L (ref 95–110)
CHOLEST SERPL-MCNC: 176 MG/DL (ref 120–199)
CHOLEST/HDLC SERPL: 3.2 {RATIO} (ref 2–5)
CO2 SERPL-SCNC: 30 MMOL/L (ref 23–29)
CREAT SERPL-MCNC: 1.4 MG/DL (ref 0.5–1.4)
EST. GFR  (AFRICAN AMERICAN): 59.3 ML/MIN/1.73 M^2
EST. GFR  (NON AFRICAN AMERICAN): 51.3 ML/MIN/1.73 M^2
ESTIMATED AVG GLUCOSE: 160 MG/DL (ref 68–131)
GLUCOSE SERPL-MCNC: 243 MG/DL (ref 70–110)
HBA1C MFR BLD HPLC: 7.2 % (ref 4–5.6)
HDLC SERPL-MCNC: 55 MG/DL (ref 40–75)
HDLC SERPL: 31.3 % (ref 20–50)
LDLC SERPL CALC-MCNC: 101.6 MG/DL (ref 63–159)
NONHDLC SERPL-MCNC: 121 MG/DL
POTASSIUM SERPL-SCNC: 4.3 MMOL/L (ref 3.5–5.1)
PROT SERPL-MCNC: 7.8 G/DL (ref 6–8.4)
SODIUM SERPL-SCNC: 138 MMOL/L (ref 136–145)
T3FREE SERPL-MCNC: 3.7 PG/ML (ref 2.3–4.2)
T4 FREE SERPL-MCNC: 1.15 NG/DL (ref 0.71–1.51)
TRIGL SERPL-MCNC: 97 MG/DL (ref 30–150)
TSH SERPL DL<=0.005 MIU/L-ACNC: <0.01 UIU/ML (ref 0.4–4)

## 2020-12-16 ENCOUNTER — PES CALL (OUTPATIENT)
Dept: ADMINISTRATIVE | Facility: CLINIC | Age: 70
End: 2020-12-16

## 2020-12-21 DIAGNOSIS — E11.8 CONTROLLED TYPE 2 DIABETES MELLITUS WITH COMPLICATION, WITHOUT LONG-TERM CURRENT USE OF INSULIN: ICD-10-CM

## 2020-12-23 ENCOUNTER — TELEPHONE (OUTPATIENT)
Dept: ADMINISTRATIVE | Facility: HOSPITAL | Age: 70
End: 2020-12-23

## 2020-12-23 DIAGNOSIS — E11.9 DIABETES MELLITUS TYPE 2 IN NONOBESE: ICD-10-CM

## 2020-12-23 NOTE — TELEPHONE ENCOUNTER
Good evening,   I spoke to patient today for health assessment. Patient reported a refill of  insulin syringe-needle U-100 1 mL 31 gauge x 5/16 Syrg and lancets (TRUEPLUS LANCETS) 33 gauge Misc are needed. Patient requesting this be sent to [facility] pharmacy. Could you please advise? Thank you for your time.   Best Regards,   Elif Castillo LPN   Care Coordinator

## 2020-12-24 RX ORDER — SYRINGE,SAFETY WITH NEEDLE,1ML 25GX1"
SYRINGE (EA) MISCELLANEOUS
Qty: 100 EACH | Refills: 11 | Status: SHIPPED | OUTPATIENT
Start: 2020-12-24 | End: 2021-09-28 | Stop reason: SDUPTHER

## 2020-12-28 DIAGNOSIS — E08.00 DIABETES MELLITUS DUE TO UNDERLYING CONDITION WITH HYPEROSMOLARITY WITHOUT COMA, WITHOUT LONG-TERM CURRENT USE OF INSULIN: ICD-10-CM

## 2020-12-28 RX ORDER — LANCETS 33 GAUGE
1 EACH MISCELLANEOUS 3 TIMES DAILY
Qty: 100 EACH | Refills: 11 | Status: SHIPPED | OUTPATIENT
Start: 2020-12-28 | End: 2021-01-12 | Stop reason: SDUPTHER

## 2020-12-31 ENCOUNTER — EXTERNAL CHRONIC CARE MANAGEMENT (OUTPATIENT)
Dept: PRIMARY CARE CLINIC | Facility: CLINIC | Age: 70
End: 2020-12-31
Payer: MEDICARE

## 2020-12-31 PROCEDURE — 99490 PR CHRONIC CARE MGMT, 1ST 20 MIN: ICD-10-PCS | Mod: S$PBB,,, | Performed by: FAMILY MEDICINE

## 2020-12-31 PROCEDURE — G2058 CCM ADD 20MIN: HCPCS | Mod: S$PBB,,, | Performed by: FAMILY MEDICINE

## 2020-12-31 PROCEDURE — G2058 CCM ADD 20MIN: HCPCS | Mod: PBBFAC,25,27 | Performed by: FAMILY MEDICINE

## 2020-12-31 PROCEDURE — 99490 CHRNC CARE MGMT STAFF 1ST 20: CPT | Mod: PBBFAC | Performed by: FAMILY MEDICINE

## 2020-12-31 PROCEDURE — G2058 PR CHRON CARE MGMT, EA ADDTL 20 MINS: ICD-10-PCS | Mod: S$PBB,,, | Performed by: FAMILY MEDICINE

## 2020-12-31 PROCEDURE — 99490 CHRNC CARE MGMT STAFF 1ST 20: CPT | Mod: S$PBB,,, | Performed by: FAMILY MEDICINE

## 2021-01-12 ENCOUNTER — OFFICE VISIT (OUTPATIENT)
Dept: INTERNAL MEDICINE | Facility: CLINIC | Age: 71
End: 2021-01-12
Payer: MEDICARE

## 2021-01-12 VITALS
BODY MASS INDEX: 27.3 KG/M2 | WEIGHT: 219.56 LBS | HEIGHT: 75 IN | OXYGEN SATURATION: 99 % | HEART RATE: 66 BPM | SYSTOLIC BLOOD PRESSURE: 144 MMHG | DIASTOLIC BLOOD PRESSURE: 100 MMHG

## 2021-01-12 DIAGNOSIS — E04.1 THYROID NODULE: ICD-10-CM

## 2021-01-12 DIAGNOSIS — E78.5 HYPERLIPIDEMIA ASSOCIATED WITH TYPE 2 DIABETES MELLITUS: ICD-10-CM

## 2021-01-12 DIAGNOSIS — I70.0 ATHEROSCLEROSIS OF AORTA: ICD-10-CM

## 2021-01-12 DIAGNOSIS — E08.00 DIABETES MELLITUS DUE TO UNDERLYING CONDITION WITH HYPEROSMOLARITY WITHOUT COMA, WITHOUT LONG-TERM CURRENT USE OF INSULIN: ICD-10-CM

## 2021-01-12 DIAGNOSIS — Z91.89 POTENTIAL FOR COGNITIVE IMPAIRMENT: ICD-10-CM

## 2021-01-12 DIAGNOSIS — Z00.00 ENCOUNTER FOR PREVENTIVE HEALTH EXAMINATION: Primary | ICD-10-CM

## 2021-01-12 DIAGNOSIS — E11.69 HYPERLIPIDEMIA ASSOCIATED WITH TYPE 2 DIABETES MELLITUS: ICD-10-CM

## 2021-01-12 DIAGNOSIS — R94.120 ABNORMAL HEARING SCREEN: ICD-10-CM

## 2021-01-12 DIAGNOSIS — Z59.9 FINANCIAL DIFFICULTIES: ICD-10-CM

## 2021-01-12 DIAGNOSIS — I10 HYPERTENSION, UNSPECIFIED TYPE: ICD-10-CM

## 2021-01-12 DIAGNOSIS — E05.90 HYPERTHYROIDISM: ICD-10-CM

## 2021-01-12 PROCEDURE — 1158F PR ADVANCE CARE PLANNING DISCUSS DOCUMENTED IN MEDICAL RECORD: ICD-10-PCS | Mod: S$GLB,,, | Performed by: NURSE PRACTITIONER

## 2021-01-12 PROCEDURE — 1126F PR PAIN SEVERITY QUANTIFIED, NO PAIN PRESENT: ICD-10-PCS | Mod: S$GLB,,, | Performed by: NURSE PRACTITIONER

## 2021-01-12 PROCEDURE — 3077F PR MOST RECENT SYSTOLIC BLOOD PRESSURE >= 140 MM HG: ICD-10-PCS | Mod: CPTII,S$GLB,, | Performed by: NURSE PRACTITIONER

## 2021-01-12 PROCEDURE — 3051F PR MOST RECENT HEMOGLOBIN A1C LEVEL 7.0 - < 8.0%: ICD-10-PCS | Mod: CPTII,S$GLB,, | Performed by: NURSE PRACTITIONER

## 2021-01-12 PROCEDURE — 1101F PT FALLS ASSESS-DOCD LE1/YR: CPT | Mod: CPTII,S$GLB,, | Performed by: NURSE PRACTITIONER

## 2021-01-12 PROCEDURE — 3080F PR MOST RECENT DIASTOLIC BLOOD PRESSURE >= 90 MM HG: ICD-10-PCS | Mod: CPTII,S$GLB,, | Performed by: NURSE PRACTITIONER

## 2021-01-12 PROCEDURE — 3072F LOW RISK FOR RETINOPATHY: CPT | Mod: S$GLB,,, | Performed by: NURSE PRACTITIONER

## 2021-01-12 PROCEDURE — 99999 PR PBB SHADOW E&M-EST. PATIENT-LVL V: ICD-10-PCS | Mod: PBBFAC,,, | Performed by: NURSE PRACTITIONER

## 2021-01-12 PROCEDURE — 1101F PR PT FALLS ASSESS DOC 0-1 FALLS W/OUT INJ PAST YR: ICD-10-PCS | Mod: CPTII,S$GLB,, | Performed by: NURSE PRACTITIONER

## 2021-01-12 PROCEDURE — 99499 UNLISTED E&M SERVICE: CPT | Mod: S$GLB,,, | Performed by: NURSE PRACTITIONER

## 2021-01-12 PROCEDURE — 3008F PR BODY MASS INDEX (BMI) DOCUMENTED: ICD-10-PCS | Mod: CPTII,S$GLB,, | Performed by: NURSE PRACTITIONER

## 2021-01-12 PROCEDURE — 3288F FALL RISK ASSESSMENT DOCD: CPT | Mod: CPTII,S$GLB,, | Performed by: NURSE PRACTITIONER

## 2021-01-12 PROCEDURE — 99499 RISK ADDL DX/OHS AUDIT: ICD-10-PCS | Mod: S$GLB,,, | Performed by: NURSE PRACTITIONER

## 2021-01-12 PROCEDURE — G0439 PPPS, SUBSEQ VISIT: HCPCS | Mod: S$GLB,,, | Performed by: NURSE PRACTITIONER

## 2021-01-12 PROCEDURE — 3051F HG A1C>EQUAL 7.0%<8.0%: CPT | Mod: CPTII,S$GLB,, | Performed by: NURSE PRACTITIONER

## 2021-01-12 PROCEDURE — 99999 PR PBB SHADOW E&M-EST. PATIENT-LVL V: CPT | Mod: PBBFAC,,, | Performed by: NURSE PRACTITIONER

## 2021-01-12 PROCEDURE — 3080F DIAST BP >= 90 MM HG: CPT | Mod: CPTII,S$GLB,, | Performed by: NURSE PRACTITIONER

## 2021-01-12 PROCEDURE — G0439 PR MEDICARE ANNUAL WELLNESS SUBSEQUENT VISIT: ICD-10-PCS | Mod: S$GLB,,, | Performed by: NURSE PRACTITIONER

## 2021-01-12 PROCEDURE — 3288F PR FALLS RISK ASSESSMENT DOCUMENTED: ICD-10-PCS | Mod: CPTII,S$GLB,, | Performed by: NURSE PRACTITIONER

## 2021-01-12 PROCEDURE — 1158F ADVNC CARE PLAN TLK DOCD: CPT | Mod: S$GLB,,, | Performed by: NURSE PRACTITIONER

## 2021-01-12 PROCEDURE — 3008F BODY MASS INDEX DOCD: CPT | Mod: CPTII,S$GLB,, | Performed by: NURSE PRACTITIONER

## 2021-01-12 PROCEDURE — 3072F PR LOW RISK FOR RETINOPATHY: ICD-10-PCS | Mod: S$GLB,,, | Performed by: NURSE PRACTITIONER

## 2021-01-12 PROCEDURE — 1126F AMNT PAIN NOTED NONE PRSNT: CPT | Mod: S$GLB,,, | Performed by: NURSE PRACTITIONER

## 2021-01-12 PROCEDURE — 3077F SYST BP >= 140 MM HG: CPT | Mod: CPTII,S$GLB,, | Performed by: NURSE PRACTITIONER

## 2021-01-12 RX ORDER — ASPIRIN 81 MG/1
81 TABLET ORAL DAILY
COMMUNITY

## 2021-01-12 SDOH — SOCIAL DETERMINANTS OF HEALTH (SDOH): PROBLEM RELATED TO HOUSING AND ECONOMIC CIRCUMSTANCES, UNSPECIFIED: Z59.9

## 2021-01-13 ENCOUNTER — OUTPATIENT CASE MANAGEMENT (OUTPATIENT)
Dept: ADMINISTRATIVE | Facility: OTHER | Age: 71
End: 2021-01-13

## 2021-01-13 RX ORDER — LANCETS 33 GAUGE
1 EACH MISCELLANEOUS 3 TIMES DAILY
Qty: 100 EACH | Refills: 11 | Status: SHIPPED | OUTPATIENT
Start: 2021-01-13 | End: 2022-11-10 | Stop reason: SDUPTHER

## 2021-01-31 ENCOUNTER — EXTERNAL CHRONIC CARE MANAGEMENT (OUTPATIENT)
Dept: PRIMARY CARE CLINIC | Facility: CLINIC | Age: 71
End: 2021-01-31
Payer: MEDICARE

## 2021-01-31 PROCEDURE — 99490 CHRNC CARE MGMT STAFF 1ST 20: CPT | Mod: S$GLB,,, | Performed by: FAMILY MEDICINE

## 2021-01-31 PROCEDURE — 99490 PR CHRONIC CARE MGMT, 1ST 20 MIN: ICD-10-PCS | Mod: S$GLB,,, | Performed by: FAMILY MEDICINE

## 2021-02-01 ENCOUNTER — TELEPHONE (OUTPATIENT)
Dept: RADIOLOGY | Facility: HOSPITAL | Age: 71
End: 2021-02-01

## 2021-02-01 ENCOUNTER — OFFICE VISIT (OUTPATIENT)
Dept: GASTROENTEROLOGY | Facility: CLINIC | Age: 71
End: 2021-02-01
Payer: MEDICARE

## 2021-02-01 VITALS
OXYGEN SATURATION: 98 % | DIASTOLIC BLOOD PRESSURE: 92 MMHG | SYSTOLIC BLOOD PRESSURE: 146 MMHG | HEIGHT: 75 IN | HEART RATE: 68 BPM | BODY MASS INDEX: 27.08 KG/M2 | WEIGHT: 217.81 LBS

## 2021-02-01 DIAGNOSIS — Z86.010 HISTORY OF COLON POLYPS: ICD-10-CM

## 2021-02-01 DIAGNOSIS — K74.00 LIVER FIBROSIS: ICD-10-CM

## 2021-02-01 DIAGNOSIS — Z86.19 HISTORY OF HEPATITIS C: Primary | ICD-10-CM

## 2021-02-01 PROCEDURE — 3072F PR LOW RISK FOR RETINOPATHY: ICD-10-PCS | Mod: ,,, | Performed by: PHYSICIAN ASSISTANT

## 2021-02-01 PROCEDURE — 99214 OFFICE O/P EST MOD 30 MIN: CPT | Mod: S$PBB,,, | Performed by: PHYSICIAN ASSISTANT

## 2021-02-01 PROCEDURE — 99999 PR PBB SHADOW E&M-EST. PATIENT-LVL IV: CPT | Mod: PBBFAC,,, | Performed by: PHYSICIAN ASSISTANT

## 2021-02-01 PROCEDURE — 3072F LOW RISK FOR RETINOPATHY: CPT | Mod: ,,, | Performed by: PHYSICIAN ASSISTANT

## 2021-02-01 PROCEDURE — 99214 OFFICE O/P EST MOD 30 MIN: CPT | Mod: PBBFAC | Performed by: PHYSICIAN ASSISTANT

## 2021-02-01 PROCEDURE — 99214 PR OFFICE/OUTPT VISIT, EST, LEVL IV, 30-39 MIN: ICD-10-PCS | Mod: S$PBB,,, | Performed by: PHYSICIAN ASSISTANT

## 2021-02-01 PROCEDURE — 99999 PR PBB SHADOW E&M-EST. PATIENT-LVL IV: ICD-10-PCS | Mod: PBBFAC,,, | Performed by: PHYSICIAN ASSISTANT

## 2021-02-02 ENCOUNTER — HOSPITAL ENCOUNTER (OUTPATIENT)
Dept: RADIOLOGY | Facility: HOSPITAL | Age: 71
Discharge: HOME | End: 2021-02-02
Attending: PHYSICIAN ASSISTANT
Payer: MEDICARE

## 2021-02-02 DIAGNOSIS — K74.00 LIVER FIBROSIS: ICD-10-CM

## 2021-02-02 DIAGNOSIS — Z86.19 HISTORY OF HEPATITIS C: ICD-10-CM

## 2021-02-02 DIAGNOSIS — Z86.19 HISTORY OF HEPATITIS C: Primary | ICD-10-CM

## 2021-02-02 PROCEDURE — 76705 ECHO EXAM OF ABDOMEN: CPT | Mod: 26,,, | Performed by: RADIOLOGY

## 2021-02-02 PROCEDURE — 76705 US ABDOMEN LIMITED: ICD-10-PCS | Mod: 26,,, | Performed by: RADIOLOGY

## 2021-02-02 PROCEDURE — 76705 ECHO EXAM OF ABDOMEN: CPT | Mod: TC

## 2021-02-04 ENCOUNTER — TELEPHONE (OUTPATIENT)
Dept: GASTROENTEROLOGY | Facility: CLINIC | Age: 71
End: 2021-02-04

## 2021-02-10 DIAGNOSIS — E11.8 CONTROLLED TYPE 2 DIABETES MELLITUS WITH COMPLICATION, WITHOUT LONG-TERM CURRENT USE OF INSULIN: ICD-10-CM

## 2021-02-24 ENCOUNTER — OFFICE VISIT (OUTPATIENT)
Dept: PAIN MEDICINE | Facility: CLINIC | Age: 71
End: 2021-02-24
Payer: MEDICARE

## 2021-02-24 VITALS
SYSTOLIC BLOOD PRESSURE: 168 MMHG | WEIGHT: 216 LBS | HEIGHT: 75 IN | DIASTOLIC BLOOD PRESSURE: 90 MMHG | BODY MASS INDEX: 26.86 KG/M2 | RESPIRATION RATE: 18 BRPM | HEART RATE: 73 BPM

## 2021-02-24 DIAGNOSIS — S68.119A TRAUMATIC AMPUTATION OF DIGIT OF ONE HAND WITHOUT COMPLICATION: Primary | ICD-10-CM

## 2021-02-24 DIAGNOSIS — Z79.891 OPIOID USE AGREEMENT EXISTS: ICD-10-CM

## 2021-02-24 DIAGNOSIS — G89.21 CHRONIC PAIN DUE TO TRAUMA: ICD-10-CM

## 2021-02-24 DIAGNOSIS — G89.4 CHRONIC PAIN DISORDER: ICD-10-CM

## 2021-02-24 PROCEDURE — 99999 PR PBB SHADOW E&M-EST. PATIENT-LVL IV: ICD-10-PCS | Mod: PBBFAC,,, | Performed by: PHYSICIAN ASSISTANT

## 2021-02-24 PROCEDURE — 3072F PR LOW RISK FOR RETINOPATHY: ICD-10-PCS | Mod: S$GLB,,, | Performed by: PHYSICIAN ASSISTANT

## 2021-02-24 PROCEDURE — 3077F PR MOST RECENT SYSTOLIC BLOOD PRESSURE >= 140 MM HG: ICD-10-PCS | Mod: CPTII,S$GLB,, | Performed by: PHYSICIAN ASSISTANT

## 2021-02-24 PROCEDURE — 3077F SYST BP >= 140 MM HG: CPT | Mod: CPTII,S$GLB,, | Performed by: PHYSICIAN ASSISTANT

## 2021-02-24 PROCEDURE — 99999 PR PBB SHADOW E&M-EST. PATIENT-LVL IV: CPT | Mod: PBBFAC,,, | Performed by: PHYSICIAN ASSISTANT

## 2021-02-24 PROCEDURE — 1125F PR PAIN SEVERITY QUANTIFIED, PAIN PRESENT: ICD-10-PCS | Mod: S$GLB,,, | Performed by: PHYSICIAN ASSISTANT

## 2021-02-24 PROCEDURE — 3072F LOW RISK FOR RETINOPATHY: CPT | Mod: S$GLB,,, | Performed by: PHYSICIAN ASSISTANT

## 2021-02-24 PROCEDURE — 3080F DIAST BP >= 90 MM HG: CPT | Mod: CPTII,S$GLB,, | Performed by: PHYSICIAN ASSISTANT

## 2021-02-24 PROCEDURE — 3008F BODY MASS INDEX DOCD: CPT | Mod: CPTII,S$GLB,, | Performed by: PHYSICIAN ASSISTANT

## 2021-02-24 PROCEDURE — 3008F PR BODY MASS INDEX (BMI) DOCUMENTED: ICD-10-PCS | Mod: CPTII,S$GLB,, | Performed by: PHYSICIAN ASSISTANT

## 2021-02-24 PROCEDURE — 1125F AMNT PAIN NOTED PAIN PRSNT: CPT | Mod: S$GLB,,, | Performed by: PHYSICIAN ASSISTANT

## 2021-02-24 PROCEDURE — 99214 OFFICE O/P EST MOD 30 MIN: CPT | Mod: S$GLB,,, | Performed by: PHYSICIAN ASSISTANT

## 2021-02-24 PROCEDURE — 99499 RISK ADDL DX/OHS AUDIT: ICD-10-PCS | Mod: S$GLB,,, | Performed by: PHYSICIAN ASSISTANT

## 2021-02-24 PROCEDURE — 3080F PR MOST RECENT DIASTOLIC BLOOD PRESSURE >= 90 MM HG: ICD-10-PCS | Mod: CPTII,S$GLB,, | Performed by: PHYSICIAN ASSISTANT

## 2021-02-24 PROCEDURE — 99499 UNLISTED E&M SERVICE: CPT | Mod: S$GLB,,, | Performed by: PHYSICIAN ASSISTANT

## 2021-02-24 PROCEDURE — 1159F PR MEDICATION LIST DOCUMENTED IN MEDICAL RECORD: ICD-10-PCS | Mod: S$GLB,,, | Performed by: PHYSICIAN ASSISTANT

## 2021-02-24 PROCEDURE — 99214 PR OFFICE/OUTPT VISIT, EST, LEVL IV, 30-39 MIN: ICD-10-PCS | Mod: S$GLB,,, | Performed by: PHYSICIAN ASSISTANT

## 2021-02-24 PROCEDURE — 1159F MED LIST DOCD IN RCRD: CPT | Mod: S$GLB,,, | Performed by: PHYSICIAN ASSISTANT

## 2021-02-24 RX ORDER — HYDROCODONE BITARTRATE AND ACETAMINOPHEN 10; 325 MG/1; MG/1
1 TABLET ORAL EVERY 12 HOURS PRN
Qty: 60 TABLET | Refills: 0 | Status: SHIPPED | OUTPATIENT
Start: 2021-04-06 | End: 2021-04-28 | Stop reason: SDUPTHER

## 2021-02-24 RX ORDER — HYDROCODONE BITARTRATE AND ACETAMINOPHEN 10; 325 MG/1; MG/1
1 TABLET ORAL EVERY 12 HOURS PRN
Qty: 60 TABLET | Refills: 0 | Status: SHIPPED | OUTPATIENT
Start: 2021-03-07 | End: 2021-04-06

## 2021-02-28 ENCOUNTER — EXTERNAL CHRONIC CARE MANAGEMENT (OUTPATIENT)
Dept: PRIMARY CARE CLINIC | Facility: CLINIC | Age: 71
End: 2021-02-28
Payer: MEDICARE

## 2021-02-28 PROCEDURE — 99439 PR CHRONIC CARE MGMT, EA ADDTL 20 MIN: ICD-10-PCS | Mod: S$GLB,,, | Performed by: FAMILY MEDICINE

## 2021-02-28 PROCEDURE — 99490 CHRNC CARE MGMT STAFF 1ST 20: CPT | Mod: S$GLB,,, | Performed by: FAMILY MEDICINE

## 2021-02-28 PROCEDURE — 99490 PR CHRONIC CARE MGMT, 1ST 20 MIN: ICD-10-PCS | Mod: S$GLB,,, | Performed by: FAMILY MEDICINE

## 2021-02-28 PROCEDURE — 99439 CHRNC CARE MGMT STAF EA ADDL: CPT | Mod: S$GLB,,, | Performed by: FAMILY MEDICINE

## 2021-03-05 DIAGNOSIS — I15.2 HYPERTENSION ASSOCIATED WITH DIABETES: ICD-10-CM

## 2021-03-05 DIAGNOSIS — E11.59 HYPERTENSION ASSOCIATED WITH DIABETES: ICD-10-CM

## 2021-03-05 RX ORDER — LOSARTAN POTASSIUM AND HYDROCHLOROTHIAZIDE 25; 100 MG/1; MG/1
1 TABLET ORAL DAILY
Qty: 90 TABLET | Refills: 1 | Status: SHIPPED | OUTPATIENT
Start: 2021-03-05 | End: 2021-06-22

## 2021-03-31 ENCOUNTER — EXTERNAL CHRONIC CARE MANAGEMENT (OUTPATIENT)
Dept: PRIMARY CARE CLINIC | Facility: CLINIC | Age: 71
End: 2021-03-31
Payer: MEDICARE

## 2021-03-31 PROCEDURE — 99490 PR CHRONIC CARE MGMT, 1ST 20 MIN: ICD-10-PCS | Mod: S$GLB,,, | Performed by: FAMILY MEDICINE

## 2021-03-31 PROCEDURE — 99490 CHRNC CARE MGMT STAFF 1ST 20: CPT | Mod: S$GLB,,, | Performed by: FAMILY MEDICINE

## 2021-04-26 ENCOUNTER — PATIENT OUTREACH (OUTPATIENT)
Dept: ADMINISTRATIVE | Facility: OTHER | Age: 71
End: 2021-04-26

## 2021-04-27 ENCOUNTER — TELEPHONE (OUTPATIENT)
Dept: PAIN MEDICINE | Facility: CLINIC | Age: 71
End: 2021-04-27

## 2021-04-28 ENCOUNTER — IMMUNIZATION (OUTPATIENT)
Dept: INTERNAL MEDICINE | Facility: CLINIC | Age: 71
End: 2021-04-28
Payer: MEDICARE

## 2021-04-28 ENCOUNTER — OFFICE VISIT (OUTPATIENT)
Dept: PAIN MEDICINE | Facility: CLINIC | Age: 71
End: 2021-04-28
Payer: MEDICARE

## 2021-04-28 VITALS
RESPIRATION RATE: 18 BRPM | HEART RATE: 78 BPM | WEIGHT: 218 LBS | SYSTOLIC BLOOD PRESSURE: 160 MMHG | BODY MASS INDEX: 27.1 KG/M2 | DIASTOLIC BLOOD PRESSURE: 83 MMHG | HEIGHT: 75 IN

## 2021-04-28 DIAGNOSIS — G89.21 CHRONIC PAIN DUE TO TRAUMA: ICD-10-CM

## 2021-04-28 DIAGNOSIS — Z79.891 OPIOID USE AGREEMENT EXISTS: ICD-10-CM

## 2021-04-28 DIAGNOSIS — Z23 NEED FOR VACCINATION: Primary | ICD-10-CM

## 2021-04-28 DIAGNOSIS — G89.4 CHRONIC PAIN DISORDER: ICD-10-CM

## 2021-04-28 DIAGNOSIS — S68.119A TRAUMATIC AMPUTATION OF DIGIT OF ONE HAND WITHOUT COMPLICATION: ICD-10-CM

## 2021-04-28 DIAGNOSIS — S68.119A TRAUMATIC AMPUTATION OF DIGIT OF ONE HAND WITHOUT COMPLICATION: Primary | ICD-10-CM

## 2021-04-28 PROCEDURE — 1125F AMNT PAIN NOTED PAIN PRSNT: CPT | Mod: S$GLB,,, | Performed by: PHYSICIAN ASSISTANT

## 2021-04-28 PROCEDURE — 3072F PR LOW RISK FOR RETINOPATHY: ICD-10-PCS | Mod: S$GLB,,, | Performed by: PHYSICIAN ASSISTANT

## 2021-04-28 PROCEDURE — 91300 COVID-19, MRNA, LNP-S, PF, 30 MCG/0.3 ML DOSE VACCINE: CPT | Mod: ,,, | Performed by: FAMILY MEDICINE

## 2021-04-28 PROCEDURE — 99999 PR PBB SHADOW E&M-EST. PATIENT-LVL III: ICD-10-PCS | Mod: PBBFAC,,, | Performed by: PHYSICIAN ASSISTANT

## 2021-04-28 PROCEDURE — 99999 PR PBB SHADOW E&M-EST. PATIENT-LVL III: CPT | Mod: PBBFAC,,, | Performed by: PHYSICIAN ASSISTANT

## 2021-04-28 PROCEDURE — 0001A COVID-19, MRNA, LNP-S, PF, 30 MCG/0.3 ML DOSE VACCINE: CPT | Mod: CV19,,, | Performed by: FAMILY MEDICINE

## 2021-04-28 PROCEDURE — 3072F LOW RISK FOR RETINOPATHY: CPT | Mod: S$GLB,,, | Performed by: PHYSICIAN ASSISTANT

## 2021-04-28 PROCEDURE — 0001A COVID-19, MRNA, LNP-S, PF, 30 MCG/0.3 ML DOSE VACCINE: ICD-10-PCS | Mod: CV19,,, | Performed by: FAMILY MEDICINE

## 2021-04-28 PROCEDURE — 3008F PR BODY MASS INDEX (BMI) DOCUMENTED: ICD-10-PCS | Mod: CPTII,S$GLB,, | Performed by: PHYSICIAN ASSISTANT

## 2021-04-28 PROCEDURE — 3008F BODY MASS INDEX DOCD: CPT | Mod: CPTII,S$GLB,, | Performed by: PHYSICIAN ASSISTANT

## 2021-04-28 PROCEDURE — 1159F MED LIST DOCD IN RCRD: CPT | Mod: S$GLB,,, | Performed by: PHYSICIAN ASSISTANT

## 2021-04-28 PROCEDURE — 99214 OFFICE O/P EST MOD 30 MIN: CPT | Mod: S$GLB,,, | Performed by: PHYSICIAN ASSISTANT

## 2021-04-28 PROCEDURE — 1159F PR MEDICATION LIST DOCUMENTED IN MEDICAL RECORD: ICD-10-PCS | Mod: S$GLB,,, | Performed by: PHYSICIAN ASSISTANT

## 2021-04-28 PROCEDURE — 1125F PR PAIN SEVERITY QUANTIFIED, PAIN PRESENT: ICD-10-PCS | Mod: S$GLB,,, | Performed by: PHYSICIAN ASSISTANT

## 2021-04-28 PROCEDURE — 91300 COVID-19, MRNA, LNP-S, PF, 30 MCG/0.3 ML DOSE VACCINE: ICD-10-PCS | Mod: ,,, | Performed by: FAMILY MEDICINE

## 2021-04-28 PROCEDURE — 99214 PR OFFICE/OUTPT VISIT, EST, LEVL IV, 30-39 MIN: ICD-10-PCS | Mod: S$GLB,,, | Performed by: PHYSICIAN ASSISTANT

## 2021-04-28 RX ORDER — HYDROCODONE BITARTRATE AND ACETAMINOPHEN 10; 325 MG/1; MG/1
1 TABLET ORAL EVERY 12 HOURS PRN
Qty: 60 TABLET | Refills: 0 | Status: SHIPPED | OUTPATIENT
Start: 2021-06-05 | End: 2021-06-30 | Stop reason: SDUPTHER

## 2021-04-28 RX ORDER — HYDROCODONE BITARTRATE AND ACETAMINOPHEN 10; 325 MG/1; MG/1
1 TABLET ORAL EVERY 12 HOURS PRN
Qty: 60 TABLET | Refills: 0 | Status: SHIPPED | OUTPATIENT
Start: 2021-05-06 | End: 2021-06-05

## 2021-04-30 ENCOUNTER — PATIENT OUTREACH (OUTPATIENT)
Dept: ADMINISTRATIVE | Facility: HOSPITAL | Age: 71
End: 2021-04-30

## 2021-04-30 ENCOUNTER — EXTERNAL CHRONIC CARE MANAGEMENT (OUTPATIENT)
Dept: PRIMARY CARE CLINIC | Facility: CLINIC | Age: 71
End: 2021-04-30
Payer: MEDICARE

## 2021-04-30 DIAGNOSIS — E11.69 TYPE 2 DIABETES MELLITUS WITH OTHER SPECIFIED COMPLICATION, WITH LONG-TERM CURRENT USE OF INSULIN: ICD-10-CM

## 2021-04-30 DIAGNOSIS — Z79.4 TYPE 2 DIABETES MELLITUS WITH OTHER SPECIFIED COMPLICATION, WITH LONG-TERM CURRENT USE OF INSULIN: ICD-10-CM

## 2021-04-30 PROCEDURE — 99490 CHRNC CARE MGMT STAFF 1ST 20: CPT | Mod: S$GLB,,, | Performed by: FAMILY MEDICINE

## 2021-04-30 PROCEDURE — 99490 PR CHRONIC CARE MGMT, 1ST 20 MIN: ICD-10-PCS | Mod: S$GLB,,, | Performed by: FAMILY MEDICINE

## 2021-05-03 DIAGNOSIS — E11.69 TYPE 2 DIABETES MELLITUS WITH OTHER SPECIFIED COMPLICATION, WITH LONG-TERM CURRENT USE OF INSULIN: ICD-10-CM

## 2021-05-03 DIAGNOSIS — Z79.4 TYPE 2 DIABETES MELLITUS WITH OTHER SPECIFIED COMPLICATION, WITH LONG-TERM CURRENT USE OF INSULIN: ICD-10-CM

## 2021-05-20 ENCOUNTER — PATIENT OUTREACH (OUTPATIENT)
Dept: INTERNAL MEDICINE | Facility: CLINIC | Age: 71
End: 2021-05-20

## 2021-05-21 ENCOUNTER — IMMUNIZATION (OUTPATIENT)
Dept: INTERNAL MEDICINE | Facility: CLINIC | Age: 71
End: 2021-05-21
Payer: MEDICARE

## 2021-05-21 DIAGNOSIS — Z23 NEED FOR VACCINATION: Primary | ICD-10-CM

## 2021-05-21 PROCEDURE — 0002A COVID-19, MRNA, LNP-S, PF, 30 MCG/0.3 ML DOSE VACCINE: CPT | Mod: CV19,,, | Performed by: FAMILY MEDICINE

## 2021-05-21 PROCEDURE — 91300 COVID-19, MRNA, LNP-S, PF, 30 MCG/0.3 ML DOSE VACCINE: ICD-10-PCS | Mod: ,,, | Performed by: FAMILY MEDICINE

## 2021-05-21 PROCEDURE — 91300 COVID-19, MRNA, LNP-S, PF, 30 MCG/0.3 ML DOSE VACCINE: CPT | Mod: ,,, | Performed by: FAMILY MEDICINE

## 2021-05-21 PROCEDURE — 0002A COVID-19, MRNA, LNP-S, PF, 30 MCG/0.3 ML DOSE VACCINE: ICD-10-PCS | Mod: CV19,,, | Performed by: FAMILY MEDICINE

## 2021-05-31 ENCOUNTER — EXTERNAL CHRONIC CARE MANAGEMENT (OUTPATIENT)
Dept: PRIMARY CARE CLINIC | Facility: CLINIC | Age: 71
End: 2021-05-31
Payer: MEDICARE

## 2021-05-31 PROCEDURE — 99490 CHRNC CARE MGMT STAFF 1ST 20: CPT | Mod: S$GLB,,, | Performed by: FAMILY MEDICINE

## 2021-05-31 PROCEDURE — 99490 PR CHRONIC CARE MGMT, 1ST 20 MIN: ICD-10-PCS | Mod: S$GLB,,, | Performed by: FAMILY MEDICINE

## 2021-06-01 ENCOUNTER — TELEPHONE (OUTPATIENT)
Dept: INTERNAL MEDICINE | Facility: CLINIC | Age: 71
End: 2021-06-01

## 2021-06-21 DIAGNOSIS — E78.2 COMBINED HYPERLIPIDEMIA ASSOCIATED WITH TYPE 2 DIABETES MELLITUS: ICD-10-CM

## 2021-06-21 DIAGNOSIS — E11.59 HYPERTENSION ASSOCIATED WITH DIABETES: ICD-10-CM

## 2021-06-21 DIAGNOSIS — I15.2 HYPERTENSION ASSOCIATED WITH DIABETES: ICD-10-CM

## 2021-06-21 DIAGNOSIS — E11.69 COMBINED HYPERLIPIDEMIA ASSOCIATED WITH TYPE 2 DIABETES MELLITUS: ICD-10-CM

## 2021-06-22 RX ORDER — LOSARTAN POTASSIUM AND HYDROCHLOROTHIAZIDE 25; 100 MG/1; MG/1
TABLET ORAL
Qty: 90 TABLET | Refills: 1 | Status: SHIPPED | OUTPATIENT
Start: 2021-06-22 | End: 2022-02-16 | Stop reason: SDUPTHER

## 2021-06-22 RX ORDER — ATORVASTATIN CALCIUM 20 MG/1
TABLET, FILM COATED ORAL
Qty: 90 TABLET | Refills: 1 | Status: SHIPPED | OUTPATIENT
Start: 2021-06-22 | End: 2021-12-21

## 2021-06-23 DIAGNOSIS — E11.9 TYPE 2 DIABETES MELLITUS WITHOUT COMPLICATION: ICD-10-CM

## 2021-06-30 ENCOUNTER — EXTERNAL CHRONIC CARE MANAGEMENT (OUTPATIENT)
Dept: PRIMARY CARE CLINIC | Facility: CLINIC | Age: 71
End: 2021-06-30
Payer: MEDICARE

## 2021-06-30 ENCOUNTER — OFFICE VISIT (OUTPATIENT)
Dept: PAIN MEDICINE | Facility: CLINIC | Age: 71
End: 2021-06-30
Payer: MEDICARE

## 2021-06-30 VITALS
BODY MASS INDEX: 27.33 KG/M2 | DIASTOLIC BLOOD PRESSURE: 90 MMHG | WEIGHT: 219.81 LBS | HEIGHT: 75 IN | SYSTOLIC BLOOD PRESSURE: 190 MMHG | HEART RATE: 72 BPM

## 2021-06-30 DIAGNOSIS — S68.119A TRAUMATIC AMPUTATION OF DIGIT OF ONE HAND WITHOUT COMPLICATION: ICD-10-CM

## 2021-06-30 DIAGNOSIS — S68.119A TRAUMATIC AMPUTATION OF DIGIT OF ONE HAND WITHOUT COMPLICATION: Primary | ICD-10-CM

## 2021-06-30 DIAGNOSIS — G89.4 CHRONIC PAIN DISORDER: ICD-10-CM

## 2021-06-30 DIAGNOSIS — Z79.891 OPIOID USE AGREEMENT EXISTS: ICD-10-CM

## 2021-06-30 DIAGNOSIS — G89.21 CHRONIC PAIN DUE TO TRAUMA: ICD-10-CM

## 2021-06-30 PROCEDURE — 99999 PR PBB SHADOW E&M-EST. PATIENT-LVL IV: ICD-10-PCS | Mod: PBBFAC,,, | Performed by: PHYSICIAN ASSISTANT

## 2021-06-30 PROCEDURE — 1159F MED LIST DOCD IN RCRD: CPT | Mod: S$GLB,,, | Performed by: PHYSICIAN ASSISTANT

## 2021-06-30 PROCEDURE — 3072F PR LOW RISK FOR RETINOPATHY: ICD-10-PCS | Mod: S$GLB,,, | Performed by: PHYSICIAN ASSISTANT

## 2021-06-30 PROCEDURE — 3072F LOW RISK FOR RETINOPATHY: CPT | Mod: S$GLB,,, | Performed by: PHYSICIAN ASSISTANT

## 2021-06-30 PROCEDURE — 1101F PR PT FALLS ASSESS DOC 0-1 FALLS W/OUT INJ PAST YR: ICD-10-PCS | Mod: CPTII,S$GLB,, | Performed by: PHYSICIAN ASSISTANT

## 2021-06-30 PROCEDURE — 3008F PR BODY MASS INDEX (BMI) DOCUMENTED: ICD-10-PCS | Mod: CPTII,S$GLB,, | Performed by: PHYSICIAN ASSISTANT

## 2021-06-30 PROCEDURE — 1101F PT FALLS ASSESS-DOCD LE1/YR: CPT | Mod: CPTII,S$GLB,, | Performed by: PHYSICIAN ASSISTANT

## 2021-06-30 PROCEDURE — 1125F PR PAIN SEVERITY QUANTIFIED, PAIN PRESENT: ICD-10-PCS | Mod: S$GLB,,, | Performed by: PHYSICIAN ASSISTANT

## 2021-06-30 PROCEDURE — 99214 PR OFFICE/OUTPT VISIT, EST, LEVL IV, 30-39 MIN: ICD-10-PCS | Mod: S$GLB,,, | Performed by: PHYSICIAN ASSISTANT

## 2021-06-30 PROCEDURE — 3288F PR FALLS RISK ASSESSMENT DOCUMENTED: ICD-10-PCS | Mod: CPTII,S$GLB,, | Performed by: PHYSICIAN ASSISTANT

## 2021-06-30 PROCEDURE — 99214 OFFICE O/P EST MOD 30 MIN: CPT | Mod: S$GLB,,, | Performed by: PHYSICIAN ASSISTANT

## 2021-06-30 PROCEDURE — 99490 PR CHRONIC CARE MGMT, 1ST 20 MIN: ICD-10-PCS | Mod: S$GLB,,, | Performed by: FAMILY MEDICINE

## 2021-06-30 PROCEDURE — 99999 PR PBB SHADOW E&M-EST. PATIENT-LVL IV: CPT | Mod: PBBFAC,,, | Performed by: PHYSICIAN ASSISTANT

## 2021-06-30 PROCEDURE — 99490 CHRNC CARE MGMT STAFF 1ST 20: CPT | Mod: S$GLB,,, | Performed by: FAMILY MEDICINE

## 2021-06-30 PROCEDURE — 3008F BODY MASS INDEX DOCD: CPT | Mod: CPTII,S$GLB,, | Performed by: PHYSICIAN ASSISTANT

## 2021-06-30 PROCEDURE — 1125F AMNT PAIN NOTED PAIN PRSNT: CPT | Mod: S$GLB,,, | Performed by: PHYSICIAN ASSISTANT

## 2021-06-30 PROCEDURE — 1159F PR MEDICATION LIST DOCUMENTED IN MEDICAL RECORD: ICD-10-PCS | Mod: S$GLB,,, | Performed by: PHYSICIAN ASSISTANT

## 2021-06-30 PROCEDURE — 3288F FALL RISK ASSESSMENT DOCD: CPT | Mod: CPTII,S$GLB,, | Performed by: PHYSICIAN ASSISTANT

## 2021-06-30 RX ORDER — HYDROCODONE BITARTRATE AND ACETAMINOPHEN 10; 325 MG/1; MG/1
1 TABLET ORAL EVERY 12 HOURS PRN
Qty: 60 TABLET | Refills: 0 | Status: SHIPPED | OUTPATIENT
Start: 2021-07-04 | End: 2021-08-03

## 2021-06-30 RX ORDER — HYDROCODONE BITARTRATE AND ACETAMINOPHEN 10; 325 MG/1; MG/1
1 TABLET ORAL EVERY 12 HOURS PRN
Qty: 60 TABLET | Refills: 0 | Status: SHIPPED | OUTPATIENT
Start: 2021-08-03 | End: 2021-09-02

## 2021-07-31 ENCOUNTER — EXTERNAL CHRONIC CARE MANAGEMENT (OUTPATIENT)
Dept: PRIMARY CARE CLINIC | Facility: CLINIC | Age: 71
End: 2021-07-31
Payer: MEDICARE

## 2021-07-31 PROCEDURE — 99490 CHRNC CARE MGMT STAFF 1ST 20: CPT | Mod: S$GLB,,, | Performed by: FAMILY MEDICINE

## 2021-07-31 PROCEDURE — 99490 PR CHRONIC CARE MGMT, 1ST 20 MIN: ICD-10-PCS | Mod: S$GLB,,, | Performed by: FAMILY MEDICINE

## 2021-08-12 ENCOUNTER — OFFICE VISIT (OUTPATIENT)
Dept: INTERNAL MEDICINE | Facility: CLINIC | Age: 71
End: 2021-08-12
Payer: MEDICARE

## 2021-08-12 VITALS
DIASTOLIC BLOOD PRESSURE: 90 MMHG | OXYGEN SATURATION: 97 % | HEART RATE: 79 BPM | SYSTOLIC BLOOD PRESSURE: 138 MMHG | TEMPERATURE: 99 F | HEIGHT: 75 IN | WEIGHT: 214.94 LBS | BODY MASS INDEX: 26.73 KG/M2

## 2021-08-12 DIAGNOSIS — Z00.00 ROUTINE GENERAL MEDICAL EXAMINATION AT A HEALTH CARE FACILITY: Primary | ICD-10-CM

## 2021-08-12 DIAGNOSIS — E11.59 HYPERTENSION ASSOCIATED WITH DIABETES: ICD-10-CM

## 2021-08-12 DIAGNOSIS — E11.8 CONTROLLED TYPE 2 DIABETES MELLITUS WITH COMPLICATION, WITHOUT LONG-TERM CURRENT USE OF INSULIN: ICD-10-CM

## 2021-08-12 DIAGNOSIS — E78.5 HYPERLIPIDEMIA ASSOCIATED WITH TYPE 2 DIABETES MELLITUS: ICD-10-CM

## 2021-08-12 DIAGNOSIS — I15.2 HYPERTENSION ASSOCIATED WITH DIABETES: ICD-10-CM

## 2021-08-12 DIAGNOSIS — E11.69 HYPERLIPIDEMIA ASSOCIATED WITH TYPE 2 DIABETES MELLITUS: ICD-10-CM

## 2021-08-12 DIAGNOSIS — E05.90 HYPERTHYROIDISM: ICD-10-CM

## 2021-08-12 PROCEDURE — 3051F PR MOST RECENT HEMOGLOBIN A1C LEVEL 7.0 - < 8.0%: ICD-10-PCS | Mod: CPTII,S$GLB,, | Performed by: FAMILY MEDICINE

## 2021-08-12 PROCEDURE — 3072F PR LOW RISK FOR RETINOPATHY: ICD-10-PCS | Mod: CPTII,S$GLB,, | Performed by: FAMILY MEDICINE

## 2021-08-12 PROCEDURE — 3288F FALL RISK ASSESSMENT DOCD: CPT | Mod: CPTII,S$GLB,, | Performed by: FAMILY MEDICINE

## 2021-08-12 PROCEDURE — 3046F PR MOST RECENT HEMOGLOBIN A1C LEVEL > 9.0%: ICD-10-PCS | Mod: CPTII,S$GLB,, | Performed by: FAMILY MEDICINE

## 2021-08-12 PROCEDURE — 1101F PR PT FALLS ASSESS DOC 0-1 FALLS W/OUT INJ PAST YR: ICD-10-PCS | Mod: CPTII,S$GLB,, | Performed by: FAMILY MEDICINE

## 2021-08-12 PROCEDURE — 99999 PR PBB SHADOW E&M-EST. PATIENT-LVL V: CPT | Mod: PBBFAC,,, | Performed by: FAMILY MEDICINE

## 2021-08-12 PROCEDURE — 1126F PR PAIN SEVERITY QUANTIFIED, NO PAIN PRESENT: ICD-10-PCS | Mod: CPTII,S$GLB,, | Performed by: FAMILY MEDICINE

## 2021-08-12 PROCEDURE — 99499 UNLISTED E&M SERVICE: CPT | Mod: HCNC,S$GLB,, | Performed by: FAMILY MEDICINE

## 2021-08-12 PROCEDURE — 3046F HEMOGLOBIN A1C LEVEL >9.0%: CPT | Mod: CPTII,S$GLB,, | Performed by: FAMILY MEDICINE

## 2021-08-12 PROCEDURE — 99397 PER PM REEVAL EST PAT 65+ YR: CPT | Mod: S$GLB,,, | Performed by: FAMILY MEDICINE

## 2021-08-12 PROCEDURE — 3075F SYST BP GE 130 - 139MM HG: CPT | Mod: CPTII,S$GLB,, | Performed by: FAMILY MEDICINE

## 2021-08-12 PROCEDURE — 99397 PR PREVENTIVE VISIT,EST,65 & OVER: ICD-10-PCS | Mod: S$GLB,,, | Performed by: FAMILY MEDICINE

## 2021-08-12 PROCEDURE — 3288F PR FALLS RISK ASSESSMENT DOCUMENTED: ICD-10-PCS | Mod: CPTII,S$GLB,, | Performed by: FAMILY MEDICINE

## 2021-08-12 PROCEDURE — 3008F PR BODY MASS INDEX (BMI) DOCUMENTED: ICD-10-PCS | Mod: CPTII,S$GLB,, | Performed by: FAMILY MEDICINE

## 2021-08-12 PROCEDURE — 3075F PR MOST RECENT SYSTOLIC BLOOD PRESS GE 130-139MM HG: ICD-10-PCS | Mod: CPTII,S$GLB,, | Performed by: FAMILY MEDICINE

## 2021-08-12 PROCEDURE — 1126F AMNT PAIN NOTED NONE PRSNT: CPT | Mod: CPTII,S$GLB,, | Performed by: FAMILY MEDICINE

## 2021-08-12 PROCEDURE — 1101F PT FALLS ASSESS-DOCD LE1/YR: CPT | Mod: CPTII,S$GLB,, | Performed by: FAMILY MEDICINE

## 2021-08-12 PROCEDURE — 3080F PR MOST RECENT DIASTOLIC BLOOD PRESSURE >= 90 MM HG: ICD-10-PCS | Mod: CPTII,S$GLB,, | Performed by: FAMILY MEDICINE

## 2021-08-12 PROCEDURE — 3072F LOW RISK FOR RETINOPATHY: CPT | Mod: CPTII,S$GLB,, | Performed by: FAMILY MEDICINE

## 2021-08-12 PROCEDURE — 3008F BODY MASS INDEX DOCD: CPT | Mod: CPTII,S$GLB,, | Performed by: FAMILY MEDICINE

## 2021-08-12 PROCEDURE — 3051F HG A1C>EQUAL 7.0%<8.0%: CPT | Mod: CPTII,S$GLB,, | Performed by: FAMILY MEDICINE

## 2021-08-12 PROCEDURE — 99999 PR PBB SHADOW E&M-EST. PATIENT-LVL V: ICD-10-PCS | Mod: PBBFAC,,, | Performed by: FAMILY MEDICINE

## 2021-08-12 PROCEDURE — 99499 RISK ADDL DX/OHS AUDIT: ICD-10-PCS | Mod: HCNC,S$GLB,, | Performed by: FAMILY MEDICINE

## 2021-08-12 PROCEDURE — 3080F DIAST BP >= 90 MM HG: CPT | Mod: CPTII,S$GLB,, | Performed by: FAMILY MEDICINE

## 2021-08-19 ENCOUNTER — TELEPHONE (OUTPATIENT)
Dept: INTERNAL MEDICINE | Facility: CLINIC | Age: 71
End: 2021-08-19
Payer: MEDICARE

## 2021-08-19 ENCOUNTER — LAB VISIT (OUTPATIENT)
Dept: LAB | Facility: HOSPITAL | Age: 71
End: 2021-08-19
Attending: FAMILY MEDICINE
Payer: MEDICARE

## 2021-08-19 DIAGNOSIS — E11.8 CONTROLLED TYPE 2 DIABETES MELLITUS WITH COMPLICATION, WITHOUT LONG-TERM CURRENT USE OF INSULIN: ICD-10-CM

## 2021-08-19 DIAGNOSIS — E11.8 CONTROLLED TYPE 2 DIABETES MELLITUS WITH COMPLICATION, WITHOUT LONG-TERM CURRENT USE OF INSULIN: Primary | ICD-10-CM

## 2021-08-19 DIAGNOSIS — E11.69 HYPERLIPIDEMIA ASSOCIATED WITH TYPE 2 DIABETES MELLITUS: ICD-10-CM

## 2021-08-19 DIAGNOSIS — I15.2 HYPERTENSION ASSOCIATED WITH DIABETES: ICD-10-CM

## 2021-08-19 DIAGNOSIS — E11.59 HYPERTENSION ASSOCIATED WITH DIABETES: ICD-10-CM

## 2021-08-19 DIAGNOSIS — E78.5 HYPERLIPIDEMIA ASSOCIATED WITH TYPE 2 DIABETES MELLITUS: ICD-10-CM

## 2021-08-19 DIAGNOSIS — E05.90 HYPERTHYROIDISM: ICD-10-CM

## 2021-08-19 LAB
ALBUMIN SERPL BCP-MCNC: 3.8 G/DL (ref 3.5–5.2)
ALP SERPL-CCNC: 63 U/L (ref 55–135)
ALT SERPL W/O P-5'-P-CCNC: 37 U/L (ref 10–44)
ANION GAP SERPL CALC-SCNC: 10 MMOL/L (ref 8–16)
AST SERPL-CCNC: 28 U/L (ref 10–40)
BASOPHILS # BLD AUTO: 0.06 K/UL (ref 0–0.2)
BASOPHILS NFR BLD: 1 % (ref 0–1.9)
BILIRUB SERPL-MCNC: 0.6 MG/DL (ref 0.1–1)
BUN SERPL-MCNC: 11 MG/DL (ref 8–23)
CALCIUM SERPL-MCNC: 10 MG/DL (ref 8.7–10.5)
CHLORIDE SERPL-SCNC: 98 MMOL/L (ref 95–110)
CHOLEST SERPL-MCNC: 159 MG/DL (ref 120–199)
CHOLEST/HDLC SERPL: 3.2 {RATIO} (ref 2–5)
CO2 SERPL-SCNC: 28 MMOL/L (ref 23–29)
CREAT SERPL-MCNC: 1.1 MG/DL (ref 0.5–1.4)
DIFFERENTIAL METHOD: ABNORMAL
EOSINOPHIL # BLD AUTO: 0.3 K/UL (ref 0–0.5)
EOSINOPHIL NFR BLD: 4.9 % (ref 0–8)
ERYTHROCYTE [DISTWIDTH] IN BLOOD BY AUTOMATED COUNT: 12.1 % (ref 11.5–14.5)
EST. GFR  (AFRICAN AMERICAN): >60 ML/MIN/1.73 M^2
EST. GFR  (NON AFRICAN AMERICAN): >60 ML/MIN/1.73 M^2
ESTIMATED AVG GLUCOSE: 286 MG/DL (ref 68–131)
GLUCOSE SERPL-MCNC: 146 MG/DL (ref 70–110)
HBA1C MFR BLD: 11.6 % (ref 4–5.6)
HCT VFR BLD AUTO: 41.1 % (ref 40–54)
HDLC SERPL-MCNC: 50 MG/DL (ref 40–75)
HDLC SERPL: 31.4 % (ref 20–50)
HGB BLD-MCNC: 13.8 G/DL (ref 14–18)
IMM GRANULOCYTES # BLD AUTO: 0.01 K/UL (ref 0–0.04)
IMM GRANULOCYTES NFR BLD AUTO: 0.2 % (ref 0–0.5)
LDLC SERPL CALC-MCNC: 91.6 MG/DL (ref 63–159)
LYMPHOCYTES # BLD AUTO: 2.8 K/UL (ref 1–4.8)
LYMPHOCYTES NFR BLD: 46.9 % (ref 18–48)
MCH RBC QN AUTO: 30.9 PG (ref 27–31)
MCHC RBC AUTO-ENTMCNC: 33.6 G/DL (ref 32–36)
MCV RBC AUTO: 92 FL (ref 82–98)
MONOCYTES # BLD AUTO: 0.6 K/UL (ref 0.3–1)
MONOCYTES NFR BLD: 9.2 % (ref 4–15)
NEUTROPHILS # BLD AUTO: 2.3 K/UL (ref 1.8–7.7)
NEUTROPHILS NFR BLD: 37.8 % (ref 38–73)
NONHDLC SERPL-MCNC: 109 MG/DL
NRBC BLD-RTO: 0 /100 WBC
PLATELET # BLD AUTO: 215 K/UL (ref 150–450)
PMV BLD AUTO: 10.9 FL (ref 9.2–12.9)
POTASSIUM SERPL-SCNC: 3.5 MMOL/L (ref 3.5–5.1)
PROT SERPL-MCNC: 7.3 G/DL (ref 6–8.4)
RBC # BLD AUTO: 4.46 M/UL (ref 4.6–6.2)
SODIUM SERPL-SCNC: 136 MMOL/L (ref 136–145)
T4 FREE SERPL-MCNC: 0.96 NG/DL (ref 0.71–1.51)
TRIGL SERPL-MCNC: 87 MG/DL (ref 30–150)
TSH SERPL DL<=0.005 MIU/L-ACNC: 0.11 UIU/ML (ref 0.4–4)
WBC # BLD AUTO: 5.95 K/UL (ref 3.9–12.7)

## 2021-08-19 PROCEDURE — 80061 LIPID PANEL: CPT | Performed by: FAMILY MEDICINE

## 2021-08-19 PROCEDURE — 85025 COMPLETE CBC W/AUTO DIFF WBC: CPT | Performed by: FAMILY MEDICINE

## 2021-08-19 PROCEDURE — 84439 ASSAY OF FREE THYROXINE: CPT | Performed by: FAMILY MEDICINE

## 2021-08-19 PROCEDURE — 80053 COMPREHEN METABOLIC PANEL: CPT | Performed by: FAMILY MEDICINE

## 2021-08-19 PROCEDURE — 83036 HEMOGLOBIN GLYCOSYLATED A1C: CPT | Performed by: FAMILY MEDICINE

## 2021-08-19 PROCEDURE — 36415 COLL VENOUS BLD VENIPUNCTURE: CPT | Performed by: FAMILY MEDICINE

## 2021-08-19 PROCEDURE — 84443 ASSAY THYROID STIM HORMONE: CPT | Performed by: FAMILY MEDICINE

## 2021-08-23 ENCOUNTER — PATIENT OUTREACH (OUTPATIENT)
Dept: ADMINISTRATIVE | Facility: OTHER | Age: 71
End: 2021-08-23

## 2021-08-24 ENCOUNTER — OFFICE VISIT (OUTPATIENT)
Dept: PAIN MEDICINE | Facility: CLINIC | Age: 71
End: 2021-08-24
Payer: MEDICARE

## 2021-08-24 VITALS
HEIGHT: 75 IN | WEIGHT: 220.13 LBS | DIASTOLIC BLOOD PRESSURE: 82 MMHG | SYSTOLIC BLOOD PRESSURE: 179 MMHG | HEART RATE: 66 BPM | BODY MASS INDEX: 27.37 KG/M2

## 2021-08-24 DIAGNOSIS — M79.642 PAIN OF LEFT HAND: ICD-10-CM

## 2021-08-24 DIAGNOSIS — Z79.891 OPIOID USE AGREEMENT EXISTS: ICD-10-CM

## 2021-08-24 DIAGNOSIS — S68.119A TRAUMATIC AMPUTATION OF DIGIT OF ONE HAND WITHOUT COMPLICATION: ICD-10-CM

## 2021-08-24 DIAGNOSIS — G89.21 CHRONIC PAIN DUE TO TRAUMA: ICD-10-CM

## 2021-08-24 DIAGNOSIS — S68.119A TRAUMATIC AMPUTATION OF DIGIT OF ONE HAND WITHOUT COMPLICATION: Primary | ICD-10-CM

## 2021-08-24 PROCEDURE — 3008F BODY MASS INDEX DOCD: CPT | Mod: CPTII,S$GLB,, | Performed by: PHYSICIAN ASSISTANT

## 2021-08-24 PROCEDURE — 3077F PR MOST RECENT SYSTOLIC BLOOD PRESSURE >= 140 MM HG: ICD-10-PCS | Mod: CPTII,S$GLB,, | Performed by: PHYSICIAN ASSISTANT

## 2021-08-24 PROCEDURE — 1160F RVW MEDS BY RX/DR IN RCRD: CPT | Mod: CPTII,S$GLB,, | Performed by: PHYSICIAN ASSISTANT

## 2021-08-24 PROCEDURE — 1159F PR MEDICATION LIST DOCUMENTED IN MEDICAL RECORD: ICD-10-PCS | Mod: CPTII,S$GLB,, | Performed by: PHYSICIAN ASSISTANT

## 2021-08-24 PROCEDURE — 99214 PR OFFICE/OUTPT VISIT, EST, LEVL IV, 30-39 MIN: ICD-10-PCS | Mod: S$GLB,,, | Performed by: PHYSICIAN ASSISTANT

## 2021-08-24 PROCEDURE — 1125F AMNT PAIN NOTED PAIN PRSNT: CPT | Mod: CPTII,S$GLB,, | Performed by: PHYSICIAN ASSISTANT

## 2021-08-24 PROCEDURE — 99999 PR PBB SHADOW E&M-EST. PATIENT-LVL IV: CPT | Mod: PBBFAC,,, | Performed by: PHYSICIAN ASSISTANT

## 2021-08-24 PROCEDURE — 1159F MED LIST DOCD IN RCRD: CPT | Mod: CPTII,S$GLB,, | Performed by: PHYSICIAN ASSISTANT

## 2021-08-24 PROCEDURE — 1101F PT FALLS ASSESS-DOCD LE1/YR: CPT | Mod: CPTII,S$GLB,, | Performed by: PHYSICIAN ASSISTANT

## 2021-08-24 PROCEDURE — 3288F PR FALLS RISK ASSESSMENT DOCUMENTED: ICD-10-PCS | Mod: CPTII,S$GLB,, | Performed by: PHYSICIAN ASSISTANT

## 2021-08-24 PROCEDURE — 1160F PR REVIEW ALL MEDS BY PRESCRIBER/CLIN PHARMACIST DOCUMENTED: ICD-10-PCS | Mod: CPTII,S$GLB,, | Performed by: PHYSICIAN ASSISTANT

## 2021-08-24 PROCEDURE — 1125F PR PAIN SEVERITY QUANTIFIED, PAIN PRESENT: ICD-10-PCS | Mod: CPTII,S$GLB,, | Performed by: PHYSICIAN ASSISTANT

## 2021-08-24 PROCEDURE — 3072F LOW RISK FOR RETINOPATHY: CPT | Mod: CPTII,S$GLB,, | Performed by: PHYSICIAN ASSISTANT

## 2021-08-24 PROCEDURE — 3079F DIAST BP 80-89 MM HG: CPT | Mod: CPTII,S$GLB,, | Performed by: PHYSICIAN ASSISTANT

## 2021-08-24 PROCEDURE — 3008F PR BODY MASS INDEX (BMI) DOCUMENTED: ICD-10-PCS | Mod: CPTII,S$GLB,, | Performed by: PHYSICIAN ASSISTANT

## 2021-08-24 PROCEDURE — 3077F SYST BP >= 140 MM HG: CPT | Mod: CPTII,S$GLB,, | Performed by: PHYSICIAN ASSISTANT

## 2021-08-24 PROCEDURE — 1101F PR PT FALLS ASSESS DOC 0-1 FALLS W/OUT INJ PAST YR: ICD-10-PCS | Mod: CPTII,S$GLB,, | Performed by: PHYSICIAN ASSISTANT

## 2021-08-24 PROCEDURE — 3046F HEMOGLOBIN A1C LEVEL >9.0%: CPT | Mod: CPTII,S$GLB,, | Performed by: PHYSICIAN ASSISTANT

## 2021-08-24 PROCEDURE — 3079F PR MOST RECENT DIASTOLIC BLOOD PRESSURE 80-89 MM HG: ICD-10-PCS | Mod: CPTII,S$GLB,, | Performed by: PHYSICIAN ASSISTANT

## 2021-08-24 PROCEDURE — 99999 PR PBB SHADOW E&M-EST. PATIENT-LVL IV: ICD-10-PCS | Mod: PBBFAC,,, | Performed by: PHYSICIAN ASSISTANT

## 2021-08-24 PROCEDURE — 3046F PR MOST RECENT HEMOGLOBIN A1C LEVEL > 9.0%: ICD-10-PCS | Mod: CPTII,S$GLB,, | Performed by: PHYSICIAN ASSISTANT

## 2021-08-24 PROCEDURE — 3072F PR LOW RISK FOR RETINOPATHY: ICD-10-PCS | Mod: CPTII,S$GLB,, | Performed by: PHYSICIAN ASSISTANT

## 2021-08-24 PROCEDURE — 3288F FALL RISK ASSESSMENT DOCD: CPT | Mod: CPTII,S$GLB,, | Performed by: PHYSICIAN ASSISTANT

## 2021-08-24 PROCEDURE — 99214 OFFICE O/P EST MOD 30 MIN: CPT | Mod: S$GLB,,, | Performed by: PHYSICIAN ASSISTANT

## 2021-08-24 RX ORDER — HYDROCODONE BITARTRATE AND ACETAMINOPHEN 10; 325 MG/1; MG/1
1 TABLET ORAL EVERY 12 HOURS PRN
Qty: 60 TABLET | Refills: 0 | Status: SHIPPED | OUTPATIENT
Start: 2021-10-03 | End: 2021-09-28 | Stop reason: SDUPTHER

## 2021-08-24 RX ORDER — HYDROCODONE BITARTRATE AND ACETAMINOPHEN 10; 325 MG/1; MG/1
1 TABLET ORAL EVERY 12 HOURS PRN
Qty: 60 TABLET | Refills: 0 | Status: SHIPPED | OUTPATIENT
Start: 2021-09-03 | End: 2021-10-26 | Stop reason: SDUPTHER

## 2021-08-31 ENCOUNTER — EXTERNAL CHRONIC CARE MANAGEMENT (OUTPATIENT)
Dept: PRIMARY CARE CLINIC | Facility: CLINIC | Age: 71
End: 2021-08-31
Payer: MEDICARE

## 2021-08-31 PROCEDURE — 99490 CHRNC CARE MGMT STAFF 1ST 20: CPT | Mod: S$GLB,,, | Performed by: FAMILY MEDICINE

## 2021-08-31 PROCEDURE — 99490 PR CHRONIC CARE MGMT, 1ST 20 MIN: ICD-10-PCS | Mod: S$GLB,,, | Performed by: FAMILY MEDICINE

## 2021-09-16 ENCOUNTER — TELEPHONE (OUTPATIENT)
Dept: INTERNAL MEDICINE | Facility: CLINIC | Age: 71
End: 2021-09-16

## 2021-09-16 ENCOUNTER — TELEPHONE (OUTPATIENT)
Dept: DIABETES | Facility: CLINIC | Age: 71
End: 2021-09-16

## 2021-09-19 DIAGNOSIS — Z79.4 TYPE 2 DIABETES MELLITUS WITH OTHER SPECIFIED COMPLICATION, WITH LONG-TERM CURRENT USE OF INSULIN: ICD-10-CM

## 2021-09-19 DIAGNOSIS — E11.69 TYPE 2 DIABETES MELLITUS WITH OTHER SPECIFIED COMPLICATION, WITH LONG-TERM CURRENT USE OF INSULIN: ICD-10-CM

## 2021-09-27 ENCOUNTER — PATIENT OUTREACH (OUTPATIENT)
Dept: ADMINISTRATIVE | Facility: OTHER | Age: 71
End: 2021-09-27

## 2021-09-28 ENCOUNTER — OFFICE VISIT (OUTPATIENT)
Dept: DIABETES | Facility: CLINIC | Age: 71
End: 2021-09-28
Payer: MEDICARE

## 2021-09-28 VITALS
SYSTOLIC BLOOD PRESSURE: 141 MMHG | DIASTOLIC BLOOD PRESSURE: 77 MMHG | WEIGHT: 215.38 LBS | HEART RATE: 74 BPM | BODY MASS INDEX: 26.78 KG/M2 | HEIGHT: 75 IN

## 2021-09-28 DIAGNOSIS — Z79.4 TYPE 2 DIABETES MELLITUS WITHOUT COMPLICATION, WITH LONG-TERM CURRENT USE OF INSULIN: Primary | ICD-10-CM

## 2021-09-28 DIAGNOSIS — E11.59 HYPERTENSION ASSOCIATED WITH DIABETES: ICD-10-CM

## 2021-09-28 DIAGNOSIS — E78.5 HYPERLIPIDEMIA ASSOCIATED WITH TYPE 2 DIABETES MELLITUS: ICD-10-CM

## 2021-09-28 DIAGNOSIS — E11.9 TYPE 2 DIABETES MELLITUS WITHOUT COMPLICATION, WITH LONG-TERM CURRENT USE OF INSULIN: Primary | ICD-10-CM

## 2021-09-28 DIAGNOSIS — E11.69 HYPERLIPIDEMIA ASSOCIATED WITH TYPE 2 DIABETES MELLITUS: ICD-10-CM

## 2021-09-28 DIAGNOSIS — I15.2 HYPERTENSION ASSOCIATED WITH DIABETES: ICD-10-CM

## 2021-09-28 LAB — GLUCOSE SERPL-MCNC: 297 MG/DL (ref 70–110)

## 2021-09-28 PROCEDURE — 3061F PR NEG MICROALBUMINURIA RESULT DOCUMENTED/REVIEW: ICD-10-PCS | Mod: HCNC,CPTII,S$GLB, | Performed by: NURSE PRACTITIONER

## 2021-09-28 PROCEDURE — 3072F LOW RISK FOR RETINOPATHY: CPT | Mod: HCNC,CPTII,S$GLB, | Performed by: NURSE PRACTITIONER

## 2021-09-28 PROCEDURE — 99215 OFFICE O/P EST HI 40 MIN: CPT | Mod: HCNC,S$GLB,, | Performed by: NURSE PRACTITIONER

## 2021-09-28 PROCEDURE — 3066F NEPHROPATHY DOC TX: CPT | Mod: HCNC,CPTII,S$GLB, | Performed by: NURSE PRACTITIONER

## 2021-09-28 PROCEDURE — 3077F SYST BP >= 140 MM HG: CPT | Mod: HCNC,CPTII,S$GLB, | Performed by: NURSE PRACTITIONER

## 2021-09-28 PROCEDURE — 99999 PR PBB SHADOW E&M-EST. PATIENT-LVL IV: CPT | Mod: PBBFAC,HCNC,, | Performed by: NURSE PRACTITIONER

## 2021-09-28 PROCEDURE — 3008F BODY MASS INDEX DOCD: CPT | Mod: HCNC,CPTII,S$GLB, | Performed by: NURSE PRACTITIONER

## 2021-09-28 PROCEDURE — 1126F PR PAIN SEVERITY QUANTIFIED, NO PAIN PRESENT: ICD-10-PCS | Mod: HCNC,CPTII,S$GLB, | Performed by: NURSE PRACTITIONER

## 2021-09-28 PROCEDURE — 3046F PR MOST RECENT HEMOGLOBIN A1C LEVEL > 9.0%: ICD-10-PCS | Mod: HCNC,CPTII,S$GLB, | Performed by: NURSE PRACTITIONER

## 2021-09-28 PROCEDURE — 3061F NEG MICROALBUMINURIA REV: CPT | Mod: HCNC,CPTII,S$GLB, | Performed by: NURSE PRACTITIONER

## 2021-09-28 PROCEDURE — 1159F MED LIST DOCD IN RCRD: CPT | Mod: HCNC,CPTII,S$GLB, | Performed by: NURSE PRACTITIONER

## 2021-09-28 PROCEDURE — 3078F PR MOST RECENT DIASTOLIC BLOOD PRESSURE < 80 MM HG: ICD-10-PCS | Mod: HCNC,CPTII,S$GLB, | Performed by: NURSE PRACTITIONER

## 2021-09-28 PROCEDURE — 99999 PR PBB SHADOW E&M-EST. PATIENT-LVL IV: ICD-10-PCS | Mod: PBBFAC,HCNC,, | Performed by: NURSE PRACTITIONER

## 2021-09-28 PROCEDURE — 1126F AMNT PAIN NOTED NONE PRSNT: CPT | Mod: HCNC,CPTII,S$GLB, | Performed by: NURSE PRACTITIONER

## 2021-09-28 PROCEDURE — 1159F PR MEDICATION LIST DOCUMENTED IN MEDICAL RECORD: ICD-10-PCS | Mod: HCNC,CPTII,S$GLB, | Performed by: NURSE PRACTITIONER

## 2021-09-28 PROCEDURE — 3008F PR BODY MASS INDEX (BMI) DOCUMENTED: ICD-10-PCS | Mod: HCNC,CPTII,S$GLB, | Performed by: NURSE PRACTITIONER

## 2021-09-28 PROCEDURE — 3066F PR DOCUMENTATION OF TREATMENT FOR NEPHROPATHY: ICD-10-PCS | Mod: HCNC,CPTII,S$GLB, | Performed by: NURSE PRACTITIONER

## 2021-09-28 PROCEDURE — 82962 GLUCOSE BLOOD TEST: CPT | Mod: HCNC,S$GLB,, | Performed by: NURSE PRACTITIONER

## 2021-09-28 PROCEDURE — 99215 PR OFFICE/OUTPT VISIT, EST, LEVL V, 40-54 MIN: ICD-10-PCS | Mod: HCNC,S$GLB,, | Performed by: NURSE PRACTITIONER

## 2021-09-28 PROCEDURE — 3078F DIAST BP <80 MM HG: CPT | Mod: HCNC,CPTII,S$GLB, | Performed by: NURSE PRACTITIONER

## 2021-09-28 PROCEDURE — 82962 POCT GLUCOSE, HAND-HELD DEVICE: ICD-10-PCS | Mod: HCNC,S$GLB,, | Performed by: NURSE PRACTITIONER

## 2021-09-28 PROCEDURE — 3046F HEMOGLOBIN A1C LEVEL >9.0%: CPT | Mod: HCNC,CPTII,S$GLB, | Performed by: NURSE PRACTITIONER

## 2021-09-28 PROCEDURE — 3077F PR MOST RECENT SYSTOLIC BLOOD PRESSURE >= 140 MM HG: ICD-10-PCS | Mod: HCNC,CPTII,S$GLB, | Performed by: NURSE PRACTITIONER

## 2021-09-28 PROCEDURE — 3072F PR LOW RISK FOR RETINOPATHY: ICD-10-PCS | Mod: HCNC,CPTII,S$GLB, | Performed by: NURSE PRACTITIONER

## 2021-09-28 RX ORDER — DULAGLUTIDE 0.75 MG/.5ML
0.75 INJECTION, SOLUTION SUBCUTANEOUS
Qty: 4 PEN | Refills: 1 | Status: SHIPPED | OUTPATIENT
Start: 2021-09-28 | End: 2021-11-30 | Stop reason: SDUPTHER

## 2021-09-28 RX ORDER — SYRINGE,SAFETY WITH NEEDLE,1ML 25GX1"
SYRINGE (EA) MISCELLANEOUS
Qty: 100 EACH | Refills: 11 | Status: SHIPPED | OUTPATIENT
Start: 2021-09-28 | End: 2023-04-26 | Stop reason: SDUPTHER

## 2021-09-30 ENCOUNTER — EXTERNAL CHRONIC CARE MANAGEMENT (OUTPATIENT)
Dept: PRIMARY CARE CLINIC | Facility: CLINIC | Age: 71
End: 2021-09-30
Payer: MEDICARE

## 2021-09-30 PROCEDURE — 99490 PR CHRONIC CARE MGMT, 1ST 20 MIN: ICD-10-PCS | Mod: S$GLB,,, | Performed by: FAMILY MEDICINE

## 2021-09-30 PROCEDURE — 99490 CHRNC CARE MGMT STAFF 1ST 20: CPT | Mod: S$GLB,,, | Performed by: FAMILY MEDICINE

## 2021-10-14 ENCOUNTER — OFFICE VISIT (OUTPATIENT)
Dept: DIABETES | Facility: CLINIC | Age: 71
End: 2021-10-14
Payer: MEDICARE

## 2021-10-14 DIAGNOSIS — Z79.4 TYPE 2 DIABETES MELLITUS WITHOUT COMPLICATION, WITH LONG-TERM CURRENT USE OF INSULIN: Primary | ICD-10-CM

## 2021-10-14 DIAGNOSIS — I15.2 HYPERTENSION ASSOCIATED WITH DIABETES: ICD-10-CM

## 2021-10-14 DIAGNOSIS — E11.9 TYPE 2 DIABETES MELLITUS WITHOUT COMPLICATION, WITH LONG-TERM CURRENT USE OF INSULIN: Primary | ICD-10-CM

## 2021-10-14 DIAGNOSIS — E11.59 HYPERTENSION ASSOCIATED WITH DIABETES: ICD-10-CM

## 2021-10-14 DIAGNOSIS — E78.5 HYPERLIPIDEMIA ASSOCIATED WITH TYPE 2 DIABETES MELLITUS: ICD-10-CM

## 2021-10-14 DIAGNOSIS — E11.69 HYPERLIPIDEMIA ASSOCIATED WITH TYPE 2 DIABETES MELLITUS: ICD-10-CM

## 2021-10-14 PROCEDURE — 3061F PR NEG MICROALBUMINURIA RESULT DOCUMENTED/REVIEW: ICD-10-PCS | Mod: HCNC,CPTII,95, | Performed by: NURSE PRACTITIONER

## 2021-10-14 PROCEDURE — 1159F PR MEDICATION LIST DOCUMENTED IN MEDICAL RECORD: ICD-10-PCS | Mod: HCNC,CPTII,95, | Performed by: NURSE PRACTITIONER

## 2021-10-14 PROCEDURE — 1159F MED LIST DOCD IN RCRD: CPT | Mod: HCNC,CPTII,95, | Performed by: NURSE PRACTITIONER

## 2021-10-14 PROCEDURE — 99443 PR PHYSICIAN TELEPHONE EVALUATION 21-30 MIN: CPT | Mod: HCNC,95,, | Performed by: NURSE PRACTITIONER

## 2021-10-14 PROCEDURE — 3072F PR LOW RISK FOR RETINOPATHY: ICD-10-PCS | Mod: HCNC,CPTII,95, | Performed by: NURSE PRACTITIONER

## 2021-10-14 PROCEDURE — 1160F RVW MEDS BY RX/DR IN RCRD: CPT | Mod: HCNC,CPTII,95, | Performed by: NURSE PRACTITIONER

## 2021-10-14 PROCEDURE — 3066F PR DOCUMENTATION OF TREATMENT FOR NEPHROPATHY: ICD-10-PCS | Mod: HCNC,CPTII,95, | Performed by: NURSE PRACTITIONER

## 2021-10-14 PROCEDURE — 3066F NEPHROPATHY DOC TX: CPT | Mod: HCNC,CPTII,95, | Performed by: NURSE PRACTITIONER

## 2021-10-14 PROCEDURE — 3061F NEG MICROALBUMINURIA REV: CPT | Mod: HCNC,CPTII,95, | Performed by: NURSE PRACTITIONER

## 2021-10-14 PROCEDURE — 3046F HEMOGLOBIN A1C LEVEL >9.0%: CPT | Mod: HCNC,CPTII,95, | Performed by: NURSE PRACTITIONER

## 2021-10-14 PROCEDURE — 99443 PR PHYSICIAN TELEPHONE EVALUATION 21-30 MIN: ICD-10-PCS | Mod: HCNC,95,, | Performed by: NURSE PRACTITIONER

## 2021-10-14 PROCEDURE — 1160F PR REVIEW ALL MEDS BY PRESCRIBER/CLIN PHARMACIST DOCUMENTED: ICD-10-PCS | Mod: HCNC,CPTII,95, | Performed by: NURSE PRACTITIONER

## 2021-10-14 PROCEDURE — 3046F PR MOST RECENT HEMOGLOBIN A1C LEVEL > 9.0%: ICD-10-PCS | Mod: HCNC,CPTII,95, | Performed by: NURSE PRACTITIONER

## 2021-10-14 PROCEDURE — 3072F LOW RISK FOR RETINOPATHY: CPT | Mod: HCNC,CPTII,95, | Performed by: NURSE PRACTITIONER

## 2021-10-15 ENCOUNTER — IMMUNIZATION (OUTPATIENT)
Dept: INTERNAL MEDICINE | Facility: CLINIC | Age: 71
End: 2021-10-15
Payer: MEDICARE

## 2021-10-15 PROCEDURE — G0008 ADMIN INFLUENZA VIRUS VAC: HCPCS | Mod: HCNC,S$GLB,, | Performed by: FAMILY MEDICINE

## 2021-10-15 PROCEDURE — 90694 VACC AIIV4 NO PRSRV 0.5ML IM: CPT | Mod: HCNC,S$GLB,, | Performed by: FAMILY MEDICINE

## 2021-10-15 PROCEDURE — G0008 FLU VACCINE - QUADRIVALENT - ADJUVANTED: ICD-10-PCS | Mod: HCNC,S$GLB,, | Performed by: FAMILY MEDICINE

## 2021-10-15 PROCEDURE — 90694 FLU VACCINE - QUADRIVALENT - ADJUVANTED: ICD-10-PCS | Mod: HCNC,S$GLB,, | Performed by: FAMILY MEDICINE

## 2021-10-26 ENCOUNTER — OFFICE VISIT (OUTPATIENT)
Dept: PAIN MEDICINE | Facility: CLINIC | Age: 71
End: 2021-10-26
Payer: MEDICARE

## 2021-10-26 VITALS
DIASTOLIC BLOOD PRESSURE: 86 MMHG | HEART RATE: 78 BPM | HEIGHT: 75 IN | SYSTOLIC BLOOD PRESSURE: 161 MMHG | BODY MASS INDEX: 26.86 KG/M2 | WEIGHT: 216.06 LBS

## 2021-10-26 DIAGNOSIS — S68.119A TRAUMATIC AMPUTATION OF DIGIT OF ONE HAND WITHOUT COMPLICATION: ICD-10-CM

## 2021-10-26 DIAGNOSIS — S68.119A TRAUMATIC AMPUTATION OF DIGIT OF ONE HAND WITHOUT COMPLICATION: Primary | ICD-10-CM

## 2021-10-26 DIAGNOSIS — G89.21 CHRONIC PAIN DUE TO TRAUMA: ICD-10-CM

## 2021-10-26 DIAGNOSIS — Z79.891 OPIOID USE AGREEMENT EXISTS: ICD-10-CM

## 2021-10-26 DIAGNOSIS — M79.642 PAIN OF LEFT HAND: ICD-10-CM

## 2021-10-26 PROCEDURE — 1159F PR MEDICATION LIST DOCUMENTED IN MEDICAL RECORD: ICD-10-PCS | Mod: HCNC,CPTII,S$GLB, | Performed by: PHYSICIAN ASSISTANT

## 2021-10-26 PROCEDURE — 1101F PT FALLS ASSESS-DOCD LE1/YR: CPT | Mod: HCNC,CPTII,S$GLB, | Performed by: PHYSICIAN ASSISTANT

## 2021-10-26 PROCEDURE — 3077F SYST BP >= 140 MM HG: CPT | Mod: HCNC,CPTII,S$GLB, | Performed by: PHYSICIAN ASSISTANT

## 2021-10-26 PROCEDURE — 3072F LOW RISK FOR RETINOPATHY: CPT | Mod: HCNC,CPTII,S$GLB, | Performed by: PHYSICIAN ASSISTANT

## 2021-10-26 PROCEDURE — 3079F DIAST BP 80-89 MM HG: CPT | Mod: HCNC,CPTII,S$GLB, | Performed by: PHYSICIAN ASSISTANT

## 2021-10-26 PROCEDURE — 3066F PR DOCUMENTATION OF TREATMENT FOR NEPHROPATHY: ICD-10-PCS | Mod: HCNC,CPTII,S$GLB, | Performed by: PHYSICIAN ASSISTANT

## 2021-10-26 PROCEDURE — 3072F PR LOW RISK FOR RETINOPATHY: ICD-10-PCS | Mod: HCNC,CPTII,S$GLB, | Performed by: PHYSICIAN ASSISTANT

## 2021-10-26 PROCEDURE — 1160F RVW MEDS BY RX/DR IN RCRD: CPT | Mod: HCNC,CPTII,S$GLB, | Performed by: PHYSICIAN ASSISTANT

## 2021-10-26 PROCEDURE — 3079F PR MOST RECENT DIASTOLIC BLOOD PRESSURE 80-89 MM HG: ICD-10-PCS | Mod: HCNC,CPTII,S$GLB, | Performed by: PHYSICIAN ASSISTANT

## 2021-10-26 PROCEDURE — 1159F MED LIST DOCD IN RCRD: CPT | Mod: HCNC,CPTII,S$GLB, | Performed by: PHYSICIAN ASSISTANT

## 2021-10-26 PROCEDURE — 99999 PR PBB SHADOW E&M-EST. PATIENT-LVL IV: ICD-10-PCS | Mod: PBBFAC,HCNC,, | Performed by: PHYSICIAN ASSISTANT

## 2021-10-26 PROCEDURE — 3288F PR FALLS RISK ASSESSMENT DOCUMENTED: ICD-10-PCS | Mod: HCNC,CPTII,S$GLB, | Performed by: PHYSICIAN ASSISTANT

## 2021-10-26 PROCEDURE — 3046F PR MOST RECENT HEMOGLOBIN A1C LEVEL > 9.0%: ICD-10-PCS | Mod: HCNC,CPTII,S$GLB, | Performed by: PHYSICIAN ASSISTANT

## 2021-10-26 PROCEDURE — 3008F PR BODY MASS INDEX (BMI) DOCUMENTED: ICD-10-PCS | Mod: HCNC,CPTII,S$GLB, | Performed by: PHYSICIAN ASSISTANT

## 2021-10-26 PROCEDURE — 99214 PR OFFICE/OUTPT VISIT, EST, LEVL IV, 30-39 MIN: ICD-10-PCS | Mod: HCNC,S$GLB,, | Performed by: PHYSICIAN ASSISTANT

## 2021-10-26 PROCEDURE — 99999 PR PBB SHADOW E&M-EST. PATIENT-LVL IV: CPT | Mod: PBBFAC,HCNC,, | Performed by: PHYSICIAN ASSISTANT

## 2021-10-26 PROCEDURE — 3066F NEPHROPATHY DOC TX: CPT | Mod: HCNC,CPTII,S$GLB, | Performed by: PHYSICIAN ASSISTANT

## 2021-10-26 PROCEDURE — 1160F PR REVIEW ALL MEDS BY PRESCRIBER/CLIN PHARMACIST DOCUMENTED: ICD-10-PCS | Mod: HCNC,CPTII,S$GLB, | Performed by: PHYSICIAN ASSISTANT

## 2021-10-26 PROCEDURE — 1126F PR PAIN SEVERITY QUANTIFIED, NO PAIN PRESENT: ICD-10-PCS | Mod: HCNC,CPTII,S$GLB, | Performed by: PHYSICIAN ASSISTANT

## 2021-10-26 PROCEDURE — 99214 OFFICE O/P EST MOD 30 MIN: CPT | Mod: HCNC,S$GLB,, | Performed by: PHYSICIAN ASSISTANT

## 2021-10-26 PROCEDURE — 1101F PR PT FALLS ASSESS DOC 0-1 FALLS W/OUT INJ PAST YR: ICD-10-PCS | Mod: HCNC,CPTII,S$GLB, | Performed by: PHYSICIAN ASSISTANT

## 2021-10-26 PROCEDURE — 3008F BODY MASS INDEX DOCD: CPT | Mod: HCNC,CPTII,S$GLB, | Performed by: PHYSICIAN ASSISTANT

## 2021-10-26 PROCEDURE — 3077F PR MOST RECENT SYSTOLIC BLOOD PRESSURE >= 140 MM HG: ICD-10-PCS | Mod: HCNC,CPTII,S$GLB, | Performed by: PHYSICIAN ASSISTANT

## 2021-10-26 PROCEDURE — 3061F PR NEG MICROALBUMINURIA RESULT DOCUMENTED/REVIEW: ICD-10-PCS | Mod: HCNC,CPTII,S$GLB, | Performed by: PHYSICIAN ASSISTANT

## 2021-10-26 PROCEDURE — 3061F NEG MICROALBUMINURIA REV: CPT | Mod: HCNC,CPTII,S$GLB, | Performed by: PHYSICIAN ASSISTANT

## 2021-10-26 PROCEDURE — 1126F AMNT PAIN NOTED NONE PRSNT: CPT | Mod: HCNC,CPTII,S$GLB, | Performed by: PHYSICIAN ASSISTANT

## 2021-10-26 PROCEDURE — 3046F HEMOGLOBIN A1C LEVEL >9.0%: CPT | Mod: HCNC,CPTII,S$GLB, | Performed by: PHYSICIAN ASSISTANT

## 2021-10-26 PROCEDURE — 3288F FALL RISK ASSESSMENT DOCD: CPT | Mod: HCNC,CPTII,S$GLB, | Performed by: PHYSICIAN ASSISTANT

## 2021-10-26 RX ORDER — HYDROCODONE BITARTRATE AND ACETAMINOPHEN 10; 325 MG/1; MG/1
1 TABLET ORAL EVERY 12 HOURS PRN
Qty: 60 TABLET | Refills: 0 | Status: SHIPPED | OUTPATIENT
Start: 2021-11-02 | End: 2021-12-21 | Stop reason: SDUPTHER

## 2021-10-26 RX ORDER — HYDROCODONE BITARTRATE AND ACETAMINOPHEN 10; 325 MG/1; MG/1
1 TABLET ORAL EVERY 12 HOURS PRN
Qty: 60 TABLET | Refills: 0 | Status: SHIPPED | OUTPATIENT
Start: 2021-12-02 | End: 2021-12-21 | Stop reason: SDUPTHER

## 2021-10-31 ENCOUNTER — EXTERNAL CHRONIC CARE MANAGEMENT (OUTPATIENT)
Dept: PRIMARY CARE CLINIC | Facility: CLINIC | Age: 71
End: 2021-10-31
Payer: MEDICARE

## 2021-10-31 PROCEDURE — 99490 PR CHRONIC CARE MGMT, 1ST 20 MIN: ICD-10-PCS | Mod: S$GLB,,, | Performed by: FAMILY MEDICINE

## 2021-10-31 PROCEDURE — 99490 CHRNC CARE MGMT STAFF 1ST 20: CPT | Mod: S$GLB,,, | Performed by: FAMILY MEDICINE

## 2021-11-10 ENCOUNTER — PATIENT OUTREACH (OUTPATIENT)
Dept: ADMINISTRATIVE | Facility: HOSPITAL | Age: 71
End: 2021-11-10
Payer: MEDICARE

## 2021-11-19 DIAGNOSIS — E11.9 TYPE 2 DIABETES MELLITUS WITHOUT COMPLICATION, WITH LONG-TERM CURRENT USE OF INSULIN: ICD-10-CM

## 2021-11-19 DIAGNOSIS — Z79.4 TYPE 2 DIABETES MELLITUS WITHOUT COMPLICATION, WITH LONG-TERM CURRENT USE OF INSULIN: ICD-10-CM

## 2021-11-19 RX ORDER — DULAGLUTIDE 0.75 MG/.5ML
INJECTION, SOLUTION SUBCUTANEOUS
OUTPATIENT
Start: 2021-11-19

## 2021-11-29 ENCOUNTER — LAB VISIT (OUTPATIENT)
Dept: LAB | Facility: HOSPITAL | Age: 71
End: 2021-11-29
Attending: NURSE PRACTITIONER
Payer: MEDICARE

## 2021-11-29 DIAGNOSIS — Z79.4 TYPE 2 DIABETES MELLITUS WITHOUT COMPLICATION, WITH LONG-TERM CURRENT USE OF INSULIN: ICD-10-CM

## 2021-11-29 DIAGNOSIS — E11.9 TYPE 2 DIABETES MELLITUS WITHOUT COMPLICATION, WITH LONG-TERM CURRENT USE OF INSULIN: ICD-10-CM

## 2021-11-29 LAB
ESTIMATED AVG GLUCOSE: 217 MG/DL (ref 68–131)
HBA1C MFR BLD: 9.2 % (ref 4–5.6)

## 2021-11-29 PROCEDURE — 36415 COLL VENOUS BLD VENIPUNCTURE: CPT | Mod: HCNC | Performed by: NURSE PRACTITIONER

## 2021-11-29 PROCEDURE — 83036 HEMOGLOBIN GLYCOSYLATED A1C: CPT | Mod: HCNC | Performed by: NURSE PRACTITIONER

## 2021-11-30 ENCOUNTER — OFFICE VISIT (OUTPATIENT)
Dept: DIABETES | Facility: CLINIC | Age: 71
End: 2021-11-30
Payer: MEDICARE

## 2021-11-30 ENCOUNTER — EXTERNAL CHRONIC CARE MANAGEMENT (OUTPATIENT)
Dept: PRIMARY CARE CLINIC | Facility: CLINIC | Age: 71
End: 2021-11-30
Payer: MEDICARE

## 2021-11-30 VITALS
HEIGHT: 75 IN | BODY MASS INDEX: 26.86 KG/M2 | TEMPERATURE: 98 F | WEIGHT: 216.06 LBS | HEART RATE: 100 BPM | DIASTOLIC BLOOD PRESSURE: 86 MMHG | SYSTOLIC BLOOD PRESSURE: 150 MMHG

## 2021-11-30 DIAGNOSIS — I15.2 HYPERTENSION ASSOCIATED WITH DIABETES: ICD-10-CM

## 2021-11-30 DIAGNOSIS — E11.59 HYPERTENSION ASSOCIATED WITH DIABETES: ICD-10-CM

## 2021-11-30 DIAGNOSIS — E11.69 HYPERLIPIDEMIA ASSOCIATED WITH TYPE 2 DIABETES MELLITUS: ICD-10-CM

## 2021-11-30 DIAGNOSIS — Z79.4 TYPE 2 DIABETES MELLITUS WITHOUT COMPLICATION, WITH LONG-TERM CURRENT USE OF INSULIN: Primary | ICD-10-CM

## 2021-11-30 DIAGNOSIS — E11.9 TYPE 2 DIABETES MELLITUS WITHOUT COMPLICATION, WITH LONG-TERM CURRENT USE OF INSULIN: Primary | ICD-10-CM

## 2021-11-30 DIAGNOSIS — E78.5 HYPERLIPIDEMIA ASSOCIATED WITH TYPE 2 DIABETES MELLITUS: ICD-10-CM

## 2021-11-30 LAB — GLUCOSE SERPL-MCNC: 319 MG/DL (ref 70–110)

## 2021-11-30 PROCEDURE — 99214 PR OFFICE/OUTPT VISIT, EST, LEVL IV, 30-39 MIN: ICD-10-PCS | Mod: HCNC,S$GLB,, | Performed by: NURSE PRACTITIONER

## 2021-11-30 PROCEDURE — 99499 RISK ADDL DX/OHS AUDIT: ICD-10-PCS | Mod: S$GLB,,, | Performed by: NURSE PRACTITIONER

## 2021-11-30 PROCEDURE — 82962 POCT GLUCOSE, HAND-HELD DEVICE: ICD-10-PCS | Mod: HCNC,S$GLB,, | Performed by: NURSE PRACTITIONER

## 2021-11-30 PROCEDURE — 3066F NEPHROPATHY DOC TX: CPT | Mod: HCNC,CPTII,S$GLB, | Performed by: NURSE PRACTITIONER

## 2021-11-30 PROCEDURE — 3072F PR LOW RISK FOR RETINOPATHY: ICD-10-PCS | Mod: HCNC,CPTII,S$GLB, | Performed by: NURSE PRACTITIONER

## 2021-11-30 PROCEDURE — 3061F NEG MICROALBUMINURIA REV: CPT | Mod: HCNC,CPTII,S$GLB, | Performed by: NURSE PRACTITIONER

## 2021-11-30 PROCEDURE — 82962 GLUCOSE BLOOD TEST: CPT | Mod: HCNC,S$GLB,, | Performed by: NURSE PRACTITIONER

## 2021-11-30 PROCEDURE — 99490 PR CHRONIC CARE MGMT, 1ST 20 MIN: ICD-10-PCS | Mod: S$PBB,,, | Performed by: FAMILY MEDICINE

## 2021-11-30 PROCEDURE — 99999 PR PBB SHADOW E&M-EST. PATIENT-LVL IV: ICD-10-PCS | Mod: PBBFAC,HCNC,, | Performed by: NURSE PRACTITIONER

## 2021-11-30 PROCEDURE — 99499 UNLISTED E&M SERVICE: CPT | Mod: S$GLB,,, | Performed by: NURSE PRACTITIONER

## 2021-11-30 PROCEDURE — 99490 CHRNC CARE MGMT STAFF 1ST 20: CPT | Mod: PBBFAC | Performed by: FAMILY MEDICINE

## 2021-11-30 PROCEDURE — 99214 OFFICE O/P EST MOD 30 MIN: CPT | Mod: HCNC,S$GLB,, | Performed by: NURSE PRACTITIONER

## 2021-11-30 PROCEDURE — 3072F LOW RISK FOR RETINOPATHY: CPT | Mod: HCNC,CPTII,S$GLB, | Performed by: NURSE PRACTITIONER

## 2021-11-30 PROCEDURE — 3061F PR NEG MICROALBUMINURIA RESULT DOCUMENTED/REVIEW: ICD-10-PCS | Mod: HCNC,CPTII,S$GLB, | Performed by: NURSE PRACTITIONER

## 2021-11-30 PROCEDURE — 3066F PR DOCUMENTATION OF TREATMENT FOR NEPHROPATHY: ICD-10-PCS | Mod: HCNC,CPTII,S$GLB, | Performed by: NURSE PRACTITIONER

## 2021-11-30 PROCEDURE — 99490 CHRNC CARE MGMT STAFF 1ST 20: CPT | Mod: S$PBB,,, | Performed by: FAMILY MEDICINE

## 2021-11-30 PROCEDURE — 99999 PR PBB SHADOW E&M-EST. PATIENT-LVL IV: CPT | Mod: PBBFAC,HCNC,, | Performed by: NURSE PRACTITIONER

## 2021-11-30 RX ORDER — EMPAGLIFLOZIN 10 MG/1
10 TABLET, FILM COATED ORAL DAILY
Qty: 30 TABLET | Refills: 2 | Status: SHIPPED | OUTPATIENT
Start: 2021-11-30 | End: 2022-02-16 | Stop reason: SDUPTHER

## 2021-11-30 RX ORDER — DULAGLUTIDE 0.75 MG/.5ML
0.75 INJECTION, SOLUTION SUBCUTANEOUS
Qty: 4 PEN | Refills: 2 | Status: SHIPPED | OUTPATIENT
Start: 2021-11-30 | End: 2022-02-16 | Stop reason: SDUPTHER

## 2021-12-01 ENCOUNTER — IMMUNIZATION (OUTPATIENT)
Dept: PRIMARY CARE CLINIC | Facility: CLINIC | Age: 71
End: 2021-12-01
Payer: MEDICARE

## 2021-12-01 DIAGNOSIS — Z23 NEED FOR VACCINATION: Primary | ICD-10-CM

## 2021-12-01 PROCEDURE — 0004A COVID-19, MRNA, LNP-S, PF, 30 MCG/0.3 ML DOSE VACCINE: CPT | Mod: CV19,PBBFAC | Performed by: FAMILY MEDICINE

## 2021-12-02 DIAGNOSIS — I15.2 HYPERTENSION ASSOCIATED WITH DIABETES: ICD-10-CM

## 2021-12-02 DIAGNOSIS — E11.59 HYPERTENSION ASSOCIATED WITH DIABETES: ICD-10-CM

## 2021-12-03 RX ORDER — NEBIVOLOL 10 MG/1
TABLET ORAL
Qty: 90 TABLET | Refills: 3 | Status: SHIPPED | OUTPATIENT
Start: 2021-12-03 | End: 2022-02-16 | Stop reason: SDUPTHER

## 2021-12-13 ENCOUNTER — PATIENT OUTREACH (OUTPATIENT)
Dept: ADMINISTRATIVE | Facility: HOSPITAL | Age: 71
End: 2021-12-13
Payer: MEDICARE

## 2021-12-16 ENCOUNTER — PATIENT OUTREACH (OUTPATIENT)
Dept: ADMINISTRATIVE | Facility: OTHER | Age: 71
End: 2021-12-16
Payer: MEDICARE

## 2021-12-17 ENCOUNTER — OFFICE VISIT (OUTPATIENT)
Dept: DIABETES | Facility: CLINIC | Age: 71
End: 2021-12-17
Payer: MEDICARE

## 2021-12-17 DIAGNOSIS — I15.2 HYPERTENSION ASSOCIATED WITH DIABETES: ICD-10-CM

## 2021-12-17 DIAGNOSIS — E11.9 TYPE 2 DIABETES MELLITUS WITHOUT COMPLICATION, WITH LONG-TERM CURRENT USE OF INSULIN: Primary | ICD-10-CM

## 2021-12-17 DIAGNOSIS — E11.69 HYPERLIPIDEMIA ASSOCIATED WITH TYPE 2 DIABETES MELLITUS: ICD-10-CM

## 2021-12-17 DIAGNOSIS — E11.59 HYPERTENSION ASSOCIATED WITH DIABETES: ICD-10-CM

## 2021-12-17 DIAGNOSIS — Z79.4 TYPE 2 DIABETES MELLITUS WITHOUT COMPLICATION, WITH LONG-TERM CURRENT USE OF INSULIN: Primary | ICD-10-CM

## 2021-12-17 DIAGNOSIS — E78.5 HYPERLIPIDEMIA ASSOCIATED WITH TYPE 2 DIABETES MELLITUS: ICD-10-CM

## 2021-12-17 PROCEDURE — 3061F PR NEG MICROALBUMINURIA RESULT DOCUMENTED/REVIEW: ICD-10-PCS | Mod: CPTII,95,, | Performed by: NURSE PRACTITIONER

## 2021-12-17 PROCEDURE — 99443 PR PHYSICIAN TELEPHONE EVALUATION 21-30 MIN: CPT | Mod: 95,,, | Performed by: NURSE PRACTITIONER

## 2021-12-17 PROCEDURE — 3061F NEG MICROALBUMINURIA REV: CPT | Mod: CPTII,95,, | Performed by: NURSE PRACTITIONER

## 2021-12-17 PROCEDURE — 3066F PR DOCUMENTATION OF TREATMENT FOR NEPHROPATHY: ICD-10-PCS | Mod: CPTII,95,, | Performed by: NURSE PRACTITIONER

## 2021-12-17 PROCEDURE — 99443 PR PHYSICIAN TELEPHONE EVALUATION 21-30 MIN: ICD-10-PCS | Mod: 95,,, | Performed by: NURSE PRACTITIONER

## 2021-12-17 PROCEDURE — 3072F LOW RISK FOR RETINOPATHY: CPT | Mod: CPTII,95,, | Performed by: NURSE PRACTITIONER

## 2021-12-17 PROCEDURE — 3066F NEPHROPATHY DOC TX: CPT | Mod: CPTII,95,, | Performed by: NURSE PRACTITIONER

## 2021-12-17 PROCEDURE — 3072F PR LOW RISK FOR RETINOPATHY: ICD-10-PCS | Mod: CPTII,95,, | Performed by: NURSE PRACTITIONER

## 2021-12-21 ENCOUNTER — OFFICE VISIT (OUTPATIENT)
Dept: PAIN MEDICINE | Facility: CLINIC | Age: 71
End: 2021-12-21
Payer: MEDICARE

## 2021-12-21 VITALS — WEIGHT: 218.25 LBS | BODY MASS INDEX: 27.14 KG/M2 | HEIGHT: 75 IN

## 2021-12-21 DIAGNOSIS — S68.119A TRAUMATIC AMPUTATION OF DIGIT OF ONE HAND WITHOUT COMPLICATION: Primary | ICD-10-CM

## 2021-12-21 DIAGNOSIS — Z79.891 OPIOID USE AGREEMENT EXISTS: ICD-10-CM

## 2021-12-21 DIAGNOSIS — G89.4 CHRONIC PAIN DISORDER: ICD-10-CM

## 2021-12-21 DIAGNOSIS — S68.119A TRAUMATIC AMPUTATION OF DIGIT OF ONE HAND WITHOUT COMPLICATION: ICD-10-CM

## 2021-12-21 DIAGNOSIS — M79.642 PAIN OF LEFT HAND: ICD-10-CM

## 2021-12-21 DIAGNOSIS — G89.21 CHRONIC PAIN DUE TO TRAUMA: ICD-10-CM

## 2021-12-21 PROCEDURE — 99999 PR PBB SHADOW E&M-EST. PATIENT-LVL III: CPT | Mod: PBBFAC,HCNC,, | Performed by: PHYSICIAN ASSISTANT

## 2021-12-21 PROCEDURE — 3072F LOW RISK FOR RETINOPATHY: CPT | Mod: HCNC,CPTII,S$GLB, | Performed by: PHYSICIAN ASSISTANT

## 2021-12-21 PROCEDURE — 99214 PR OFFICE/OUTPT VISIT, EST, LEVL IV, 30-39 MIN: ICD-10-PCS | Mod: HCNC,S$GLB,, | Performed by: PHYSICIAN ASSISTANT

## 2021-12-21 PROCEDURE — 3066F NEPHROPATHY DOC TX: CPT | Mod: HCNC,CPTII,S$GLB, | Performed by: PHYSICIAN ASSISTANT

## 2021-12-21 PROCEDURE — 3066F PR DOCUMENTATION OF TREATMENT FOR NEPHROPATHY: ICD-10-PCS | Mod: HCNC,CPTII,S$GLB, | Performed by: PHYSICIAN ASSISTANT

## 2021-12-21 PROCEDURE — 3061F PR NEG MICROALBUMINURIA RESULT DOCUMENTED/REVIEW: ICD-10-PCS | Mod: HCNC,CPTII,S$GLB, | Performed by: PHYSICIAN ASSISTANT

## 2021-12-21 PROCEDURE — 99999 PR PBB SHADOW E&M-EST. PATIENT-LVL III: ICD-10-PCS | Mod: PBBFAC,HCNC,, | Performed by: PHYSICIAN ASSISTANT

## 2021-12-21 PROCEDURE — 3061F NEG MICROALBUMINURIA REV: CPT | Mod: HCNC,CPTII,S$GLB, | Performed by: PHYSICIAN ASSISTANT

## 2021-12-21 PROCEDURE — 3072F PR LOW RISK FOR RETINOPATHY: ICD-10-PCS | Mod: HCNC,CPTII,S$GLB, | Performed by: PHYSICIAN ASSISTANT

## 2021-12-21 PROCEDURE — 99214 OFFICE O/P EST MOD 30 MIN: CPT | Mod: HCNC,S$GLB,, | Performed by: PHYSICIAN ASSISTANT

## 2021-12-21 RX ORDER — HYDROCODONE BITARTRATE AND ACETAMINOPHEN 10; 325 MG/1; MG/1
1 TABLET ORAL EVERY 12 HOURS PRN
Qty: 60 TABLET | Refills: 0 | Status: SHIPPED | OUTPATIENT
Start: 2021-12-30 | End: 2022-02-21 | Stop reason: SDUPTHER

## 2021-12-21 RX ORDER — HYDROCODONE BITARTRATE AND ACETAMINOPHEN 10; 325 MG/1; MG/1
1 TABLET ORAL EVERY 12 HOURS PRN
Qty: 60 TABLET | Refills: 0 | Status: SHIPPED | OUTPATIENT
Start: 2022-01-31 | End: 2022-02-21

## 2021-12-29 DIAGNOSIS — Z79.4 TYPE 2 DIABETES MELLITUS WITHOUT COMPLICATION, WITH LONG-TERM CURRENT USE OF INSULIN: ICD-10-CM

## 2021-12-29 DIAGNOSIS — E11.9 TYPE 2 DIABETES MELLITUS WITHOUT COMPLICATION, WITH LONG-TERM CURRENT USE OF INSULIN: ICD-10-CM

## 2021-12-31 ENCOUNTER — EXTERNAL CHRONIC CARE MANAGEMENT (OUTPATIENT)
Dept: PRIMARY CARE CLINIC | Facility: CLINIC | Age: 71
End: 2021-12-31
Payer: MEDICARE

## 2021-12-31 PROCEDURE — 99490 PR CHRONIC CARE MGMT, 1ST 20 MIN: ICD-10-PCS | Mod: S$PBB,,, | Performed by: FAMILY MEDICINE

## 2021-12-31 PROCEDURE — 99490 CHRNC CARE MGMT STAFF 1ST 20: CPT | Mod: S$PBB,,, | Performed by: FAMILY MEDICINE

## 2021-12-31 PROCEDURE — 99490 CHRNC CARE MGMT STAFF 1ST 20: CPT | Mod: PBBFAC | Performed by: FAMILY MEDICINE

## 2022-01-20 ENCOUNTER — PES CALL (OUTPATIENT)
Dept: ADMINISTRATIVE | Facility: CLINIC | Age: 72
End: 2022-01-20
Payer: MEDICARE

## 2022-01-31 ENCOUNTER — EXTERNAL CHRONIC CARE MANAGEMENT (OUTPATIENT)
Dept: PRIMARY CARE CLINIC | Facility: CLINIC | Age: 72
End: 2022-01-31
Payer: MEDICARE

## 2022-01-31 PROCEDURE — 99490 CHRNC CARE MGMT STAFF 1ST 20: CPT | Mod: S$PBB,,, | Performed by: FAMILY MEDICINE

## 2022-01-31 PROCEDURE — 99490 PR CHRONIC CARE MGMT, 1ST 20 MIN: ICD-10-PCS | Mod: S$PBB,,, | Performed by: FAMILY MEDICINE

## 2022-01-31 PROCEDURE — 99490 CHRNC CARE MGMT STAFF 1ST 20: CPT | Mod: PBBFAC | Performed by: FAMILY MEDICINE

## 2022-02-16 ENCOUNTER — OFFICE VISIT (OUTPATIENT)
Dept: INTERNAL MEDICINE | Facility: CLINIC | Age: 72
End: 2022-02-16
Payer: MEDICAID

## 2022-02-16 VITALS
HEIGHT: 75 IN | BODY MASS INDEX: 26.1 KG/M2 | SYSTOLIC BLOOD PRESSURE: 122 MMHG | TEMPERATURE: 98 F | DIASTOLIC BLOOD PRESSURE: 80 MMHG | WEIGHT: 209.88 LBS | HEART RATE: 83 BPM | OXYGEN SATURATION: 98 %

## 2022-02-16 DIAGNOSIS — E11.69 COMBINED HYPERLIPIDEMIA ASSOCIATED WITH TYPE 2 DIABETES MELLITUS: ICD-10-CM

## 2022-02-16 DIAGNOSIS — E11.59 HYPERTENSION ASSOCIATED WITH DIABETES: ICD-10-CM

## 2022-02-16 DIAGNOSIS — I15.2 HYPERTENSION ASSOCIATED WITH DIABETES: ICD-10-CM

## 2022-02-16 DIAGNOSIS — E78.2 COMBINED HYPERLIPIDEMIA ASSOCIATED WITH TYPE 2 DIABETES MELLITUS: ICD-10-CM

## 2022-02-16 DIAGNOSIS — E11.9 TYPE 2 DIABETES MELLITUS WITHOUT COMPLICATION, WITH LONG-TERM CURRENT USE OF INSULIN: ICD-10-CM

## 2022-02-16 DIAGNOSIS — Z79.4 TYPE 2 DIABETES MELLITUS WITHOUT COMPLICATION, WITH LONG-TERM CURRENT USE OF INSULIN: ICD-10-CM

## 2022-02-16 PROCEDURE — 99499 UNLISTED E&M SERVICE: CPT | Mod: S$GLB,,, | Performed by: FAMILY MEDICINE

## 2022-02-16 PROCEDURE — 3288F FALL RISK ASSESSMENT DOCD: CPT | Mod: CPTII,S$GLB,, | Performed by: FAMILY MEDICINE

## 2022-02-16 PROCEDURE — 1159F MED LIST DOCD IN RCRD: CPT | Mod: CPTII,S$GLB,, | Performed by: FAMILY MEDICINE

## 2022-02-16 PROCEDURE — 1160F RVW MEDS BY RX/DR IN RCRD: CPT | Mod: CPTII,S$GLB,, | Performed by: FAMILY MEDICINE

## 2022-02-16 PROCEDURE — 1101F PR PT FALLS ASSESS DOC 0-1 FALLS W/OUT INJ PAST YR: ICD-10-PCS | Mod: CPTII,S$GLB,, | Performed by: FAMILY MEDICINE

## 2022-02-16 PROCEDURE — 1160F PR REVIEW ALL MEDS BY PRESCRIBER/CLIN PHARMACIST DOCUMENTED: ICD-10-PCS | Mod: CPTII,S$GLB,, | Performed by: FAMILY MEDICINE

## 2022-02-16 PROCEDURE — 3008F PR BODY MASS INDEX (BMI) DOCUMENTED: ICD-10-PCS | Mod: CPTII,S$GLB,, | Performed by: FAMILY MEDICINE

## 2022-02-16 PROCEDURE — 3288F PR FALLS RISK ASSESSMENT DOCUMENTED: ICD-10-PCS | Mod: CPTII,S$GLB,, | Performed by: FAMILY MEDICINE

## 2022-02-16 PROCEDURE — 1159F PR MEDICATION LIST DOCUMENTED IN MEDICAL RECORD: ICD-10-PCS | Mod: CPTII,S$GLB,, | Performed by: FAMILY MEDICINE

## 2022-02-16 PROCEDURE — 99214 OFFICE O/P EST MOD 30 MIN: CPT | Mod: PBBFAC | Performed by: FAMILY MEDICINE

## 2022-02-16 PROCEDURE — 99999 PR PBB SHADOW E&M-EST. PATIENT-LVL IV: CPT | Mod: PBBFAC,,, | Performed by: FAMILY MEDICINE

## 2022-02-16 PROCEDURE — 99499 RISK ADDL DX/OHS AUDIT: ICD-10-PCS | Mod: S$GLB,,, | Performed by: FAMILY MEDICINE

## 2022-02-16 PROCEDURE — 3074F SYST BP LT 130 MM HG: CPT | Mod: CPTII,S$GLB,, | Performed by: FAMILY MEDICINE

## 2022-02-16 PROCEDURE — 3074F PR MOST RECENT SYSTOLIC BLOOD PRESSURE < 130 MM HG: ICD-10-PCS | Mod: CPTII,S$GLB,, | Performed by: FAMILY MEDICINE

## 2022-02-16 PROCEDURE — 99214 PR OFFICE/OUTPT VISIT, EST, LEVL IV, 30-39 MIN: ICD-10-PCS | Mod: S$GLB,,, | Performed by: FAMILY MEDICINE

## 2022-02-16 PROCEDURE — 3079F DIAST BP 80-89 MM HG: CPT | Mod: CPTII,S$GLB,, | Performed by: FAMILY MEDICINE

## 2022-02-16 PROCEDURE — 99214 OFFICE O/P EST MOD 30 MIN: CPT | Mod: S$GLB,,, | Performed by: FAMILY MEDICINE

## 2022-02-16 PROCEDURE — 1101F PT FALLS ASSESS-DOCD LE1/YR: CPT | Mod: CPTII,S$GLB,, | Performed by: FAMILY MEDICINE

## 2022-02-16 PROCEDURE — 99999 PR PBB SHADOW E&M-EST. PATIENT-LVL IV: ICD-10-PCS | Mod: PBBFAC,,, | Performed by: FAMILY MEDICINE

## 2022-02-16 PROCEDURE — 3079F PR MOST RECENT DIASTOLIC BLOOD PRESSURE 80-89 MM HG: ICD-10-PCS | Mod: CPTII,S$GLB,, | Performed by: FAMILY MEDICINE

## 2022-02-16 PROCEDURE — 3008F BODY MASS INDEX DOCD: CPT | Mod: CPTII,S$GLB,, | Performed by: FAMILY MEDICINE

## 2022-02-16 RX ORDER — NEBIVOLOL 10 MG/1
10 TABLET ORAL DAILY
Qty: 90 TABLET | Refills: 1 | Status: SHIPPED | OUTPATIENT
Start: 2022-02-16 | End: 2022-08-23

## 2022-02-16 RX ORDER — DULAGLUTIDE 0.75 MG/.5ML
0.75 INJECTION, SOLUTION SUBCUTANEOUS
Qty: 4 PEN | Refills: 2 | Status: SHIPPED | OUTPATIENT
Start: 2022-02-16 | End: 2022-06-21

## 2022-02-16 RX ORDER — LOSARTAN POTASSIUM AND HYDROCHLOROTHIAZIDE 25; 100 MG/1; MG/1
1 TABLET ORAL DAILY
Qty: 90 TABLET | Refills: 1 | Status: SHIPPED | OUTPATIENT
Start: 2022-02-16 | End: 2022-05-31

## 2022-02-16 RX ORDER — EMPAGLIFLOZIN 10 MG/1
10 TABLET, FILM COATED ORAL DAILY
Qty: 30 TABLET | Refills: 2 | Status: SHIPPED | OUTPATIENT
Start: 2022-02-16 | End: 2022-05-20

## 2022-02-16 RX ORDER — ATORVASTATIN CALCIUM 20 MG/1
20 TABLET, FILM COATED ORAL NIGHTLY
Qty: 90 TABLET | Refills: 1 | Status: SHIPPED | OUTPATIENT
Start: 2022-02-16 | End: 2023-03-08 | Stop reason: SDUPTHER

## 2022-02-16 NOTE — PROGRESS NOTES
Subjective:       Patient ID: Ulysses Boreaux is a 71 y.o. male.    Chief Complaint: Follow-up    Patient presents to clinic today for followup of chronic conditions.    Review of Systems   Constitutional: Negative for chills, fatigue, fever and unexpected weight change.   Eyes: Negative for visual disturbance.   Respiratory: Negative for shortness of breath.    Cardiovascular: Negative for chest pain.   Musculoskeletal: Negative for myalgias.   Neurological: Negative for headaches.         Objective:      Physical Exam  Vitals reviewed.   Constitutional:       General: He is not in acute distress.     Appearance: He is well-developed.   HENT:      Head: Normocephalic and atraumatic.   Eyes:      General: Lids are normal. No scleral icterus.     Extraocular Movements: Extraocular movements intact.      Conjunctiva/sclera: Conjunctivae normal.      Pupils: Pupils are equal, round, and reactive to light.   Pulmonary:      Effort: Pulmonary effort is normal.   Neurological:      Mental Status: He is alert and oriented to person, place, and time.      Cranial Nerves: No cranial nerve deficit.      Gait: Gait normal.   Psychiatric:         Mood and Affect: Mood and affect normal.         Assessment:       1. Combined hyperlipidemia associated with type 2 diabetes mellitus    2. Hypertension associated with diabetes    3. Type 2 diabetes mellitus without complication, with long-term current use of insulin        Plan:     Problem List Items Addressed This Visit     Combined hyperlipidemia associated with type 2 diabetes mellitus    Current Assessment & Plan     Status pending labs, continue atorvastatin         Relevant Medications    atorvastatin (LIPITOR) 20 MG tablet    dulaglutide (TRULICITY) 0.75 mg/0.5 mL pen injector    insulin NPH-insulin regular, 70/30, (NOVOLIN 70/30 U-100 INSULIN) 100 unit/mL (70-30) injection    Other Relevant Orders    Lipid Panel    Hypertension associated with diabetes    Current Assessment  & Plan     Controlled, continue amlodipine, losartan-HCTZ and bystolic         Relevant Medications    losartan-hydrochlorothiazide 100-25 mg (HYZAAR) 100-25 mg per tablet    nebivoloL (BYSTOLIC) 10 MG Tab    dulaglutide (TRULICITY) 0.75 mg/0.5 mL pen injector    insulin NPH-insulin regular, 70/30, (NOVOLIN 70/30 U-100 INSULIN) 100 unit/mL (70-30) injection    Type 2 diabetes mellitus without complication, with long-term current use of insulin    Current Assessment & Plan     Status pending labs, continue current medications; followed by Ms. Rothman in diabetes           Relevant Medications    empagliflozin (JARDIANCE) 10 mg tablet    dulaglutide (TRULICITY) 0.75 mg/0.5 mL pen injector    insulin NPH-insulin regular, 70/30, (NOVOLIN 70/30 U-100 INSULIN) 100 unit/mL (70-30) injection    Other Relevant Orders    Ambulatory referral/consult to Optometry          Health Maintenance reviewed/updated.  Declines shingrix at this time.

## 2022-02-18 ENCOUNTER — TELEPHONE (OUTPATIENT)
Dept: ADMINISTRATIVE | Facility: OTHER | Age: 72
End: 2022-02-18
Payer: MEDICARE

## 2022-02-19 DIAGNOSIS — E11.59 HYPERTENSION ASSOCIATED WITH DIABETES: ICD-10-CM

## 2022-02-19 DIAGNOSIS — I15.2 HYPERTENSION ASSOCIATED WITH DIABETES: ICD-10-CM

## 2022-02-21 ENCOUNTER — OFFICE VISIT (OUTPATIENT)
Dept: PAIN MEDICINE | Facility: CLINIC | Age: 72
End: 2022-02-21
Payer: MEDICARE

## 2022-02-21 ENCOUNTER — LAB VISIT (OUTPATIENT)
Dept: LAB | Facility: HOSPITAL | Age: 72
End: 2022-02-21
Attending: PHYSICIAN ASSISTANT
Payer: MEDICARE

## 2022-02-21 VITALS
SYSTOLIC BLOOD PRESSURE: 150 MMHG | HEART RATE: 67 BPM | RESPIRATION RATE: 17 BRPM | BODY MASS INDEX: 26.15 KG/M2 | DIASTOLIC BLOOD PRESSURE: 91 MMHG | WEIGHT: 210.31 LBS | HEIGHT: 75 IN

## 2022-02-21 DIAGNOSIS — S68.119A TRAUMATIC AMPUTATION OF DIGIT OF ONE HAND WITHOUT COMPLICATION: Primary | ICD-10-CM

## 2022-02-21 DIAGNOSIS — K74.00 LIVER FIBROSIS: ICD-10-CM

## 2022-02-21 DIAGNOSIS — S68.119A TRAUMATIC AMPUTATION OF DIGIT OF ONE HAND WITHOUT COMPLICATION: ICD-10-CM

## 2022-02-21 DIAGNOSIS — Z79.891 OPIOID USE AGREEMENT EXISTS: ICD-10-CM

## 2022-02-21 DIAGNOSIS — G89.21 CHRONIC PAIN DUE TO TRAUMA: ICD-10-CM

## 2022-02-21 DIAGNOSIS — G89.4 CHRONIC PAIN DISORDER: ICD-10-CM

## 2022-02-21 DIAGNOSIS — E11.9 TYPE 2 DIABETES MELLITUS WITHOUT COMPLICATION, WITH LONG-TERM CURRENT USE OF INSULIN: ICD-10-CM

## 2022-02-21 DIAGNOSIS — M79.642 PAIN OF LEFT HAND: ICD-10-CM

## 2022-02-21 DIAGNOSIS — E11.69 COMBINED HYPERLIPIDEMIA ASSOCIATED WITH TYPE 2 DIABETES MELLITUS: ICD-10-CM

## 2022-02-21 DIAGNOSIS — E78.2 COMBINED HYPERLIPIDEMIA ASSOCIATED WITH TYPE 2 DIABETES MELLITUS: ICD-10-CM

## 2022-02-21 DIAGNOSIS — Z79.4 TYPE 2 DIABETES MELLITUS WITHOUT COMPLICATION, WITH LONG-TERM CURRENT USE OF INSULIN: ICD-10-CM

## 2022-02-21 DIAGNOSIS — E11.9 TYPE 2 DIABETES MELLITUS WITHOUT COMPLICATION: ICD-10-CM

## 2022-02-21 DIAGNOSIS — Z86.19 HISTORY OF HEPATITIS C: ICD-10-CM

## 2022-02-21 LAB
ALBUMIN SERPL BCP-MCNC: 4 G/DL (ref 3.5–5.2)
ALP SERPL-CCNC: 62 U/L (ref 55–135)
ALT SERPL W/O P-5'-P-CCNC: 35 U/L (ref 10–44)
ANION GAP SERPL CALC-SCNC: 11 MMOL/L (ref 8–16)
AST SERPL-CCNC: 29 U/L (ref 10–40)
BILIRUB SERPL-MCNC: 0.5 MG/DL (ref 0.1–1)
BUN SERPL-MCNC: 15 MG/DL (ref 8–23)
CALCIUM SERPL-MCNC: 9.9 MG/DL (ref 8.7–10.5)
CHLORIDE SERPL-SCNC: 101 MMOL/L (ref 95–110)
CHOLEST SERPL-MCNC: 178 MG/DL (ref 120–199)
CHOLEST/HDLC SERPL: 3.6 {RATIO} (ref 2–5)
CO2 SERPL-SCNC: 28 MMOL/L (ref 23–29)
CREAT SERPL-MCNC: 1.3 MG/DL (ref 0.5–1.4)
EST. GFR  (AFRICAN AMERICAN): >60 ML/MIN/1.73 M^2
EST. GFR  (NON AFRICAN AMERICAN): 55.7 ML/MIN/1.73 M^2
ESTIMATED AVG GLUCOSE: 151 MG/DL (ref 68–131)
ESTIMATED AVG GLUCOSE: 151 MG/DL (ref 68–131)
GLUCOSE SERPL-MCNC: 138 MG/DL (ref 70–110)
HBA1C MFR BLD: 6.9 % (ref 4–5.6)
HBA1C MFR BLD: 6.9 % (ref 4–5.6)
HDLC SERPL-MCNC: 50 MG/DL (ref 40–75)
HDLC SERPL: 28.1 % (ref 20–50)
LDLC SERPL CALC-MCNC: 112.2 MG/DL (ref 63–159)
NONHDLC SERPL-MCNC: 128 MG/DL
POTASSIUM SERPL-SCNC: 4.4 MMOL/L (ref 3.5–5.1)
PROT SERPL-MCNC: 7.6 G/DL (ref 6–8.4)
SODIUM SERPL-SCNC: 140 MMOL/L (ref 136–145)
TRIGL SERPL-MCNC: 79 MG/DL (ref 30–150)

## 2022-02-21 PROCEDURE — 3080F PR MOST RECENT DIASTOLIC BLOOD PRESSURE >= 90 MM HG: ICD-10-PCS | Mod: HCNC,CPTII,S$GLB, | Performed by: PHYSICIAN ASSISTANT

## 2022-02-21 PROCEDURE — 83036 HEMOGLOBIN GLYCOSYLATED A1C: CPT | Mod: HCNC | Performed by: FAMILY MEDICINE

## 2022-02-21 PROCEDURE — 1101F PR PT FALLS ASSESS DOC 0-1 FALLS W/OUT INJ PAST YR: ICD-10-PCS | Mod: HCNC,CPTII,S$GLB, | Performed by: PHYSICIAN ASSISTANT

## 2022-02-21 PROCEDURE — 1125F PR PAIN SEVERITY QUANTIFIED, PAIN PRESENT: ICD-10-PCS | Mod: HCNC,CPTII,S$GLB, | Performed by: PHYSICIAN ASSISTANT

## 2022-02-21 PROCEDURE — 3288F FALL RISK ASSESSMENT DOCD: CPT | Mod: HCNC,CPTII,S$GLB, | Performed by: PHYSICIAN ASSISTANT

## 2022-02-21 PROCEDURE — 3008F PR BODY MASS INDEX (BMI) DOCUMENTED: ICD-10-PCS | Mod: HCNC,CPTII,S$GLB, | Performed by: PHYSICIAN ASSISTANT

## 2022-02-21 PROCEDURE — 3008F BODY MASS INDEX DOCD: CPT | Mod: HCNC,CPTII,S$GLB, | Performed by: PHYSICIAN ASSISTANT

## 2022-02-21 PROCEDURE — 1160F RVW MEDS BY RX/DR IN RCRD: CPT | Mod: HCNC,CPTII,S$GLB, | Performed by: PHYSICIAN ASSISTANT

## 2022-02-21 PROCEDURE — 99999 PR PBB SHADOW E&M-EST. PATIENT-LVL IV: CPT | Mod: PBBFAC,HCNC,, | Performed by: PHYSICIAN ASSISTANT

## 2022-02-21 PROCEDURE — 36415 COLL VENOUS BLD VENIPUNCTURE: CPT | Mod: HCNC | Performed by: PHYSICIAN ASSISTANT

## 2022-02-21 PROCEDURE — 3080F DIAST BP >= 90 MM HG: CPT | Mod: HCNC,CPTII,S$GLB, | Performed by: PHYSICIAN ASSISTANT

## 2022-02-21 PROCEDURE — 1101F PT FALLS ASSESS-DOCD LE1/YR: CPT | Mod: HCNC,CPTII,S$GLB, | Performed by: PHYSICIAN ASSISTANT

## 2022-02-21 PROCEDURE — 1160F PR REVIEW ALL MEDS BY PRESCRIBER/CLIN PHARMACIST DOCUMENTED: ICD-10-PCS | Mod: HCNC,CPTII,S$GLB, | Performed by: PHYSICIAN ASSISTANT

## 2022-02-21 PROCEDURE — 3077F SYST BP >= 140 MM HG: CPT | Mod: HCNC,CPTII,S$GLB, | Performed by: PHYSICIAN ASSISTANT

## 2022-02-21 PROCEDURE — 99999 PR PBB SHADOW E&M-EST. PATIENT-LVL IV: ICD-10-PCS | Mod: PBBFAC,HCNC,, | Performed by: PHYSICIAN ASSISTANT

## 2022-02-21 PROCEDURE — 1125F AMNT PAIN NOTED PAIN PRSNT: CPT | Mod: HCNC,CPTII,S$GLB, | Performed by: PHYSICIAN ASSISTANT

## 2022-02-21 PROCEDURE — 99214 OFFICE O/P EST MOD 30 MIN: CPT | Mod: HCNC,S$GLB,, | Performed by: PHYSICIAN ASSISTANT

## 2022-02-21 PROCEDURE — 1159F MED LIST DOCD IN RCRD: CPT | Mod: HCNC,CPTII,S$GLB, | Performed by: PHYSICIAN ASSISTANT

## 2022-02-21 PROCEDURE — 99214 PR OFFICE/OUTPT VISIT, EST, LEVL IV, 30-39 MIN: ICD-10-PCS | Mod: HCNC,S$GLB,, | Performed by: PHYSICIAN ASSISTANT

## 2022-02-21 PROCEDURE — 1159F PR MEDICATION LIST DOCUMENTED IN MEDICAL RECORD: ICD-10-PCS | Mod: HCNC,CPTII,S$GLB, | Performed by: PHYSICIAN ASSISTANT

## 2022-02-21 PROCEDURE — 3077F PR MOST RECENT SYSTOLIC BLOOD PRESSURE >= 140 MM HG: ICD-10-PCS | Mod: HCNC,CPTII,S$GLB, | Performed by: PHYSICIAN ASSISTANT

## 2022-02-21 PROCEDURE — 80053 COMPREHEN METABOLIC PANEL: CPT | Mod: HCNC | Performed by: PHYSICIAN ASSISTANT

## 2022-02-21 PROCEDURE — 3288F PR FALLS RISK ASSESSMENT DOCUMENTED: ICD-10-PCS | Mod: HCNC,CPTII,S$GLB, | Performed by: PHYSICIAN ASSISTANT

## 2022-02-21 PROCEDURE — 80061 LIPID PANEL: CPT | Mod: HCNC | Performed by: FAMILY MEDICINE

## 2022-02-21 RX ORDER — HYDROCODONE BITARTRATE AND ACETAMINOPHEN 10; 325 MG/1; MG/1
1 TABLET ORAL EVERY 12 HOURS PRN
Qty: 60 TABLET | Refills: 0 | Status: SHIPPED | OUTPATIENT
Start: 2022-04-01 | End: 2022-04-25 | Stop reason: SDUPTHER

## 2022-02-21 RX ORDER — HYDROCODONE BITARTRATE AND ACETAMINOPHEN 10; 325 MG/1; MG/1
1 TABLET ORAL EVERY 12 HOURS PRN
Qty: 60 TABLET | Refills: 0 | Status: SHIPPED | OUTPATIENT
Start: 2022-03-02 | End: 2022-04-25 | Stop reason: SDUPTHER

## 2022-02-21 RX ORDER — LOSARTAN POTASSIUM AND HYDROCHLOROTHIAZIDE 25; 100 MG/1; MG/1
TABLET ORAL
Qty: 90 TABLET | Refills: 1 | OUTPATIENT
Start: 2022-02-21

## 2022-02-21 NOTE — PROGRESS NOTES
Subjective:     Chief Complaint:  Left Hand Pain      History of Present Illness:  This patient is a 65 year old male who presents today for f/u complaining of the above noted pains. The patient describes this pain as follows.    - duration (acute, chronic): left hand pain since 1997 after table saw amputation of digits 2 through 5 at work, left lower quadrant pain since hernia surgery in 2004  - timing (constant, intermittent): Constant  - character (sharp, dull, aching, burning): Throbbing  - radiating: No  - dermatomal distribution: No  - side: Left   - aggravating factors: Nothing worsens left digit pain, left lower quadrant pain worse with exercise or walking  - relieving factors: Medication / Norco  - antecedent trauma: Amputation via skill saw and 1997, hernia repair in 2004  - prior spinal surgery: None  - pertinent negatives: No fever, No chills, No weight loss, No bladder dysfunction, No bowel dysfunction, No extremity weakness, No saddle anesthesia  - medications tried: Norco, Percocet, over-the-counter medications, compound pain cream  - other therapies tried (physical therapy, injections):     >> physical therapy tried in the past    >> no prior injections        Imaging/ Labs (reviewed on 2/21/2022):    7/12/2018 US ABDOMINAL AORTA  CLINICAL HISTORY:  Abnormal findings on diagnostic imaging of other specified body structures  TECHNIQUE:  Limited ultrasound was performed of the abdominal aorta, with cross sectional diameter measurements obtained.  COMPARISON:  01/10/2018  FINDINGS:  The proximal abdominal aorta measures 2.7 cm.  The mid abdominal aorta measures 1.8 cm.  The distal abdominal aorta measures 1.9 cm, slightly ectatic and unchanged from prior.  The right iliac artery measures 1.0 cm.  The left iliac artery measures 1.1 cm.  Aortoiliac atherosclerosis: Mild  Impression: Stable examination with slight distal abdominal aortic ectasia.  Negative for aneurysm.         ROS:  CONSTITUTIONAL: No  "fever, chills, weight loss  SKIN: No rash or itching  CARDIOVASCULAR:  No chest pain, palpitations  RESPIRATORY: No shortness of breath  GASTROINTESTINAL: No diarrhea, No constipation, abdominal pain, left lower quadrant  GENITOURINARY: No urinary incontinence    MUSCULOSKELETAL:  - patient reports pain as above, see chief complaint     NEUROLOGICAL:   - Pain as above  - Strength in lower extremities is normal  - Sensation in lower extremities is normal  - No bowel or bladder incontinence     PSYCHIATRIC: No change in mood noticed         Objective:     Physical Exam:  Vitals:    02/21/22 0930   BP: (!) 150/91   Pulse: 67   Resp: 17   Weight: 95.4 kg (210 lb 5.1 oz)   Height: 6' 3" (1.905 m)   PainSc:   5    Body mass index is 26.29 kg/m².   (reviewed on 2/21/2022)    General: alert and oriented, in no apparent distress  Gait: normal gait  Skin: No rashes, No discoloration   HEENT: EOMI  Respiratory: respirations nonlabored  Cardiology: No obvious peripheral edema  GI: no obvious distention     Musculoskeletal:  - No pain with lumbar flexion/ extension  - Left upper extremity range of motion intact throughout  - Digital stumps are hypersensitive to palpation, hypersensitivity does not extend further proximal  - No color change, no swelling, no changes in hair growth along left hand    Neuro:  - Lower extremities:      >> 5/5 strength bilaterally, throughout, symmetric  - Upper extremities:    >> 5/5 strength bilaterally    Psych: mood and affect appropriate        Assessment:     Ulysses Boreaux is a 71 y.o. male who presents with     ICD-10-CM ICD-9-CM   1. 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 Pain of left hand  M79.642 729.5   3. Chronic pain due to trauma  G89.21 338.21   4. Chronic pain disorder  G89.4 338.4   5. Opioid use agreement exists  Z79.891 V58.69       Plan:   1. Interventional: None.    2. Pharmacologic:   - Refill Norco 10/325 mg PO BID PRN (60 tabs) x 2 " months. Will e-prescribe for 3/2/22 and 4/1/22. Patient tolerating opioids with no side effects, obtaining good pain control with functional improvement. He tolerates this medication well, and he denies any drowsiness or constipation.  - Opioid contract signed on 12/12/2019 with Dr. Olmedo.  Updated opioid contract signed with Dr. Love on 10/28/20.  - LA  reviewed and appropriate.     - Last UDS  was compliant for medications prescribed.     3. Rehabilitative: Encouraged regular exercise.    4. Diagnostic: None for now.    5. Follow up: 9 week medication Refill     - I discussed the risks, benefits, and alternatives to potential treatment options. All questions and concerns were fully addressed today in clinic.            >> UDS:  8-18-15 :: appropriate  12-29-15 :: appropriate  5-3-16 :: appropriate  10-18-16 :: appropriate  4/13/2017 :: appropriate  9/29/2017 :: appropriate  3/9/2018 :: appropriate  9/4/2018 :: appropriate  6/19/2019 :: appropriate  12/12/2019 :: appropriate  6/12/2020 ::  Appropriate  10/2/2020 :: Appropriate  2/24/2021 :: Appropriate  8/24/2021 :: Appropriate  12/21/2021 :: Appropriate

## 2022-02-28 ENCOUNTER — EXTERNAL CHRONIC CARE MANAGEMENT (OUTPATIENT)
Dept: PRIMARY CARE CLINIC | Facility: CLINIC | Age: 72
End: 2022-02-28
Payer: MEDICARE

## 2022-02-28 PROCEDURE — 99490 CHRNC CARE MGMT STAFF 1ST 20: CPT | Mod: PBBFAC | Performed by: FAMILY MEDICINE

## 2022-02-28 PROCEDURE — 99490 PR CHRONIC CARE MGMT, 1ST 20 MIN: ICD-10-PCS | Mod: S$PBB,,, | Performed by: FAMILY MEDICINE

## 2022-02-28 PROCEDURE — 99490 CHRNC CARE MGMT STAFF 1ST 20: CPT | Mod: S$PBB,,, | Performed by: FAMILY MEDICINE

## 2022-03-01 ENCOUNTER — TELEPHONE (OUTPATIENT)
Dept: INTERNAL MEDICINE | Facility: CLINIC | Age: 72
End: 2022-03-01
Payer: MEDICARE

## 2022-03-14 ENCOUNTER — PATIENT OUTREACH (OUTPATIENT)
Dept: ADMINISTRATIVE | Facility: OTHER | Age: 72
End: 2022-03-14
Payer: MEDICARE

## 2022-03-15 ENCOUNTER — OFFICE VISIT (OUTPATIENT)
Dept: DIABETES | Facility: CLINIC | Age: 72
End: 2022-03-15
Payer: MEDICARE

## 2022-03-15 VITALS
HEIGHT: 75 IN | SYSTOLIC BLOOD PRESSURE: 148 MMHG | DIASTOLIC BLOOD PRESSURE: 88 MMHG | WEIGHT: 216.06 LBS | BODY MASS INDEX: 26.86 KG/M2 | HEART RATE: 71 BPM

## 2022-03-15 DIAGNOSIS — E78.5 HYPERLIPIDEMIA ASSOCIATED WITH TYPE 2 DIABETES MELLITUS: ICD-10-CM

## 2022-03-15 DIAGNOSIS — E11.9 TYPE 2 DIABETES MELLITUS WITHOUT COMPLICATION, WITH LONG-TERM CURRENT USE OF INSULIN: Primary | ICD-10-CM

## 2022-03-15 DIAGNOSIS — Z79.4 TYPE 2 DIABETES MELLITUS WITHOUT COMPLICATION, WITH LONG-TERM CURRENT USE OF INSULIN: Primary | ICD-10-CM

## 2022-03-15 DIAGNOSIS — E11.59 HYPERTENSION ASSOCIATED WITH DIABETES: ICD-10-CM

## 2022-03-15 DIAGNOSIS — I15.2 HYPERTENSION ASSOCIATED WITH DIABETES: ICD-10-CM

## 2022-03-15 DIAGNOSIS — E11.69 HYPERLIPIDEMIA ASSOCIATED WITH TYPE 2 DIABETES MELLITUS: ICD-10-CM

## 2022-03-15 LAB — GLUCOSE SERPL-MCNC: 213 MG/DL (ref 70–110)

## 2022-03-15 PROCEDURE — 1101F PT FALLS ASSESS-DOCD LE1/YR: CPT | Mod: CPTII,S$GLB,, | Performed by: NURSE PRACTITIONER

## 2022-03-15 PROCEDURE — 3077F SYST BP >= 140 MM HG: CPT | Mod: CPTII,S$GLB,, | Performed by: NURSE PRACTITIONER

## 2022-03-15 PROCEDURE — 99999 PR PBB SHADOW E&M-EST. PATIENT-LVL IV: ICD-10-PCS | Mod: PBBFAC,,, | Performed by: NURSE PRACTITIONER

## 2022-03-15 PROCEDURE — 99214 OFFICE O/P EST MOD 30 MIN: CPT | Mod: S$GLB,,, | Performed by: NURSE PRACTITIONER

## 2022-03-15 PROCEDURE — 3077F PR MOST RECENT SYSTOLIC BLOOD PRESSURE >= 140 MM HG: ICD-10-PCS | Mod: CPTII,S$GLB,, | Performed by: NURSE PRACTITIONER

## 2022-03-15 PROCEDURE — 3044F HG A1C LEVEL LT 7.0%: CPT | Mod: CPTII,S$GLB,, | Performed by: NURSE PRACTITIONER

## 2022-03-15 PROCEDURE — 1159F MED LIST DOCD IN RCRD: CPT | Mod: CPTII,S$GLB,, | Performed by: NURSE PRACTITIONER

## 2022-03-15 PROCEDURE — 82962 GLUCOSE BLOOD TEST: CPT | Mod: S$GLB,,, | Performed by: NURSE PRACTITIONER

## 2022-03-15 PROCEDURE — 1126F AMNT PAIN NOTED NONE PRSNT: CPT | Mod: CPTII,S$GLB,, | Performed by: NURSE PRACTITIONER

## 2022-03-15 PROCEDURE — 3079F PR MOST RECENT DIASTOLIC BLOOD PRESSURE 80-89 MM HG: ICD-10-PCS | Mod: CPTII,S$GLB,, | Performed by: NURSE PRACTITIONER

## 2022-03-15 PROCEDURE — 3288F PR FALLS RISK ASSESSMENT DOCUMENTED: ICD-10-PCS | Mod: CPTII,S$GLB,, | Performed by: NURSE PRACTITIONER

## 2022-03-15 PROCEDURE — 3008F PR BODY MASS INDEX (BMI) DOCUMENTED: ICD-10-PCS | Mod: CPTII,S$GLB,, | Performed by: NURSE PRACTITIONER

## 2022-03-15 PROCEDURE — 3079F DIAST BP 80-89 MM HG: CPT | Mod: CPTII,S$GLB,, | Performed by: NURSE PRACTITIONER

## 2022-03-15 PROCEDURE — 1101F PR PT FALLS ASSESS DOC 0-1 FALLS W/OUT INJ PAST YR: ICD-10-PCS | Mod: CPTII,S$GLB,, | Performed by: NURSE PRACTITIONER

## 2022-03-15 PROCEDURE — 1160F RVW MEDS BY RX/DR IN RCRD: CPT | Mod: CPTII,S$GLB,, | Performed by: NURSE PRACTITIONER

## 2022-03-15 PROCEDURE — 3008F BODY MASS INDEX DOCD: CPT | Mod: CPTII,S$GLB,, | Performed by: NURSE PRACTITIONER

## 2022-03-15 PROCEDURE — 1159F PR MEDICATION LIST DOCUMENTED IN MEDICAL RECORD: ICD-10-PCS | Mod: CPTII,S$GLB,, | Performed by: NURSE PRACTITIONER

## 2022-03-15 PROCEDURE — 1160F PR REVIEW ALL MEDS BY PRESCRIBER/CLIN PHARMACIST DOCUMENTED: ICD-10-PCS | Mod: CPTII,S$GLB,, | Performed by: NURSE PRACTITIONER

## 2022-03-15 PROCEDURE — 3288F FALL RISK ASSESSMENT DOCD: CPT | Mod: CPTII,S$GLB,, | Performed by: NURSE PRACTITIONER

## 2022-03-15 PROCEDURE — 99214 PR OFFICE/OUTPT VISIT, EST, LEVL IV, 30-39 MIN: ICD-10-PCS | Mod: S$GLB,,, | Performed by: NURSE PRACTITIONER

## 2022-03-15 PROCEDURE — 1126F PR PAIN SEVERITY QUANTIFIED, NO PAIN PRESENT: ICD-10-PCS | Mod: CPTII,S$GLB,, | Performed by: NURSE PRACTITIONER

## 2022-03-15 PROCEDURE — 3044F PR MOST RECENT HEMOGLOBIN A1C LEVEL <7.0%: ICD-10-PCS | Mod: CPTII,S$GLB,, | Performed by: NURSE PRACTITIONER

## 2022-03-15 PROCEDURE — 99999 PR PBB SHADOW E&M-EST. PATIENT-LVL IV: CPT | Mod: PBBFAC,,, | Performed by: NURSE PRACTITIONER

## 2022-03-15 PROCEDURE — 82962 POCT GLUCOSE, HAND-HELD DEVICE: ICD-10-PCS | Mod: S$GLB,,, | Performed by: NURSE PRACTITIONER

## 2022-03-15 NOTE — PATIENT INSTRUCTIONS
Medication Regimen/Changes:   - Continue Novolin 70/30, 22 units every morning 22 units every evening  - Continue Trulicity 0.75 mg weekly  - Continue Jardiance 10 mg daily    Patient Instructions:  Carbohydrate Count: 30-45 grams/meal and 15 grams/snack with limit of 2 snacks per day.  Exercise: Goal is 150 minutes or more per week.  Bring meter and blood sugar log to each appointment.     Goals for Blood Sugar:  Fastin-130 mg/dl  2 hours after meal:  mg/dl  Before Bed: 100-150 mg/dl  If Blood sugar is below 70 mg/dl, DO NOT take diabetes medication. Eat first and then recheck blood sugar in 20 minutes.  Foods to eat if blood sugar is below 70 mg/dl  1/2 glass of orange juice   1/2 regular cola can   3-4 hard candies   3-4 small glucose tabs.   Foods to eat if blood sugar is below 50 mg/dl  1 glass of orange juice  1 regular cola can  8 hard candies  8 small glucose tabs.   If you have repeated eating/blood sugar recheck process 2 times and blood sugar still below 70 mg/dl, call 911.

## 2022-03-15 NOTE — PROGRESS NOTES
PCP: Elza Pantoja MD    Subjective:     Chief Complaint: Diabetes follow up.  Last visit with me: 12/17/2021    HPI: 71 y.o.   male for diabetes follow up.   Previously managed by Dr. Jackson and YASEMIN Dale NP.  Type II diabetes since 2005 .  Comorbidities: HTN, HLD and Hyperthyroidism  Diabetes complications: without complications    Interval history:  Denies recent hospital admissions or ER visits.   Denies having hypoglycemia.   Endorses the following diabetes related symptoms: None.    Blood glucose monitoring fingerstick: 2x/day   Per patient's BG log over the last 2 weeks,   Fasting BG range 120-125   AC dinner BG range 140-150    Activity: Sedentary- none  Diet: 2 meals/day, breakfast and dinner, infrequent snacks/day.    Medication compliant all of the time; diet compliant most of the time.   Has attended diabetes education in the past.     Patient's concerns today include glycemic control.  Patient medication regimen is as below.     Current DM Medications:   - Novolin 70/30 22 units qam, 22 units q evening   - Trulicity 0.75 mg weekly (Sunday)    - Jardiance 10 mg - started 12/2    Previous regimen: Januvia - stopped once started on Trulicity (DPP4 not recommended)    Past failed treatment include: none    Allergies  Review of patient's allergies indicates:  No Known Allergies    Labs Reviewed:    His most recent A1C is:  Lab Results   Component Value Date    HGBA1C 6.9 (H) 02/21/2022    HGBA1C 6.9 (H) 02/21/2022    HGBA1C 9.2 (H) 11/29/2021       His most recent BMP is:  Lab Results   Component Value Date     02/21/2022    K 4.4 02/21/2022     02/21/2022    CO2 28 02/21/2022    BUN 15 02/21/2022    CREATININE 1.3 02/21/2022    CALCIUM 9.9 02/21/2022    ANIONGAP 11 02/21/2022    ESTGFRAFRICA >60.0 02/21/2022    EGFRNONAA 55.7 (A) 02/21/2022       Lab Results   Component Value Date    CPEPTIDE 1.33 10/20/2017     Lab Results   Component Value Date    GLUTAMICACID 0.00  "10/20/2017     Body mass index is 27 kg/m².    Weight gain 6 lbs since last visit.  Wt Readings from Last 3 Encounters:   03/15/22 1139 98 kg (216 lb 0.8 oz)   22 0930 95.4 kg (210 lb 5.1 oz)   22 1045 95.2 kg (209 lb 14.1 oz)      His blood sugar in clinic today is:  Lab Results   Component Value Date    POCGLU 213 (A) 03/15/2022     Diabetes Management Status  Statin: Taking  ACE/ARB: Taking    Screening or Prevention Patient's value Goal Complete/Controlled?   HgA1C Testing and Control   Lab Results   Component Value Date    HGBA1C 6.9 (H) 2022    HGBA1C 6.9 (H) 2022      Annually/Less than 8% Yes   Lipid profile : 2022 Annually Yes   LDL control Lab Results   Component Value Date    LDLCALC 112.2 2022    Annually/Less than 100 mg/dl  No   Nephropathy screening Lab Results   Component Value Date    MICALBCREAT 26.7 2021     No results found for: PROTEINUA Annually Yes   Blood pressure BP Readings from Last 1 Encounters:   03/15/22 (!) 148/88    Less than 140/90 No   Dilated retinal exam : 2020 Annually No    Foot exam   : 2021 Annually Yes     Social History     Socioeconomic History    Marital status:     Number of children: 2    Highest education level: 11th grade   Occupational History    Occupation: retired   Tobacco Use    Smoking status: Former Smoker     Packs/day: 0.50     Types: Cigarettes     Quit date: 1972     Years since quittin.0    Smokeless tobacco: Never Used   Substance and Sexual Activity    Alcohol use: Yes     Alcohol/week: 0.0 standard drinks     Comment: " Every blue moon"    Drug use: No    Sexual activity: Yes     Partners: Female   Social History Narrative    . Has 2 children. Patient disabled- was .      Past Medical History:   Diagnosis Date    Allergy     Amputation of finger of left hand     all fingers except for thumb    Cataract     OS NGCL     Chronic pain due to trauma     " traumatic amputations left hand    Diabetes mellitus, type 2     History of hepatitis C 02/13/2014    tx'd w/ Harvoni 2018    Hyperlipidemia     Hypertension     Hyperthyroidism 10/10/2019    Refractive error      Review of Systems   Constitutional: Negative for activity change, appetite change, fatigue and unexpected weight change.   HENT: Negative for dental problem and trouble swallowing.    Eyes: Negative for visual disturbance.   Respiratory: Negative for chest tightness and shortness of breath.    Cardiovascular: Negative for chest pain, palpitations and leg swelling.   Gastrointestinal: Negative for abdominal pain, constipation, diarrhea, nausea and vomiting.   Endocrine: Negative for polydipsia, polyphagia and polyuria.   Genitourinary: Negative for difficulty urinating, dysuria, frequency and urgency.        Negative yeast infection   Musculoskeletal: Negative for gait problem, myalgias and neck pain.   Integumentary:  Negative for rash and wound.   Allergic/Immunologic: Negative.    Neurological: Negative for dizziness, syncope, weakness, numbness and headaches.   Hematological: Negative.    Psychiatric/Behavioral: Negative for confusion and sleep disturbance.   All other systems reviewed and are negative.    Objective:      Physical Exam  Vitals reviewed.   Constitutional:       Appearance: Normal appearance.   HENT:      Head: Normocephalic and atraumatic.   Eyes:      General: Vision grossly intact.      Extraocular Movements: Extraocular movements intact.      Pupils: Pupils are equal, round, and reactive to light.   Neck:      Thyroid: No thyroid mass or thyroid tenderness.   Cardiovascular:      Rate and Rhythm: Normal rate and regular rhythm.      Pulses: Normal pulses.           Radial pulses are 2+ on the right side and 2+ on the left side.        Dorsalis pedis pulses are 2+ on the right side and 2+ on the left side.      Heart sounds: Normal heart sounds.   Pulmonary:      Effort:  Pulmonary effort is normal.      Breath sounds: Normal breath sounds.   Abdominal:      General: Bowel sounds are normal.      Palpations: Abdomen is soft.   Musculoskeletal:         General: Normal range of motion.      Cervical back: Normal range of motion and neck supple.      Right lower leg: No edema.      Left lower leg: No edema.      Right foot: No deformity or bunion.      Left foot: No deformity or bunion.      Comments: left hand digits 2-5 amputated (1997 injury)    Feet:      Right foot:      Skin integrity: Callus present. No ulcer, skin breakdown or dry skin.      Toenail Condition: Right toenails are normal.      Left foot:      Skin integrity: Callus present. No ulcer, skin breakdown or dry skin.      Toenail Condition: Left toenails are normal.   Lymphadenopathy:      Cervical: No cervical adenopathy.   Skin:     General: Skin is warm and dry.      Findings: No rash or wound.      Comments: Negative for acanthosis nigricans. Well-healed SQ injection sites.   Neurological:      Mental Status: He is alert and oriented to person, place, and time.      Coordination: Coordination is intact.      Gait: Gait is intact.   Psychiatric:         Attention and Perception: Attention normal.         Mood and Affect: Mood normal.         Speech: Speech normal.         Behavior: Behavior normal. Behavior is cooperative.         Thought Content: Thought content normal.         Cognition and Memory: Cognition normal.           Assessment / Plan:     Diagnoses and all orders for this visit:    Type 2 diabetes mellitus without complication, with long-term current use of insulin  -     POCT Glucose, Hand-Held Device  -     Hemoglobin A1C; Future  -     insulin NPH-insulin regular, 70/30, (NOVOLIN 70/30 U-100 INSULIN) 100 unit/mL (70-30) injection; Inject 22 Units into the skin 2 (two) times daily before meals. In the morning and in the evening.    Hypertension associated with diabetes    Hyperlipidemia associated with  type 2 diabetes mellitus    Additional Plan Details:  - Condition: controlled, most recent A1C 6.9% was completed 3 weeks ago.   - Monitor blood glucose:  3x daily.Goals reviewed. Maintain BG log and bring to next visit for review.   - Hypoglycemia protocol: Reviewed and risk discussed. Instructed to inform with BG readings below 80.  - Diet and activity: Reviewed, limit carbohydrate intake to 30-45 grams per meal, 3x daily and 15 grams per snack with a limit of two daily. Recommend at least 150 minutes of exercise in a week.  - BP and LDL: Recommend lifestyle modifications and follow up with PCP for management.   - Medication Changes:   - Continue Novolin 70/30, 22 units qam, 22 units q evening   - Continue Trulicity 0.75 mg weekly   - Continue Jardiance 10 mg daily, tolerated well, denies SE/AR    - Medication consideration: Pt hesitant to increase Trulicity dose due to nausea limited to the day of administration.  - Lab results: A1C discussed.  - Health Maintenance standards addressed today:  A1c scheduled.   - Risks of uncontrolled DM explained. Importance of routine foot and eye exams reviewed. Scheduled as appropriate.  -The patient was explained the above plan and given opportunity to ask questions.  He understands, chooses and consents to this plan and accepts all the risks, which include but are not limited to the risks mentioned above.   - Labs ordered as above. A1C  - Follow up: 3 months       Charlotte T. Delacruz, FNP-C [facility]    A total of 30 minutes was spent in face to face time, of which over 50 % was spent in counseling patient on disease process, complications, treatment, and side effects of medications.

## 2022-03-31 ENCOUNTER — EXTERNAL CHRONIC CARE MANAGEMENT (OUTPATIENT)
Dept: PRIMARY CARE CLINIC | Facility: CLINIC | Age: 72
End: 2022-03-31
Payer: MEDICARE

## 2022-03-31 PROCEDURE — 99490 PR CHRONIC CARE MGMT, 1ST 20 MIN: ICD-10-PCS | Mod: S$GLB,,, | Performed by: FAMILY MEDICINE

## 2022-03-31 PROCEDURE — 99490 CHRNC CARE MGMT STAFF 1ST 20: CPT | Mod: S$GLB,,, | Performed by: FAMILY MEDICINE

## 2022-04-18 ENCOUNTER — TELEPHONE (OUTPATIENT)
Dept: PAIN MEDICINE | Facility: CLINIC | Age: 72
End: 2022-04-18
Payer: MEDICARE

## 2022-04-22 ENCOUNTER — PATIENT OUTREACH (OUTPATIENT)
Dept: ADMINISTRATIVE | Facility: OTHER | Age: 72
End: 2022-04-22
Payer: MEDICARE

## 2022-04-25 ENCOUNTER — OFFICE VISIT (OUTPATIENT)
Dept: PAIN MEDICINE | Facility: CLINIC | Age: 72
End: 2022-04-25
Payer: MEDICARE

## 2022-04-25 VITALS — HEIGHT: 75 IN | WEIGHT: 213.63 LBS | BODY MASS INDEX: 26.56 KG/M2

## 2022-04-25 DIAGNOSIS — Z79.891 OPIOID USE AGREEMENT EXISTS: ICD-10-CM

## 2022-04-25 DIAGNOSIS — G89.21 CHRONIC PAIN DUE TO TRAUMA: ICD-10-CM

## 2022-04-25 DIAGNOSIS — M79.642 PAIN OF LEFT HAND: ICD-10-CM

## 2022-04-25 DIAGNOSIS — S68.119A TRAUMATIC AMPUTATION OF DIGIT OF ONE HAND WITHOUT COMPLICATION: Primary | ICD-10-CM

## 2022-04-25 DIAGNOSIS — G89.4 CHRONIC PAIN DISORDER: ICD-10-CM

## 2022-04-25 DIAGNOSIS — S68.119A TRAUMATIC AMPUTATION OF DIGIT OF ONE HAND WITHOUT COMPLICATION: ICD-10-CM

## 2022-04-25 PROCEDURE — 1101F PR PT FALLS ASSESS DOC 0-1 FALLS W/OUT INJ PAST YR: ICD-10-PCS | Mod: CPTII,S$GLB,, | Performed by: PHYSICIAN ASSISTANT

## 2022-04-25 PROCEDURE — 3044F PR MOST RECENT HEMOGLOBIN A1C LEVEL <7.0%: ICD-10-PCS | Mod: CPTII,S$GLB,, | Performed by: PHYSICIAN ASSISTANT

## 2022-04-25 PROCEDURE — 99214 OFFICE O/P EST MOD 30 MIN: CPT | Mod: S$GLB,,, | Performed by: PHYSICIAN ASSISTANT

## 2022-04-25 PROCEDURE — 3288F PR FALLS RISK ASSESSMENT DOCUMENTED: ICD-10-PCS | Mod: CPTII,S$GLB,, | Performed by: PHYSICIAN ASSISTANT

## 2022-04-25 PROCEDURE — 99999 PR PBB SHADOW E&M-EST. PATIENT-LVL III: ICD-10-PCS | Mod: PBBFAC,,, | Performed by: PHYSICIAN ASSISTANT

## 2022-04-25 PROCEDURE — 1160F PR REVIEW ALL MEDS BY PRESCRIBER/CLIN PHARMACIST DOCUMENTED: ICD-10-PCS | Mod: CPTII,S$GLB,, | Performed by: PHYSICIAN ASSISTANT

## 2022-04-25 PROCEDURE — 1101F PT FALLS ASSESS-DOCD LE1/YR: CPT | Mod: CPTII,S$GLB,, | Performed by: PHYSICIAN ASSISTANT

## 2022-04-25 PROCEDURE — 3288F FALL RISK ASSESSMENT DOCD: CPT | Mod: CPTII,S$GLB,, | Performed by: PHYSICIAN ASSISTANT

## 2022-04-25 PROCEDURE — 1159F MED LIST DOCD IN RCRD: CPT | Mod: CPTII,S$GLB,, | Performed by: PHYSICIAN ASSISTANT

## 2022-04-25 PROCEDURE — 1159F PR MEDICATION LIST DOCUMENTED IN MEDICAL RECORD: ICD-10-PCS | Mod: CPTII,S$GLB,, | Performed by: PHYSICIAN ASSISTANT

## 2022-04-25 PROCEDURE — 3008F PR BODY MASS INDEX (BMI) DOCUMENTED: ICD-10-PCS | Mod: CPTII,S$GLB,, | Performed by: PHYSICIAN ASSISTANT

## 2022-04-25 PROCEDURE — 3044F HG A1C LEVEL LT 7.0%: CPT | Mod: CPTII,S$GLB,, | Performed by: PHYSICIAN ASSISTANT

## 2022-04-25 PROCEDURE — 99999 PR PBB SHADOW E&M-EST. PATIENT-LVL III: CPT | Mod: PBBFAC,,, | Performed by: PHYSICIAN ASSISTANT

## 2022-04-25 PROCEDURE — 1125F AMNT PAIN NOTED PAIN PRSNT: CPT | Mod: CPTII,S$GLB,, | Performed by: PHYSICIAN ASSISTANT

## 2022-04-25 PROCEDURE — 3008F BODY MASS INDEX DOCD: CPT | Mod: CPTII,S$GLB,, | Performed by: PHYSICIAN ASSISTANT

## 2022-04-25 PROCEDURE — 99214 PR OFFICE/OUTPT VISIT, EST, LEVL IV, 30-39 MIN: ICD-10-PCS | Mod: S$GLB,,, | Performed by: PHYSICIAN ASSISTANT

## 2022-04-25 PROCEDURE — 1160F RVW MEDS BY RX/DR IN RCRD: CPT | Mod: CPTII,S$GLB,, | Performed by: PHYSICIAN ASSISTANT

## 2022-04-25 PROCEDURE — 1125F PR PAIN SEVERITY QUANTIFIED, PAIN PRESENT: ICD-10-PCS | Mod: CPTII,S$GLB,, | Performed by: PHYSICIAN ASSISTANT

## 2022-04-25 RX ORDER — HYDROCODONE BITARTRATE AND ACETAMINOPHEN 10; 325 MG/1; MG/1
1 TABLET ORAL EVERY 12 HOURS PRN
Qty: 60 TABLET | Refills: 0 | Status: SHIPPED | OUTPATIENT
Start: 2022-05-01 | End: 2022-05-26 | Stop reason: SDUPTHER

## 2022-04-25 RX ORDER — HYDROCODONE BITARTRATE AND ACETAMINOPHEN 10; 325 MG/1; MG/1
1 TABLET ORAL EVERY 12 HOURS PRN
Qty: 60 TABLET | Refills: 0 | Status: SHIPPED | OUTPATIENT
Start: 2022-05-31 | End: 2022-06-30 | Stop reason: ALTCHOICE

## 2022-04-25 NOTE — PROGRESS NOTES
Subjective:     Chief Complaint:  Left Hand Pain      History of Present Illness:  This patient is a 65 year old male who presents today for f/u complaining of the above noted pains. The patient describes this pain as follows.    - duration (acute, chronic): left hand pain since 1997 after table saw amputation of digits 2 through 5 at work, left lower quadrant pain since hernia surgery in 2004  - timing (constant, intermittent): Constant  - character (sharp, dull, aching, burning): Throbbing  - radiating: No  - dermatomal distribution: No  - side: Left   - aggravating factors: Nothing worsens left digit pain, left lower quadrant pain worse with exercise or walking  - relieving factors: Medication / Norco  - antecedent trauma: Amputation via skill saw and 1997, hernia repair in 2004  - prior spinal surgery: None  - pertinent negatives: No fever, No chills, No weight loss, No bladder dysfunction, No bowel dysfunction, No extremity weakness, No saddle anesthesia  - medications tried: Norco, Percocet, over-the-counter medications, compound pain cream  - other therapies tried (physical therapy, injections):     >> physical therapy tried in the past    >> no prior injections        Imaging/ Labs (reviewed on 4/25/2022):    7/12/2018 US ABDOMINAL AORTA  CLINICAL HISTORY:  Abnormal findings on diagnostic imaging of other specified body structures  TECHNIQUE:  Limited ultrasound was performed of the abdominal aorta, with cross sectional diameter measurements obtained.  COMPARISON:  01/10/2018  FINDINGS:  The proximal abdominal aorta measures 2.7 cm.  The mid abdominal aorta measures 1.8 cm.  The distal abdominal aorta measures 1.9 cm, slightly ectatic and unchanged from prior.  The right iliac artery measures 1.0 cm.  The left iliac artery measures 1.1 cm.  Aortoiliac atherosclerosis: Mild  Impression: Stable examination with slight distal abdominal aortic ectasia.  Negative for aneurysm.         ROS:  CONSTITUTIONAL: No  "fever, chills, weight loss  SKIN: No rash or itching  CARDIOVASCULAR:  No chest pain, palpitations  RESPIRATORY: No shortness of breath  GASTROINTESTINAL: No diarrhea, No constipation, abdominal pain, left lower quadrant  GENITOURINARY: No urinary incontinence    MUSCULOSKELETAL:  - patient reports pain as above, see chief complaint     NEUROLOGICAL:   - Pain as above  - Strength in lower extremities is normal  - Sensation in lower extremities is normal  - No bowel or bladder incontinence     PSYCHIATRIC: No change in mood noticed         Objective:     Physical Exam:  Vitals:    04/25/22 0853   Weight: 96.9 kg (213 lb 10 oz)   Height: 6' 3" (1.905 m)   PainSc:   5   PainLoc: Hip    Body mass index is 26.7 kg/m².   (reviewed on 4/25/2022)    General: alert and oriented, in no apparent distress  Gait: normal gait  Skin: No rashes, No discoloration   HEENT: EOMI  Respiratory: respirations nonlabored  Cardiology: No obvious peripheral edema  GI: no obvious distention     Musculoskeletal:  - ROM preserved   - No pain with lumbar flexion/ extension  - Left upper extremity range of motion intact throughout  - Digital stumps are hypersensitive to palpation, hypersensitivity does not extend further proximal  - No color change, no swelling, no changes in hair growth along left hand    Neuro:  - Lower extremities:      >> 5/5 strength bilaterally, throughout, symmetric  - Upper extremities:    >> 5/5 strength bilaterally    Psych: mood and affect appropriate        Assessment:     Ulysses Boreaux is a 71 y.o. male who presents with     ICD-10-CM ICD-9-CM   1. 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 Pain of left hand  M79.642 729.5   3. Chronic pain due to trauma  G89.21 338.21   4. Chronic pain disorder  G89.4 338.4       Plan:   1. Interventional: None.    2. Pharmacologic:   - Refill Norco 10/325 mg PO BID PRN (60 tabs) x 2 months. Will e-prescribe for 5/1/22 and 5/31/22. Patient " tolerating opioids with no side effects, obtaining good pain control with functional improvement. He tolerates this medication well, and he denies any drowsiness or constipation.  - Opioid contract signed on 12/12/2019 with Dr. Olmedo.  Updated opioid contract signed with Dr. Love on 10/28/20.  - LA  reviewed and appropriate.     - Last UDS  12/12/21 was compliant for medications prescribed. Order drug screen (UDS/ oral swab) next visit to ensure med compliance.     3. Rehabilitative: Encouraged regular exercise.    4. Diagnostic: None for now.    5. Follow up: 9 week medication Refill     - I discussed the risks, benefits, and alternatives to potential treatment options. All questions and concerns were fully addressed today in clinic.            >> UDS:  8-18-15 :: appropriate  12-29-15 :: appropriate  5-3-16 :: appropriate  10-18-16 :: appropriate  4/13/2017 :: appropriate  9/29/2017 :: appropriate  3/9/2018 :: appropriate  9/4/2018 :: appropriate  6/19/2019 :: appropriate  12/12/2019 :: appropriate  6/12/2020 ::  Appropriate  10/2/2020 :: Appropriate  2/24/2021 :: Appropriate  8/24/2021 :: Appropriate  12/21/2021 :: Appropriate

## 2022-04-27 ENCOUNTER — TELEPHONE (OUTPATIENT)
Dept: ADMINISTRATIVE | Facility: HOSPITAL | Age: 72
End: 2022-04-27
Payer: MEDICARE

## 2022-04-30 ENCOUNTER — EXTERNAL CHRONIC CARE MANAGEMENT (OUTPATIENT)
Dept: PRIMARY CARE CLINIC | Facility: CLINIC | Age: 72
End: 2022-04-30
Payer: MEDICARE

## 2022-04-30 PROCEDURE — 99490 CHRNC CARE MGMT STAFF 1ST 20: CPT | Mod: S$GLB,,, | Performed by: FAMILY MEDICINE

## 2022-04-30 PROCEDURE — 99490 PR CHRONIC CARE MGMT, 1ST 20 MIN: ICD-10-PCS | Mod: S$GLB,,, | Performed by: FAMILY MEDICINE

## 2022-05-26 DIAGNOSIS — S68.119A TRAUMATIC AMPUTATION OF DIGIT OF ONE HAND WITHOUT COMPLICATION: ICD-10-CM

## 2022-05-27 RX ORDER — HYDROCODONE BITARTRATE AND ACETAMINOPHEN 10; 325 MG/1; MG/1
1 TABLET ORAL EVERY 12 HOURS PRN
Qty: 60 TABLET | Refills: 0 | Status: SHIPPED | OUTPATIENT
Start: 2022-07-30 | End: 2022-08-05 | Stop reason: SDUPTHER

## 2022-05-27 RX ORDER — HYDROCODONE BITARTRATE AND ACETAMINOPHEN 10; 325 MG/1; MG/1
1 TABLET ORAL EVERY 12 HOURS PRN
Qty: 60 TABLET | Refills: 0 | Status: SHIPPED | OUTPATIENT
Start: 2022-06-30 | End: 2022-08-25 | Stop reason: SDUPTHER

## 2022-05-31 ENCOUNTER — EXTERNAL CHRONIC CARE MANAGEMENT (OUTPATIENT)
Dept: PRIMARY CARE CLINIC | Facility: CLINIC | Age: 72
End: 2022-05-31
Payer: MEDICARE

## 2022-05-31 PROCEDURE — 99490 CHRNC CARE MGMT STAFF 1ST 20: CPT | Mod: S$GLB,,, | Performed by: FAMILY MEDICINE

## 2022-05-31 PROCEDURE — 99490 PR CHRONIC CARE MGMT, 1ST 20 MIN: ICD-10-PCS | Mod: S$GLB,,, | Performed by: FAMILY MEDICINE

## 2022-05-31 PROCEDURE — 99439 CHRNC CARE MGMT STAF EA ADDL: CPT | Mod: S$GLB,,, | Performed by: FAMILY MEDICINE

## 2022-05-31 PROCEDURE — 99439 PR CHRONIC CARE MGMT, EA ADDTL 20 MIN: ICD-10-PCS | Mod: S$GLB,,, | Performed by: FAMILY MEDICINE

## 2022-06-15 ENCOUNTER — LAB VISIT (OUTPATIENT)
Dept: LAB | Facility: HOSPITAL | Age: 72
End: 2022-06-15
Attending: NURSE PRACTITIONER
Payer: MEDICARE

## 2022-06-15 DIAGNOSIS — E11.9 TYPE 2 DIABETES MELLITUS WITHOUT COMPLICATION, WITH LONG-TERM CURRENT USE OF INSULIN: ICD-10-CM

## 2022-06-15 DIAGNOSIS — Z79.4 TYPE 2 DIABETES MELLITUS WITHOUT COMPLICATION, WITH LONG-TERM CURRENT USE OF INSULIN: ICD-10-CM

## 2022-06-15 LAB
ESTIMATED AVG GLUCOSE: 183 MG/DL (ref 68–131)
HBA1C MFR BLD: 8 % (ref 4–5.6)

## 2022-06-15 PROCEDURE — 83036 HEMOGLOBIN GLYCOSYLATED A1C: CPT | Performed by: NURSE PRACTITIONER

## 2022-06-15 PROCEDURE — 36415 COLL VENOUS BLD VENIPUNCTURE: CPT | Performed by: NURSE PRACTITIONER

## 2022-06-21 ENCOUNTER — OFFICE VISIT (OUTPATIENT)
Dept: DIABETES | Facility: CLINIC | Age: 72
End: 2022-06-21
Payer: MEDICARE

## 2022-06-21 VITALS — SYSTOLIC BLOOD PRESSURE: 140 MMHG | DIASTOLIC BLOOD PRESSURE: 88 MMHG | WEIGHT: 211.19 LBS | BODY MASS INDEX: 26.4 KG/M2

## 2022-06-21 DIAGNOSIS — E78.5 HYPERLIPIDEMIA ASSOCIATED WITH TYPE 2 DIABETES MELLITUS: ICD-10-CM

## 2022-06-21 DIAGNOSIS — Z79.4 TYPE 2 DIABETES MELLITUS WITHOUT COMPLICATION, WITH LONG-TERM CURRENT USE OF INSULIN: Primary | ICD-10-CM

## 2022-06-21 DIAGNOSIS — I15.2 HYPERTENSION ASSOCIATED WITH DIABETES: ICD-10-CM

## 2022-06-21 DIAGNOSIS — E11.9 TYPE 2 DIABETES MELLITUS WITHOUT COMPLICATION, WITH LONG-TERM CURRENT USE OF INSULIN: Primary | ICD-10-CM

## 2022-06-21 DIAGNOSIS — E11.69 HYPERLIPIDEMIA ASSOCIATED WITH TYPE 2 DIABETES MELLITUS: ICD-10-CM

## 2022-06-21 DIAGNOSIS — E11.59 HYPERTENSION ASSOCIATED WITH DIABETES: ICD-10-CM

## 2022-06-21 LAB — GLUCOSE SERPL-MCNC: 177 MG/DL (ref 70–110)

## 2022-06-21 PROCEDURE — 99999 PR PBB SHADOW E&M-EST. PATIENT-LVL III: ICD-10-PCS | Mod: PBBFAC,,, | Performed by: NURSE PRACTITIONER

## 2022-06-21 PROCEDURE — 3288F PR FALLS RISK ASSESSMENT DOCUMENTED: ICD-10-PCS | Mod: CPTII,S$GLB,, | Performed by: NURSE PRACTITIONER

## 2022-06-21 PROCEDURE — 99214 PR OFFICE/OUTPT VISIT, EST, LEVL IV, 30-39 MIN: ICD-10-PCS | Mod: S$GLB,,, | Performed by: NURSE PRACTITIONER

## 2022-06-21 PROCEDURE — 82962 POCT GLUCOSE, HAND-HELD DEVICE: ICD-10-PCS | Mod: S$GLB,,, | Performed by: NURSE PRACTITIONER

## 2022-06-21 PROCEDURE — 1160F PR REVIEW ALL MEDS BY PRESCRIBER/CLIN PHARMACIST DOCUMENTED: ICD-10-PCS | Mod: CPTII,S$GLB,, | Performed by: NURSE PRACTITIONER

## 2022-06-21 PROCEDURE — 99999 PR PBB SHADOW E&M-EST. PATIENT-LVL III: CPT | Mod: PBBFAC,,, | Performed by: NURSE PRACTITIONER

## 2022-06-21 PROCEDURE — 3079F DIAST BP 80-89 MM HG: CPT | Mod: CPTII,S$GLB,, | Performed by: NURSE PRACTITIONER

## 2022-06-21 PROCEDURE — 3077F PR MOST RECENT SYSTOLIC BLOOD PRESSURE >= 140 MM HG: ICD-10-PCS | Mod: CPTII,S$GLB,, | Performed by: NURSE PRACTITIONER

## 2022-06-21 PROCEDURE — 3079F PR MOST RECENT DIASTOLIC BLOOD PRESSURE 80-89 MM HG: ICD-10-PCS | Mod: CPTII,S$GLB,, | Performed by: NURSE PRACTITIONER

## 2022-06-21 PROCEDURE — 1159F PR MEDICATION LIST DOCUMENTED IN MEDICAL RECORD: ICD-10-PCS | Mod: CPTII,S$GLB,, | Performed by: NURSE PRACTITIONER

## 2022-06-21 PROCEDURE — 3008F BODY MASS INDEX DOCD: CPT | Mod: CPTII,S$GLB,, | Performed by: NURSE PRACTITIONER

## 2022-06-21 PROCEDURE — 3077F SYST BP >= 140 MM HG: CPT | Mod: CPTII,S$GLB,, | Performed by: NURSE PRACTITIONER

## 2022-06-21 PROCEDURE — 1101F PT FALLS ASSESS-DOCD LE1/YR: CPT | Mod: CPTII,S$GLB,, | Performed by: NURSE PRACTITIONER

## 2022-06-21 PROCEDURE — 1160F RVW MEDS BY RX/DR IN RCRD: CPT | Mod: CPTII,S$GLB,, | Performed by: NURSE PRACTITIONER

## 2022-06-21 PROCEDURE — 3008F PR BODY MASS INDEX (BMI) DOCUMENTED: ICD-10-PCS | Mod: CPTII,S$GLB,, | Performed by: NURSE PRACTITIONER

## 2022-06-21 PROCEDURE — 1126F AMNT PAIN NOTED NONE PRSNT: CPT | Mod: CPTII,S$GLB,, | Performed by: NURSE PRACTITIONER

## 2022-06-21 PROCEDURE — 99214 OFFICE O/P EST MOD 30 MIN: CPT | Mod: S$GLB,,, | Performed by: NURSE PRACTITIONER

## 2022-06-21 PROCEDURE — 3052F PR MOST RECENT HEMOGLOBIN A1C LEVEL 8.0 - < 9.0%: ICD-10-PCS | Mod: CPTII,S$GLB,, | Performed by: NURSE PRACTITIONER

## 2022-06-21 PROCEDURE — 3288F FALL RISK ASSESSMENT DOCD: CPT | Mod: CPTII,S$GLB,, | Performed by: NURSE PRACTITIONER

## 2022-06-21 PROCEDURE — 3052F HG A1C>EQUAL 8.0%<EQUAL 9.0%: CPT | Mod: CPTII,S$GLB,, | Performed by: NURSE PRACTITIONER

## 2022-06-21 PROCEDURE — 1159F MED LIST DOCD IN RCRD: CPT | Mod: CPTII,S$GLB,, | Performed by: NURSE PRACTITIONER

## 2022-06-21 PROCEDURE — 1101F PR PT FALLS ASSESS DOC 0-1 FALLS W/OUT INJ PAST YR: ICD-10-PCS | Mod: CPTII,S$GLB,, | Performed by: NURSE PRACTITIONER

## 2022-06-21 PROCEDURE — 1126F PR PAIN SEVERITY QUANTIFIED, NO PAIN PRESENT: ICD-10-PCS | Mod: CPTII,S$GLB,, | Performed by: NURSE PRACTITIONER

## 2022-06-21 PROCEDURE — 82962 GLUCOSE BLOOD TEST: CPT | Mod: S$GLB,,, | Performed by: NURSE PRACTITIONER

## 2022-06-21 RX ORDER — DULAGLUTIDE 0.75 MG/.5ML
0.75 INJECTION, SOLUTION SUBCUTANEOUS
Qty: 4 PEN | Refills: 3 | Status: SHIPPED | OUTPATIENT
Start: 2022-06-21 | End: 2022-11-09

## 2022-06-21 RX ORDER — EMPAGLIFLOZIN 10 MG/1
10 TABLET, FILM COATED ORAL DAILY
Qty: 90 TABLET | Refills: 0 | Status: CANCELLED | OUTPATIENT
Start: 2022-06-21

## 2022-06-21 RX ORDER — EMPAGLIFLOZIN 10 MG/1
10 TABLET, FILM COATED ORAL DAILY
Qty: 90 TABLET | Refills: 2 | Status: SHIPPED | OUTPATIENT
Start: 2022-06-21 | End: 2023-04-26 | Stop reason: SDUPTHER

## 2022-06-21 NOTE — PATIENT INSTRUCTIONS
Medication Regimen/Changes:   - Continue Novolin 70/30, 22 units every morning, 22 units every evening  - Continue Trulicity 0.75 mg weekly  - Continue Jardiance 10 mg every morning    Patient Instructions:  Carbohydrate Count: 30-45 grams/meal and 15 grams/snack with limit of 2 snacks per day.  Exercise: Goal is 150 minutes or more per week.  Bring meter and blood sugar log to each appointment.     Goals for Blood Sugar:  Fastin-130 mg/dl  2 hours after meal:  mg/dl  Before Bed: 100-150 mg/dl  If Blood sugar is below 70 mg/dl, DO NOT take diabetes medication. Eat first and then recheck blood sugar in 20 minutes.  Foods to eat if blood sugar is below 70 mg/dl  1/2 glass of orange juice   1/2 regular cola can   3-4 hard candies   3-4 small glucose tabs.   Foods to eat if blood sugar is below 50 mg/dl  1 glass of orange juice  1 regular cola can  8 hard candies  8 small glucose tabs.   If you have repeated eating/blood sugar recheck process 2 times and blood sugar still below 70 mg/dl, call 911.

## 2022-06-21 NOTE — PROGRESS NOTES
PCP: Elza Pantoja MD    Subjective:     Chief Complaint: Diabetes follow up.  Last visit with me: 3/15/2022    HPI: 71 y.o.   male for diabetes follow up.   Previously managed by Dr. Jackson and YASEMIN Dale NP.  Type II diabetes since 2005 .  Comorbidities: HTN, HLD and Hyperthyroidism  Diabetes complications: without complications    Interval history:  Denies recent hospital admissions or ER visits.   Denies having hypoglycemia.   Endorses the following diabetes related symptoms: None.    Blood glucose monitoring fingerstick: 2x/day   Per patient's recall over the last 4 weeks,   Fasting BG range 104 this am, average 's   PP dinner range     Activity: Sedentary- none  Diet: 2 meals/day, breakfast and dinner, infrequent snacks/day.  Skips lunch.   Admits to variable eating pattern over the last few months.    Medication compliant all of the time; diet compliant most of the time.   Has attended diabetes education in the past.     Patient's concerns today include glycemic control.  Patient medication regimen is as below.     Current DM Medications:   - Novolin 70/30 22 units qam, 22 units q evening   - Trulicity 0.75 mg weekly (Sunday)    - Jardiance 10 mg - started 12/2    Previous regimen: Januvia - stopped once started on Trulicity (DPP4 not recommended)    Past failed treatment include: none    Allergies  Review of patient's allergies indicates:  No Known Allergies    Labs Reviewed:    His most recent A1C is:  Lab Results   Component Value Date    HGBA1C 8.0 (H) 06/15/2022    HGBA1C 6.9 (H) 02/21/2022    HGBA1C 6.9 (H) 02/21/2022     His most recent BMP is:  Lab Results   Component Value Date     02/21/2022    K 4.4 02/21/2022     02/21/2022    CO2 28 02/21/2022    BUN 15 02/21/2022    CREATININE 1.3 02/21/2022    CALCIUM 9.9 02/21/2022    ANIONGAP 11 02/21/2022    ESTGFRAFRICA >60.0 02/21/2022    EGFRNONAA 55.7 (A) 02/21/2022       Lab Results   Component Value Date     "CPEPTIDE 1.33 10/20/2017     Lab Results   Component Value Date    GLUTAMICACID 0.00 10/20/2017       Body mass index is 26.4 kg/m².    Weight lost 5 lbs since last visit.  Wt Readings from Last 3 Encounters:   22 1011 95.8 kg (211 lb 3.2 oz)   22 0853 96.9 kg (213 lb 10 oz)   03/15/22 1139 98 kg (216 lb 0.8 oz)        His blood sugar in clinic today is:  Lab Results   Component Value Date    POCGLU 177 (A) 2022       Diabetes Management Status  Statin: Taking  ACE/ARB: Taking    Screening or Prevention Patient's value Goal Complete/Controlled?   HgA1C Testing and Control   Lab Results   Component Value Date    HGBA1C 8.0 (H) 06/15/2022      Annually/Less than 8% No   Lipid profile : 2022 Annually Yes   LDL control Lab Results   Component Value Date    LDLCALC 112.2 2022    Annually/Less than 100 mg/dl  No   Nephropathy screening Lab Results   Component Value Date    MICALBCREAT 26.7 2021     No results found for: PROTEINUA Annually Yes   Blood pressure BP Readings from Last 1 Encounters:   22 (!) 140/88    Less than 140/90 Yes   Dilated retinal exam : 2020 Annually No    Foot exam   : 2021 Annually Yes     Social History     Socioeconomic History    Marital status:     Number of children: 2    Highest education level: 11th grade   Occupational History    Occupation: retired   Tobacco Use    Smoking status: Former Smoker     Packs/day: 0.50     Types: Cigarettes     Quit date: 1972     Years since quittin.3    Smokeless tobacco: Never Used   Substance and Sexual Activity    Alcohol use: Yes     Alcohol/week: 0.0 standard drinks     Comment: " Every blue moon"    Drug use: No    Sexual activity: Yes     Partners: Female   Social History Narrative    . Has 2 children. Patient disabled- was .      Past Medical History:   Diagnosis Date    Allergy     Amputation of finger of left hand     all fingers except for thumb    " Cataract     OS NGCL     Chronic pain due to trauma     traumatic amputations left hand    Diabetes mellitus, type 2     History of hepatitis C 02/13/2014    tx'd w/ Js 2018    Hyperlipidemia     Hypertension     Hyperthyroidism 10/10/2019    Refractive error      Review of Systems   Constitutional: Negative for activity change, appetite change, fatigue and unexpected weight change.   HENT: Negative for dental problem and trouble swallowing.    Eyes: Negative for visual disturbance.   Respiratory: Negative for chest tightness and shortness of breath.    Cardiovascular: Negative for chest pain, palpitations and leg swelling.   Gastrointestinal: Negative for abdominal pain, constipation, diarrhea, nausea and vomiting.   Endocrine: Negative for polydipsia, polyphagia and polyuria.   Genitourinary: Negative for difficulty urinating, dysuria, frequency and urgency.        Negative yeast infection   Musculoskeletal: Negative for gait problem, myalgias and neck pain.   Integumentary:  Negative for rash and wound.   Allergic/Immunologic: Negative.    Neurological: Negative for dizziness, syncope, weakness, numbness and headaches.   Hematological: Negative.    Psychiatric/Behavioral: Negative for confusion and sleep disturbance.   All other systems reviewed and are negative.    Objective:      Physical Exam  Vitals reviewed.   Constitutional:       Appearance: Normal appearance.   HENT:      Head: Normocephalic and atraumatic.   Eyes:      General: Vision grossly intact.      Extraocular Movements: Extraocular movements intact.      Pupils: Pupils are equal, round, and reactive to light.   Neck:      Thyroid: No thyroid mass or thyroid tenderness.   Cardiovascular:      Rate and Rhythm: Normal rate and regular rhythm.      Pulses: Normal pulses.           Radial pulses are 2+ on the right side and 2+ on the left side.        Dorsalis pedis pulses are 2+ on the right side and 2+ on the left side.      Heart  sounds: Normal heart sounds.   Pulmonary:      Effort: Pulmonary effort is normal.      Breath sounds: Normal breath sounds.   Abdominal:      General: Bowel sounds are normal.      Palpations: Abdomen is soft.   Musculoskeletal:         General: Normal range of motion.      Cervical back: Normal range of motion and neck supple.      Right lower leg: No edema.      Left lower leg: No edema.      Right foot: No deformity or bunion.      Left foot: No deformity or bunion.      Comments: left hand digits 2-5 amputated (1997 injury)    Feet:      Right foot:      Skin integrity: Callus present. No ulcer, skin breakdown or dry skin.      Toenail Condition: Right toenails are normal.      Left foot:      Skin integrity: Callus present. No ulcer, skin breakdown or dry skin.      Toenail Condition: Left toenails are normal.   Lymphadenopathy:      Cervical: No cervical adenopathy.   Skin:     General: Skin is warm and dry.      Findings: No rash or wound.      Comments: Negative for acanthosis nigricans. Well-healed SQ injection sites.   Neurological:      Mental Status: He is alert and oriented to person, place, and time.      Coordination: Coordination is intact.      Gait: Gait is intact.   Psychiatric:         Attention and Perception: Attention normal.         Mood and Affect: Mood normal.         Speech: Speech normal.         Behavior: Behavior normal. Behavior is cooperative.         Thought Content: Thought content normal.         Cognition and Memory: Cognition normal.       Assessment / Plan:     Ulysses was seen today for diabetes mellitus.    Diagnoses and all orders for this visit:    Type 2 diabetes mellitus without complication, with long-term current use of insulin  -     POCT Glucose, Hand-Held Device  -     Hemoglobin A1C; Future  -     insulin NPH-insulin regular, 70/30, (NOVOLIN 70/30 U-100 INSULIN) 100 unit/mL (70-30) injection; Inject 22 Units into the skin 2 (two) times daily before meals. In the  morning and in the evening.  -     dulaglutide (TRULICITY) 0.75 mg/0.5 mL pen injector; Inject 0.75 mg into the skin every 7 days.  -     empagliflozin (JARDIANCE) 10 mg tablet; Take 1 tablet (10 mg total) by mouth once daily.    Hypertension associated with diabetes    Hyperlipidemia associated with type 2 diabetes mellitus    BMI 26.0-26.9,adult    Additional Plan Details:  - Condition: uncontrolled, most recent A1C 6.9% was completed 1 week ago.   - Monitor blood glucose:  3x daily.Goals reviewed. Maintain BG log and bring to next visit for review.   - Hypoglycemia protocol: Reviewed and risk discussed. Instructed to inform with BG readings below 80.  - Diet and activity: Reviewed, limit carbohydrate intake to 30-45 grams per meal, 3x daily and 15 grams per snack with a limit of two daily. Recommend at least 150 minutes of exercise in a week.  - BP and LDL: Recommend lifestyle modifications and follow up with PCP for management.   - Medication Changes:   - Continue Novolin 70/30, 22 units qam, 22 units q evening   - Continue Trulicity 0.75 mg weekly   - Continue Jardiance 10 mg daily, tolerated well, denies SE/AR    - Medication consideration: Pt hesitant to increase Trulicity dose due to nausea limited to the day of administration. Discussed increasing Jardiance dose however he would like to improve on his diet and eating pattern.   - Lab results: A1C discussed.  - Health Maintenance standards addressed today:  A1c scheduled.   - Risks of uncontrolled DM explained. Importance of routine foot and eye exams reviewed. Scheduled as appropriate.  -The patient was explained the above plan and given opportunity to ask questions.  He understands, chooses and consents to this plan and accepts all the risks, which include but are not limited to the risks mentioned above.   - Labs ordered as above. A1C  - Follow up: 3 months with A1c lab draw prior.       Charlotte T. Delacruz, FNP-C [facility]    A total  of 30 minutes was spent in face to face time, of which over 50 % was spent in counseling patient on disease process, complications, treatment, and side effects of medications.

## 2022-06-30 ENCOUNTER — OFFICE VISIT (OUTPATIENT)
Dept: PAIN MEDICINE | Facility: CLINIC | Age: 72
End: 2022-06-30
Payer: MEDICARE

## 2022-06-30 ENCOUNTER — EXTERNAL CHRONIC CARE MANAGEMENT (OUTPATIENT)
Dept: PRIMARY CARE CLINIC | Facility: CLINIC | Age: 72
End: 2022-06-30
Payer: MEDICARE

## 2022-06-30 VITALS
SYSTOLIC BLOOD PRESSURE: 155 MMHG | BODY MASS INDEX: 26.53 KG/M2 | WEIGHT: 213.38 LBS | HEIGHT: 75 IN | HEART RATE: 66 BPM | DIASTOLIC BLOOD PRESSURE: 92 MMHG

## 2022-06-30 DIAGNOSIS — Z79.891 OPIOID USE AGREEMENT EXISTS: ICD-10-CM

## 2022-06-30 DIAGNOSIS — S68.119A TRAUMATIC AMPUTATION OF DIGIT OF ONE HAND WITHOUT COMPLICATION: Primary | ICD-10-CM

## 2022-06-30 DIAGNOSIS — M79.642 PAIN OF LEFT HAND: ICD-10-CM

## 2022-06-30 DIAGNOSIS — G89.4 CHRONIC PAIN DISORDER: ICD-10-CM

## 2022-06-30 DIAGNOSIS — G89.21 CHRONIC PAIN DUE TO TRAUMA: ICD-10-CM

## 2022-06-30 PROCEDURE — 99999 PR PBB SHADOW E&M-EST. PATIENT-LVL IV: CPT | Mod: PBBFAC,,, | Performed by: PHYSICIAN ASSISTANT

## 2022-06-30 PROCEDURE — 1125F PR PAIN SEVERITY QUANTIFIED, PAIN PRESENT: ICD-10-PCS | Mod: CPTII,S$GLB,, | Performed by: PHYSICIAN ASSISTANT

## 2022-06-30 PROCEDURE — 3052F PR MOST RECENT HEMOGLOBIN A1C LEVEL 8.0 - < 9.0%: ICD-10-PCS | Mod: CPTII,S$GLB,, | Performed by: PHYSICIAN ASSISTANT

## 2022-06-30 PROCEDURE — 1101F PR PT FALLS ASSESS DOC 0-1 FALLS W/OUT INJ PAST YR: ICD-10-PCS | Mod: CPTII,S$GLB,, | Performed by: PHYSICIAN ASSISTANT

## 2022-06-30 PROCEDURE — 3080F DIAST BP >= 90 MM HG: CPT | Mod: CPTII,S$GLB,, | Performed by: PHYSICIAN ASSISTANT

## 2022-06-30 PROCEDURE — 3288F PR FALLS RISK ASSESSMENT DOCUMENTED: ICD-10-PCS | Mod: CPTII,S$GLB,, | Performed by: PHYSICIAN ASSISTANT

## 2022-06-30 PROCEDURE — 1159F MED LIST DOCD IN RCRD: CPT | Mod: CPTII,S$GLB,, | Performed by: PHYSICIAN ASSISTANT

## 2022-06-30 PROCEDURE — 99214 OFFICE O/P EST MOD 30 MIN: CPT | Mod: S$GLB,,, | Performed by: PHYSICIAN ASSISTANT

## 2022-06-30 PROCEDURE — 1101F PT FALLS ASSESS-DOCD LE1/YR: CPT | Mod: CPTII,S$GLB,, | Performed by: PHYSICIAN ASSISTANT

## 2022-06-30 PROCEDURE — 1159F PR MEDICATION LIST DOCUMENTED IN MEDICAL RECORD: ICD-10-PCS | Mod: CPTII,S$GLB,, | Performed by: PHYSICIAN ASSISTANT

## 2022-06-30 PROCEDURE — 1160F PR REVIEW ALL MEDS BY PRESCRIBER/CLIN PHARMACIST DOCUMENTED: ICD-10-PCS | Mod: CPTII,S$GLB,, | Performed by: PHYSICIAN ASSISTANT

## 2022-06-30 PROCEDURE — 3288F FALL RISK ASSESSMENT DOCD: CPT | Mod: CPTII,S$GLB,, | Performed by: PHYSICIAN ASSISTANT

## 2022-06-30 PROCEDURE — 1125F AMNT PAIN NOTED PAIN PRSNT: CPT | Mod: CPTII,S$GLB,, | Performed by: PHYSICIAN ASSISTANT

## 2022-06-30 PROCEDURE — 3008F PR BODY MASS INDEX (BMI) DOCUMENTED: ICD-10-PCS | Mod: CPTII,S$GLB,, | Performed by: PHYSICIAN ASSISTANT

## 2022-06-30 PROCEDURE — 3052F HG A1C>EQUAL 8.0%<EQUAL 9.0%: CPT | Mod: CPTII,S$GLB,, | Performed by: PHYSICIAN ASSISTANT

## 2022-06-30 PROCEDURE — 3080F PR MOST RECENT DIASTOLIC BLOOD PRESSURE >= 90 MM HG: ICD-10-PCS | Mod: CPTII,S$GLB,, | Performed by: PHYSICIAN ASSISTANT

## 2022-06-30 PROCEDURE — 3077F SYST BP >= 140 MM HG: CPT | Mod: CPTII,S$GLB,, | Performed by: PHYSICIAN ASSISTANT

## 2022-06-30 PROCEDURE — 99490 PR CHRONIC CARE MGMT, 1ST 20 MIN: ICD-10-PCS | Mod: S$GLB,,, | Performed by: FAMILY MEDICINE

## 2022-06-30 PROCEDURE — 3077F PR MOST RECENT SYSTOLIC BLOOD PRESSURE >= 140 MM HG: ICD-10-PCS | Mod: CPTII,S$GLB,, | Performed by: PHYSICIAN ASSISTANT

## 2022-06-30 PROCEDURE — 99490 CHRNC CARE MGMT STAFF 1ST 20: CPT | Mod: S$GLB,,, | Performed by: FAMILY MEDICINE

## 2022-06-30 PROCEDURE — 99214 PR OFFICE/OUTPT VISIT, EST, LEVL IV, 30-39 MIN: ICD-10-PCS | Mod: S$GLB,,, | Performed by: PHYSICIAN ASSISTANT

## 2022-06-30 PROCEDURE — 1160F RVW MEDS BY RX/DR IN RCRD: CPT | Mod: CPTII,S$GLB,, | Performed by: PHYSICIAN ASSISTANT

## 2022-06-30 PROCEDURE — 3008F BODY MASS INDEX DOCD: CPT | Mod: CPTII,S$GLB,, | Performed by: PHYSICIAN ASSISTANT

## 2022-06-30 PROCEDURE — 99999 PR PBB SHADOW E&M-EST. PATIENT-LVL IV: ICD-10-PCS | Mod: PBBFAC,,, | Performed by: PHYSICIAN ASSISTANT

## 2022-06-30 NOTE — PROGRESS NOTES
Subjective:     Chief Complaint:  Left Hand Pain    Interval History (6/30/2022): Patient was last seen on 4/25/2022. he presents today for follow-up for medication refill.  Medication is providing adequate pain relief. he feels the pain medication reduces the negative effects of chronic pain that affects day-to-day function and quality of life. No major changes since previous visit; patient is stable overall. Patient reports pain as 5/10 today.     History of Present Illness:  This patient is a 65 year old male who presents today for f/u complaining of the above noted pains. The patient describes this pain as follows.    - duration (acute, chronic): left hand pain since 1997 after table saw amputation of digits 2 through 5 at work, left lower quadrant pain since hernia surgery in 2004  - timing (constant, intermittent): Constant  - character (sharp, dull, aching, burning): Throbbing  - radiating: No  - dermatomal distribution: No  - side: Left   - aggravating factors: Nothing worsens left digit pain, left lower quadrant pain worse with exercise or walking  - relieving factors: Medication / Norco  - antecedent trauma: Amputation via skill saw and 1997, hernia repair in 2004  - prior spinal surgery: None  - pertinent negatives: No fever, No chills, No weight loss, No bladder dysfunction, No bowel dysfunction, No extremity weakness, No saddle anesthesia  - medications tried: Norco, Percocet, over-the-counter medications, compound pain cream  - other therapies tried (physical therapy, injections):     >> physical therapy tried in the past    >> no prior injections        Imaging/ Labs (reviewed on 6/30/2022):    7/12/2018 US ABDOMINAL AORTA  CLINICAL HISTORY:  Abnormal findings on diagnostic imaging of other specified body structures  TECHNIQUE:  Limited ultrasound was performed of the abdominal aorta, with cross sectional diameter measurements obtained.  COMPARISON:  01/10/2018  FINDINGS:  The proximal abdominal aorta  "measures 2.7 cm.  The mid abdominal aorta measures 1.8 cm.  The distal abdominal aorta measures 1.9 cm, slightly ectatic and unchanged from prior.  The right iliac artery measures 1.0 cm.  The left iliac artery measures 1.1 cm.  Aortoiliac atherosclerosis: Mild  Impression: Stable examination with slight distal abdominal aortic ectasia.  Negative for aneurysm.         ROS:  CONSTITUTIONAL: No fever, chills, weight loss  SKIN: No rash or itching  CARDIOVASCULAR:  No chest pain, palpitations  RESPIRATORY: No shortness of breath  GASTROINTESTINAL: No diarrhea, No constipation, abdominal pain, left lower quadrant  GENITOURINARY: No urinary incontinence    MUSCULOSKELETAL:  - patient reports pain as above, see chief complaint     NEUROLOGICAL:   - Pain as above  - Strength in lower extremities is normal  - Sensation in lower extremities is normal  - No bowel or bladder incontinence     PSYCHIATRIC: No change in mood noticed         Objective:     Physical Exam:  Vitals:    06/30/22 1016   BP: (!) 155/92   Pulse: 66   Weight: 96.8 kg (213 lb 6.5 oz)   Height: 6' 3" (1.905 m)   PainSc:   5   PainLoc: Hand    Body mass index is 26.67 kg/m².   (reviewed on 6/30/2022)    General: alert and oriented, in no apparent distress  Gait: normal gait  Skin: No rashes, No discoloration   HEENT: EOMI  Respiratory: respirations nonlabored  Cardiology: No obvious peripheral edema  GI: no obvious distention     Musculoskeletal:  - No pain with lumbar flexion/ extension  - Left upper extremity range of motion intact throughout  - Digital stumps are hypersensitive to palpation, hypersensitivity does not extend further proximal  - No color change, no swelling, no changes in hair growth along left hand    Neuro:  - Lower extremities:      >> 5/5 strength bilaterally, throughout, symmetric  - Upper extremities:    >> 5/5 strength bilaterally    Psych: mood and affect appropriate        Assessment:     Ulysses Boreaux is a 71 y.o. male who " presents with     ICD-10-CM ICD-9-CM   1. 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 Pain of left hand  M79.642 729.5   3. Chronic pain due to trauma  G89.21 338.21   4. Chronic pain disorder  G89.4 338.4   5. Opioid use agreement exists  Z79.891 V58.69       Plan:   1. Interventional: None.    2. Pharmacologic:   - Refill Norco 10/325 mg PO BID PRN (60 tabs) x 2 months. Will e-prescribe for 6/30/22 and 7/30/22. Patient tolerating opioids with no side effects, obtaining good pain control with functional improvement. He tolerates this medication well, and he denies any drowsiness or constipation.  - Opioid contract signed on 12/12/2019 with Dr. Olmedo. Updated opioid contract signed with Dr. Love on 10/28/20.  - LA  reviewed and appropriate.     - Will order drug screen (UDS or oral swab) today to ensure med compliance.     3. Rehabilitative: Encouraged regular exercise.    4. Diagnostic: None for now.    5. Follow up: 8 week medication Refill     - I discussed the risks, benefits, and alternatives to potential treatment options. All questions and concerns were fully addressed today in clinic.            >> UDS:  8-18-15 :: appropriate  12-29-15 :: appropriate  5-3-16 :: appropriate  10-18-16 :: appropriate  4/13/2017 :: appropriate  9/29/2017 :: appropriate  3/9/2018 :: appropriate  9/4/2018 :: appropriate  6/19/2019 :: appropriate  12/12/2019 :: appropriate  6/12/2020 ::  Appropriate  10/2/2020 :: Appropriate  2/24/2021 :: Appropriate  8/24/2021 :: Appropriate  12/21/2021 :: Appropriate  6/30/2022 :: pending

## 2022-07-05 ENCOUNTER — PES CALL (OUTPATIENT)
Dept: ADMINISTRATIVE | Facility: CLINIC | Age: 72
End: 2022-07-05
Payer: MEDICARE

## 2022-07-31 ENCOUNTER — EXTERNAL CHRONIC CARE MANAGEMENT (OUTPATIENT)
Dept: PRIMARY CARE CLINIC | Facility: CLINIC | Age: 72
End: 2022-07-31
Payer: MEDICARE

## 2022-07-31 PROCEDURE — 99490 PR CHRONIC CARE MGMT, 1ST 20 MIN: ICD-10-PCS | Mod: S$GLB,,, | Performed by: FAMILY MEDICINE

## 2022-07-31 PROCEDURE — 99490 CHRNC CARE MGMT STAFF 1ST 20: CPT | Mod: S$GLB,,, | Performed by: FAMILY MEDICINE

## 2022-08-01 ENCOUNTER — TELEPHONE (OUTPATIENT)
Dept: PAIN MEDICINE | Facility: CLINIC | Age: 72
End: 2022-08-01
Payer: MEDICARE

## 2022-08-01 NOTE — TELEPHONE ENCOUNTER
----- Message from Yue Kaur sent at 7/30/2022 11:24 AM CDT -----  Type:  RX Refill Request    Who Called:  Pt   Refill or New Rx: refill   RX Name and Strength: HYDROcodone-acetaminophen (NORCO)  mg per tablet  How is the patient currently taking it? (ex. 1XDay): 2XDay   Is this a 30 day or 90 day RX: 60  Preferred Pharmacy with phone number: [id number]  [address] [place] [address] AT [facility]   Phone: [phone]  Fax:  [phone]  Would the patient rather a call back or a response via [facility]? Call back   Best Call Back Number: [phone]  Additional Information:  Pt was advised by [facility] they did not receive script

## 2022-08-01 NOTE — TELEPHONE ENCOUNTER
----- Message from Muriel Sheikh sent at 8/1/2022  8:24 AM CDT -----  Contact: pt  Type:  Patient Returning Call    Who Called: pt   Who Left Message for Patient:nurse   Does the patient know what this is regarding?:med being called in to his pharm   Would the patient rather a call back or a response via [facility]?  Phone  Best Call Back Number: [phone]  Additional Information: pharm states that the medicine wasn't received by them

## 2022-08-01 NOTE — TELEPHONE ENCOUNTER
Called and spoke with Pharmacy states they do not have an RX on file. Notified Ysabel Garcia PA-C who will resubmit medication

## 2022-08-05 ENCOUNTER — TELEPHONE (OUTPATIENT)
Dept: PAIN MEDICINE | Facility: CLINIC | Age: 72
End: 2022-08-05
Payer: MEDICARE

## 2022-08-05 DIAGNOSIS — G89.4 CHRONIC PAIN DISORDER: ICD-10-CM

## 2022-08-05 DIAGNOSIS — G89.21 CHRONIC PAIN DUE TO TRAUMA: Primary | ICD-10-CM

## 2022-08-05 DIAGNOSIS — S68.119A TRAUMATIC AMPUTATION OF DIGIT OF ONE HAND WITHOUT COMPLICATION: ICD-10-CM

## 2022-08-05 RX ORDER — HYDROCODONE BITARTRATE AND ACETAMINOPHEN 10; 325 MG/1; MG/1
1 TABLET ORAL EVERY 12 HOURS PRN
Qty: 60 TABLET | Refills: 0 | Status: SHIPPED | OUTPATIENT
Start: 2022-08-05 | End: 2022-10-20 | Stop reason: SDUPTHER

## 2022-08-05 NOTE — TELEPHONE ENCOUNTER
----- Message from Opalyajoselo Montesinos sent at 8/5/2022  1:32 PM CDT -----  Contact: Ulysses  Pt is returning a missed call, pt stated he has been calling since Monday. Please give him a call back today at [phone].

## 2022-08-12 ENCOUNTER — TELEPHONE (OUTPATIENT)
Dept: INTERNAL MEDICINE | Facility: CLINIC | Age: 72
End: 2022-08-12
Payer: MEDICARE

## 2022-08-12 NOTE — TELEPHONE ENCOUNTER
----- Message from Itzel Tolbert sent at 8/12/2022 10:34 AM CDT -----  Pt is requesting a call back in regards to he having a head cold and the OTC med is not working and would the doctor to call him some antibiotic in. Pt can be reached at [phone] (home)         [id number]  [address]  [address] [facility] [address] [facility]  [address] [address]  Phone: [phone] Fax: [phone]

## 2022-08-19 DIAGNOSIS — E11.59 HYPERTENSION ASSOCIATED WITH DIABETES: Primary | ICD-10-CM

## 2022-08-19 DIAGNOSIS — I15.2 HYPERTENSION ASSOCIATED WITH DIABETES: Primary | ICD-10-CM

## 2022-08-25 ENCOUNTER — OFFICE VISIT (OUTPATIENT)
Dept: PAIN MEDICINE | Facility: CLINIC | Age: 72
End: 2022-08-25
Payer: MEDICARE

## 2022-08-25 VITALS
BODY MASS INDEX: 25.61 KG/M2 | HEART RATE: 83 BPM | DIASTOLIC BLOOD PRESSURE: 84 MMHG | WEIGHT: 205.94 LBS | HEIGHT: 75 IN | SYSTOLIC BLOOD PRESSURE: 136 MMHG

## 2022-08-25 DIAGNOSIS — G89.4 CHRONIC PAIN DISORDER: ICD-10-CM

## 2022-08-25 DIAGNOSIS — M79.642 PAIN OF LEFT HAND: ICD-10-CM

## 2022-08-25 DIAGNOSIS — G89.21 CHRONIC PAIN DUE TO TRAUMA: ICD-10-CM

## 2022-08-25 DIAGNOSIS — S68.119A TRAUMATIC AMPUTATION OF DIGIT OF ONE HAND WITHOUT COMPLICATION: Primary | ICD-10-CM

## 2022-08-25 DIAGNOSIS — Z79.891 OPIOID USE AGREEMENT EXISTS: ICD-10-CM

## 2022-08-25 DIAGNOSIS — S68.119A TRAUMATIC AMPUTATION OF DIGIT OF ONE HAND WITHOUT COMPLICATION: ICD-10-CM

## 2022-08-25 PROCEDURE — 99214 PR OFFICE/OUTPT VISIT, EST, LEVL IV, 30-39 MIN: ICD-10-PCS | Mod: S$GLB,,, | Performed by: PHYSICIAN ASSISTANT

## 2022-08-25 PROCEDURE — 3079F DIAST BP 80-89 MM HG: CPT | Mod: CPTII,S$GLB,, | Performed by: PHYSICIAN ASSISTANT

## 2022-08-25 PROCEDURE — 99999 PR PBB SHADOW E&M-EST. PATIENT-LVL IV: CPT | Mod: PBBFAC,,, | Performed by: PHYSICIAN ASSISTANT

## 2022-08-25 PROCEDURE — 3008F BODY MASS INDEX DOCD: CPT | Mod: CPTII,S$GLB,, | Performed by: PHYSICIAN ASSISTANT

## 2022-08-25 PROCEDURE — 3075F PR MOST RECENT SYSTOLIC BLOOD PRESS GE 130-139MM HG: ICD-10-PCS | Mod: CPTII,S$GLB,, | Performed by: PHYSICIAN ASSISTANT

## 2022-08-25 PROCEDURE — 3008F PR BODY MASS INDEX (BMI) DOCUMENTED: ICD-10-PCS | Mod: CPTII,S$GLB,, | Performed by: PHYSICIAN ASSISTANT

## 2022-08-25 PROCEDURE — 1125F PR PAIN SEVERITY QUANTIFIED, PAIN PRESENT: ICD-10-PCS | Mod: CPTII,S$GLB,, | Performed by: PHYSICIAN ASSISTANT

## 2022-08-25 PROCEDURE — 3079F PR MOST RECENT DIASTOLIC BLOOD PRESSURE 80-89 MM HG: ICD-10-PCS | Mod: CPTII,S$GLB,, | Performed by: PHYSICIAN ASSISTANT

## 2022-08-25 PROCEDURE — 1159F MED LIST DOCD IN RCRD: CPT | Mod: CPTII,S$GLB,, | Performed by: PHYSICIAN ASSISTANT

## 2022-08-25 PROCEDURE — 1101F PR PT FALLS ASSESS DOC 0-1 FALLS W/OUT INJ PAST YR: ICD-10-PCS | Mod: CPTII,S$GLB,, | Performed by: PHYSICIAN ASSISTANT

## 2022-08-25 PROCEDURE — 1125F AMNT PAIN NOTED PAIN PRSNT: CPT | Mod: CPTII,S$GLB,, | Performed by: PHYSICIAN ASSISTANT

## 2022-08-25 PROCEDURE — 1159F PR MEDICATION LIST DOCUMENTED IN MEDICAL RECORD: ICD-10-PCS | Mod: CPTII,S$GLB,, | Performed by: PHYSICIAN ASSISTANT

## 2022-08-25 PROCEDURE — 1160F RVW MEDS BY RX/DR IN RCRD: CPT | Mod: CPTII,S$GLB,, | Performed by: PHYSICIAN ASSISTANT

## 2022-08-25 PROCEDURE — 99999 PR PBB SHADOW E&M-EST. PATIENT-LVL IV: ICD-10-PCS | Mod: PBBFAC,,, | Performed by: PHYSICIAN ASSISTANT

## 2022-08-25 PROCEDURE — 3052F HG A1C>EQUAL 8.0%<EQUAL 9.0%: CPT | Mod: CPTII,S$GLB,, | Performed by: PHYSICIAN ASSISTANT

## 2022-08-25 PROCEDURE — 3288F FALL RISK ASSESSMENT DOCD: CPT | Mod: CPTII,S$GLB,, | Performed by: PHYSICIAN ASSISTANT

## 2022-08-25 PROCEDURE — 1101F PT FALLS ASSESS-DOCD LE1/YR: CPT | Mod: CPTII,S$GLB,, | Performed by: PHYSICIAN ASSISTANT

## 2022-08-25 PROCEDURE — 3052F PR MOST RECENT HEMOGLOBIN A1C LEVEL 8.0 - < 9.0%: ICD-10-PCS | Mod: CPTII,S$GLB,, | Performed by: PHYSICIAN ASSISTANT

## 2022-08-25 PROCEDURE — 1160F PR REVIEW ALL MEDS BY PRESCRIBER/CLIN PHARMACIST DOCUMENTED: ICD-10-PCS | Mod: CPTII,S$GLB,, | Performed by: PHYSICIAN ASSISTANT

## 2022-08-25 PROCEDURE — 99214 OFFICE O/P EST MOD 30 MIN: CPT | Mod: S$GLB,,, | Performed by: PHYSICIAN ASSISTANT

## 2022-08-25 PROCEDURE — 3288F PR FALLS RISK ASSESSMENT DOCUMENTED: ICD-10-PCS | Mod: CPTII,S$GLB,, | Performed by: PHYSICIAN ASSISTANT

## 2022-08-25 PROCEDURE — 3075F SYST BP GE 130 - 139MM HG: CPT | Mod: CPTII,S$GLB,, | Performed by: PHYSICIAN ASSISTANT

## 2022-08-25 RX ORDER — HYDROCODONE BITARTRATE AND ACETAMINOPHEN 10; 325 MG/1; MG/1
1 TABLET ORAL EVERY 12 HOURS PRN
Qty: 60 TABLET | Refills: 0 | Status: SHIPPED | OUTPATIENT
Start: 2022-09-04 | End: 2022-10-20 | Stop reason: SDUPTHER

## 2022-08-25 RX ORDER — HYDROCODONE BITARTRATE AND ACETAMINOPHEN 10; 325 MG/1; MG/1
1 TABLET ORAL EVERY 12 HOURS PRN
Qty: 60 TABLET | Refills: 0 | Status: SHIPPED | OUTPATIENT
Start: 2022-10-04 | End: 2022-12-22 | Stop reason: SDUPTHER

## 2022-08-25 NOTE — PROGRESS NOTES
Subjective:     Chief Complaint:  Left Hand Pain    Interval History (8/25/2022): Ulysses Boreaux was last seen on 6/30/2022. he presents today for follow-up for medication refill.  Medication is providing adequate pain relief. he feels the pain medication reduces the negative effects of chronic pain that affects day-to-day function and quality of life. No major changes since previous visit; patient is stable overall. Patient reports pain as 5/10 today.     History of Present Illness:  This patient is a 65 year old male who presents today for f/u complaining of the above noted pains. The patient describes this pain as follows.    - duration (acute, chronic): left hand pain since 1997 after table saw amputation of digits 2 through 5 at work, left lower quadrant pain since hernia surgery in 2004  - timing (constant, intermittent): Constant  - character (sharp, dull, aching, burning): Throbbing  - radiating: No  - dermatomal distribution: No  - side: Left   - aggravating factors: Nothing worsens left digit pain, left lower quadrant pain worse with exercise or walking  - relieving factors: Medication / Norco  - antecedent trauma: Amputation via skill saw and 1997, hernia repair in 2004  - prior spinal surgery: None  - pertinent negatives: No fever, No chills, No weight loss, No bladder dysfunction, No bowel dysfunction, No extremity weakness, No saddle anesthesia  - medications tried: Norco, Percocet, over-the-counter medications, compound pain cream  - other therapies tried (physical therapy, injections):     >> physical therapy tried in the past    >> no prior injections        Imaging/ Labs (reviewed on 8/25/2022):    7/12/2018 US ABDOMINAL AORTA  CLINICAL HISTORY:  Abnormal findings on diagnostic imaging of other specified body structures  TECHNIQUE:  Limited ultrasound was performed of the abdominal aorta, with cross sectional diameter measurements obtained.  COMPARISON:  01/10/2018  FINDINGS:  The proximal  "abdominal aorta measures 2.7 cm.  The mid abdominal aorta measures 1.8 cm.  The distal abdominal aorta measures 1.9 cm, slightly ectatic and unchanged from prior.  The right iliac artery measures 1.0 cm.  The left iliac artery measures 1.1 cm.  Aortoiliac atherosclerosis: Mild  Impression: Stable examination with slight distal abdominal aortic ectasia.  Negative for aneurysm.         ROS:  CONSTITUTIONAL: No fever, chills, weight loss  SKIN: No rash or itching  CARDIOVASCULAR:  No chest pain, palpitations  RESPIRATORY: No shortness of breath  GASTROINTESTINAL: No diarrhea, No constipation, abdominal pain, left lower quadrant  GENITOURINARY: No urinary incontinence    MUSCULOSKELETAL:  - patient reports pain as above, see chief complaint     NEUROLOGICAL:   - Pain as above  - Strength in lower extremities is normal  - Sensation in lower extremities is normal  - No bowel or bladder incontinence     PSYCHIATRIC: No change in mood noticed         Objective:     Physical Exam:  Vitals:    08/25/22 1033   BP: 136/84   Pulse: 83   Weight: 93.4 kg (205 lb 14.6 oz)   Height: 6' 3" (1.905 m)   PainSc:   6   PainLoc: Back    Body mass index is 25.74 kg/m².   (reviewed on 8/25/2022)    General: alert and oriented, in no apparent distress  Gait: normal gait  Skin: No rashes, No discoloration   HEENT: EOMI  Respiratory: respirations nonlabored  Cardiology: No obvious peripheral edema  GI: no obvious distention     Musculoskeletal:  - ROM preserved   - No pain with lumbar flexion/ extension  - Left upper extremity range of motion intact throughout  - Digital stumps are hypersensitive to palpation, hypersensitivity does not extend further proximal  - No color change, no swelling, no changes in hair growth along left hand    Neuro:  - Lower extremities:      >> 5/5 strength bilaterally, throughout, symmetric  - Upper extremities:    >> 5/5 strength bilaterally    Psych: mood and affect appropriate        Assessment:     Ulysses " Oma is a 72 y.o. male who presents with     ICD-10-CM ICD-9-CM   1. 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 Chronic pain due to trauma  G89.21 338.21   3. Chronic pain disorder  G89.4 338.4   4. Pain of left hand  M79.642 729.5   5. Opioid use agreement exists  Z79.891 V58.69       Plan:   1. Interventional: None.    2. Pharmacologic:   - Refill Norco 10/325 mg PO BID PRN (60 tabs) x 2 months. Will e-prescribe for 9/4/22 and 10/4/22. Patient tolerating opioids with no side effects, obtaining good pain control with functional improvement. He tolerates this medication well, and he denies any drowsiness or constipation.  - Opioid contract signed on 12/12/2019 with Dr. Olmedo. Updated opioid contract signed with Dr. Love on 10/28/20.  - LA  reviewed and appropriate.     - Last UDS 6/30/2022 was compliant for medications prescribed.     3. Rehabilitative: Encouraged regular exercise.    4. Diagnostic: None for now.    5. Follow up: 8 week medication Refill     - I discussed the risks, benefits, and alternatives to potential treatment options. All questions and concerns were fully addressed today in clinic.            >> UDS:  8-18-15 :: appropriate  12-29-15 :: appropriate  5-3-16 :: appropriate  10-18-16 :: appropriate  4/13/2017 :: appropriate  9/29/2017 :: appropriate  3/9/2018 :: appropriate  9/4/2018 :: appropriate  6/19/2019 :: appropriate  12/12/2019 :: appropriate  6/12/2020 ::  Appropriate  10/2/2020 :: Appropriate  2/24/2021 :: Appropriate  8/24/2021 :: Appropriate  12/21/2021 :: Appropriate  6/30/2022 :: Appropriate

## 2022-08-31 ENCOUNTER — EXTERNAL CHRONIC CARE MANAGEMENT (OUTPATIENT)
Dept: PRIMARY CARE CLINIC | Facility: CLINIC | Age: 72
End: 2022-08-31
Payer: MEDICARE

## 2022-08-31 PROCEDURE — 99490 PR CHRONIC CARE MGMT, 1ST 20 MIN: ICD-10-PCS | Mod: S$GLB,,, | Performed by: FAMILY MEDICINE

## 2022-08-31 PROCEDURE — 99490 CHRNC CARE MGMT STAFF 1ST 20: CPT | Mod: S$GLB,,, | Performed by: FAMILY MEDICINE

## 2022-09-08 ENCOUNTER — OFFICE VISIT (OUTPATIENT)
Dept: INTERNAL MEDICINE | Facility: CLINIC | Age: 72
End: 2022-09-08
Payer: MEDICARE

## 2022-09-08 VITALS
OXYGEN SATURATION: 97 % | SYSTOLIC BLOOD PRESSURE: 138 MMHG | WEIGHT: 206.25 LBS | TEMPERATURE: 98 F | BODY MASS INDEX: 25.64 KG/M2 | DIASTOLIC BLOOD PRESSURE: 82 MMHG | HEIGHT: 75 IN | HEART RATE: 73 BPM

## 2022-09-08 DIAGNOSIS — I15.2 HYPERTENSION ASSOCIATED WITH DIABETES: ICD-10-CM

## 2022-09-08 DIAGNOSIS — I70.0 ATHEROSCLEROSIS OF AORTA: ICD-10-CM

## 2022-09-08 DIAGNOSIS — Z23 NEED FOR PNEUMOCOCCAL VACCINATION: ICD-10-CM

## 2022-09-08 DIAGNOSIS — E11.9 TYPE 2 DIABETES MELLITUS WITHOUT COMPLICATION, WITH LONG-TERM CURRENT USE OF INSULIN: ICD-10-CM

## 2022-09-08 DIAGNOSIS — E11.69 HYPERLIPIDEMIA ASSOCIATED WITH TYPE 2 DIABETES MELLITUS: ICD-10-CM

## 2022-09-08 DIAGNOSIS — Z00.00 ROUTINE GENERAL MEDICAL EXAMINATION AT A HEALTH CARE FACILITY: Primary | ICD-10-CM

## 2022-09-08 DIAGNOSIS — E11.59 HYPERTENSION ASSOCIATED WITH DIABETES: ICD-10-CM

## 2022-09-08 DIAGNOSIS — G89.21 CHRONIC PAIN DUE TO TRAUMA: ICD-10-CM

## 2022-09-08 DIAGNOSIS — Z79.4 TYPE 2 DIABETES MELLITUS WITHOUT COMPLICATION, WITH LONG-TERM CURRENT USE OF INSULIN: ICD-10-CM

## 2022-09-08 DIAGNOSIS — E78.5 HYPERLIPIDEMIA ASSOCIATED WITH TYPE 2 DIABETES MELLITUS: ICD-10-CM

## 2022-09-08 PROCEDURE — 3008F PR BODY MASS INDEX (BMI) DOCUMENTED: ICD-10-PCS | Mod: CPTII,S$GLB,, | Performed by: FAMILY MEDICINE

## 2022-09-08 PROCEDURE — 3288F FALL RISK ASSESSMENT DOCD: CPT | Mod: CPTII,S$GLB,, | Performed by: FAMILY MEDICINE

## 2022-09-08 PROCEDURE — G0009 PNEUMOCOCCAL CONJUGATE VACCINE 20-VALENT: ICD-10-PCS | Mod: S$GLB,,, | Performed by: FAMILY MEDICINE

## 2022-09-08 PROCEDURE — 3079F PR MOST RECENT DIASTOLIC BLOOD PRESSURE 80-89 MM HG: ICD-10-PCS | Mod: CPTII,S$GLB,, | Performed by: FAMILY MEDICINE

## 2022-09-08 PROCEDURE — 1159F MED LIST DOCD IN RCRD: CPT | Mod: CPTII,S$GLB,, | Performed by: FAMILY MEDICINE

## 2022-09-08 PROCEDURE — 90677 PNEUMOCOCCAL CONJUGATE VACCINE 20-VALENT: ICD-10-PCS | Mod: S$GLB,,, | Performed by: FAMILY MEDICINE

## 2022-09-08 PROCEDURE — 1101F PR PT FALLS ASSESS DOC 0-1 FALLS W/OUT INJ PAST YR: ICD-10-PCS | Mod: CPTII,S$GLB,, | Performed by: FAMILY MEDICINE

## 2022-09-08 PROCEDURE — 99999 PR PBB SHADOW E&M-EST. PATIENT-LVL V: ICD-10-PCS | Mod: PBBFAC,,, | Performed by: FAMILY MEDICINE

## 2022-09-08 PROCEDURE — G0009 ADMIN PNEUMOCOCCAL VACCINE: HCPCS | Mod: S$GLB,,, | Performed by: FAMILY MEDICINE

## 2022-09-08 PROCEDURE — 3288F PR FALLS RISK ASSESSMENT DOCUMENTED: ICD-10-PCS | Mod: CPTII,S$GLB,, | Performed by: FAMILY MEDICINE

## 2022-09-08 PROCEDURE — 3052F PR MOST RECENT HEMOGLOBIN A1C LEVEL 8.0 - < 9.0%: ICD-10-PCS | Mod: CPTII,S$GLB,, | Performed by: FAMILY MEDICINE

## 2022-09-08 PROCEDURE — 1126F PR PAIN SEVERITY QUANTIFIED, NO PAIN PRESENT: ICD-10-PCS | Mod: CPTII,S$GLB,, | Performed by: FAMILY MEDICINE

## 2022-09-08 PROCEDURE — 90677 PCV20 VACCINE IM: CPT | Mod: S$GLB,,, | Performed by: FAMILY MEDICINE

## 2022-09-08 PROCEDURE — 3075F SYST BP GE 130 - 139MM HG: CPT | Mod: CPTII,S$GLB,, | Performed by: FAMILY MEDICINE

## 2022-09-08 PROCEDURE — 99397 PR PREVENTIVE VISIT,EST,65 & OVER: ICD-10-PCS | Mod: 25,S$GLB,, | Performed by: FAMILY MEDICINE

## 2022-09-08 PROCEDURE — 3075F PR MOST RECENT SYSTOLIC BLOOD PRESS GE 130-139MM HG: ICD-10-PCS | Mod: CPTII,S$GLB,, | Performed by: FAMILY MEDICINE

## 2022-09-08 PROCEDURE — 1101F PT FALLS ASSESS-DOCD LE1/YR: CPT | Mod: CPTII,S$GLB,, | Performed by: FAMILY MEDICINE

## 2022-09-08 PROCEDURE — 3008F BODY MASS INDEX DOCD: CPT | Mod: CPTII,S$GLB,, | Performed by: FAMILY MEDICINE

## 2022-09-08 PROCEDURE — 1159F PR MEDICATION LIST DOCUMENTED IN MEDICAL RECORD: ICD-10-PCS | Mod: CPTII,S$GLB,, | Performed by: FAMILY MEDICINE

## 2022-09-08 PROCEDURE — 99499 UNLISTED E&M SERVICE: CPT | Mod: HCNC,S$GLB,, | Performed by: FAMILY MEDICINE

## 2022-09-08 PROCEDURE — 1126F AMNT PAIN NOTED NONE PRSNT: CPT | Mod: CPTII,S$GLB,, | Performed by: FAMILY MEDICINE

## 2022-09-08 PROCEDURE — 99397 PER PM REEVAL EST PAT 65+ YR: CPT | Mod: 25,S$GLB,, | Performed by: FAMILY MEDICINE

## 2022-09-08 PROCEDURE — 99999 PR PBB SHADOW E&M-EST. PATIENT-LVL V: CPT | Mod: PBBFAC,,, | Performed by: FAMILY MEDICINE

## 2022-09-08 PROCEDURE — 3052F HG A1C>EQUAL 8.0%<EQUAL 9.0%: CPT | Mod: CPTII,S$GLB,, | Performed by: FAMILY MEDICINE

## 2022-09-08 PROCEDURE — 3079F DIAST BP 80-89 MM HG: CPT | Mod: CPTII,S$GLB,, | Performed by: FAMILY MEDICINE

## 2022-09-08 NOTE — PATIENT INSTRUCTIONS
Get your flu vaccine this season, October is the optimal time. Flu vaccines start becoming available in September and we continue vaccinating through the winter months.

## 2022-09-09 PROBLEM — E05.90 HYPERTHYROIDISM: Status: RESOLVED | Noted: 2019-10-10 | Resolved: 2022-09-09

## 2022-09-09 NOTE — PROGRESS NOTES
Subjective:       Patient ID: Ulysses Boreaux is a 72 y.o. male.    Chief Complaint: Annual Exam    Patient presents to clinic today for annual physical exam.      Review of Systems   Constitutional:  Negative for chills, fatigue, fever and unexpected weight change.   HENT:  Negative for congestion, dental problem, ear pain, hearing loss, rhinorrhea and trouble swallowing.    Eyes:  Negative for pain and visual disturbance.   Respiratory:  Negative for cough and shortness of breath.    Cardiovascular:  Negative for chest pain, palpitations and leg swelling.   Gastrointestinal:  Negative for abdominal distention, abdominal pain, blood in stool, constipation, diarrhea, nausea and vomiting.   Genitourinary:  Negative for difficulty urinating, scrotal swelling and testicular pain.   Musculoskeletal:  Negative for arthralgias and myalgias.   Skin:  Negative for rash.   Neurological:  Negative for dizziness, weakness, numbness and headaches.   Hematological:  Negative for adenopathy. Does not bruise/bleed easily.   Psychiatric/Behavioral:  Negative for dysphoric mood and sleep disturbance. The patient is not nervous/anxious.        Objective:      Physical Exam  Vitals reviewed.   Constitutional:       General: He is not in acute distress.     Appearance: He is well-developed.   HENT:      Head: Normocephalic and atraumatic.      Right Ear: Hearing, tympanic membrane, ear canal and external ear normal.      Left Ear: Hearing, tympanic membrane, ear canal and external ear normal.      Nose: Nose normal.      Mouth/Throat:      Pharynx: Uvula midline.   Eyes:      General: Lids are normal. No scleral icterus.     Conjunctiva/sclera: Conjunctivae normal.      Pupils: Pupils are equal, round, and reactive to light.   Neck:      Thyroid: No thyromegaly.   Cardiovascular:      Rate and Rhythm: Normal rate and regular rhythm.      Heart sounds: No murmur heard.    No friction rub. No gallop.   Pulmonary:      Effort: Pulmonary  effort is normal.      Breath sounds: Normal breath sounds. No wheezing or rales.   Abdominal:      General: Bowel sounds are normal. There is no distension.      Palpations: Abdomen is soft. There is no mass.      Tenderness: There is no abdominal tenderness.   Musculoskeletal:         General: No tenderness. Normal range of motion.      Cervical back: Normal range of motion and neck supple.   Lymphadenopathy:      Cervical: No cervical adenopathy.   Skin:     General: Skin is warm and dry.      Findings: No rash.   Neurological:      Mental Status: He is alert and oriented to person, place, and time.      Cranial Nerves: No cranial nerve deficit.      Gait: Gait normal.   Psychiatric:         Mood and Affect: Mood and affect normal.       Assessment:       1. Routine general medical examination at a health care facility    2. Hypertension associated with diabetes    3. Type 2 diabetes mellitus without complication, with long-term current use of insulin    4. Hyperlipidemia associated with type 2 diabetes mellitus    5. Atherosclerosis of aorta    6. Chronic pain due to trauma    7. Need for pneumococcal vaccination          Plan:   1. Routine general medical examination at a health care facility    2. Hypertension associated with diabetes  Assessment & Plan:  Controlled, continue amlodipine, losartan-HCTZ and nebivolol      3. Type 2 diabetes mellitus without complication, with long-term current use of insulin  Assessment & Plan:  Status pending lab, continue current medications and follow up with Mrs. Rothman.    Orders:  -     Ambulatory referral/consult to Optometry    4. Hyperlipidemia associated with type 2 diabetes mellitus  Assessment & Plan:  Status pending lab, continue atorvastatin      5. Atherosclerosis of aorta  Overview:   7/2018      6. Chronic pain due to trauma  Overview:  Followed by Pain Management, continue current treatment plan       7. Need for pneumococcal vaccination  -      Pneumococcal Conjugate Vaccine (20 Valent) (IM)      Health Maintenance reviewed/updated.

## 2022-09-14 ENCOUNTER — LAB VISIT (OUTPATIENT)
Dept: LAB | Facility: HOSPITAL | Age: 72
End: 2022-09-14
Attending: NURSE PRACTITIONER
Payer: MEDICARE

## 2022-09-14 DIAGNOSIS — E11.59 HYPERTENSION ASSOCIATED WITH DIABETES: ICD-10-CM

## 2022-09-14 DIAGNOSIS — E11.9 TYPE 2 DIABETES MELLITUS WITHOUT COMPLICATION, WITH LONG-TERM CURRENT USE OF INSULIN: ICD-10-CM

## 2022-09-14 DIAGNOSIS — Z79.4 TYPE 2 DIABETES MELLITUS WITHOUT COMPLICATION, WITH LONG-TERM CURRENT USE OF INSULIN: ICD-10-CM

## 2022-09-14 DIAGNOSIS — I15.2 HYPERTENSION ASSOCIATED WITH DIABETES: ICD-10-CM

## 2022-09-14 LAB
ALBUMIN SERPL BCP-MCNC: 4.1 G/DL (ref 3.5–5.2)
ALP SERPL-CCNC: 60 U/L (ref 55–135)
ALT SERPL W/O P-5'-P-CCNC: 26 U/L (ref 10–44)
ANION GAP SERPL CALC-SCNC: 8 MMOL/L (ref 8–16)
AST SERPL-CCNC: 23 U/L (ref 10–40)
BASOPHILS # BLD AUTO: 0.06 K/UL (ref 0–0.2)
BASOPHILS NFR BLD: 1.4 % (ref 0–1.9)
BILIRUB SERPL-MCNC: 0.6 MG/DL (ref 0.1–1)
BUN SERPL-MCNC: 12 MG/DL (ref 8–23)
CALCIUM SERPL-MCNC: 10 MG/DL (ref 8.7–10.5)
CHLORIDE SERPL-SCNC: 101 MMOL/L (ref 95–110)
CHOLEST SERPL-MCNC: 210 MG/DL (ref 120–199)
CHOLEST/HDLC SERPL: 3.6 {RATIO} (ref 2–5)
CO2 SERPL-SCNC: 29 MMOL/L (ref 23–29)
CREAT SERPL-MCNC: 1.4 MG/DL (ref 0.5–1.4)
DIFFERENTIAL METHOD: ABNORMAL
EOSINOPHIL # BLD AUTO: 0.2 K/UL (ref 0–0.5)
EOSINOPHIL NFR BLD: 4.8 % (ref 0–8)
ERYTHROCYTE [DISTWIDTH] IN BLOOD BY AUTOMATED COUNT: 13.2 % (ref 11.5–14.5)
EST. GFR  (NO RACE VARIABLE): 54.1 ML/MIN/1.73 M^2
ESTIMATED AVG GLUCOSE: 183 MG/DL (ref 68–131)
GLUCOSE SERPL-MCNC: 161 MG/DL (ref 70–110)
HBA1C MFR BLD: 8 % (ref 4–5.6)
HCT VFR BLD AUTO: 48.7 % (ref 40–54)
HDLC SERPL-MCNC: 58 MG/DL (ref 40–75)
HDLC SERPL: 27.6 % (ref 20–50)
HGB BLD-MCNC: 15.9 G/DL (ref 14–18)
IMM GRANULOCYTES # BLD AUTO: 0.01 K/UL (ref 0–0.04)
IMM GRANULOCYTES NFR BLD AUTO: 0.2 % (ref 0–0.5)
LDLC SERPL CALC-MCNC: 130.2 MG/DL (ref 63–159)
LYMPHOCYTES # BLD AUTO: 2.1 K/UL (ref 1–4.8)
LYMPHOCYTES NFR BLD: 47.4 % (ref 18–48)
MCH RBC QN AUTO: 31.5 PG (ref 27–31)
MCHC RBC AUTO-ENTMCNC: 32.6 G/DL (ref 32–36)
MCV RBC AUTO: 97 FL (ref 82–98)
MONOCYTES # BLD AUTO: 0.5 K/UL (ref 0.3–1)
MONOCYTES NFR BLD: 10.6 % (ref 4–15)
NEUTROPHILS # BLD AUTO: 1.6 K/UL (ref 1.8–7.7)
NEUTROPHILS NFR BLD: 35.6 % (ref 38–73)
NONHDLC SERPL-MCNC: 152 MG/DL
NRBC BLD-RTO: 0 /100 WBC
PLATELET # BLD AUTO: 250 K/UL (ref 150–450)
PMV BLD AUTO: 10.7 FL (ref 9.2–12.9)
POTASSIUM SERPL-SCNC: 4.5 MMOL/L (ref 3.5–5.1)
PROT SERPL-MCNC: 7.6 G/DL (ref 6–8.4)
RBC # BLD AUTO: 5.04 M/UL (ref 4.6–6.2)
SODIUM SERPL-SCNC: 138 MMOL/L (ref 136–145)
TRIGL SERPL-MCNC: 109 MG/DL (ref 30–150)
TSH SERPL DL<=0.005 MIU/L-ACNC: 0.69 UIU/ML (ref 0.4–4)
WBC # BLD AUTO: 4.35 K/UL (ref 3.9–12.7)

## 2022-09-14 PROCEDURE — 84443 ASSAY THYROID STIM HORMONE: CPT | Performed by: PHYSICIAN ASSISTANT

## 2022-09-14 PROCEDURE — 80061 LIPID PANEL: CPT | Performed by: PHYSICIAN ASSISTANT

## 2022-09-14 PROCEDURE — 85025 COMPLETE CBC W/AUTO DIFF WBC: CPT | Performed by: PHYSICIAN ASSISTANT

## 2022-09-14 PROCEDURE — 83036 HEMOGLOBIN GLYCOSYLATED A1C: CPT | Performed by: NURSE PRACTITIONER

## 2022-09-14 PROCEDURE — 80053 COMPREHEN METABOLIC PANEL: CPT | Performed by: PHYSICIAN ASSISTANT

## 2022-09-14 PROCEDURE — 36415 COLL VENOUS BLD VENIPUNCTURE: CPT | Performed by: PHYSICIAN ASSISTANT

## 2022-09-15 ENCOUNTER — TELEPHONE (OUTPATIENT)
Dept: PAIN MEDICINE | Facility: CLINIC | Age: 72
End: 2022-09-15
Payer: MEDICARE

## 2022-09-15 NOTE — TELEPHONE ENCOUNTER
----- Message from Breana Rea sent at 9/15/2022 10:47 AM CDT -----  Contact: Ulysses  Type:  Patient Returning Call    Who Called: Ulysses  Who Left Message for Patient: Nurse  Does the patient know what this is regarding?: Appointment in October  Would the patient rather a call back or a response via [facility]? Call  Best Call Back Number: [phone]  Additional Information:

## 2022-09-15 NOTE — TELEPHONE ENCOUNTER
Confirmed location of appointment for 10/20/2022 with Ysabel Garcia PA-C at [facility]

## 2022-09-22 ENCOUNTER — OFFICE VISIT (OUTPATIENT)
Dept: DIABETES | Facility: CLINIC | Age: 72
End: 2022-09-22
Payer: MEDICARE

## 2022-09-22 VITALS
SYSTOLIC BLOOD PRESSURE: 146 MMHG | HEART RATE: 78 BPM | WEIGHT: 206.81 LBS | BODY MASS INDEX: 25.85 KG/M2 | DIASTOLIC BLOOD PRESSURE: 93 MMHG

## 2022-09-22 DIAGNOSIS — I15.2 HYPERTENSION ASSOCIATED WITH DIABETES: ICD-10-CM

## 2022-09-22 DIAGNOSIS — E78.5 HYPERLIPIDEMIA ASSOCIATED WITH TYPE 2 DIABETES MELLITUS: ICD-10-CM

## 2022-09-22 DIAGNOSIS — E11.69 HYPERLIPIDEMIA ASSOCIATED WITH TYPE 2 DIABETES MELLITUS: ICD-10-CM

## 2022-09-22 DIAGNOSIS — Z79.4 TYPE 2 DIABETES MELLITUS WITHOUT COMPLICATION, WITH LONG-TERM CURRENT USE OF INSULIN: Primary | ICD-10-CM

## 2022-09-22 DIAGNOSIS — E11.9 TYPE 2 DIABETES MELLITUS WITHOUT COMPLICATION, WITH LONG-TERM CURRENT USE OF INSULIN: Primary | ICD-10-CM

## 2022-09-22 DIAGNOSIS — E11.59 HYPERTENSION ASSOCIATED WITH DIABETES: ICD-10-CM

## 2022-09-22 LAB — GLUCOSE SERPL-MCNC: 219 MG/DL (ref 70–110)

## 2022-09-22 PROCEDURE — 1101F PT FALLS ASSESS-DOCD LE1/YR: CPT | Mod: CPTII,S$GLB,, | Performed by: NURSE PRACTITIONER

## 2022-09-22 PROCEDURE — 1159F PR MEDICATION LIST DOCUMENTED IN MEDICAL RECORD: ICD-10-PCS | Mod: CPTII,S$GLB,, | Performed by: NURSE PRACTITIONER

## 2022-09-22 PROCEDURE — 3052F HG A1C>EQUAL 8.0%<EQUAL 9.0%: CPT | Mod: CPTII,S$GLB,, | Performed by: NURSE PRACTITIONER

## 2022-09-22 PROCEDURE — 82962 GLUCOSE BLOOD TEST: CPT | Mod: S$GLB,,, | Performed by: NURSE PRACTITIONER

## 2022-09-22 PROCEDURE — 3077F SYST BP >= 140 MM HG: CPT | Mod: CPTII,S$GLB,, | Performed by: NURSE PRACTITIONER

## 2022-09-22 PROCEDURE — 1159F MED LIST DOCD IN RCRD: CPT | Mod: CPTII,S$GLB,, | Performed by: NURSE PRACTITIONER

## 2022-09-22 PROCEDURE — 3080F DIAST BP >= 90 MM HG: CPT | Mod: CPTII,S$GLB,, | Performed by: NURSE PRACTITIONER

## 2022-09-22 PROCEDURE — 3052F PR MOST RECENT HEMOGLOBIN A1C LEVEL 8.0 - < 9.0%: ICD-10-PCS | Mod: CPTII,S$GLB,, | Performed by: NURSE PRACTITIONER

## 2022-09-22 PROCEDURE — 99214 PR OFFICE/OUTPT VISIT, EST, LEVL IV, 30-39 MIN: ICD-10-PCS | Mod: S$GLB,,, | Performed by: NURSE PRACTITIONER

## 2022-09-22 PROCEDURE — 82962 POCT GLUCOSE, HAND-HELD DEVICE: ICD-10-PCS | Mod: S$GLB,,, | Performed by: NURSE PRACTITIONER

## 2022-09-22 PROCEDURE — 99999 PR PBB SHADOW E&M-EST. PATIENT-LVL IV: CPT | Mod: PBBFAC,,, | Performed by: NURSE PRACTITIONER

## 2022-09-22 PROCEDURE — 3008F BODY MASS INDEX DOCD: CPT | Mod: CPTII,S$GLB,, | Performed by: NURSE PRACTITIONER

## 2022-09-22 PROCEDURE — 3080F PR MOST RECENT DIASTOLIC BLOOD PRESSURE >= 90 MM HG: ICD-10-PCS | Mod: CPTII,S$GLB,, | Performed by: NURSE PRACTITIONER

## 2022-09-22 PROCEDURE — 3288F FALL RISK ASSESSMENT DOCD: CPT | Mod: CPTII,S$GLB,, | Performed by: NURSE PRACTITIONER

## 2022-09-22 PROCEDURE — 3077F PR MOST RECENT SYSTOLIC BLOOD PRESSURE >= 140 MM HG: ICD-10-PCS | Mod: CPTII,S$GLB,, | Performed by: NURSE PRACTITIONER

## 2022-09-22 PROCEDURE — 99999 PR PBB SHADOW E&M-EST. PATIENT-LVL IV: ICD-10-PCS | Mod: PBBFAC,,, | Performed by: NURSE PRACTITIONER

## 2022-09-22 PROCEDURE — 3288F PR FALLS RISK ASSESSMENT DOCUMENTED: ICD-10-PCS | Mod: CPTII,S$GLB,, | Performed by: NURSE PRACTITIONER

## 2022-09-22 PROCEDURE — 99214 OFFICE O/P EST MOD 30 MIN: CPT | Mod: S$GLB,,, | Performed by: NURSE PRACTITIONER

## 2022-09-22 PROCEDURE — 1126F PR PAIN SEVERITY QUANTIFIED, NO PAIN PRESENT: ICD-10-PCS | Mod: CPTII,S$GLB,, | Performed by: NURSE PRACTITIONER

## 2022-09-22 PROCEDURE — 1160F RVW MEDS BY RX/DR IN RCRD: CPT | Mod: CPTII,S$GLB,, | Performed by: NURSE PRACTITIONER

## 2022-09-22 PROCEDURE — 3008F PR BODY MASS INDEX (BMI) DOCUMENTED: ICD-10-PCS | Mod: CPTII,S$GLB,, | Performed by: NURSE PRACTITIONER

## 2022-09-22 PROCEDURE — 1160F PR REVIEW ALL MEDS BY PRESCRIBER/CLIN PHARMACIST DOCUMENTED: ICD-10-PCS | Mod: CPTII,S$GLB,, | Performed by: NURSE PRACTITIONER

## 2022-09-22 PROCEDURE — 1101F PR PT FALLS ASSESS DOC 0-1 FALLS W/OUT INJ PAST YR: ICD-10-PCS | Mod: CPTII,S$GLB,, | Performed by: NURSE PRACTITIONER

## 2022-09-22 PROCEDURE — 1126F AMNT PAIN NOTED NONE PRSNT: CPT | Mod: CPTII,S$GLB,, | Performed by: NURSE PRACTITIONER

## 2022-09-22 RX ORDER — INSULIN PUMP SYRINGE, 3 ML
EACH MISCELLANEOUS
Qty: 1 EACH | Refills: 0 | Status: SHIPPED | OUTPATIENT
Start: 2022-09-22 | End: 2022-11-10

## 2022-09-22 NOTE — PROGRESS NOTES
PCP: Elza Pantoja MD    Subjective:     Chief Complaint: Diabetes follow up.  Last visit with me: 2022    HPI: 71 y.o.   male for diabetes follow up.   Previously managed by Dr. Jackson and YASEMIN Dale NP.  Type II diabetes since  .  Comorbidities: HTN, HLD and Hyperthyroidism  Diabetes complications: without complications    Interval history:  Denies recent hospital admissions or ER visits.   Denies having hypoglycemia.   Endorses the following diabetes related symptoms: None.    Blood glucose monitoring fingerstick: 2x/day   Per patient's recall over the last 4 weeks,   Fasting BG range 140-150   PP dinner range     Activity level: Sedentary- none  Meal Plannin meals/day, breakfast and dinner, infrequent snacks/day.  Skips lunch.     Medication compliant all of the time; diet compliant most of the time.   Has attended diabetes education in the past.     Previous regimen: Januvia - stopped once started on Trulicity (DPP4 not recommended)    Past failed treatment include: none    Current DM Medications:  Diabetes Medications               dulaglutide (TRULICITY) 0.75 mg/0.5 mL pen injector Inject 0.75 mg into the skin every 7 days.    empagliflozin (JARDIANCE) 10 mg tablet Take 1 tablet (10 mg total) by mouth once daily.    insulin NPH-insulin regular, 70/30, (NOVOLIN 70/30 U-100 INSULIN) 100 unit/mL (70-30) injection Inject 22 Units into the skin 2 (two) times daily before meals. In the morning and in the evening.       Allergies  Review of patient's allergies indicates:  No Known Allergies    Labs Reviewed:  His most recent A1C is:  Lab Results   Component Value Date    HGBA1C 8.0 (H) 2022    HGBA1C 8.0 (H) 06/15/2022    HGBA1C 6.9 (H) 2022    HGBA1C 6.9 (H) 2022       His most recent BMP is:  Lab Results   Component Value Date     2022    K 4.5 2022     2022    CO2 29 2022    BUN 12 2022    CREATININE 1.4 2022  "   CALCIUM 10.0 2022    ANIONGAP 8 2022    ESTGFRAFRICA >60.0 2022    EGFRNONAA 55.7 (A) 2022       Lab Results   Component Value Date    CPEPTIDE 1.33 10/20/2017     Lab Results   Component Value Date    GLUTAMICACID 0.00 10/20/2017       Body mass index is 25.85 kg/m².    Weight lost 5 lbs since last visit.  Wt Readings from Last 3 Encounters:   22 0947 93.8 kg (206 lb 12.7 oz)   22 1013 93.6 kg (206 lb 3.9 oz)   22 1033 93.4 kg (205 lb 14.6 oz)        His blood sugar in clinic today is:  Lab Results   Component Value Date    POCGLU 219 (A) 2022       Diabetes Management Status  Statin: Taking  ACE/ARB: Taking    Screening or Prevention Patient's value Goal Complete/Controlled?   HgA1C Testing and Control   Lab Results   Component Value Date    HGBA1C 8.0 (H) 2022      Annually/Less than 8% No     Lipid profile : 2022 Annually Yes     LDL control Lab Results   Component Value Date    LDLCALC 130.2 2022    Annually/Less than 100 mg/dl  No     Nephropathy screening Lab Results   Component Value Date    MICALBCREAT 26.7 2021     No results found for: PROTEINUA Annually No     Blood pressure BP Readings from Last 1 Encounters:   22 (!) 146/93    Less than 140/90 No     Dilated retinal exam : 2020 Annually No    Foot exam   : 2021 Annually Yes       Social History     Socioeconomic History    Marital status:     Number of children: 2    Highest education level: 11th grade   Occupational History    Occupation: retired   Tobacco Use    Smoking status: Former     Packs/day: 0.50     Types: Cigarettes     Quit date: 1972     Years since quittin.6    Smokeless tobacco: Never   Substance and Sexual Activity    Alcohol use: Yes     Alcohol/week: 0.0 standard drinks     Comment: " Every blue moon"    Drug use: No    Sexual activity: Yes     Partners: Female   Social History Narrative    . Has 2 children. Patient " disabled- was .      Past Medical History:   Diagnosis Date    Allergy     Amputation of finger of left hand     all fingers except for thumb    Cataract     OS NGCL     Chronic pain due to trauma     traumatic amputations left hand    Diabetes mellitus, type 2     History of hepatitis C 02/13/2014    tx'd w/ Js 2018    Hyperlipidemia     Hypertension     Hyperthyroidism 10/10/2019    Refractive error      Review of Systems   Constitutional: Negative for activity change, appetite change, fatigue and unexpected weight change.   HENT: Negative for dental problem and trouble swallowing.    Eyes: Negative for visual disturbance.   Respiratory: Negative for chest tightness and shortness of breath.    Cardiovascular: Negative for chest pain, palpitations and leg swelling.   Gastrointestinal: Negative for abdominal pain, constipation, diarrhea, nausea and vomiting.   Endocrine: Negative for polydipsia, polyphagia and polyuria.   Genitourinary: Negative for difficulty urinating, dysuria, frequency and urgency.        Negative yeast infection   Musculoskeletal: Negative for gait problem, myalgias and neck pain.   Integumentary:  Negative for rash and wound.   Allergic/Immunologic: Negative.    Neurological: Negative for dizziness, syncope, weakness, numbness and headaches.   Hematological: Negative.    Psychiatric/Behavioral: Negative for confusion and sleep disturbance.   All other systems reviewed and are negative.    Objective:      Physical Exam  Vitals reviewed.   Constitutional:       Appearance: Normal appearance.   HENT:      Head: Normocephalic and atraumatic.   Eyes:      General: Vision grossly intact.      Extraocular Movements: Extraocular movements intact.      Pupils: Pupils are equal, round, and reactive to light.   Neck:      Thyroid: No thyroid mass or thyroid tenderness.   Cardiovascular:      Rate and Rhythm: Normal rate and regular rhythm.      Pulses: Normal pulses.           Radial  pulses are 2+ on the right side and 2+ on the left side.        Dorsalis pedis pulses are 2+ on the right side and 2+ on the left side.      Heart sounds: Normal heart sounds.   Pulmonary:      Effort: Pulmonary effort is normal.      Breath sounds: Normal breath sounds.   Abdominal:      General: Bowel sounds are normal.      Palpations: Abdomen is soft.   Musculoskeletal:         General: Normal range of motion.      Cervical back: Normal range of motion and neck supple.      Right lower leg: No edema.      Left lower leg: No edema.      Right foot: No deformity or bunion.      Left foot: No deformity or bunion.      Comments: left hand digits 2-5 amputated (1997 injury)    Feet:      Right foot:      Skin integrity: Callus present. No ulcer, skin breakdown or dry skin.      Toenail Condition: Right toenails are normal.      Left foot:      Skin integrity: Callus present. No ulcer, skin breakdown or dry skin.      Toenail Condition: Left toenails are normal.   Lymphadenopathy:      Cervical: No cervical adenopathy.   Skin:     General: Skin is warm and dry.      Findings: No rash or wound.      Comments: Negative for acanthosis nigricans. Well-healed SQ injection sites.   Neurological:      Mental Status: He is alert and oriented to person, place, and time.      Coordination: Coordination is intact.      Gait: Gait is intact.   Psychiatric:         Attention and Perception: Attention normal.         Mood and Affect: Mood normal.         Speech: Speech normal.         Behavior: Behavior normal. Behavior is cooperative.         Thought Content: Thought content normal.         Cognition and Memory: Cognition normal.       Assessment / Plan:     Ulysses was seen today for general diabetes follow-up.    Diagnoses and all orders for this visit:    Type 2 diabetes mellitus without complication, with long-term current use of insulin  -     POCT Glucose, Hand-Held Device  -     Hemoglobin A1C; Future  -     blood-glucose  meter (TRUE METRIX AIR GLUCOSE METER) kit; Use as instructed- True Metrix Meter  -     insulin NPH-insulin regular, 70/30, (NOVOLIN 70/30 U-100 INSULIN) 100 unit/mL (70-30) injection; Inject 22 units in the morning and 26 units in the evening.    Hypertension associated with diabetes    Hyperlipidemia associated with type 2 diabetes mellitus    BMI 25.0-25.9,adult     Additional Plan Details:  - Condition: uncontrolled, most recent A1C 8% was completed 1 week ago.   - Monitor blood glucose:  3x daily.Goals reviewed. Maintain BG log and bring to next visit for review.   - Hypoglycemia protocol: Reviewed and risk discussed. Instructed to inform with BG readings below 80.  - Diet and activity: Reviewed, limit carbohydrate intake to 30-45 grams per meal, 3x daily and 15 grams per snack with a limit of two daily. Recommend at least 150 minutes of exercise in a week.  - BP and LDL: Recommend lifestyle modifications and follow up with PCP for management.   - Medication Changes:   - Continue Novolin 70/30, 22 units in the morning and increase to 26 units in the evening  - Continue Trulicity 0.75 mg weekly  - Continue Jardiance 10 mg every morning    - Medication consideration: Pt hesitant to increase Trulicity dose at previous visits due to nausea limited to the day of administration. Titrate insulin to goal BG. Will consider increasing Jardiance dose if needed at next visit.  - Lab results: A1C discussed.  - Health Maintenance standards addressed today:  A1c scheduled.   - Risks of uncontrolled DM explained. Importance of routine foot and eye exams reviewed. Scheduled as appropriate.  -The patient was explained the above plan and given opportunity to ask questions.  He understands, chooses and consents to this plan and accepts all the risks, which include but are not limited to the risks mentioned above.   - Labs ordered as above. A1C  - Follow up: 2 months audio.         [facility] Diabetes  Management    A total of 30 minutes was spent in face to face time, of which over 50 % was spent in counseling patient on disease process, complications, treatment, and side effects of medications.

## 2022-09-22 NOTE — PATIENT INSTRUCTIONS
Medication Regimen:   - Continue Novolin 70/30, 22 units in the morning and increase to 26 units in the evening  - Continue Trulicity 0.75 mg weekly  - Continue Jardiance 10 mg every morning    Patient Instructions:  Carbohydrate Count: 30-45 grams/meal and 15 grams/snack with limit of 2 snacks per day.  Exercise: Goal is 150 minutes or more per week.  Bring meter and blood sugar log to each appointment.     Goals for Blood Sugar:  Fastin-130 mg/dl  2 hours after meal:  mg/dl  Before Bed: 100-150 mg/dl  If Blood sugar is below 70 mg/dl, DO NOT take diabetes medication. Eat first and then recheck blood sugar in 20 minutes.  Foods to eat if blood sugar is below 70 mg/dl  1/2 glass of orange juice   1/2 regular cola can   3-4 hard candies   3-4 small glucose tabs.   Foods to eat if blood sugar is below 50 mg/dl  1 glass of orange juice  1 regular cola can  8 hard candies  8 small glucose tabs.   If you have repeated eating/blood sugar recheck process 2 times and blood sugar still below 70 mg/dl, call 911.

## 2022-09-30 ENCOUNTER — EXTERNAL CHRONIC CARE MANAGEMENT (OUTPATIENT)
Dept: PRIMARY CARE CLINIC | Facility: CLINIC | Age: 72
End: 2022-09-30
Payer: MEDICARE

## 2022-09-30 PROCEDURE — 99490 CHRNC CARE MGMT STAFF 1ST 20: CPT | Mod: S$GLB,,, | Performed by: FAMILY MEDICINE

## 2022-09-30 PROCEDURE — 99490 PR CHRONIC CARE MGMT, 1ST 20 MIN: ICD-10-PCS | Mod: S$GLB,,, | Performed by: FAMILY MEDICINE

## 2022-10-04 ENCOUNTER — OFFICE VISIT (OUTPATIENT)
Dept: OPHTHALMOLOGY | Facility: CLINIC | Age: 72
End: 2022-10-04
Payer: MEDICARE

## 2022-10-04 DIAGNOSIS — H25.012 CORTICAL SENILE CATARACT, LEFT: ICD-10-CM

## 2022-10-04 DIAGNOSIS — Z79.4 TYPE 2 DIABETES MELLITUS WITHOUT COMPLICATION, WITH LONG-TERM CURRENT USE OF INSULIN: ICD-10-CM

## 2022-10-04 DIAGNOSIS — E11.36 DIABETIC CATARACT: ICD-10-CM

## 2022-10-04 DIAGNOSIS — I15.2 HYPERTENSION ASSOCIATED WITH DIABETES: ICD-10-CM

## 2022-10-04 DIAGNOSIS — E11.9 TYPE 2 DIABETES MELLITUS WITHOUT COMPLICATION, WITH LONG-TERM CURRENT USE OF INSULIN: ICD-10-CM

## 2022-10-04 DIAGNOSIS — E11.59 HYPERTENSION ASSOCIATED WITH DIABETES: ICD-10-CM

## 2022-10-04 DIAGNOSIS — Z96.1 PSEUDOPHAKIA OF RIGHT EYE: ICD-10-CM

## 2022-10-04 DIAGNOSIS — E11.9 DIABETES MELLITUS TYPE 2 WITHOUT RETINOPATHY: Primary | ICD-10-CM

## 2022-10-04 DIAGNOSIS — H52.7 REFRACTIVE ERROR: ICD-10-CM

## 2022-10-04 PROCEDURE — 1159F PR MEDICATION LIST DOCUMENTED IN MEDICAL RECORD: ICD-10-PCS | Mod: CPTII,S$GLB,, | Performed by: OPTOMETRIST

## 2022-10-04 PROCEDURE — 3052F HG A1C>EQUAL 8.0%<EQUAL 9.0%: CPT | Mod: CPTII,S$GLB,, | Performed by: OPTOMETRIST

## 2022-10-04 PROCEDURE — 3052F PR MOST RECENT HEMOGLOBIN A1C LEVEL 8.0 - < 9.0%: ICD-10-PCS | Mod: CPTII,S$GLB,, | Performed by: OPTOMETRIST

## 2022-10-04 PROCEDURE — 99999 PR PBB SHADOW E&M-EST. PATIENT-LVL III: CPT | Mod: PBBFAC,,, | Performed by: OPTOMETRIST

## 2022-10-04 PROCEDURE — 92015 DETERMINE REFRACTIVE STATE: CPT | Mod: S$GLB,,, | Performed by: OPTOMETRIST

## 2022-10-04 PROCEDURE — 92015 PR REFRACTION: ICD-10-PCS | Mod: S$GLB,,, | Performed by: OPTOMETRIST

## 2022-10-04 PROCEDURE — 1159F MED LIST DOCD IN RCRD: CPT | Mod: CPTII,S$GLB,, | Performed by: OPTOMETRIST

## 2022-10-04 PROCEDURE — 2023F PR DILATED RETINAL EXAM W/O EVID OF RETINOPATHY: ICD-10-PCS | Mod: CPTII,S$GLB,, | Performed by: OPTOMETRIST

## 2022-10-04 PROCEDURE — 99999 PR PBB SHADOW E&M-EST. PATIENT-LVL III: ICD-10-PCS | Mod: PBBFAC,,, | Performed by: OPTOMETRIST

## 2022-10-04 PROCEDURE — 2023F DILAT RTA XM W/O RTNOPTHY: CPT | Mod: CPTII,S$GLB,, | Performed by: OPTOMETRIST

## 2022-10-04 PROCEDURE — 92014 PR EYE EXAM, EST PATIENT,COMPREHESV: ICD-10-PCS | Mod: S$GLB,,, | Performed by: OPTOMETRIST

## 2022-10-04 PROCEDURE — 92014 COMPRE OPH EXAM EST PT 1/>: CPT | Mod: S$GLB,,, | Performed by: OPTOMETRIST

## 2022-10-04 NOTE — PROGRESS NOTES
HPI     Diabetic Eye Exam     Additional comments: Lab Results       Component                Value               Date                       HGBA1C                   8.0 (H)             09/14/2022                       Comments    Here for yearly diabetic exam.  No changes in vision per pt.          Last edited by Soheila Bliss MA on 10/4/2022  9:46 AM.            Assessment /Plan     For exam results, see Encounter Report.    Diabetes mellitus type 2 without retinopathy    Type 2 diabetes mellitus without complication, with long-term current use of insulin  -     Ambulatory referral/consult to Optometry    Hypertension associated with diabetes    Pseudophakia of right eye    Cortical senile cataract, left    Refractive error    Diabetic cataract    No Background Diabetic Retinopathy    No HTN Retinopathy    Stable IOL OD    Significant cataracts OS, pt defers the cataract evaluation consult.    Dispense Final Rx for glasses or may use OTC glasses.  RTC 1 year  Discussed above and answered questions.

## 2022-10-06 ENCOUNTER — PATIENT OUTREACH (OUTPATIENT)
Dept: ADMINISTRATIVE | Facility: HOSPITAL | Age: 72
End: 2022-10-06
Payer: MEDICARE

## 2022-10-20 ENCOUNTER — OFFICE VISIT (OUTPATIENT)
Dept: PAIN MEDICINE | Facility: CLINIC | Age: 72
End: 2022-10-20
Payer: MEDICARE

## 2022-10-20 VITALS
HEIGHT: 75 IN | HEART RATE: 74 BPM | SYSTOLIC BLOOD PRESSURE: 158 MMHG | WEIGHT: 208.13 LBS | BODY MASS INDEX: 25.88 KG/M2 | DIASTOLIC BLOOD PRESSURE: 94 MMHG

## 2022-10-20 DIAGNOSIS — G89.4 CHRONIC PAIN DISORDER: ICD-10-CM

## 2022-10-20 DIAGNOSIS — S68.119A TRAUMATIC AMPUTATION OF DIGIT OF ONE HAND WITHOUT COMPLICATION: Primary | ICD-10-CM

## 2022-10-20 DIAGNOSIS — Z79.891 OPIOID USE AGREEMENT EXISTS: ICD-10-CM

## 2022-10-20 DIAGNOSIS — S68.119A TRAUMATIC AMPUTATION OF DIGIT OF ONE HAND WITHOUT COMPLICATION: ICD-10-CM

## 2022-10-20 DIAGNOSIS — G89.21 CHRONIC PAIN DUE TO TRAUMA: ICD-10-CM

## 2022-10-20 DIAGNOSIS — M79.642 PAIN OF LEFT HAND: ICD-10-CM

## 2022-10-20 PROCEDURE — 3080F DIAST BP >= 90 MM HG: CPT | Mod: CPTII,S$GLB,, | Performed by: PHYSICIAN ASSISTANT

## 2022-10-20 PROCEDURE — 3077F PR MOST RECENT SYSTOLIC BLOOD PRESSURE >= 140 MM HG: ICD-10-PCS | Mod: CPTII,S$GLB,, | Performed by: PHYSICIAN ASSISTANT

## 2022-10-20 PROCEDURE — 1159F MED LIST DOCD IN RCRD: CPT | Mod: CPTII,S$GLB,, | Performed by: PHYSICIAN ASSISTANT

## 2022-10-20 PROCEDURE — 1160F RVW MEDS BY RX/DR IN RCRD: CPT | Mod: CPTII,S$GLB,, | Performed by: PHYSICIAN ASSISTANT

## 2022-10-20 PROCEDURE — 1159F PR MEDICATION LIST DOCUMENTED IN MEDICAL RECORD: ICD-10-PCS | Mod: CPTII,S$GLB,, | Performed by: PHYSICIAN ASSISTANT

## 2022-10-20 PROCEDURE — 3077F SYST BP >= 140 MM HG: CPT | Mod: CPTII,S$GLB,, | Performed by: PHYSICIAN ASSISTANT

## 2022-10-20 PROCEDURE — 3052F PR MOST RECENT HEMOGLOBIN A1C LEVEL 8.0 - < 9.0%: ICD-10-PCS | Mod: CPTII,S$GLB,, | Performed by: PHYSICIAN ASSISTANT

## 2022-10-20 PROCEDURE — 1125F PR PAIN SEVERITY QUANTIFIED, PAIN PRESENT: ICD-10-PCS | Mod: CPTII,S$GLB,, | Performed by: PHYSICIAN ASSISTANT

## 2022-10-20 PROCEDURE — 99999 PR PBB SHADOW E&M-EST. PATIENT-LVL IV: ICD-10-PCS | Mod: PBBFAC,,, | Performed by: PHYSICIAN ASSISTANT

## 2022-10-20 PROCEDURE — 1160F PR REVIEW ALL MEDS BY PRESCRIBER/CLIN PHARMACIST DOCUMENTED: ICD-10-PCS | Mod: CPTII,S$GLB,, | Performed by: PHYSICIAN ASSISTANT

## 2022-10-20 PROCEDURE — 3080F PR MOST RECENT DIASTOLIC BLOOD PRESSURE >= 90 MM HG: ICD-10-PCS | Mod: CPTII,S$GLB,, | Performed by: PHYSICIAN ASSISTANT

## 2022-10-20 PROCEDURE — 3052F HG A1C>EQUAL 8.0%<EQUAL 9.0%: CPT | Mod: CPTII,S$GLB,, | Performed by: PHYSICIAN ASSISTANT

## 2022-10-20 PROCEDURE — 99214 PR OFFICE/OUTPT VISIT, EST, LEVL IV, 30-39 MIN: ICD-10-PCS | Mod: S$GLB,,, | Performed by: PHYSICIAN ASSISTANT

## 2022-10-20 PROCEDURE — 99214 OFFICE O/P EST MOD 30 MIN: CPT | Mod: S$GLB,,, | Performed by: PHYSICIAN ASSISTANT

## 2022-10-20 PROCEDURE — 99999 PR PBB SHADOW E&M-EST. PATIENT-LVL IV: CPT | Mod: PBBFAC,,, | Performed by: PHYSICIAN ASSISTANT

## 2022-10-20 PROCEDURE — 1125F AMNT PAIN NOTED PAIN PRSNT: CPT | Mod: CPTII,S$GLB,, | Performed by: PHYSICIAN ASSISTANT

## 2022-10-20 RX ORDER — HYDROCODONE BITARTRATE AND ACETAMINOPHEN 10; 325 MG/1; MG/1
1 TABLET ORAL EVERY 12 HOURS PRN
Qty: 60 TABLET | Refills: 0 | Status: SHIPPED | OUTPATIENT
Start: 2022-12-03 | End: 2023-03-01 | Stop reason: SDUPTHER

## 2022-10-20 RX ORDER — HYDROCODONE BITARTRATE AND ACETAMINOPHEN 10; 325 MG/1; MG/1
1 TABLET ORAL EVERY 12 HOURS PRN
Qty: 60 TABLET | Refills: 0 | Status: SHIPPED | OUTPATIENT
Start: 2022-11-03 | End: 2022-12-22 | Stop reason: SDUPTHER

## 2022-10-20 NOTE — PROGRESS NOTES
Subjective:     Chief Complaint:  Left Hand Pain    Interval History (10/20/2022): Ulysses Boreaux was last seen on 8/25/2022. he presents today for follow-up for medication refill.  Medication is providing adequate pain relief. he feels the pain medication reduces the negative effects of chronic pain that affects quality of life. Patient reports pain as 8/10 today. No major changes since previous visit; patient is stable overall.     Interval History (8/25/2022): Ulysses Boreaux was last seen on 6/30/2022. he presents today for follow-up for medication refill.  Medication is providing adequate pain relief. he feels the pain medication reduces the negative effects of chronic pain that affects day-to-day function and quality of life. No major changes since previous visit; patient is stable overall. Patient reports pain as 5/10 today.     History of Present Illness:  This patient is a 65 year old male who presents today for f/u complaining of the above noted pains. The patient describes this pain as follows.    - duration (acute, chronic): left hand pain since 1997 after table saw amputation of digits 2 through 5 at work, left lower quadrant pain since hernia surgery in 2004  - timing (constant, intermittent): Constant  - character (sharp, dull, aching, burning): Throbbing  - radiating: No  - dermatomal distribution: No  - side: Left   - aggravating factors: Nothing worsens left digit pain, left lower quadrant pain worse with exercise or walking  - relieving factors: Medication / Norco  - antecedent trauma: Amputation via skill saw and 1997, hernia repair in 2004  - prior spinal surgery: None  - pertinent negatives: No fever, No chills, No weight loss, No bladder dysfunction, No bowel dysfunction, No extremity weakness, No saddle anesthesia  - medications tried: Norco, Percocet, over-the-counter medications, compound pain cream  - other therapies tried (physical therapy, injections):     >> physical therapy tried in  "the past    >> no prior injections        Imaging/ Labs (reviewed on 10/20/2022):    7/12/2018 US ABDOMINAL AORTA  CLINICAL HISTORY:  Abnormal findings on diagnostic imaging of other specified body structures  TECHNIQUE:  Limited ultrasound was performed of the abdominal aorta, with cross sectional diameter measurements obtained.  COMPARISON:  01/10/2018  FINDINGS:  The proximal abdominal aorta measures 2.7 cm.  The mid abdominal aorta measures 1.8 cm.  The distal abdominal aorta measures 1.9 cm, slightly ectatic and unchanged from prior.  The right iliac artery measures 1.0 cm.  The left iliac artery measures 1.1 cm.  Aortoiliac atherosclerosis: Mild  Impression: Stable examination with slight distal abdominal aortic ectasia.  Negative for aneurysm.         ROS:  CONSTITUTIONAL: No fever, chills, weight loss  SKIN: No rash or itching  CARDIOVASCULAR:  No chest pain, palpitations  RESPIRATORY: No shortness of breath  GASTROINTESTINAL: No diarrhea, No constipation, abdominal pain, left lower quadrant  GENITOURINARY: No urinary incontinence    MUSCULOSKELETAL:  - patient reports pain as above, see chief complaint     NEUROLOGICAL:   - Pain as above  - Strength in lower extremities is normal  - Sensation in lower extremities is normal  - No bowel or bladder incontinence     PSYCHIATRIC: No change in mood noticed         Objective:     Physical Exam:  Vitals:    10/20/22 0923   BP: (!) 158/94   Pulse: 74   Weight: 94.4 kg (208 lb 1.8 oz)   Height: 6' 3" (1.905 m)   PainSc:   8    Body mass index is 26.01 kg/m².   (reviewed on 10/20/2022)    General: alert and oriented, in no apparent distress  Gait: normal gait  Skin: No rashes, No discoloration   HEENT: EOMI  Respiratory: respirations nonlabored  Cardiology: No obvious peripheral edema  GI: no obvious distention     Musculoskeletal:  - ROM preserved   - No pain with lumbar flexion/ extension  - Left upper extremity range of motion intact throughout  - Digital stumps " are hypersensitive to palpation, hypersensitivity does not extend further proximal  - No color change, no swelling, no changes in hair growth along left hand    Neuro:  - Lower extremities:      >> 5/5 strength bilaterally, throughout, symmetric  - Upper extremities:    >> 5/5 strength bilaterally  - Normal sensation    Psych: mood and affect appropriate        Assessment:     Ulysses Boreaux is a 72 y.o. male who presents with     ICD-10-CM ICD-9-CM   1. 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 Chronic pain due to trauma  G89.21 338.21   3. Chronic pain disorder  G89.4 338.4   4. Pain of left hand  M79.642 729.5   5. Opioid use agreement exists  Z79.891 V58.69       Plan:   1. Interventional: None.    2. Pharmacologic:   - Refill Norco 10/325 mg PO BID PRN (60 tabs) x 2 months. Will e-prescribe for 11/3/22 and 12/3/22. Patient tolerating opioids with no side effects, obtaining good pain control with functional improvement. He tolerates this medication well, and he denies any drowsiness or constipation.  - Updated opioid contract signed with Dr. Love on 10/28/20.  - LA  reviewed and appropriate.     - Last UDS 6/30/2022 was compliant for medications prescribed. Order drug screen (UDS/ oral swab) next visit to ensure med compliance.     3. Rehabilitative: Encouraged regular exercise.    4. Diagnostic: None for now.    5. Follow up: 9 week medication Refill     - I discussed the risks, benefits, and alternatives to potential treatment options. All questions and concerns were fully addressed today in clinic.            >> UDS:  8-18-15 :: appropriate  12-29-15 :: appropriate  5-3-16 :: appropriate  10-18-16 :: appropriate  4/13/2017 :: appropriate  9/29/2017 :: appropriate  3/9/2018 :: appropriate  9/4/2018 :: appropriate  6/19/2019 :: appropriate  12/12/2019 :: appropriate  6/12/2020 ::  Appropriate  10/2/2020 :: Appropriate  2/24/2021 :: Appropriate  8/24/2021 ::  Appropriate  12/21/2021 :: Appropriate  6/30/2022 :: Appropriate

## 2022-10-26 ENCOUNTER — OFFICE VISIT (OUTPATIENT)
Dept: DIABETES | Facility: CLINIC | Age: 72
End: 2022-10-26
Payer: MEDICARE

## 2022-10-26 ENCOUNTER — IMMUNIZATION (OUTPATIENT)
Dept: PRIMARY CARE CLINIC | Facility: CLINIC | Age: 72
End: 2022-10-26
Payer: MEDICARE

## 2022-10-26 DIAGNOSIS — E11.9 TYPE 2 DIABETES MELLITUS WITHOUT COMPLICATION: ICD-10-CM

## 2022-10-26 DIAGNOSIS — Z53.21 PATIENT LEFT WITHOUT BEING SEEN: Primary | ICD-10-CM

## 2022-10-26 DIAGNOSIS — Z23 NEED FOR VACCINATION: Primary | ICD-10-CM

## 2022-10-26 PROCEDURE — 1160F RVW MEDS BY RX/DR IN RCRD: CPT | Mod: CPTII,95,, | Performed by: NURSE PRACTITIONER

## 2022-10-26 PROCEDURE — 1159F PR MEDICATION LIST DOCUMENTED IN MEDICAL RECORD: ICD-10-PCS | Mod: CPTII,95,, | Performed by: NURSE PRACTITIONER

## 2022-10-26 PROCEDURE — 1159F MED LIST DOCD IN RCRD: CPT | Mod: CPTII,95,, | Performed by: NURSE PRACTITIONER

## 2022-10-26 PROCEDURE — 99499 UNLISTED E&M SERVICE: CPT | Mod: 95,,, | Performed by: NURSE PRACTITIONER

## 2022-10-26 PROCEDURE — 3052F HG A1C>EQUAL 8.0%<EQUAL 9.0%: CPT | Mod: CPTII,95,, | Performed by: NURSE PRACTITIONER

## 2022-10-26 PROCEDURE — 91312 COVID-19, MRNA, LNP-S, BIVALENT BOOSTER, PF, 30 MCG/0.3 ML DOSE: CPT | Mod: PBBFAC | Performed by: FAMILY MEDICINE

## 2022-10-26 PROCEDURE — 0124A COVID-19, MRNA, LNP-S, BIVALENT BOOSTER, PF, 30 MCG/0.3 ML DOSE: CPT | Mod: CV19,PBBFAC | Performed by: FAMILY MEDICINE

## 2022-10-26 PROCEDURE — 3052F PR MOST RECENT HEMOGLOBIN A1C LEVEL 8.0 - < 9.0%: ICD-10-PCS | Mod: CPTII,95,, | Performed by: NURSE PRACTITIONER

## 2022-10-26 PROCEDURE — 99499 NO LOS: ICD-10-PCS | Mod: 95,,, | Performed by: NURSE PRACTITIONER

## 2022-10-26 PROCEDURE — 1160F PR REVIEW ALL MEDS BY PRESCRIBER/CLIN PHARMACIST DOCUMENTED: ICD-10-PCS | Mod: CPTII,95,, | Performed by: NURSE PRACTITIONER

## 2022-10-31 ENCOUNTER — EXTERNAL CHRONIC CARE MANAGEMENT (OUTPATIENT)
Dept: PRIMARY CARE CLINIC | Facility: CLINIC | Age: 72
End: 2022-10-31
Payer: MEDICARE

## 2022-10-31 PROCEDURE — 99490 PR CHRONIC CARE MGMT, 1ST 20 MIN: ICD-10-PCS | Mod: S$GLB,,, | Performed by: FAMILY MEDICINE

## 2022-10-31 PROCEDURE — 99490 CHRNC CARE MGMT STAFF 1ST 20: CPT | Mod: S$GLB,,, | Performed by: FAMILY MEDICINE

## 2022-11-10 ENCOUNTER — TELEPHONE (OUTPATIENT)
Dept: DIABETES | Facility: CLINIC | Age: 72
End: 2022-11-10
Payer: MEDICARE

## 2022-11-10 DIAGNOSIS — E11.65 UNCONTROLLED TYPE 2 DIABETES MELLITUS WITH HYPERGLYCEMIA, WITH LONG-TERM CURRENT USE OF INSULIN: Primary | ICD-10-CM

## 2022-11-10 DIAGNOSIS — E08.00 DIABETES MELLITUS DUE TO UNDERLYING CONDITION WITH HYPEROSMOLARITY WITHOUT COMA, WITHOUT LONG-TERM CURRENT USE OF INSULIN: ICD-10-CM

## 2022-11-10 DIAGNOSIS — Z79.4 UNCONTROLLED TYPE 2 DIABETES MELLITUS WITH HYPERGLYCEMIA, WITH LONG-TERM CURRENT USE OF INSULIN: Primary | ICD-10-CM

## 2022-11-10 RX ORDER — LANCETS 33 GAUGE
EACH MISCELLANEOUS
Qty: 100 EACH | Refills: 11 | Status: SHIPPED | OUTPATIENT
Start: 2022-11-10

## 2022-11-10 RX ORDER — INSULIN PUMP SYRINGE, 3 ML
EACH MISCELLANEOUS
Qty: 1 EACH | Refills: 0 | Status: SHIPPED | OUTPATIENT
Start: 2022-11-10 | End: 2023-12-29 | Stop reason: SDUPTHER

## 2022-11-10 NOTE — TELEPHONE ENCOUNTER
Covering for Laurie while she is out, returning 11/14. Is the meter kit that he needs True Metrix? Let me know and I'll re-send for him. Does he need all testing supplies refilled (test strips,lancets) also?

## 2022-11-10 NOTE — TELEPHONE ENCOUNTER
----- Message from Susan Zhang MA sent at 11/9/2022  4:40 PM CST -----  Contact: Ulysses  Are we able to send another kit?     ----- Message -----  From: Yao Luciano  Sent: 11/9/2022  10:54 AM CST  To: Jenny KABA Staff    Ulysses would like a call back at [phone], in regards to sending a message over Monday about getting another diabetic test kit sent to his pharmacy due to the current one being broken and has received a response.   [id number] - [place] - [address] AT [facility] [address] [place]  [address] [address]  Phone: [phone] Fax: [phone]    Thanks

## 2022-11-11 ENCOUNTER — TELEPHONE (OUTPATIENT)
Dept: DIABETES | Facility: CLINIC | Age: 72
End: 2022-11-11
Payer: MEDICARE

## 2022-11-11 NOTE — TELEPHONE ENCOUNTER
Spoke w/ pt and informed him that rx would be ready today.     Spoke w/ pharmacy and they had rx on file from September for same rx that they are filling and pt can  today.     ----- Message from Tamica Grigsby sent at 11/11/2022 11:56 AM CST -----  Contact: Ulysses Ulysses prescription for RX: blood-glucose meter (TRUE METRIX GLUCOSE METER) kit was sent to the pharmacy on 11/10/2022 but the pharmacy staff is reporting they have not received it.    The patient is Ms. Laurie Olmos patient but the script was submitted by Chelly Hong.  The patient has not tested for a week now.     Preferred Pharmacy:  [id number] -   [place]  [address] [place] [address] [address] [address]  Phone: [phone] Fax: [phone]    Please call Ulysses if you have questions at [phone] (home)      Thanks

## 2022-11-22 NOTE — Clinical Note
Can you please address health maintenance items that pertain to chronic pain management at upcoming visit? Thank you! Arava Counseling:  Patient counseled regarding adverse effects of Arava including but not limited to nausea, vomiting, abnormalities in liver function tests. Patients may develop mouth sores, rash, diarrhea, and abnormalities in blood counts. The patient understands that monitoring is required including LFTs and blood counts.  There is a rare possibility of scarring of the liver and lung problems that can occur when taking methotrexate. Persistent nausea, loss of appetite, pale stools, dark urine, cough, and shortness of breath should be reported immediately. Patient advised to discontinue Arava treatment and consult with a physician prior to attempting conception. The patient will have to undergo a treatment to eliminate Arava from the body prior to conception.

## 2022-11-30 ENCOUNTER — EXTERNAL CHRONIC CARE MANAGEMENT (OUTPATIENT)
Dept: PRIMARY CARE CLINIC | Facility: CLINIC | Age: 72
End: 2022-11-30
Payer: MEDICARE

## 2022-11-30 PROCEDURE — 99439 CHRNC CARE MGMT STAF EA ADDL: CPT | Mod: S$GLB,,, | Performed by: FAMILY MEDICINE

## 2022-11-30 PROCEDURE — 99439 PR CHRONIC CARE MGMT, EA ADDTL 20 MIN: ICD-10-PCS | Mod: S$GLB,,, | Performed by: FAMILY MEDICINE

## 2022-11-30 PROCEDURE — 99490 CHRNC CARE MGMT STAFF 1ST 20: CPT | Mod: S$GLB,,, | Performed by: FAMILY MEDICINE

## 2022-11-30 PROCEDURE — 99490 PR CHRONIC CARE MGMT, 1ST 20 MIN: ICD-10-PCS | Mod: S$GLB,,, | Performed by: FAMILY MEDICINE

## 2022-12-08 ENCOUNTER — HOSPITAL ENCOUNTER (EMERGENCY)
Facility: HOSPITAL | Age: 72
Discharge: HOME | End: 2022-12-08
Attending: EMERGENCY MEDICINE
Payer: MEDICARE

## 2022-12-08 VITALS
OXYGEN SATURATION: 98 % | RESPIRATION RATE: 18 BRPM | TEMPERATURE: 99 F | SYSTOLIC BLOOD PRESSURE: 154 MMHG | HEIGHT: 75 IN | DIASTOLIC BLOOD PRESSURE: 76 MMHG | HEART RATE: 80 BPM | BODY MASS INDEX: 26.26 KG/M2 | WEIGHT: 211.19 LBS

## 2022-12-08 DIAGNOSIS — J02.9 SORE THROAT: Primary | ICD-10-CM

## 2022-12-08 LAB
ALBUMIN SERPL BCP-MCNC: 3.8 G/DL (ref 3.5–5.2)
ALBUMIN SERPL BCP-MCNC: 3.8 G/DL (ref 3.5–5.2)
ALP SERPL-CCNC: 50 U/L (ref 55–135)
ALP SERPL-CCNC: 50 U/L (ref 55–135)
ALT SERPL W/O P-5'-P-CCNC: 23 U/L (ref 10–44)
ALT SERPL W/O P-5'-P-CCNC: 23 U/L (ref 10–44)
ANION GAP SERPL CALC-SCNC: 11 MMOL/L (ref 8–16)
ANION GAP SERPL CALC-SCNC: 11 MMOL/L (ref 8–16)
AST SERPL-CCNC: 26 U/L (ref 10–40)
AST SERPL-CCNC: 26 U/L (ref 10–40)
BASOPHILS # BLD AUTO: 0.06 K/UL (ref 0–0.2)
BASOPHILS NFR BLD: 1.3 % (ref 0–1.9)
BILIRUB SERPL-MCNC: 0.5 MG/DL (ref 0.1–1)
BILIRUB SERPL-MCNC: 0.5 MG/DL (ref 0.1–1)
BUN SERPL-MCNC: 16 MG/DL (ref 8–23)
BUN SERPL-MCNC: 16 MG/DL (ref 8–23)
CALCIUM SERPL-MCNC: 9.3 MG/DL (ref 8.7–10.5)
CALCIUM SERPL-MCNC: 9.3 MG/DL (ref 8.7–10.5)
CHLORIDE SERPL-SCNC: 103 MMOL/L (ref 95–110)
CHLORIDE SERPL-SCNC: 103 MMOL/L (ref 95–110)
CO2 SERPL-SCNC: 26 MMOL/L (ref 23–29)
CO2 SERPL-SCNC: 26 MMOL/L (ref 23–29)
CREAT SERPL-MCNC: 1.3 MG/DL (ref 0.5–1.4)
CREAT SERPL-MCNC: 1.3 MG/DL (ref 0.5–1.4)
DIFFERENTIAL METHOD: ABNORMAL
EOSINOPHIL # BLD AUTO: 0.3 K/UL (ref 0–0.5)
EOSINOPHIL NFR BLD: 5.3 % (ref 0–8)
ERYTHROCYTE [DISTWIDTH] IN BLOOD BY AUTOMATED COUNT: 12.1 % (ref 11.5–14.5)
EST. GFR  (NO RACE VARIABLE): 59 ML/MIN/1.73 M^2
EST. GFR  (NO RACE VARIABLE): 59 ML/MIN/1.73 M^2
GLUCOSE SERPL-MCNC: 107 MG/DL (ref 70–110)
GLUCOSE SERPL-MCNC: 107 MG/DL (ref 70–110)
GROUP A STREP, MOLECULAR: NEGATIVE
HCT VFR BLD AUTO: 46 % (ref 40–54)
HGB BLD-MCNC: 15.6 G/DL (ref 14–18)
IMM GRANULOCYTES # BLD AUTO: 0.01 K/UL (ref 0–0.04)
IMM GRANULOCYTES NFR BLD AUTO: 0.2 % (ref 0–0.5)
LYMPHOCYTES # BLD AUTO: 1.9 K/UL (ref 1–4.8)
LYMPHOCYTES NFR BLD: 40.2 % (ref 18–48)
MCH RBC QN AUTO: 32 PG (ref 27–31)
MCHC RBC AUTO-ENTMCNC: 33.9 G/DL (ref 32–36)
MCV RBC AUTO: 94 FL (ref 82–98)
MONOCYTES # BLD AUTO: 0.4 K/UL (ref 0.3–1)
MONOCYTES NFR BLD: 9.1 % (ref 4–15)
NEUTROPHILS # BLD AUTO: 2.1 K/UL (ref 1.8–7.7)
NEUTROPHILS NFR BLD: 43.9 % (ref 38–73)
NRBC BLD-RTO: 0 /100 WBC
PLATELET # BLD AUTO: 238 K/UL (ref 150–450)
PMV BLD AUTO: 9.7 FL (ref 9.2–12.9)
POTASSIUM SERPL-SCNC: 5 MMOL/L (ref 3.5–5.1)
POTASSIUM SERPL-SCNC: 5 MMOL/L (ref 3.5–5.1)
PROT SERPL-MCNC: 7.6 G/DL (ref 6–8.4)
PROT SERPL-MCNC: 7.6 G/DL (ref 6–8.4)
RBC # BLD AUTO: 4.88 M/UL (ref 4.6–6.2)
SODIUM SERPL-SCNC: 140 MMOL/L (ref 136–145)
SODIUM SERPL-SCNC: 140 MMOL/L (ref 136–145)
WBC # BLD AUTO: 4.75 K/UL (ref 3.9–12.7)

## 2022-12-08 PROCEDURE — 99284 EMERGENCY DEPT VISIT MOD MDM: CPT | Mod: 25

## 2022-12-08 PROCEDURE — 25000003 PHARM REV CODE 250: Performed by: NURSE PRACTITIONER

## 2022-12-08 PROCEDURE — 96360 HYDRATION IV INFUSION INIT: CPT

## 2022-12-08 PROCEDURE — 80053 COMPREHEN METABOLIC PANEL: CPT | Performed by: EMERGENCY MEDICINE

## 2022-12-08 PROCEDURE — 85025 COMPLETE CBC W/AUTO DIFF WBC: CPT | Performed by: NURSE PRACTITIONER

## 2022-12-08 PROCEDURE — 87651 STREP A DNA AMP PROBE: CPT | Performed by: NURSE PRACTITIONER

## 2022-12-08 RX ADMIN — SODIUM CHLORIDE 1000 ML: 0.9 INJECTION, SOLUTION INTRAVENOUS at 02:12

## 2022-12-08 NOTE — FIRST PROVIDER EVALUATION
Medical screening examination initiated.  I have conducted a focused provider triage encounter, findings are as follows:    Brief history of present illness:  Pt. C/o unilateral sore throat for 10 days    There were no vitals filed for this visit.    Pertinent physical exam:  right submandibular swelling    Brief workup plan:  labs imaging    Preliminary workup initiated; this workup will be continued and followed by the physician or advanced practice provider that is assigned to the patient when roomed.

## 2022-12-11 NOTE — ED PROVIDER NOTES
"Encounter Date: 2022       History     Chief Complaint   Patient presents with    Sore Throat     Sore throat for over a week     Patient complains of sore throat for 10 days.    The history is provided by the patient.   Sore Throat   This is a new problem. There has been no fever. Pertinent negatives include no shortness of breath. He has tried nothing for the symptoms.   Review of patient's allergies indicates:  No Known Allergies  Past Medical History:   Diagnosis Date    Allergy     Amputation of finger of left hand     all fingers except for thumb    Cataract     OS NGCL     Chronic pain due to trauma     traumatic amputations left hand    Diabetes mellitus, type 2     History of hepatitis C 2014    tx'd w/ Js 2018    Hyperlipidemia     Hypertension     Hyperthyroidism 10/10/2019    Refractive error      Past Surgical History:   Procedure Laterality Date    APPENDECTOMY          CATARACT EXTRACTION      OD     COLONOSCOPY N/A 2019    Procedure: COLONOSCOPY;  Surgeon: Elif Toribio MD;  Location: [facility];  Service: Endoscopy;  Laterality: N/A;    HERNIA REPAIR       Family History   Problem Relation Age of Onset    Cancer Mother     Hypertension Mother     Hypertension Father     Diabetes Sister     Hypertension Sister     Heart disease Sister     Diabetes Brother     Hypertension Brother     Cancer Brother      Social History     Tobacco Use    Smoking status: Former     Packs/day: 0.50     Types: Cigarettes     Quit date: 1972     Years since quittin.8    Smokeless tobacco: Never   Substance Use Topics    Alcohol use: Yes     Alcohol/week: 0.0 standard drinks     Comment: " Every blue moon"    Drug use: No     Review of Systems   Constitutional:  Negative for fever.   HENT:  Positive for sore throat.    Respiratory:  Negative for shortness of breath.    Cardiovascular:  Negative for chest pain.   Gastrointestinal:  Negative for nausea.   Genitourinary:  Negative for " dysuria.   Musculoskeletal:  Negative for back pain.   Skin:  Negative for rash.   Neurological:  Negative for weakness.   Hematological:  Does not bruise/bleed easily.     Physical Exam     Initial Vitals [12/08/22 1244]   BP Pulse Resp Temp SpO2   (!) 154/76 80 18 99.3 °F (37.4 °C) 98 %      MAP       --         Physical Exam    Nursing note and vitals reviewed.  Constitutional: He appears well-developed and well-nourished.   HENT:   Head: Normocephalic and atraumatic.   Eyes: Conjunctivae are normal. Pupils are equal, round, and reactive to light.   Neck: Neck supple.   Normal range of motion.  Cardiovascular:  Normal rate, regular rhythm, normal heart sounds and intact distal pulses.           Pulmonary/Chest: Breath sounds normal.   Abdominal: Abdomen is soft. There is no rebound and no guarding.   Musculoskeletal:         General: Normal range of motion.      Cervical back: Normal range of motion and neck supple.     Neurological: He is alert.   Skin: Skin is warm and dry.   Psychiatric: He has a normal mood and affect. His behavior is normal. Thought content normal.       ED Course   Procedures  Labs Reviewed   CBC W/ AUTO DIFFERENTIAL - Abnormal; Notable for the following components:       Result Value    MCH 32.0 (*)     All other components within normal limits   COMPREHENSIVE METABOLIC PANEL - Abnormal; Notable for the following components:    Alkaline Phosphatase 50 (*)     eGFR 59 (*)     All other components within normal limits   COMPREHENSIVE METABOLIC PANEL - Abnormal; Notable for the following components:    Alkaline Phosphatase 50 (*)     eGFR 59 (*)     All other components within normal limits   GROUP A STREP, MOLECULAR          Imaging Results              CT Soft Tissue Neck WO Contrast (Final result)  Result time 12/08/22 13:55:54      Final result by Clark Hilton Jr., MD (12/08/22 13:55:54)                   Impression:      1. There is skin thickening about the anterior face and  overlying the right  muscle with reticulation of the subcutaneous fat.  These findings may relate to cellulitis.  No signs of abscess within limits of a noncontrast study.  No enlarged lymph nodes by CT criteria.  No masslike abnormalities within the inherent limits of noncontrast study.  All CT scans at this facility use dose modulation, iterative reconstruction, and/or weight base dosing when appropriate to reduce radiation dose to as low as reasonably achievable.      Electronically signed by: Clark Hilton Jr., MD  Date:    12/08/2022  Time:    13:55               Narrative:    EXAMINATION:  CT SOFT TISSUE NECK WITHOUT CONTRAST    CLINICAL HISTORY:  Neck mass, nonpulsatile;    TECHNIQUE:  Contiguous axial CT images were obtained from the aortic arch through the vertex without intravenous contrast.    COMPARISON:  None.    FINDINGS:  Evaluation is limited without intravenous contrast.  There is no definite abnormality of the aerodigestive tract within limits of a noncontrast study.    There is slight skin thickening about the face anteriorly bilaterally with reticulation of the subcutaneous fat, right greater than left.  There is also focal inflammatory change deep to the skin surface lying lateral to the right masseter muscle.    There is fatty atrophy of the parotid glands.  The left submandibular gland is slightly more prominent than the right.  No definite enlarged nodes or masslike abnormalities within the submandibular regions.  The thyroid gland is small in size.    There are no enlarged nodes by criteria within the neck.    Multilevel degenerative spine change.  Paranasal sinuses and mastoid air cells are normal. Visualized lung apices and mediastinum are normal.                                       Medications   sodium chloride 0.9% bolus 1,000 mL (0 mLs Intravenous Stopped 12/8/22 1516)                              Clinical Impression:   Final diagnoses:  [J02.9] Sore throat (Primary)         ED Disposition Condition    Discharge Stable          ED Prescriptions    None       Follow-up Information       Follow up With Specialties Details Why Contact Info    Otolaryngology Otolaryngology Schedule an appointment as soon as possible for a visit  As needed [address] [facility] [address] [phone]             Minesh Valiente NP  12/14/22 4616

## 2022-12-14 ENCOUNTER — LAB VISIT (OUTPATIENT)
Dept: LAB | Facility: HOSPITAL | Age: 72
End: 2022-12-14
Attending: NURSE PRACTITIONER
Payer: MEDICARE

## 2022-12-14 DIAGNOSIS — E11.9 TYPE 2 DIABETES MELLITUS WITHOUT COMPLICATION, WITH LONG-TERM CURRENT USE OF INSULIN: ICD-10-CM

## 2022-12-14 DIAGNOSIS — Z79.4 TYPE 2 DIABETES MELLITUS WITHOUT COMPLICATION, WITH LONG-TERM CURRENT USE OF INSULIN: ICD-10-CM

## 2022-12-14 LAB
ESTIMATED AVG GLUCOSE: 140 MG/DL (ref 68–131)
HBA1C MFR BLD: 6.5 % (ref 4–5.6)

## 2022-12-14 PROCEDURE — 36415 COLL VENOUS BLD VENIPUNCTURE: CPT | Performed by: NURSE PRACTITIONER

## 2022-12-14 PROCEDURE — 83036 HEMOGLOBIN GLYCOSYLATED A1C: CPT | Performed by: NURSE PRACTITIONER

## 2022-12-20 ENCOUNTER — TELEPHONE (OUTPATIENT)
Dept: DIABETES | Facility: CLINIC | Age: 72
End: 2022-12-20
Payer: MEDICARE

## 2022-12-20 NOTE — TELEPHONE ENCOUNTER
Patient was notified and informed. Pt verbalized understanding. No further questions/concerns at this time. ----- Message from Laurie Alvarado NP sent at 12/20/2022  4:10 PM CST -----  Inform patient I have reviewed recent A1C lab result.   It is 6.5 improved from previous reading.   Remains at goal.

## 2022-12-21 ENCOUNTER — TELEPHONE (OUTPATIENT)
Dept: PAIN MEDICINE | Facility: CLINIC | Age: 72
End: 2022-12-21
Payer: MEDICARE

## 2022-12-22 ENCOUNTER — OFFICE VISIT (OUTPATIENT)
Dept: PAIN MEDICINE | Facility: CLINIC | Age: 72
End: 2022-12-22
Payer: MEDICARE

## 2022-12-22 VITALS
RESPIRATION RATE: 17 BRPM | DIASTOLIC BLOOD PRESSURE: 91 MMHG | BODY MASS INDEX: 26.48 KG/M2 | WEIGHT: 212.94 LBS | HEIGHT: 75 IN | SYSTOLIC BLOOD PRESSURE: 156 MMHG | HEART RATE: 77 BPM

## 2022-12-22 DIAGNOSIS — G89.4 CHRONIC PAIN DISORDER: ICD-10-CM

## 2022-12-22 DIAGNOSIS — G89.21 CHRONIC PAIN DUE TO TRAUMA: ICD-10-CM

## 2022-12-22 DIAGNOSIS — S68.119A TRAUMATIC AMPUTATION OF DIGIT OF ONE HAND WITHOUT COMPLICATION: Primary | ICD-10-CM

## 2022-12-22 DIAGNOSIS — S68.119A TRAUMATIC AMPUTATION OF DIGIT OF ONE HAND WITHOUT COMPLICATION: ICD-10-CM

## 2022-12-22 DIAGNOSIS — M79.642 PAIN OF LEFT HAND: ICD-10-CM

## 2022-12-22 DIAGNOSIS — Z79.891 OPIOID USE AGREEMENT EXISTS: ICD-10-CM

## 2022-12-22 PROCEDURE — 99214 PR OFFICE/OUTPT VISIT, EST, LEVL IV, 30-39 MIN: ICD-10-PCS | Mod: S$GLB,,, | Performed by: PHYSICIAN ASSISTANT

## 2022-12-22 PROCEDURE — 3288F FALL RISK ASSESSMENT DOCD: CPT | Mod: CPTII,S$GLB,, | Performed by: PHYSICIAN ASSISTANT

## 2022-12-22 PROCEDURE — 99214 OFFICE O/P EST MOD 30 MIN: CPT | Mod: S$GLB,,, | Performed by: PHYSICIAN ASSISTANT

## 2022-12-22 PROCEDURE — 1125F PR PAIN SEVERITY QUANTIFIED, PAIN PRESENT: ICD-10-PCS | Mod: CPTII,S$GLB,, | Performed by: PHYSICIAN ASSISTANT

## 2022-12-22 PROCEDURE — 3044F PR MOST RECENT HEMOGLOBIN A1C LEVEL <7.0%: ICD-10-PCS | Mod: CPTII,S$GLB,, | Performed by: PHYSICIAN ASSISTANT

## 2022-12-22 PROCEDURE — 3044F HG A1C LEVEL LT 7.0%: CPT | Mod: CPTII,S$GLB,, | Performed by: PHYSICIAN ASSISTANT

## 2022-12-22 PROCEDURE — 3080F DIAST BP >= 90 MM HG: CPT | Mod: CPTII,S$GLB,, | Performed by: PHYSICIAN ASSISTANT

## 2022-12-22 PROCEDURE — 3288F PR FALLS RISK ASSESSMENT DOCUMENTED: ICD-10-PCS | Mod: CPTII,S$GLB,, | Performed by: PHYSICIAN ASSISTANT

## 2022-12-22 PROCEDURE — 3008F PR BODY MASS INDEX (BMI) DOCUMENTED: ICD-10-PCS | Mod: CPTII,S$GLB,, | Performed by: PHYSICIAN ASSISTANT

## 2022-12-22 PROCEDURE — 1159F PR MEDICATION LIST DOCUMENTED IN MEDICAL RECORD: ICD-10-PCS | Mod: CPTII,S$GLB,, | Performed by: PHYSICIAN ASSISTANT

## 2022-12-22 PROCEDURE — 99999 PR PBB SHADOW E&M-EST. PATIENT-LVL IV: CPT | Mod: PBBFAC,,, | Performed by: PHYSICIAN ASSISTANT

## 2022-12-22 PROCEDURE — 1125F AMNT PAIN NOTED PAIN PRSNT: CPT | Mod: CPTII,S$GLB,, | Performed by: PHYSICIAN ASSISTANT

## 2022-12-22 PROCEDURE — 99999 PR PBB SHADOW E&M-EST. PATIENT-LVL IV: ICD-10-PCS | Mod: PBBFAC,,, | Performed by: PHYSICIAN ASSISTANT

## 2022-12-22 PROCEDURE — 1101F PT FALLS ASSESS-DOCD LE1/YR: CPT | Mod: CPTII,S$GLB,, | Performed by: PHYSICIAN ASSISTANT

## 2022-12-22 PROCEDURE — 1160F PR REVIEW ALL MEDS BY PRESCRIBER/CLIN PHARMACIST DOCUMENTED: ICD-10-PCS | Mod: CPTII,S$GLB,, | Performed by: PHYSICIAN ASSISTANT

## 2022-12-22 PROCEDURE — 3077F PR MOST RECENT SYSTOLIC BLOOD PRESSURE >= 140 MM HG: ICD-10-PCS | Mod: CPTII,S$GLB,, | Performed by: PHYSICIAN ASSISTANT

## 2022-12-22 PROCEDURE — 3077F SYST BP >= 140 MM HG: CPT | Mod: CPTII,S$GLB,, | Performed by: PHYSICIAN ASSISTANT

## 2022-12-22 PROCEDURE — 1160F RVW MEDS BY RX/DR IN RCRD: CPT | Mod: CPTII,S$GLB,, | Performed by: PHYSICIAN ASSISTANT

## 2022-12-22 PROCEDURE — 3008F BODY MASS INDEX DOCD: CPT | Mod: CPTII,S$GLB,, | Performed by: PHYSICIAN ASSISTANT

## 2022-12-22 PROCEDURE — 1159F MED LIST DOCD IN RCRD: CPT | Mod: CPTII,S$GLB,, | Performed by: PHYSICIAN ASSISTANT

## 2022-12-22 PROCEDURE — 1101F PR PT FALLS ASSESS DOC 0-1 FALLS W/OUT INJ PAST YR: ICD-10-PCS | Mod: CPTII,S$GLB,, | Performed by: PHYSICIAN ASSISTANT

## 2022-12-22 PROCEDURE — 3080F PR MOST RECENT DIASTOLIC BLOOD PRESSURE >= 90 MM HG: ICD-10-PCS | Mod: CPTII,S$GLB,, | Performed by: PHYSICIAN ASSISTANT

## 2022-12-22 RX ORDER — HYDROCODONE BITARTRATE AND ACETAMINOPHEN 10; 325 MG/1; MG/1
1 TABLET ORAL EVERY 12 HOURS PRN
Qty: 60 TABLET | Refills: 0 | Status: SHIPPED | OUTPATIENT
Start: 2023-01-02 | End: 2023-03-01 | Stop reason: SDUPTHER

## 2022-12-22 RX ORDER — HYDROCODONE BITARTRATE AND ACETAMINOPHEN 10; 325 MG/1; MG/1
1 TABLET ORAL EVERY 12 HOURS PRN
Qty: 60 TABLET | Refills: 0 | Status: SHIPPED | OUTPATIENT
Start: 2023-02-01 | End: 2023-04-26 | Stop reason: SDUPTHER

## 2022-12-22 NOTE — PROGRESS NOTES
Subjective:     Chief Complaint:  Left Hand Pain    Interval History (12/22/2022): Ulysses Boreaux was last seen on 10/20/2022. he presents today for follow-up for medication refill.  Medication is providing adequate pain relief. he feels the pain medication reduces the negative effects of chronic pain that affects quality of life. Patient reports pain as 7/10 today. No major changes since previous visit; patient is stable overall. Pain Increases with colder weather, but overall stable.    History of Present Illness:  This patient is a 65 year old male who presents today for f/u complaining of the above noted pains. The patient describes this pain as follows.    - duration (acute, chronic): left hand pain since 1997 after table saw amputation of digits 2 through 5 at work, left lower quadrant pain since hernia surgery in 2004  - timing (constant, intermittent): Constant  - character (sharp, dull, aching, burning): Throbbing  - radiating: No  - dermatomal distribution: No  - side: Left   - aggravating factors: Nothing worsens left digit pain, left lower quadrant pain worse with exercise or walking  - relieving factors: Medication / Norco  - antecedent trauma: Amputation via skill saw and 1997, hernia repair in 2004  - prior spinal surgery: None  - pertinent negatives: No fever, No chills, No weight loss, No bladder dysfunction, No bowel dysfunction, No extremity weakness, No saddle anesthesia  - medications tried: Norco, Percocet, over-the-counter medications, compound pain cream  - other therapies tried (physical therapy, injections):     >> physical therapy tried in the past    >> no prior injections        Imaging/ Labs (reviewed on 12/22/2022):    7/12/2018 US ABDOMINAL AORTA  CLINICAL HISTORY:  Abnormal findings on diagnostic imaging of other specified body structures  TECHNIQUE:  Limited ultrasound was performed of the abdominal aorta, with cross sectional diameter measurements  "obtained.  COMPARISON:  01/10/2018  FINDINGS:  The proximal abdominal aorta measures 2.7 cm.  The mid abdominal aorta measures 1.8 cm.  The distal abdominal aorta measures 1.9 cm, slightly ectatic and unchanged from prior.  The right iliac artery measures 1.0 cm.  The left iliac artery measures 1.1 cm.  Aortoiliac atherosclerosis: Mild  Impression: Stable examination with slight distal abdominal aortic ectasia.  Negative for aneurysm.         ROS:  CONSTITUTIONAL: No fever, chills, weight loss  SKIN: No rash or itching  CARDIOVASCULAR:  No chest pain, palpitations  RESPIRATORY: No shortness of breath  GASTROINTESTINAL: No diarrhea, No constipation, abdominal pain, left lower quadrant  GENITOURINARY: No urinary incontinence    MUSCULOSKELETAL:  - patient reports pain as above, see chief complaint     NEUROLOGICAL:   - Pain as above  - Strength in lower extremities is normal  - Sensation in lower extremities is normal  - No bowel or bladder incontinence     PSYCHIATRIC: No change in mood noticed         Objective:     Physical Exam:  Vitals:    12/22/22 0906   BP: (!) 156/91   Pulse: 77   Resp: 17   Weight: 96.6 kg (212 lb 15.4 oz)   Height: 6' 3" (1.905 m)   PainSc:   7    Body mass index is 26.62 kg/m².   (reviewed on 12/22/2022)    General: alert and oriented, in no apparent distress.   Gait: normal gait  Skin: No rashes, No discoloration   HEENT: EOMI  Respiratory: respirations nonlabored  Cardiology: No obvious peripheral edema    Musculoskeletal:  - ROM preserved   - No pain with lumbar flexion/ extension  - Left upper extremity range of motion intact throughout  - Digital stumps are hypersensitive to palpation, hypersensitivity does not extend further proximal  - No color change, no swelling, no changes in hair growth along left hand    Neuro:  - Lower extremities:      >> 5/5 strength bilaterally, throughout, symmetric  - Upper extremities:    >> 5/5 strength bilaterally  - Normal sensation    Psych: mood and " affect appropriate        Assessment:     Ulysses Boreaux is a 72 y.o. male who presents with     ICD-10-CM ICD-9-CM   1. 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 Chronic pain due to trauma  G89.21 338.21   3. Chronic pain disorder  G89.4 338.4   4. Pain of left hand  M79.642 729.5   5. Opioid use agreement exists  Z79.891 V58.69         Plan:   1. Interventional: None.    2. Pharmacologic:   - Refill Norco 10/325 mg PO BID PRN (60 tabs) x 2 months. Will e-prescribe for 1/2/23 and 2/1/23. Patient tolerating opioids with no side effects, obtaining good pain control with functional improvement. He tolerates this medication well, and he denies any drowsiness or constipation.  - Updated opioid contract signed with Dr. Love on 10/28/20.  - LA  reviewed and appropriate.     - Will order drug screen (UDS or oral swab) today to ensure med compliance.     3. Rehabilitative: Encouraged regular exercise.    4. Diagnostic: None for now.    5. Follow up: 9 week medication Refill     - I discussed the risks, benefits, and alternatives to potential treatment options. All questions and concerns were fully addressed today in clinic.            >> UDS:  8-18-15 :: appropriate  12-29-15 :: appropriate  5-3-16 :: appropriate  10-18-16 :: appropriate  4/13/2017 :: appropriate  9/29/2017 :: appropriate  3/9/2018 :: appropriate  9/4/2018 :: appropriate  6/19/2019 :: appropriate  12/12/2019 :: appropriate  6/12/2020 ::  Appropriate  10/2/2020 :: Appropriate  2/24/2021 :: Appropriate  8/24/2021 :: Appropriate  12/21/2021 :: Appropriate  6/30/2022 :: Appropriate  12/22/2022 :: pending

## 2022-12-31 ENCOUNTER — EXTERNAL CHRONIC CARE MANAGEMENT (OUTPATIENT)
Dept: PRIMARY CARE CLINIC | Facility: CLINIC | Age: 72
End: 2022-12-31
Payer: MEDICARE

## 2022-12-31 PROCEDURE — 99490 PR CHRONIC CARE MGMT, 1ST 20 MIN: ICD-10-PCS | Mod: S$GLB,,, | Performed by: FAMILY MEDICINE

## 2022-12-31 PROCEDURE — 99490 CHRNC CARE MGMT STAFF 1ST 20: CPT | Mod: S$GLB,,, | Performed by: FAMILY MEDICINE

## 2023-01-17 ENCOUNTER — PES CALL (OUTPATIENT)
Dept: ADMINISTRATIVE | Facility: CLINIC | Age: 73
End: 2023-01-17
Payer: MEDICARE

## 2023-01-19 ENCOUNTER — TELEPHONE (OUTPATIENT)
Dept: PAIN MEDICINE | Facility: CLINIC | Age: 73
End: 2023-01-19
Payer: MEDICARE

## 2023-01-19 NOTE — PROGRESS NOTES
Established Patient - Audio Only Telehealth Visit     The patient location is: Home ([place])  The chief complaint leading to consultation is: Diabetes Follow-up  Visit type: Virtual visit with audio only (telephone)  Total time spent with patient: 15 minutes    The reason for the audio only service rather than synchronous audio and video virtual visit was related to technical difficulties or patient preference/necessity.     Each patient to whom I provide medical services by telemedicine is:  (1) informed of the relationship between the physician and patient and the respective role of any other health care provider with respect to management of the patient; and (2) notified that they may decline to receive medical services by telemedicine and may withdraw from such care at any time. Patient verbally consented to receive this service via voice-only telephone call.    This service was not originating from a related E/M service provided within the previous 7 days nor will  to an E/M service or procedure within the next 24 hours or my soonest available appointment.  Prevailing standard of care was able to be met in this audio-only visit.     PCP: Elza Pantoja MD    Subjective:     Chief Complaint: Diabetes follow up.  Last visit with me: 2022    HPI: 72 y.o.   male for diabetes follow up.   Previously managed by Dr. Jackson and YASEMIN Dale NP.  Type II diabetes since  .  Comorbidities: HTN, HLD and Hyperthyroidism  Diabetes complications: without complications    Interval history:  Denies recent hospital admissions or ER visits.   Denies having hypoglycemia.   Endorses the following diabetes related symptoms: None.    Blood glucose monitoring fingerstick: 1x/day   Per patient's recall over the last 4 weeks,   Fasting BG range 120's    Activity level: Sedentary  Meal Plannin meals/day, breakfast and dinner, infrequent snacks/day.  Skips lunch.     Medication compliant all of the  time; diet compliant most of the time.   Has attended diabetes education in the past.     Previous regimen: Januvia - stopped once started on Trulicity (DPP4 not recommended)    Past failed treatment include: none    Current DM Medications:  Diabetes Medications               dulaglutide (TRULICITY) 0.75 mg/0.5 mL pen injector Inject 0.75 mg into the skin every 7 days.    empagliflozin (JARDIANCE) 10 mg tablet Take 1 tablet (10 mg total) by mouth once daily.    insulin NPH-insulin regular, 70/30, (NOVOLIN 70/30 U-100 INSULIN) 100 unit/mL (70-30) injection Inject 22 units in the morning and 26 units in the evening.     Allergies  Review of patient's allergies indicates:  No Known Allergies    Labs Reviewed:  His most recent A1C is:  Lab Results   Component Value Date    HGBA1C 6.5 (H) 12/14/2022    HGBA1C 8.0 (H) 09/14/2022    HGBA1C 8.0 (H) 06/15/2022       His most recent BMP is:  Lab Results   Component Value Date     12/08/2022     12/08/2022    K 5.0 12/08/2022    K 5.0 12/08/2022     12/08/2022     12/08/2022    CO2 26 12/08/2022    CO2 26 12/08/2022    BUN 16 12/08/2022    BUN 16 12/08/2022    CREATININE 1.3 12/08/2022    CREATININE 1.3 12/08/2022    CALCIUM 9.3 12/08/2022    CALCIUM 9.3 12/08/2022    ANIONGAP 11 12/08/2022    ANIONGAP 11 12/08/2022    ESTGFRAFRICA >60.0 02/21/2022    EGFRNONAA 55.7 (A) 02/21/2022       Lab Results   Component Value Date    CPEPTIDE 1.33 10/20/2017     Lab Results   Component Value Date    GLUTAMICACID 0.00 10/20/2017       There is no height or weight on file to calculate BMI.    Wt Readings from Last 3 Encounters:   12/22/22 0906 96.6 kg (212 lb 15.4 oz)   12/08/22 1244 95.8 kg (211 lb 3.2 oz)   10/20/22 0923 94.4 kg (208 lb 1.8 oz)        His blood sugar in clinic today is: Not assessed due to type of visit.    Diabetes Management Status  Statin: Taking  ACE/ARB: Taking    Screening or Prevention Patient's value Goal Complete/Controlled?   HgA1C  "Testing and Control   Lab Results   Component Value Date    HGBA1C 6.5 (H) 2022      Annually/Less than 8% Yes     Lipid profile : 2022 Annually Yes     LDL control Lab Results   Component Value Date    LDLCALC 130.2 2022    Annually/Less than 100 mg/dl  No     Nephropathy screening Lab Results   Component Value Date    MICALBCREAT 26.7 2021     No results found for: PROTEINUA Annually No     Blood pressure BP Readings from Last 1 Encounters:   22 (!) 156/91    Less than 140/90 No     Dilated retinal exam : 10/04/2022 Annually Yes    Foot exam   : 2021 Annually No       Social History     Socioeconomic History    Marital status:     Number of children: 2    Highest education level: 11th grade   Occupational History    Occupation: retired   Tobacco Use    Smoking status: Former     Packs/day: 0.50     Types: Cigarettes     Quit date: 1972     Years since quittin.9    Smokeless tobacco: Never   Substance and Sexual Activity    Alcohol use: Yes     Alcohol/week: 0.0 standard drinks     Comment: " Every blue moon"    Drug use: No    Sexual activity: Yes     Partners: Female   Social History Narrative    . Has 2 children. Patient disabled- was .      Past Medical History:   Diagnosis Date    Allergy     Amputation of finger of left hand     all fingers except for thumb    Cataract     OS NGCL     Chronic pain due to trauma     traumatic amputations left hand    Diabetes mellitus, type 2     History of hepatitis C 2014    tx'd w/ Js 2018    Hyperlipidemia     Hypertension     Hyperthyroidism 10/10/2019    Refractive error      Review of Systems   Constitutional: Negative for activity change, appetite change, fatigue and unexpected weight change.   HENT: Negative for dental problem and trouble swallowing.    Eyes: Negative for visual disturbance.   Respiratory: Negative for chest tightness and shortness of breath.    Cardiovascular: Negative " for chest pain, palpitations and leg swelling.   Gastrointestinal: Negative for abdominal pain, constipation, diarrhea, nausea and vomiting.   Endocrine: Negative for polydipsia, polyphagia and polyuria.   Genitourinary: Negative for difficulty urinating, dysuria, frequency and urgency.        Negative yeast infection   Musculoskeletal: Negative for gait problem, myalgias and neck pain.   Integumentary:  Negative for rash and wound.   Allergic/Immunologic: Negative.    Neurological: Negative for dizziness, syncope, weakness, numbness and headaches.   Hematological: Negative.    Psychiatric/Behavioral: Negative for confusion and sleep disturbance.   All other systems reviewed and are negative.    Objective:      Physical Exam  Neurological:      Mental Status: Alert and oriented to person, place, and time. Mental status is at baseline.   Psychiatric:         Attention and Perception: Attention normal.         Mood and Affect: Mood normal.         Speech: Speech normal.         Thought Content: Thought content normal.         Cognition and Memory: Cognition normal.         Judgment: Judgment normal.       Assessment / Plan:     Diagnoses and all orders for this visit:    Type 2 diabetes mellitus without complication, with long-term current use of insulin  -     insulin NPH-insulin regular, 70/30, (NOVOLIN 70/30 U-100 INSULIN) 100 unit/mL (70-30) injection; Inject 22 units in the morning and 26 units in the evening.  -     Hemoglobin A1C; Future    Hypertension associated with diabetes    Hyperlipidemia associated with type 2 diabetes mellitus    Additional Plan Details:  - Condition: controlled, most recent A1C 6.5% was completed 1 month ago.   - Monitor blood glucose:  3x daily.Goals reviewed. Maintain BG log and bring to next visit for review.   - Hypoglycemia protocol: Reviewed and risk discussed. Instructed to inform with BG readings below 80.  - Diet and activity: Reviewed, limit carbohydrate intake to 30-45  grams per meal, 3x daily and 15 grams per snack with a limit of two daily. Recommend at least 150 minutes of exercise in a week.  - BP and LDL: Recommend lifestyle modifications and follow up with PCP for management.   - Medication Regimen:     Continue Novolin 70/30, 22 units in the morning and 26 units in the evening  Continue Trulicity 0.75 mg weekly  Continue Jardiance 10 mg every morning    - Medication consideration: Pt hesitant to increase Trulicity dose at previous visits due to nausea limited to the day of administration. Continue regimen. Will consider increasing Jardiance dose if needed.  - Lab results: A1C discussed.  - Health Maintenance standards addressed today:  A1c scheduled.   - Risks of uncontrolled DM explained. Importance of routine foot and eye exams reviewed. Scheduled as appropriate.  -The patient was explained the above plan and given opportunity to ask questions.  He understands, chooses and consents to this plan and accepts all the risks, which include but are not limited to the risks mentioned above.   - Labs ordered as above.  - Follow up: 3 months in clinic.         Charlotte T. Delacruz, FNP-C [facility]    A total of 15 minutes was spent in face to face time, of which over 50 % was spent in counseling patient on disease process, complications, treatment, and side effects of medications.

## 2023-01-19 NOTE — TELEPHONE ENCOUNTER
----- Message from Soheila Mitchell sent at 1/19/2023  2:40 PM CST -----  Contact: Maegan/Joaquin Issa is calling in regard to get a diagnosis code for the 12/22 and would like this fax to  [phone]  and if need call her at [phone]  thanks/mpd      Ref [id number]

## 2023-01-20 ENCOUNTER — OFFICE VISIT (OUTPATIENT)
Dept: DIABETES | Facility: CLINIC | Age: 73
End: 2023-01-20
Payer: MEDICARE

## 2023-01-20 DIAGNOSIS — E11.69 HYPERLIPIDEMIA ASSOCIATED WITH TYPE 2 DIABETES MELLITUS: ICD-10-CM

## 2023-01-20 DIAGNOSIS — E11.9 TYPE 2 DIABETES MELLITUS WITHOUT COMPLICATION, WITH LONG-TERM CURRENT USE OF INSULIN: Primary | ICD-10-CM

## 2023-01-20 DIAGNOSIS — E11.59 HYPERTENSION ASSOCIATED WITH DIABETES: ICD-10-CM

## 2023-01-20 DIAGNOSIS — E78.5 HYPERLIPIDEMIA ASSOCIATED WITH TYPE 2 DIABETES MELLITUS: ICD-10-CM

## 2023-01-20 DIAGNOSIS — I15.2 HYPERTENSION ASSOCIATED WITH DIABETES: ICD-10-CM

## 2023-01-20 DIAGNOSIS — Z79.4 TYPE 2 DIABETES MELLITUS WITHOUT COMPLICATION, WITH LONG-TERM CURRENT USE OF INSULIN: Primary | ICD-10-CM

## 2023-01-20 PROCEDURE — 99442 PR PHYSICIAN TELEPHONE EVALUATION 11-20 MIN: CPT | Mod: HCNC,95,, | Performed by: NURSE PRACTITIONER

## 2023-01-20 PROCEDURE — 3072F PR LOW RISK FOR RETINOPATHY: ICD-10-PCS | Mod: HCNC,CPTII,95, | Performed by: NURSE PRACTITIONER

## 2023-01-20 PROCEDURE — 1159F MED LIST DOCD IN RCRD: CPT | Mod: HCNC,CPTII,95, | Performed by: NURSE PRACTITIONER

## 2023-01-20 PROCEDURE — 3072F LOW RISK FOR RETINOPATHY: CPT | Mod: HCNC,CPTII,95, | Performed by: NURSE PRACTITIONER

## 2023-01-20 PROCEDURE — 1159F PR MEDICATION LIST DOCUMENTED IN MEDICAL RECORD: ICD-10-PCS | Mod: HCNC,CPTII,95, | Performed by: NURSE PRACTITIONER

## 2023-01-20 PROCEDURE — 1160F RVW MEDS BY RX/DR IN RCRD: CPT | Mod: HCNC,CPTII,95, | Performed by: NURSE PRACTITIONER

## 2023-01-20 PROCEDURE — 99442 PR PHYSICIAN TELEPHONE EVALUATION 11-20 MIN: ICD-10-PCS | Mod: HCNC,95,, | Performed by: NURSE PRACTITIONER

## 2023-01-20 PROCEDURE — 1160F PR REVIEW ALL MEDS BY PRESCRIBER/CLIN PHARMACIST DOCUMENTED: ICD-10-PCS | Mod: HCNC,CPTII,95, | Performed by: NURSE PRACTITIONER

## 2023-01-20 NOTE — PATIENT INSTRUCTIONS
Medication Regimen:   Continue Novolin 70/30, 22 units in the morning and 26 units in the evening  Continue Trulicity 0.75 mg weekly  Continue Jardiance 10 mg every morning    Patient Instructions:  Carbohydrate Count: 30-45 grams/meal and 15 grams/snack with limit of 2 snacks per day.  Exercise: Goal is 150 minutes or more per week.  Bring meter and blood sugar log to each appointment.     Goals for Blood Sugar:  Fastin-130 mg/dl  2 hours after meal:  mg/dl  Before Bed: 100-150 mg/dl  If Blood sugar is below 70 mg/dl, DO NOT take diabetes medication. Eat first and then recheck blood sugar in 20 minutes.  Foods to eat if blood sugar is below 70 mg/dl  1/2 glass of orange juice   1/2 regular cola can   3-4 hard candies   3-4 small glucose tabs.   Foods to eat if blood sugar is below 50 mg/dl  1 glass of orange juice  1 regular cola can  8 hard candies  8 small glucose tabs.   If you have repeated eating/blood sugar recheck process 2 times and blood sugar still below 70 mg/dl, call 911.

## 2023-01-31 ENCOUNTER — EXTERNAL CHRONIC CARE MANAGEMENT (OUTPATIENT)
Dept: PRIMARY CARE CLINIC | Facility: CLINIC | Age: 73
End: 2023-01-31
Payer: MEDICARE

## 2023-01-31 PROCEDURE — 99490 PR CHRONIC CARE MGMT, 1ST 20 MIN: ICD-10-PCS | Mod: S$GLB,,, | Performed by: FAMILY MEDICINE

## 2023-01-31 PROCEDURE — 99490 CHRNC CARE MGMT STAFF 1ST 20: CPT | Mod: S$GLB,,, | Performed by: FAMILY MEDICINE

## 2023-02-28 ENCOUNTER — EXTERNAL CHRONIC CARE MANAGEMENT (OUTPATIENT)
Dept: PRIMARY CARE CLINIC | Facility: CLINIC | Age: 73
End: 2023-02-28
Payer: MEDICARE

## 2023-02-28 PROCEDURE — 99490 PR CHRONIC CARE MGMT, 1ST 20 MIN: ICD-10-PCS | Mod: S$GLB,,, | Performed by: FAMILY MEDICINE

## 2023-02-28 PROCEDURE — 99490 CHRNC CARE MGMT STAFF 1ST 20: CPT | Mod: S$GLB,,, | Performed by: FAMILY MEDICINE

## 2023-03-01 ENCOUNTER — OFFICE VISIT (OUTPATIENT)
Dept: PAIN MEDICINE | Facility: CLINIC | Age: 73
End: 2023-03-01
Payer: MEDICARE

## 2023-03-01 VITALS
SYSTOLIC BLOOD PRESSURE: 162 MMHG | DIASTOLIC BLOOD PRESSURE: 98 MMHG | RESPIRATION RATE: 16 BRPM | BODY MASS INDEX: 26.76 KG/M2 | HEIGHT: 75 IN | WEIGHT: 215.19 LBS | HEART RATE: 74 BPM

## 2023-03-01 DIAGNOSIS — Z79.891 OPIOID USE AGREEMENT EXISTS: ICD-10-CM

## 2023-03-01 DIAGNOSIS — G89.4 CHRONIC PAIN DISORDER: ICD-10-CM

## 2023-03-01 DIAGNOSIS — S68.119A TRAUMATIC AMPUTATION OF DIGIT OF ONE HAND WITHOUT COMPLICATION: Primary | ICD-10-CM

## 2023-03-01 DIAGNOSIS — G89.21 CHRONIC PAIN DUE TO TRAUMA: ICD-10-CM

## 2023-03-01 DIAGNOSIS — S68.119A TRAUMATIC AMPUTATION OF DIGIT OF ONE HAND WITHOUT COMPLICATION: ICD-10-CM

## 2023-03-01 PROCEDURE — 3072F PR LOW RISK FOR RETINOPATHY: ICD-10-PCS | Mod: HCNC,CPTII,S$GLB, | Performed by: PHYSICIAN ASSISTANT

## 2023-03-01 PROCEDURE — 3288F FALL RISK ASSESSMENT DOCD: CPT | Mod: HCNC,CPTII,S$GLB, | Performed by: PHYSICIAN ASSISTANT

## 2023-03-01 PROCEDURE — 99214 OFFICE O/P EST MOD 30 MIN: CPT | Mod: HCNC,S$GLB,, | Performed by: PHYSICIAN ASSISTANT

## 2023-03-01 PROCEDURE — 3072F LOW RISK FOR RETINOPATHY: CPT | Mod: HCNC,CPTII,S$GLB, | Performed by: PHYSICIAN ASSISTANT

## 2023-03-01 PROCEDURE — 99214 PR OFFICE/OUTPT VISIT, EST, LEVL IV, 30-39 MIN: ICD-10-PCS | Mod: HCNC,S$GLB,, | Performed by: PHYSICIAN ASSISTANT

## 2023-03-01 PROCEDURE — 1101F PR PT FALLS ASSESS DOC 0-1 FALLS W/OUT INJ PAST YR: ICD-10-PCS | Mod: HCNC,CPTII,S$GLB, | Performed by: PHYSICIAN ASSISTANT

## 2023-03-01 PROCEDURE — 3080F DIAST BP >= 90 MM HG: CPT | Mod: HCNC,CPTII,S$GLB, | Performed by: PHYSICIAN ASSISTANT

## 2023-03-01 PROCEDURE — 99999 PR PBB SHADOW E&M-EST. PATIENT-LVL IV: CPT | Mod: PBBFAC,HCNC,, | Performed by: PHYSICIAN ASSISTANT

## 2023-03-01 PROCEDURE — 1101F PT FALLS ASSESS-DOCD LE1/YR: CPT | Mod: HCNC,CPTII,S$GLB, | Performed by: PHYSICIAN ASSISTANT

## 2023-03-01 PROCEDURE — 1160F RVW MEDS BY RX/DR IN RCRD: CPT | Mod: HCNC,CPTII,S$GLB, | Performed by: PHYSICIAN ASSISTANT

## 2023-03-01 PROCEDURE — 99999 PR PBB SHADOW E&M-EST. PATIENT-LVL IV: ICD-10-PCS | Mod: PBBFAC,HCNC,, | Performed by: PHYSICIAN ASSISTANT

## 2023-03-01 PROCEDURE — 3077F PR MOST RECENT SYSTOLIC BLOOD PRESSURE >= 140 MM HG: ICD-10-PCS | Mod: HCNC,CPTII,S$GLB, | Performed by: PHYSICIAN ASSISTANT

## 2023-03-01 PROCEDURE — 3008F PR BODY MASS INDEX (BMI) DOCUMENTED: ICD-10-PCS | Mod: HCNC,CPTII,S$GLB, | Performed by: PHYSICIAN ASSISTANT

## 2023-03-01 PROCEDURE — 1125F AMNT PAIN NOTED PAIN PRSNT: CPT | Mod: HCNC,CPTII,S$GLB, | Performed by: PHYSICIAN ASSISTANT

## 2023-03-01 PROCEDURE — 1159F MED LIST DOCD IN RCRD: CPT | Mod: HCNC,CPTII,S$GLB, | Performed by: PHYSICIAN ASSISTANT

## 2023-03-01 PROCEDURE — 1160F PR REVIEW ALL MEDS BY PRESCRIBER/CLIN PHARMACIST DOCUMENTED: ICD-10-PCS | Mod: HCNC,CPTII,S$GLB, | Performed by: PHYSICIAN ASSISTANT

## 2023-03-01 PROCEDURE — 3080F PR MOST RECENT DIASTOLIC BLOOD PRESSURE >= 90 MM HG: ICD-10-PCS | Mod: HCNC,CPTII,S$GLB, | Performed by: PHYSICIAN ASSISTANT

## 2023-03-01 PROCEDURE — 1159F PR MEDICATION LIST DOCUMENTED IN MEDICAL RECORD: ICD-10-PCS | Mod: HCNC,CPTII,S$GLB, | Performed by: PHYSICIAN ASSISTANT

## 2023-03-01 PROCEDURE — 3288F PR FALLS RISK ASSESSMENT DOCUMENTED: ICD-10-PCS | Mod: HCNC,CPTII,S$GLB, | Performed by: PHYSICIAN ASSISTANT

## 2023-03-01 PROCEDURE — 3008F BODY MASS INDEX DOCD: CPT | Mod: HCNC,CPTII,S$GLB, | Performed by: PHYSICIAN ASSISTANT

## 2023-03-01 PROCEDURE — 1125F PR PAIN SEVERITY QUANTIFIED, PAIN PRESENT: ICD-10-PCS | Mod: HCNC,CPTII,S$GLB, | Performed by: PHYSICIAN ASSISTANT

## 2023-03-01 PROCEDURE — 3077F SYST BP >= 140 MM HG: CPT | Mod: HCNC,CPTII,S$GLB, | Performed by: PHYSICIAN ASSISTANT

## 2023-03-01 RX ORDER — HYDROCODONE BITARTRATE AND ACETAMINOPHEN 10; 325 MG/1; MG/1
1 TABLET ORAL EVERY 12 HOURS PRN
Qty: 60 TABLET | Refills: 0 | Status: SHIPPED | OUTPATIENT
Start: 2023-04-02 | End: 2023-06-29 | Stop reason: SDUPTHER

## 2023-03-01 RX ORDER — HYDROCODONE BITARTRATE AND ACETAMINOPHEN 10; 325 MG/1; MG/1
1 TABLET ORAL EVERY 12 HOURS PRN
Qty: 60 TABLET | Refills: 0 | Status: SHIPPED | OUTPATIENT
Start: 2023-03-03 | End: 2023-04-26 | Stop reason: SDUPTHER

## 2023-03-01 NOTE — PROGRESS NOTES
Subjective:     Chief Complaint:  Left Hand Pain    Interval History (3/1/2023): Patient was last seen on 12/22/2022. he presents today for follow-up for medication refill. he feels the pain medication is providing adequate pain relief and reduces the negative effects of chronic pain that affects quality of life. No major SE from medications. No major changes since previous visit; patient is stable overall. Patient reports pain as 4/10 today.     History of Present Illness:  This patient is a 65 year old male who presents today for f/u complaining of the above noted pains. The patient describes this pain as follows.    - duration (acute, chronic): left hand pain since 1997 after table saw amputation of digits 2 through 5 at work, left lower quadrant pain since hernia surgery in 2004  - timing (constant, intermittent): Constant  - character (sharp, dull, aching, burning): Throbbing  - radiating: No  - dermatomal distribution: No  - side: Left   - aggravating factors: Nothing worsens left digit pain, left lower quadrant pain worse with exercise or walking  - relieving factors: Medication / Norco  - antecedent trauma: Amputation via skill saw and 1997, hernia repair in 2004  - prior spinal surgery: None  - pertinent negatives: No fever, No chills, No weight loss, No bladder dysfunction, No bowel dysfunction, No extremity weakness, No saddle anesthesia  - medications tried: Norco, Percocet, over-the-counter medications, compound pain cream  - other therapies tried (physical therapy, injections):     >> physical therapy tried in the past    >> no prior injections        Imaging/ Labs (reviewed on 3/1/2023):    7/12/2018 US ABDOMINAL AORTA  CLINICAL HISTORY:  Abnormal findings on diagnostic imaging of other specified body structures  TECHNIQUE:  Limited ultrasound was performed of the abdominal aorta, with cross sectional diameter measurements obtained.  COMPARISON:  01/10/2018  FINDINGS:  The proximal abdominal aorta  "measures 2.7 cm.  The mid abdominal aorta measures 1.8 cm.  The distal abdominal aorta measures 1.9 cm, slightly ectatic and unchanged from prior.  The right iliac artery measures 1.0 cm.  The left iliac artery measures 1.1 cm.  Aortoiliac atherosclerosis: Mild  Impression: Stable examination with slight distal abdominal aortic ectasia.  Negative for aneurysm.         ROS:  CONSTITUTIONAL: No fever, chills, weight loss  SKIN: No rash or itching  CARDIOVASCULAR:  No chest pain, palpitations  RESPIRATORY: No shortness of breath  GASTROINTESTINAL: No diarrhea, No constipation, abdominal pain, left lower quadrant  GENITOURINARY: No urinary incontinence    MUSCULOSKELETAL:  - patient reports pain as above, see chief complaint     NEUROLOGICAL:   - Pain as above  - Strength in lower extremities is normal  - Sensation in lower extremities is normal  - No bowel or bladder incontinence     PSYCHIATRIC: No change in mood noticed         Objective:     Physical Exam:  Vitals:    03/01/23 0916   BP: (!) 162/98   Pulse: 74   Resp: 16   Weight: 97.6 kg (215 lb 2.7 oz)   Height: 6' 3" (1.905 m)   PainSc:   4    Body mass index is 26.89 kg/m².   (reviewed on 3/1/2023)    General: alert and oriented, in no apparent distress.   Gait: normal gait  Skin: No rashes, No discoloration   HEENT: EOMI  Respiratory: respirations nonlabored  Cardiology: No obvious peripheral edema  GI: no obvious distention     Musculoskeletal:  - ROM preserved   - No pain with lumbar flexion/ extension  - Left upper extremity range of motion intact throughout  - Digital stumps are hypersensitive to palpation, hypersensitivity does not extend further proximal  - No color change, no swelling, no changes in hair growth along left hand    Neuro:  - Lower extremities:      >> 5/5 strength bilaterally, throughout, symmetric  - Upper extremities:    >> 5/5 strength bilaterally  - Normal sensation    Psych: mood and affect appropriate        Assessment:     Ulysses " Oma is a 72 y.o. male who presents with     ICD-10-CM ICD-9-CM   1. 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 Chronic pain due to trauma  G89.21 338.21   3. Chronic pain disorder  G89.4 338.4   4. Opioid use agreement exists  Z79.891 V58.69       Plan:   1. Interventional: None.    2. Pharmacologic:   - Refill Norco 10/325 mg BID PRN (60 tabs) x 2 months. Will e-prescribe for 3/3/23 and 4/2/23. Patient tolerating opioids with no side effects, obtaining good pain control with functional improvement. He tolerates this medication well, and he denies any drowsiness or constipation.  - Updated opioid contract signed with Dr. Love on 10/28/20.  - LA  reviewed and appropriate.     - Last UDS 12/22/2022 was compliant for medications prescribed.     3. Rehabilitative: Encouraged regular exercise.    4. Diagnostic: None for now.    5. Follow up: 9 week medication Refill     - This condition does not require this patient to take time off of work, and the primary goal of our Pain Management services is to improve the patient's functional capacity.   - I discussed the risks, benefits, and alternatives to potential treatment options. All questions and concerns were fully addressed today in clinic.         Ysabel Garcia PA-C  Interventional Pain Management - [facility]    Disclaimer:  This note was prepared using voice recognition system and is likely to have sound alike errors that may have been overlooked even after proof reading.  Please call me with any questions.       >> UDS:  8-18-15 :: appropriate  12-29-15 :: appropriate  5-3-16 :: appropriate  10-18-16 :: appropriate  4/13/2017 :: appropriate  9/29/2017 :: appropriate  3/9/2018 :: appropriate  9/4/2018 :: appropriate  6/19/2019 :: appropriate  12/12/2019 :: appropriate  6/12/2020 ::  Appropriate  10/2/2020 :: Appropriate  2/24/2021 :: Appropriate  8/24/2021 :: Appropriate  12/21/2021 ::  Appropriate  6/30/2022 :: Appropriate  12/22/2022 :: Appropriate

## 2023-03-08 ENCOUNTER — OFFICE VISIT (OUTPATIENT)
Dept: INTERNAL MEDICINE | Facility: CLINIC | Age: 73
End: 2023-03-08
Payer: MEDICARE

## 2023-03-08 VITALS
RESPIRATION RATE: 18 BRPM | BODY MASS INDEX: 26.51 KG/M2 | SYSTOLIC BLOOD PRESSURE: 138 MMHG | HEIGHT: 75 IN | HEART RATE: 80 BPM | DIASTOLIC BLOOD PRESSURE: 86 MMHG | OXYGEN SATURATION: 97 % | TEMPERATURE: 98 F | WEIGHT: 213.19 LBS

## 2023-03-08 DIAGNOSIS — N18.31 TYPE 2 DIABETES MELLITUS WITH STAGE 3A CHRONIC KIDNEY DISEASE, WITH LONG-TERM CURRENT USE OF INSULIN: Primary | ICD-10-CM

## 2023-03-08 DIAGNOSIS — Z79.4 TYPE 2 DIABETES MELLITUS WITH STAGE 3A CHRONIC KIDNEY DISEASE, WITH LONG-TERM CURRENT USE OF INSULIN: Primary | ICD-10-CM

## 2023-03-08 DIAGNOSIS — I15.2 HYPERTENSION ASSOCIATED WITH DIABETES: ICD-10-CM

## 2023-03-08 DIAGNOSIS — F19.20 CONTROLLED DRUG DEPENDENCE: ICD-10-CM

## 2023-03-08 DIAGNOSIS — J30.9 ALLERGIC RHINITIS, UNSPECIFIED SEASONALITY, UNSPECIFIED TRIGGER: ICD-10-CM

## 2023-03-08 DIAGNOSIS — I70.0 ATHEROSCLEROSIS OF AORTA: ICD-10-CM

## 2023-03-08 DIAGNOSIS — E11.59 HYPERTENSION ASSOCIATED WITH DIABETES: ICD-10-CM

## 2023-03-08 DIAGNOSIS — E78.5 HYPERLIPIDEMIA ASSOCIATED WITH TYPE 2 DIABETES MELLITUS: ICD-10-CM

## 2023-03-08 DIAGNOSIS — E11.69 HYPERLIPIDEMIA ASSOCIATED WITH TYPE 2 DIABETES MELLITUS: ICD-10-CM

## 2023-03-08 DIAGNOSIS — E11.22 TYPE 2 DIABETES MELLITUS WITH STAGE 3A CHRONIC KIDNEY DISEASE, WITH LONG-TERM CURRENT USE OF INSULIN: Primary | ICD-10-CM

## 2023-03-08 PROCEDURE — 3072F LOW RISK FOR RETINOPATHY: CPT | Mod: HCNC,CPTII,S$GLB, | Performed by: PHYSICIAN ASSISTANT

## 2023-03-08 PROCEDURE — 3075F SYST BP GE 130 - 139MM HG: CPT | Mod: HCNC,CPTII,S$GLB, | Performed by: PHYSICIAN ASSISTANT

## 2023-03-08 PROCEDURE — 99214 PR OFFICE/OUTPT VISIT, EST, LEVL IV, 30-39 MIN: ICD-10-PCS | Mod: HCNC,S$GLB,, | Performed by: PHYSICIAN ASSISTANT

## 2023-03-08 PROCEDURE — 3008F PR BODY MASS INDEX (BMI) DOCUMENTED: ICD-10-PCS | Mod: HCNC,CPTII,S$GLB, | Performed by: PHYSICIAN ASSISTANT

## 2023-03-08 PROCEDURE — 99999 PR PBB SHADOW E&M-EST. PATIENT-LVL IV: ICD-10-PCS | Mod: PBBFAC,HCNC,, | Performed by: PHYSICIAN ASSISTANT

## 2023-03-08 PROCEDURE — 3072F PR LOW RISK FOR RETINOPATHY: ICD-10-PCS | Mod: HCNC,CPTII,S$GLB, | Performed by: PHYSICIAN ASSISTANT

## 2023-03-08 PROCEDURE — 3288F FALL RISK ASSESSMENT DOCD: CPT | Mod: HCNC,CPTII,S$GLB, | Performed by: PHYSICIAN ASSISTANT

## 2023-03-08 PROCEDURE — 3008F BODY MASS INDEX DOCD: CPT | Mod: HCNC,CPTII,S$GLB, | Performed by: PHYSICIAN ASSISTANT

## 2023-03-08 PROCEDURE — 1160F PR REVIEW ALL MEDS BY PRESCRIBER/CLIN PHARMACIST DOCUMENTED: ICD-10-PCS | Mod: HCNC,CPTII,S$GLB, | Performed by: PHYSICIAN ASSISTANT

## 2023-03-08 PROCEDURE — 3079F DIAST BP 80-89 MM HG: CPT | Mod: HCNC,CPTII,S$GLB, | Performed by: PHYSICIAN ASSISTANT

## 2023-03-08 PROCEDURE — 1159F PR MEDICATION LIST DOCUMENTED IN MEDICAL RECORD: ICD-10-PCS | Mod: HCNC,CPTII,S$GLB, | Performed by: PHYSICIAN ASSISTANT

## 2023-03-08 PROCEDURE — 99999 PR PBB SHADOW E&M-EST. PATIENT-LVL IV: CPT | Mod: PBBFAC,HCNC,, | Performed by: PHYSICIAN ASSISTANT

## 2023-03-08 PROCEDURE — 1101F PT FALLS ASSESS-DOCD LE1/YR: CPT | Mod: HCNC,CPTII,S$GLB, | Performed by: PHYSICIAN ASSISTANT

## 2023-03-08 PROCEDURE — 3075F PR MOST RECENT SYSTOLIC BLOOD PRESS GE 130-139MM HG: ICD-10-PCS | Mod: HCNC,CPTII,S$GLB, | Performed by: PHYSICIAN ASSISTANT

## 2023-03-08 PROCEDURE — 99214 OFFICE O/P EST MOD 30 MIN: CPT | Mod: HCNC,S$GLB,, | Performed by: PHYSICIAN ASSISTANT

## 2023-03-08 PROCEDURE — 1126F PR PAIN SEVERITY QUANTIFIED, NO PAIN PRESENT: ICD-10-PCS | Mod: HCNC,CPTII,S$GLB, | Performed by: PHYSICIAN ASSISTANT

## 2023-03-08 PROCEDURE — 3079F PR MOST RECENT DIASTOLIC BLOOD PRESSURE 80-89 MM HG: ICD-10-PCS | Mod: HCNC,CPTII,S$GLB, | Performed by: PHYSICIAN ASSISTANT

## 2023-03-08 PROCEDURE — 1159F MED LIST DOCD IN RCRD: CPT | Mod: HCNC,CPTII,S$GLB, | Performed by: PHYSICIAN ASSISTANT

## 2023-03-08 PROCEDURE — 1126F AMNT PAIN NOTED NONE PRSNT: CPT | Mod: HCNC,CPTII,S$GLB, | Performed by: PHYSICIAN ASSISTANT

## 2023-03-08 PROCEDURE — 1101F PR PT FALLS ASSESS DOC 0-1 FALLS W/OUT INJ PAST YR: ICD-10-PCS | Mod: HCNC,CPTII,S$GLB, | Performed by: PHYSICIAN ASSISTANT

## 2023-03-08 PROCEDURE — 1160F RVW MEDS BY RX/DR IN RCRD: CPT | Mod: HCNC,CPTII,S$GLB, | Performed by: PHYSICIAN ASSISTANT

## 2023-03-08 PROCEDURE — 3288F PR FALLS RISK ASSESSMENT DOCUMENTED: ICD-10-PCS | Mod: HCNC,CPTII,S$GLB, | Performed by: PHYSICIAN ASSISTANT

## 2023-03-08 RX ORDER — FLUTICASONE PROPIONATE 50 MCG
SPRAY, SUSPENSION (ML) NASAL
Qty: 16 G | Refills: 11 | Status: SHIPPED | OUTPATIENT
Start: 2023-03-08 | End: 2024-02-29

## 2023-03-08 RX ORDER — ATORVASTATIN CALCIUM 20 MG/1
20 TABLET, FILM COATED ORAL NIGHTLY
Qty: 90 TABLET | Refills: 3 | Status: SHIPPED | OUTPATIENT
Start: 2023-03-08 | End: 2024-03-12 | Stop reason: SDUPTHER

## 2023-03-08 RX ORDER — NEBIVOLOL 10 MG/1
10 TABLET ORAL DAILY
Qty: 90 TABLET | Refills: 3 | Status: SHIPPED | OUTPATIENT
Start: 2023-03-08 | End: 2023-09-11

## 2023-03-08 NOTE — ASSESSMENT & PLAN NOTE
/86, controlled, continue losartan-HCTZ and nebivolol  Lab Results   Component Value Date     12/08/2022     12/08/2022    K 5.0 12/08/2022    K 5.0 12/08/2022    BUN 16 12/08/2022    BUN 16 12/08/2022    CREATININE 1.3 12/08/2022    CREATININE 1.3 12/08/2022    ESTGFRAFRICA >60.0 02/21/2022    EGFRNONAA 55.7 (A) 02/21/2022    EGFRNORACEVR 59 (A) 12/08/2022    EGFRNORACEVR 59 (A) 12/08/2022

## 2023-03-08 NOTE — PROGRESS NOTES
Subjective:       Patient ID: Ulysses Boreaux is a 72 y.o. male.    Chief Complaint: Follow-up      Patient presents to clinic today for followup of chronic conditions.      Review of Systems   Constitutional:  Negative for chills, fatigue, fever and unexpected weight change.   Eyes:  Negative for visual disturbance.   Respiratory:  Negative for shortness of breath.    Cardiovascular:  Negative for chest pain.   Musculoskeletal:  Negative for myalgias.   Neurological:  Negative for headaches.     Objective:      Physical Exam  Vitals and nursing note reviewed.   Constitutional:       General: He is not in acute distress.     Appearance: He is well-developed.   HENT:      Head: Normocephalic and atraumatic.   Eyes:      General: Lids are normal. No scleral icterus.     Extraocular Movements: Extraocular movements intact.      Conjunctiva/sclera: Conjunctivae normal.   Pulmonary:      Effort: Pulmonary effort is normal.   Neurological:      Mental Status: He is alert.      Cranial Nerves: No cranial nerve deficit.   Psychiatric:         Mood and Affect: Mood and affect normal.       Protective Sensation (w/ 10 gram monofilament):  Right: Intact  Left: Intact    Visual Inspection:  Normal -  Bilateral and Nails Intact - without Evidence of Foot Deformity- Bilateral    Pedal Pulses:   Right: Present  Left: Present    Posterior Tibialis Pulses:   Right:Present  Left: Present   Assessment:       1. Type 2 diabetes mellitus with stage 3a chronic kidney disease, with long-term current use of insulin    2. Hyperlipidemia associated with type 2 diabetes mellitus    3. Hypertension associated with diabetes    4. Atherosclerosis of aorta    5. Controlled drug dependence    6. Allergic rhinitis, unspecified seasonality, unspecified trigger        Plan:   1. Type 2 diabetes mellitus with stage 3a chronic kidney disease, with long-term current use of insulin  Assessment & Plan:  Status pending lab, continue insulin, trulicity and  jardiance    Orders:  -     Hemoglobin A1C; Future; Expected date: 09/08/2023    2. Hyperlipidemia associated with type 2 diabetes mellitus  Assessment & Plan:  Status pending lab, continue atorvastatin    Orders:  -     Comprehensive Metabolic Panel; Future; Expected date: 03/08/2023  -     Lipid Panel; Future; Expected date: 03/08/2023  -     Comprehensive Metabolic Panel; Future; Expected date: 09/08/2023  -     Lipid Panel; Future; Expected date: 09/08/2023  -     atorvastatin (LIPITOR) 20 MG tablet; Take 1 tablet (20 mg total) by mouth every evening.  Dispense: 90 tablet; Refill: 3    3. Hypertension associated with diabetes  Assessment & Plan:  /86, controlled, continue losartan-HCTZ and nebivolol  Lab Results   Component Value Date     12/08/2022     12/08/2022    K 5.0 12/08/2022    K 5.0 12/08/2022    BUN 16 12/08/2022    BUN 16 12/08/2022    CREATININE 1.3 12/08/2022    CREATININE 1.3 12/08/2022    ESTGFRAFRICA >60.0 02/21/2022    EGFRNONAA 55.7 (A) 02/21/2022    EGFRNORACEVR 59 (A) 12/08/2022    EGFRNORACEVR 59 (A) 12/08/2022        Orders:  -     TSH; Future; Expected date: 09/08/2023  -     CBC Auto Differential; Future; Expected date: 09/08/2023  -     nebivoloL (BYSTOLIC) 10 MG Tab; Take 1 tablet (10 mg total) by mouth once daily.  Dispense: 90 tablet; Refill: 3    4. Atherosclerosis of aorta  Overview:  US 7/2018, on ASA and statin      5. Controlled drug dependence  Overview:  Followed by Pain Management, continue current treatment plan       6. Allergic rhinitis, unspecified seasonality, unspecified trigger  -     fluticasone propionate (FLONASE) 50 mcg/actuation nasal spray; SHAKE LIQUID AND USE 2 SPRAYS(100 MCG) IN EACH NOSTRIL DAILY AS NEEDED FOR RHINITIS  Dispense: 16 g; Refill: 11        Fasting labs ASAP  Discussed shingrix vaccine, advised can be obtained at pharmacy.  Annual with Dr. Pantoja scheduled with fasting labs PTA   Health Maintenance reviewed/updated.

## 2023-03-09 ENCOUNTER — LAB VISIT (OUTPATIENT)
Dept: LAB | Facility: HOSPITAL | Age: 73
End: 2023-03-09
Attending: FAMILY MEDICINE
Payer: MEDICARE

## 2023-03-09 DIAGNOSIS — E11.9 TYPE 2 DIABETES MELLITUS WITHOUT COMPLICATION, WITH LONG-TERM CURRENT USE OF INSULIN: ICD-10-CM

## 2023-03-09 DIAGNOSIS — E78.5 HYPERLIPIDEMIA ASSOCIATED WITH TYPE 2 DIABETES MELLITUS: ICD-10-CM

## 2023-03-09 DIAGNOSIS — E11.9 TYPE 2 DIABETES MELLITUS WITHOUT COMPLICATION: ICD-10-CM

## 2023-03-09 DIAGNOSIS — E11.69 HYPERLIPIDEMIA ASSOCIATED WITH TYPE 2 DIABETES MELLITUS: ICD-10-CM

## 2023-03-09 DIAGNOSIS — Z79.4 TYPE 2 DIABETES MELLITUS WITHOUT COMPLICATION, WITH LONG-TERM CURRENT USE OF INSULIN: ICD-10-CM

## 2023-03-09 LAB
ALBUMIN SERPL BCP-MCNC: 4.2 G/DL (ref 3.5–5.2)
ALP SERPL-CCNC: 67 U/L (ref 55–135)
ALT SERPL W/O P-5'-P-CCNC: 37 U/L (ref 10–44)
ANION GAP SERPL CALC-SCNC: 10 MMOL/L (ref 8–16)
AST SERPL-CCNC: 31 U/L (ref 10–40)
BILIRUB SERPL-MCNC: 0.5 MG/DL (ref 0.1–1)
BUN SERPL-MCNC: 18 MG/DL (ref 8–23)
CALCIUM SERPL-MCNC: 9.7 MG/DL (ref 8.7–10.5)
CHLORIDE SERPL-SCNC: 99 MMOL/L (ref 95–110)
CHOLEST SERPL-MCNC: 187 MG/DL (ref 120–199)
CHOLEST/HDLC SERPL: 3.2 {RATIO} (ref 2–5)
CO2 SERPL-SCNC: 27 MMOL/L (ref 23–29)
CREAT SERPL-MCNC: 1.3 MG/DL (ref 0.5–1.4)
EST. GFR  (NO RACE VARIABLE): 59.1 ML/MIN/1.73 M^2
ESTIMATED AVG GLUCOSE: 148 MG/DL (ref 68–131)
GLUCOSE SERPL-MCNC: 116 MG/DL (ref 70–110)
HBA1C MFR BLD: 6.8 % (ref 4–5.6)
HDLC SERPL-MCNC: 59 MG/DL (ref 40–75)
HDLC SERPL: 31.6 % (ref 20–50)
LDLC SERPL CALC-MCNC: 112 MG/DL (ref 63–159)
NONHDLC SERPL-MCNC: 128 MG/DL
POTASSIUM SERPL-SCNC: 4 MMOL/L (ref 3.5–5.1)
PROT SERPL-MCNC: 7.5 G/DL (ref 6–8.4)
SODIUM SERPL-SCNC: 136 MMOL/L (ref 136–145)
TRIGL SERPL-MCNC: 80 MG/DL (ref 30–150)

## 2023-03-09 PROCEDURE — 80053 COMPREHEN METABOLIC PANEL: CPT | Mod: HCNC | Performed by: PHYSICIAN ASSISTANT

## 2023-03-09 PROCEDURE — 83036 HEMOGLOBIN GLYCOSYLATED A1C: CPT | Mod: HCNC | Performed by: NURSE PRACTITIONER

## 2023-03-09 PROCEDURE — 36415 COLL VENOUS BLD VENIPUNCTURE: CPT | Mod: HCNC | Performed by: PHYSICIAN ASSISTANT

## 2023-03-09 PROCEDURE — 80061 LIPID PANEL: CPT | Mod: HCNC | Performed by: PHYSICIAN ASSISTANT

## 2023-03-13 NOTE — TELEPHONE ENCOUNTER
----- Message from Pili Barragan sent at 8/5/2022  9:32 AM CDT -----  Contact: [phone]  Pt is calling regarding medication, HYDROcodone-acetaminophen (NORCO)  mg per tablet . States he been trying to get intouch with someone and no one has called back, is needing a call back today please . Please call back at [phone] . Thanks/dj       
Per Ysabel Garcia PA-C please fill Hydrocodone 10/325mg1 tab Q12h PRN.   Pharmacy does not have the RX that was post dated for 7/30/2022.   
172

## 2023-03-14 ENCOUNTER — TELEPHONE (OUTPATIENT)
Dept: DIABETES | Facility: CLINIC | Age: 73
End: 2023-03-14
Payer: MEDICARE

## 2023-03-14 NOTE — TELEPHONE ENCOUNTER
----- Message from Laurie Alvarado NP sent at 3/13/2023  2:12 PM CDT -----  Inform patient I have reviewed recent A1C lab result.   It is 6.8, slightly increased from previous reading.   However remains at goal.  Continue current diabetes medications.

## 2023-03-15 DIAGNOSIS — E11.9 TYPE 2 DIABETES MELLITUS WITHOUT COMPLICATION: ICD-10-CM

## 2023-03-16 ENCOUNTER — TELEPHONE (OUTPATIENT)
Dept: ADMINISTRATIVE | Facility: HOSPITAL | Age: 73
End: 2023-03-16
Payer: MEDICARE

## 2023-03-31 ENCOUNTER — EXTERNAL CHRONIC CARE MANAGEMENT (OUTPATIENT)
Dept: PRIMARY CARE CLINIC | Facility: CLINIC | Age: 73
End: 2023-03-31
Payer: MEDICARE

## 2023-03-31 PROCEDURE — 99490 CHRNC CARE MGMT STAFF 1ST 20: CPT | Mod: S$GLB,,, | Performed by: FAMILY MEDICINE

## 2023-03-31 PROCEDURE — 99490 PR CHRONIC CARE MGMT, 1ST 20 MIN: ICD-10-PCS | Mod: S$GLB,,, | Performed by: FAMILY MEDICINE

## 2023-04-24 NOTE — PROGRESS NOTES
Subjective:     Chief Complaint:  Left Hand Pain    Interval History (4/26/2023): Ulysses Boreaux was last seen on 3/1/2023. he presents today for follow-up for medication refill. he feels the pain medication is providing adequate pain relief and reduces the negative effects of chronic pain that affects quality of life. No major SE from medications.  Patient reports pain as 0/10 today. No major changes since previous visit; patient is stable overall.    Interval History (3/1/2023): Patient was last seen on 12/22/2022. he presents today for follow-up for medication refill. he feels the pain medication is providing adequate pain relief and reduces the negative effects of chronic pain that affects quality of life. No major SE from medications. No major changes since previous visit; patient is stable overall. Patient reports pain as 4/10 today.     History of Present Illness:  This patient is a 65 year old male who presents today for f/u complaining of the above noted pains. The patient describes this pain as follows.    - duration (acute, chronic): left hand pain since 1997 after table saw amputation of digits 2 through 5 at work, left lower quadrant pain since hernia surgery in 2004  - timing (constant, intermittent): Constant  - character (sharp, dull, aching, burning): Throbbing  - radiating: No  - dermatomal distribution: No  - side: Left   - aggravating factors: Nothing worsens left digit pain, left lower quadrant pain worse with exercise or walking  - relieving factors: Medication / Norco  - antecedent trauma: Amputation via skill saw and 1997, hernia repair in 2004  - prior spinal surgery: None  - pertinent negatives: No fever, No chills, No weight loss, No bladder dysfunction, No bowel dysfunction, No extremity weakness, No saddle anesthesia  - medications tried: Norco, Percocet, over-the-counter medications, compound pain cream  - other therapies tried (physical therapy, injections):     >> physical therapy  "tried in the past    >> no prior injections        Imaging/ Labs (reviewed on 4/26/2023):    7/12/2018 US ABDOMINAL AORTA  CLINICAL HISTORY:  Abnormal findings on diagnostic imaging of other specified body structures  TECHNIQUE:  Limited ultrasound was performed of the abdominal aorta, with cross sectional diameter measurements obtained.  COMPARISON:  01/10/2018  FINDINGS:  The proximal abdominal aorta measures 2.7 cm.  The mid abdominal aorta measures 1.8 cm.  The distal abdominal aorta measures 1.9 cm, slightly ectatic and unchanged from prior.  The right iliac artery measures 1.0 cm.  The left iliac artery measures 1.1 cm.  Aortoiliac atherosclerosis: Mild  Impression: Stable examination with slight distal abdominal aortic ectasia.  Negative for aneurysm.         ROS:  CONSTITUTIONAL: No fever, chills, weight loss  SKIN: No rash or itching  CARDIOVASCULAR:  No chest pain, palpitations  RESPIRATORY: No shortness of breath  GASTROINTESTINAL: No diarrhea, No constipation, abdominal pain, left lower quadrant  GENITOURINARY: No urinary incontinence    MUSCULOSKELETAL:  - patient reports pain as above, see chief complaint     NEUROLOGICAL:   - Pain as above  - Strength in lower extremities is normal  - Sensation in lower extremities is normal  - No bowel or bladder incontinence     PSYCHIATRIC: No change in mood noticed         Objective:     Physical Exam:  Vitals:    04/26/23 0920   BP: (!) 156/100   Pulse: 82   Resp: 16   Weight: 96.8 kg (213 lb 6.5 oz)   Height: 6' 3" (1.905 m)   PainSc: 0-No pain    Body mass index is 26.67 kg/m².   (reviewed on 4/26/2023)    General: alert and oriented, in no apparent distress.   Gait: normal gait  Skin: No rashes, No discoloration   HEENT: EOMI  Respiratory: respirations nonlabored  Cardiology: No obvious peripheral edema  GI: no obvious distention     Musculoskeletal:  - No pain with lumbar flexion/ extension  - Left upper extremity range of motion intact throughout  - Digital " stumps are hypersensitive to palpation, hypersensitivity does not extend further proximal  - No color change, no swelling, no changes in hair growth along left hand    Neuro:  - Lower extremities:      >> 5/5 strength bilaterally, throughout, symmetric  - Upper extremities:    >> 5/5 strength bilaterally  - Normal sensation    Psych: mood and affect appropriate        Assessment:     Ulysses Boreaux is a 72 y.o. male who presents with     ICD-10-CM ICD-9-CM   1. 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 Chronic pain due to trauma  G89.21 338.21   3. Chronic pain disorder  G89.4 338.4   4. Opioid use agreement exists  Z79.891 V58.69         Plan:   1. Interventional: None.    2. Pharmacologic:   - Refill Norco 10/325 mg BID PRN (60 tabs) x 2 months. Will e-prescribe for 5/2/23 & 6/1/23. Patient tolerating opioids with no side effects, obtaining good pain control with functional improvement. He tolerates this medication well, and he denies any drowsiness or constipation.  - Updated opioid contract signed with Dr. Love on 10/28/20.  - LA  reviewed and appropriate.     - Will order drug screen (UDS or oral swab) today to ensure med compliance.     3. Rehabilitative: Encouraged regular exercise.    4. Diagnostic: None for now.    5. Follow up: 8-9 week medication Refill     - This condition does not require this patient to take time off of work, and the primary goal of our Pain Management services is to improve the patient's functional capacity.   - I discussed the risks, benefits, and alternatives to potential treatment options. All questions and concerns were fully addressed today in clinic.         Ysabel Garcia PA-C  Interventional Pain Management - [facility]    Disclaimer:  This note was prepared using voice recognition system and is likely to have sound alike errors that may have been overlooked even after proof reading.  Please call me with any  questions.       >> UDS:  8-18-15 :: appropriate  12-29-15 :: appropriate  5-3-16 :: appropriate  10-18-16 :: appropriate  4/13/2017 :: appropriate  9/29/2017 :: appropriate  3/9/2018 :: appropriate  9/4/2018 :: appropriate  6/19/2019 :: appropriate  12/12/2019 :: appropriate  6/12/2020 ::  Appropriate  10/2/2020 :: Appropriate  2/24/2021 :: Appropriate  8/24/2021 :: Appropriate  12/21/2021 :: Appropriate  6/30/2022 :: Appropriate  12/22/2022 :: Appropriate   4/26/2023 :: pending

## 2023-04-26 ENCOUNTER — OFFICE VISIT (OUTPATIENT)
Dept: DIABETES | Facility: CLINIC | Age: 73
End: 2023-04-26
Payer: MEDICARE

## 2023-04-26 ENCOUNTER — OFFICE VISIT (OUTPATIENT)
Dept: PAIN MEDICINE | Facility: CLINIC | Age: 73
End: 2023-04-26
Payer: MEDICARE

## 2023-04-26 VITALS
WEIGHT: 213.38 LBS | BODY MASS INDEX: 26.53 KG/M2 | RESPIRATION RATE: 16 BRPM | HEIGHT: 75 IN | SYSTOLIC BLOOD PRESSURE: 156 MMHG | DIASTOLIC BLOOD PRESSURE: 100 MMHG | HEART RATE: 82 BPM

## 2023-04-26 DIAGNOSIS — Z79.891 OPIOID USE AGREEMENT EXISTS: ICD-10-CM

## 2023-04-26 DIAGNOSIS — S68.119A TRAUMATIC AMPUTATION OF DIGIT OF ONE HAND WITHOUT COMPLICATION: Primary | ICD-10-CM

## 2023-04-26 DIAGNOSIS — G89.21 CHRONIC PAIN DUE TO TRAUMA: ICD-10-CM

## 2023-04-26 DIAGNOSIS — S68.119A TRAUMATIC AMPUTATION OF DIGIT OF ONE HAND WITHOUT COMPLICATION: ICD-10-CM

## 2023-04-26 DIAGNOSIS — E11.9 TYPE 2 DIABETES MELLITUS WITHOUT COMPLICATION, WITH LONG-TERM CURRENT USE OF INSULIN: Primary | ICD-10-CM

## 2023-04-26 DIAGNOSIS — I15.2 HYPERTENSION ASSOCIATED WITH DIABETES: ICD-10-CM

## 2023-04-26 DIAGNOSIS — G89.4 CHRONIC PAIN DISORDER: ICD-10-CM

## 2023-04-26 DIAGNOSIS — E78.5 HYPERLIPIDEMIA ASSOCIATED WITH TYPE 2 DIABETES MELLITUS: ICD-10-CM

## 2023-04-26 DIAGNOSIS — E11.69 HYPERLIPIDEMIA ASSOCIATED WITH TYPE 2 DIABETES MELLITUS: ICD-10-CM

## 2023-04-26 DIAGNOSIS — Z79.4 TYPE 2 DIABETES MELLITUS WITHOUT COMPLICATION, WITH LONG-TERM CURRENT USE OF INSULIN: Primary | ICD-10-CM

## 2023-04-26 DIAGNOSIS — E11.59 HYPERTENSION ASSOCIATED WITH DIABETES: ICD-10-CM

## 2023-04-26 PROCEDURE — 3288F FALL RISK ASSESSMENT DOCD: CPT | Mod: CPTII,S$GLB,, | Performed by: PHYSICIAN ASSISTANT

## 2023-04-26 PROCEDURE — 1126F PR PAIN SEVERITY QUANTIFIED, NO PAIN PRESENT: ICD-10-PCS | Mod: CPTII,S$GLB,, | Performed by: PHYSICIAN ASSISTANT

## 2023-04-26 PROCEDURE — 99999 PR PBB SHADOW E&M-EST. PATIENT-LVL V: ICD-10-PCS | Mod: PBBFAC,,, | Performed by: PHYSICIAN ASSISTANT

## 2023-04-26 PROCEDURE — 1126F AMNT PAIN NOTED NONE PRSNT: CPT | Mod: CPTII,S$GLB,, | Performed by: PHYSICIAN ASSISTANT

## 2023-04-26 PROCEDURE — 1159F PR MEDICATION LIST DOCUMENTED IN MEDICAL RECORD: ICD-10-PCS | Mod: CPTII,95,, | Performed by: NURSE PRACTITIONER

## 2023-04-26 PROCEDURE — 99214 PR OFFICE/OUTPT VISIT, EST, LEVL IV, 30-39 MIN: ICD-10-PCS | Mod: S$GLB,,, | Performed by: PHYSICIAN ASSISTANT

## 2023-04-26 PROCEDURE — 3077F PR MOST RECENT SYSTOLIC BLOOD PRESSURE >= 140 MM HG: ICD-10-PCS | Mod: CPTII,S$GLB,, | Performed by: PHYSICIAN ASSISTANT

## 2023-04-26 PROCEDURE — 1159F PR MEDICATION LIST DOCUMENTED IN MEDICAL RECORD: ICD-10-PCS | Mod: CPTII,S$GLB,, | Performed by: PHYSICIAN ASSISTANT

## 2023-04-26 PROCEDURE — 1101F PT FALLS ASSESS-DOCD LE1/YR: CPT | Mod: CPTII,S$GLB,, | Performed by: PHYSICIAN ASSISTANT

## 2023-04-26 PROCEDURE — 3008F PR BODY MASS INDEX (BMI) DOCUMENTED: ICD-10-PCS | Mod: CPTII,S$GLB,, | Performed by: PHYSICIAN ASSISTANT

## 2023-04-26 PROCEDURE — 3072F LOW RISK FOR RETINOPATHY: CPT | Mod: CPTII,S$GLB,, | Performed by: PHYSICIAN ASSISTANT

## 2023-04-26 PROCEDURE — 3044F HG A1C LEVEL LT 7.0%: CPT | Mod: CPTII,S$GLB,, | Performed by: PHYSICIAN ASSISTANT

## 2023-04-26 PROCEDURE — 1159F MED LIST DOCD IN RCRD: CPT | Mod: CPTII,95,, | Performed by: NURSE PRACTITIONER

## 2023-04-26 PROCEDURE — 1160F PR REVIEW ALL MEDS BY PRESCRIBER/CLIN PHARMACIST DOCUMENTED: ICD-10-PCS | Mod: CPTII,95,, | Performed by: NURSE PRACTITIONER

## 2023-04-26 PROCEDURE — 3072F LOW RISK FOR RETINOPATHY: CPT | Mod: CPTII,95,, | Performed by: NURSE PRACTITIONER

## 2023-04-26 PROCEDURE — 3288F PR FALLS RISK ASSESSMENT DOCUMENTED: ICD-10-PCS | Mod: CPTII,S$GLB,, | Performed by: PHYSICIAN ASSISTANT

## 2023-04-26 PROCEDURE — 99442 PR PHYSICIAN TELEPHONE EVALUATION 11-20 MIN: CPT | Mod: 95,,, | Performed by: NURSE PRACTITIONER

## 2023-04-26 PROCEDURE — 3072F PR LOW RISK FOR RETINOPATHY: ICD-10-PCS | Mod: CPTII,95,, | Performed by: NURSE PRACTITIONER

## 2023-04-26 PROCEDURE — 1160F RVW MEDS BY RX/DR IN RCRD: CPT | Mod: CPTII,S$GLB,, | Performed by: PHYSICIAN ASSISTANT

## 2023-04-26 PROCEDURE — 3080F PR MOST RECENT DIASTOLIC BLOOD PRESSURE >= 90 MM HG: ICD-10-PCS | Mod: CPTII,S$GLB,, | Performed by: PHYSICIAN ASSISTANT

## 2023-04-26 PROCEDURE — 1101F PR PT FALLS ASSESS DOC 0-1 FALLS W/OUT INJ PAST YR: ICD-10-PCS | Mod: CPTII,S$GLB,, | Performed by: PHYSICIAN ASSISTANT

## 2023-04-26 PROCEDURE — 3072F PR LOW RISK FOR RETINOPATHY: ICD-10-PCS | Mod: CPTII,S$GLB,, | Performed by: PHYSICIAN ASSISTANT

## 2023-04-26 PROCEDURE — 1160F PR REVIEW ALL MEDS BY PRESCRIBER/CLIN PHARMACIST DOCUMENTED: ICD-10-PCS | Mod: CPTII,S$GLB,, | Performed by: PHYSICIAN ASSISTANT

## 2023-04-26 PROCEDURE — 3077F SYST BP >= 140 MM HG: CPT | Mod: CPTII,S$GLB,, | Performed by: PHYSICIAN ASSISTANT

## 2023-04-26 PROCEDURE — 3044F PR MOST RECENT HEMOGLOBIN A1C LEVEL <7.0%: ICD-10-PCS | Mod: CPTII,95,, | Performed by: NURSE PRACTITIONER

## 2023-04-26 PROCEDURE — 1160F RVW MEDS BY RX/DR IN RCRD: CPT | Mod: CPTII,95,, | Performed by: NURSE PRACTITIONER

## 2023-04-26 PROCEDURE — 3008F BODY MASS INDEX DOCD: CPT | Mod: CPTII,S$GLB,, | Performed by: PHYSICIAN ASSISTANT

## 2023-04-26 PROCEDURE — 3044F PR MOST RECENT HEMOGLOBIN A1C LEVEL <7.0%: ICD-10-PCS | Mod: CPTII,S$GLB,, | Performed by: PHYSICIAN ASSISTANT

## 2023-04-26 PROCEDURE — 1159F MED LIST DOCD IN RCRD: CPT | Mod: CPTII,S$GLB,, | Performed by: PHYSICIAN ASSISTANT

## 2023-04-26 PROCEDURE — 3080F DIAST BP >= 90 MM HG: CPT | Mod: CPTII,S$GLB,, | Performed by: PHYSICIAN ASSISTANT

## 2023-04-26 PROCEDURE — 99442 PR PHYSICIAN TELEPHONE EVALUATION 11-20 MIN: ICD-10-PCS | Mod: 95,,, | Performed by: NURSE PRACTITIONER

## 2023-04-26 PROCEDURE — 99214 OFFICE O/P EST MOD 30 MIN: CPT | Mod: S$GLB,,, | Performed by: PHYSICIAN ASSISTANT

## 2023-04-26 PROCEDURE — 99999 PR PBB SHADOW E&M-EST. PATIENT-LVL V: CPT | Mod: PBBFAC,,, | Performed by: PHYSICIAN ASSISTANT

## 2023-04-26 PROCEDURE — 3044F HG A1C LEVEL LT 7.0%: CPT | Mod: CPTII,95,, | Performed by: NURSE PRACTITIONER

## 2023-04-26 RX ORDER — HYDROCODONE BITARTRATE AND ACETAMINOPHEN 10; 325 MG/1; MG/1
1 TABLET ORAL EVERY 12 HOURS PRN
Qty: 60 TABLET | Refills: 0 | Status: SHIPPED | OUTPATIENT
Start: 2023-05-02 | End: 2023-07-19 | Stop reason: SDUPTHER

## 2023-04-26 RX ORDER — HYDROCODONE BITARTRATE AND ACETAMINOPHEN 10; 325 MG/1; MG/1
1 TABLET ORAL EVERY 12 HOURS PRN
Qty: 60 TABLET | Refills: 0 | Status: SHIPPED | OUTPATIENT
Start: 2023-06-01 | End: 2023-07-19 | Stop reason: SDUPTHER

## 2023-04-26 RX ORDER — SYRINGE,SAFETY WITH NEEDLE,1ML 25GX1"
SYRINGE (EA) MISCELLANEOUS
Qty: 100 EACH | Refills: 11 | Status: SHIPPED | OUTPATIENT
Start: 2023-04-26

## 2023-04-26 NOTE — PROGRESS NOTES
Established Patient - Audio Only Telehealth Visit     The patient location is: Home ([place])  The chief complaint leading to consultation is: Diabetes Follow-up  Visit type: Virtual visit with audio only (telephone)  Total time spent with patient: 15 minutes    The reason for the audio only service rather than synchronous audio and video virtual visit was related to technical difficulties or patient preference/necessity.     Each patient to whom I provide medical services by telemedicine is:  (1) informed of the relationship between the physician and patient and the respective role of any other health care provider with respect to management of the patient; and (2) notified that they may decline to receive medical services by telemedicine and may withdraw from such care at any time. Patient verbally consented to receive this service via voice-only telephone call.    This service was not originating from a related E/M service provided within the previous 7 days nor will  to an E/M service or procedure within the next 24 hours or my soonest available appointment.  Prevailing standard of care was able to be met in this audio-only visit.     PCP: Elza Pantoja MD    Subjective:     Chief Complaint: Diabetes follow up.  Last visit (audio) with me: 23    HPI: 72 y.o.   male for diabetes follow up.   Previously managed by Dr. Jackson and YASEMIN Dale NP.  Type II diabetes since  .  Comorbidities: HTN, HLD and Hyperthyroidism  Diabetes complications: without complications    Interval history:  Denies recent hospital admissions or ER visits.   Denies having hypoglycemia.   Endorses the following diabetes related symptoms: None.    Blood glucose monitoring fingerstick: 1x/day   Per patient's recall over the last 8 weeks,   Fasting BG range     No BG log for review.    Activity level: Sedentary  Meal Plannin meals/day, breakfast and dinner, infrequent snacks/day.  Skips lunch.      Medication compliant all of the time; diet compliant most of the time.   Has attended diabetes education in the past.     Previous regimen: Januvia - stopped once started on Trulicity (DPP4 not recommended)    Past failed treatment include: none    Current DM Medications:  Diabetes Medications               dulaglutide (TRULICITY) 0.75 mg/0.5 mL pen injector Inject 0.75 mg into the skin every 7 days.    empagliflozin (JARDIANCE) 10 mg tablet Take 1 tablet (10 mg total) by mouth once daily.    insulin NPH-insulin regular, 70/30, (NOVOLIN 70/30 U-100 INSULIN) 100 unit/mL (70-30) injection Inject 22 units in the morning and 26 units in the evening.     Allergies  Review of patient's allergies indicates:  No Known Allergies    Labs Reviewed:  His most recent A1C is:  Lab Results   Component Value Date    HGBA1C 6.8 (H) 03/09/2023    HGBA1C 6.5 (H) 12/14/2022    HGBA1C 8.0 (H) 09/14/2022       His most recent BMP is:  Lab Results   Component Value Date     03/09/2023    K 4.0 03/09/2023    CL 99 03/09/2023    CO2 27 03/09/2023    BUN 18 03/09/2023    CREATININE 1.3 03/09/2023    CALCIUM 9.7 03/09/2023    ANIONGAP 10 03/09/2023    ESTGFRAFRICA >60.0 02/21/2022    EGFRNONAA 55.7 (A) 02/21/2022       Lab Results   Component Value Date    CPEPTIDE 1.33 10/20/2017     Lab Results   Component Value Date    GLUTAMICACID 0.00 10/20/2017       There is no height or weight on file to calculate BMI.    Wt Readings from Last 3 Encounters:   03/08/23 1029 96.7 kg (213 lb 3 oz)   03/01/23 0916 97.6 kg (215 lb 2.7 oz)   12/22/22 0906 96.6 kg (212 lb 15.4 oz)        His blood sugar in clinic today is: Not assessed due to type of visit.    Diabetes Management Status  Statin: Taking  ACE/ARB: Taking    Screening or Prevention Patient's value Goal Complete/Controlled?   HgA1C Testing and Control   Lab Results   Component Value Date    HGBA1C 6.8 (H) 03/09/2023      Annually/Less than 8% Yes     Lipid profile : 03/09/2023 Annually  "Yes     LDL control Lab Results   Component Value Date    LDLCALC 112.0 2023    Annually/Less than 100 mg/dl  No     Nephropathy screening Lab Results   Component Value Date    MICALBCREAT 26.7 2021     No results found for: PROTEINUA Annually No     Blood pressure BP Readings from Last 1 Encounters:   23 138/86    Less than 140/90 Yes     Dilated retinal exam : 10/04/2022 Annually Yes    Foot exam   : 2023 Annually Yes       Social History     Socioeconomic History    Marital status:     Number of children: 2    Highest education level: 11th grade   Occupational History    Occupation: retired   Tobacco Use    Smoking status: Former     Packs/day: 0.50     Types: Cigarettes     Quit date: 1972     Years since quittin.2    Smokeless tobacco: Never   Substance and Sexual Activity    Alcohol use: Yes     Alcohol/week: 0.0 standard drinks     Comment: " Every blue moon"    Drug use: No    Sexual activity: Yes     Partners: Female   Social History Narrative    . Has 2 children. Patient disabled- was .      Past Medical History:   Diagnosis Date    Allergy     Amputation of finger of left hand     all fingers except for thumb    Cataract     OS NGCL     Chronic pain due to trauma     traumatic amputations left hand    Diabetes mellitus, type 2     History of hepatitis C 2014    tx'd w/ Js 2018    Hyperlipidemia     Hypertension     Hyperthyroidism 10/10/2019    Refractive error      Review of Systems   Constitutional: Negative for activity change, appetite change, fatigue and unexpected weight change.   HENT: Negative for dental problem and trouble swallowing.    Eyes: Negative for visual disturbance.   Respiratory: Negative for chest tightness and shortness of breath.    Cardiovascular: Negative for chest pain, palpitations and leg swelling.   Gastrointestinal: Negative for abdominal pain, constipation, diarrhea, nausea and vomiting.   Endocrine: " Negative for polydipsia, polyphagia and polyuria.   Genitourinary: Negative for difficulty urinating, dysuria, frequency and urgency.        Negative yeast infection   Musculoskeletal: Negative for gait problem, myalgias and neck pain.   Integumentary:  Negative for rash and wound.   Allergic/Immunologic: Negative.    Neurological: Negative for dizziness, syncope, weakness, numbness and headaches.   Hematological: Negative.    Psychiatric/Behavioral: Negative for confusion and sleep disturbance.   All other systems reviewed and are negative.    Objective:      Physical Exam  Neurological:      Mental Status: Alert and oriented to person, place, and time. Mental status is at baseline.   Psychiatric:         Attention and Perception: Attention normal.         Mood and Affect: Mood normal.         Speech: Speech normal.         Thought Content: Thought content normal.         Cognition and Memory: Cognition normal.         Judgment: Judgment normal.       Assessment / Plan:     Diagnoses and all orders for this visit:    Type 2 diabetes mellitus without complication, with long-term current use of insulin  -     empagliflozin (JARDIANCE) 10 mg tablet; Take 1 tablet (10 mg total) by mouth every morning.  -     insulin syringe-needle U-100 1 mL 31 gauge x 5/16 Syrg; Use twice daily as directed.    Hypertension associated with diabetes    Hyperlipidemia associated with type 2 diabetes mellitus    Additional Plan Details:  - Condition: controlled, most recent A1C 6.8% was completed 1 month ago.   - Monitor blood glucose:  3x daily.Goals reviewed. Maintain BG log and bring to next visit for review.   - Hypoglycemia protocol: Reviewed and risk discussed. Instructed to inform with BG readings below 80.  - Diet and activity: Reviewed, limit carbohydrate intake to 30-45 grams per meal, 3x daily and 15 grams per snack with a limit of two daily. Recommend at least 150 minutes of exercise in a week.  - BP and LDL: Recommend  lifestyle modifications and follow up with PCP for management.   - Medication Regimen:     Continue Novolin 70/30, 22 units in the morning and 26 units in the evening  Continue Trulicity 0.75 mg weekly  Continue Jardiance 10 mg every morning    - Medication consideration: Continue regimen. Discussed increasing Trulicity or Jardiance dose. He would like to stay at same dose for now and will reconsider at next visit.  - Lab results: A1C discussed.  - Health Maintenance standards addressed today:  A1c scheduled.   - Risks of uncontrolled DM explained. Importance of routine foot and eye exams reviewed. Scheduled as appropriate.  -The patient was explained the above plan and given opportunity to ask questions.  He understands, chooses and consents to this plan and accepts all the risks, which include but are not limited to the risks mentioned above.   - Labs ordered as above.  - Follow up: 6 months in clinic.         Charlotte T. Delacruz, FNP-C [facility]    A total of 15 minutes was spent in face to face time, of which over 50 % was spent in counseling patient on disease process, complications, treatment, and side effects of medications.

## 2023-04-30 ENCOUNTER — EXTERNAL CHRONIC CARE MANAGEMENT (OUTPATIENT)
Dept: PRIMARY CARE CLINIC | Facility: CLINIC | Age: 73
End: 2023-04-30
Payer: MEDICARE

## 2023-04-30 PROCEDURE — 99490 PR CHRONIC CARE MGMT, 1ST 20 MIN: ICD-10-PCS | Mod: S$GLB,,, | Performed by: FAMILY MEDICINE

## 2023-04-30 PROCEDURE — 99490 CHRNC CARE MGMT STAFF 1ST 20: CPT | Mod: S$GLB,,, | Performed by: FAMILY MEDICINE

## 2023-05-31 ENCOUNTER — EXTERNAL CHRONIC CARE MANAGEMENT (OUTPATIENT)
Dept: PRIMARY CARE CLINIC | Facility: CLINIC | Age: 73
End: 2023-05-31
Payer: MEDICARE

## 2023-05-31 PROCEDURE — 99490 PR CHRONIC CARE MGMT, 1ST 20 MIN: ICD-10-PCS | Mod: S$GLB,,, | Performed by: FAMILY MEDICINE

## 2023-05-31 PROCEDURE — 99490 CHRNC CARE MGMT STAFF 1ST 20: CPT | Mod: S$GLB,,, | Performed by: FAMILY MEDICINE

## 2023-06-29 ENCOUNTER — TELEPHONE (OUTPATIENT)
Dept: PAIN MEDICINE | Facility: CLINIC | Age: 73
End: 2023-06-29
Payer: MEDICARE

## 2023-06-29 DIAGNOSIS — S68.119A TRAUMATIC AMPUTATION OF DIGIT OF ONE HAND WITHOUT COMPLICATION: ICD-10-CM

## 2023-06-29 RX ORDER — HYDROCODONE BITARTRATE AND ACETAMINOPHEN 10; 325 MG/1; MG/1
1 TABLET ORAL EVERY 12 HOURS PRN
Qty: 60 TABLET | Refills: 0 | Status: SHIPPED | OUTPATIENT
Start: 2023-07-01 | End: 2023-09-13

## 2023-06-30 ENCOUNTER — EXTERNAL CHRONIC CARE MANAGEMENT (OUTPATIENT)
Dept: PRIMARY CARE CLINIC | Facility: CLINIC | Age: 73
End: 2023-06-30
Payer: MEDICARE

## 2023-06-30 PROCEDURE — 99490 CHRNC CARE MGMT STAFF 1ST 20: CPT | Mod: S$GLB,,, | Performed by: FAMILY MEDICINE

## 2023-06-30 PROCEDURE — 99490 PR CHRONIC CARE MGMT, 1ST 20 MIN: ICD-10-PCS | Mod: S$GLB,,, | Performed by: FAMILY MEDICINE

## 2023-07-17 NOTE — PROGRESS NOTES
Subjective:     Chief Complaint:  Left Hand Pain    Interval History (7/19/2023): Patient was last seen on 4/26/2023. he presents today for follow-up for medication refill. he feels the pain medication is providing adequate pain relief and reduces the negative effects of chronic pain that affects quality of life. No major SE from medications. No major changes since previous visit; patient is stable overall. Patient reports pain as 3/10 today.     History of Present Illness:  This patient is a 65 year old male who presents today for f/u complaining of the above noted pains. The patient describes this pain as follows.    - duration (acute, chronic): left hand pain since 1997 after table saw amputation of digits 2 through 5 at work, left lower quadrant pain since hernia surgery in 2004  - timing (constant, intermittent): Constant  - character (sharp, dull, aching, burning): Throbbing  - radiating: No  - dermatomal distribution: No  - side: Left   - aggravating factors: Nothing worsens left digit pain, left lower quadrant pain worse with exercise or walking  - relieving factors: Medication / Norco  - antecedent trauma: Amputation via skill saw and 1997, hernia repair in 2004  - prior spinal surgery: None  - pertinent negatives: No fever, No chills, No weight loss, No bladder dysfunction, No bowel dysfunction, No extremity weakness, No saddle anesthesia  - medications tried: Norco, Percocet, over-the-counter medications, compound pain cream  - other therapies tried (physical therapy, injections):     >> physical therapy tried in the past    >> no prior injections        Imaging/ Labs (reviewed on 7/19/2023):    7/12/2018 US ABDOMINAL AORTA  CLINICAL HISTORY:  Abnormal findings on diagnostic imaging of other specified body structures  TECHNIQUE:  Limited ultrasound was performed of the abdominal aorta, with cross sectional diameter measurements obtained.  COMPARISON:  01/10/2018  FINDINGS:  The proximal abdominal aorta  "measures 2.7 cm.  The mid abdominal aorta measures 1.8 cm.  The distal abdominal aorta measures 1.9 cm, slightly ectatic and unchanged from prior.  The right iliac artery measures 1.0 cm.  The left iliac artery measures 1.1 cm.  Aortoiliac atherosclerosis: Mild  Impression: Stable examination with slight distal abdominal aortic ectasia.  Negative for aneurysm.         ROS:  CONSTITUTIONAL: No fever, chills, weight loss  SKIN: No rash or itching  CARDIOVASCULAR:  No chest pain, palpitations  RESPIRATORY: No shortness of breath  GASTROINTESTINAL: No diarrhea, No constipation, abdominal pain, left lower quadrant  GENITOURINARY: No urinary incontinence    MUSCULOSKELETAL:  - patient reports pain as above, see chief complaint     NEUROLOGICAL:   - Pain as above  - Strength in lower extremities is normal  - Sensation in lower extremities is normal  - No bowel or bladder incontinence     PSYCHIATRIC: No change in mood noticed         Objective:     Physical Exam:  Vitals:    07/19/23 0817   BP: (!) 161/91   Pulse: 77   Weight: 95.4 kg (210 lb 5.1 oz)   Height: 6' 3" (1.905 m)   PainSc:   3   Body mass index is 26.29 kg/m².   (reviewed on 7/19/2023)    General: alert and oriented, in no apparent distress.   Gait: normal gait  Skin: No rashes, No discoloration   HEENT: EOMI  Respiratory: respirations nonlabored  Cardiology: No obvious peripheral edema    Musculoskeletal:  - ROM preserved   - No pain with lumbar flexion/ extension  - Left upper extremity range of motion intact throughout  - Digital stumps are hypersensitive to palpation, hypersensitivity does not extend further proximal  - No color change, no swelling, no changes in hair growth along left hand    Neuro:  - Lower extremities:      >> 5/5 strength bilaterally, throughout, symmetric  - Upper extremities:    >> 5/5 strength bilaterally  - Normal sensation    Psych: mood and affect appropriate        Assessment:     Ulysses Boreaux is a 72 y.o. male who presents " with     ICD-10-CM ICD-9-CM   1. 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 Chronic pain due to trauma  G89.21 338.21   3. Chronic pain disorder  G89.4 338.4   4. Opioid use agreement exists  Z79.891 V58.69           Plan:   1. Interventional: None.    2. Pharmacologic:   - Refill Norco 10/325 mg BID PRN (60 tabs) x 2 months. Will e-prescribe for 7/31/23 & 8/30/23. Patient tolerating opioids with no side effects, obtaining good pain control with functional improvement. He tolerates this medication well, and he denies any drowsiness or constipation.  - Updated opioid contract signed with Dr. Love on 10/28/20.  - LA  reviewed and appropriate.     - Will order drug screen (UDS or oral swab) today to ensure med compliance.     3. Rehabilitative: Encouraged regular exercise.    4. Diagnostic: None for now.    5. Follow up: 8-9 week medication Refill     - This condition does not require this patient to take time off of work, and the primary goal of our Pain Management services is to improve the patient's functional capacity.   - I discussed the risks, benefits, and alternatives to potential treatment options. All questions and concerns were fully addressed today in clinic.         Ysabel Garcia PA-C  Interventional Pain Management - [facility]    Disclaimer:  This note was prepared using voice recognition system and is likely to have sound alike errors that may have been overlooked even after proof reading.  Please call me with any questions.       >> UDS:  8-18-15 :: appropriate  12-29-15 :: appropriate  5-3-16 :: appropriate  10-18-16 :: appropriate  4/13/2017 :: appropriate  9/29/2017 :: appropriate  3/9/2018 :: appropriate  9/4/2018 :: appropriate  6/19/2019 :: appropriate  12/12/2019 :: appropriate  6/12/2020 ::  Appropriate  10/2/2020 :: Appropriate  2/24/2021 :: Appropriate  8/24/2021 :: Appropriate  12/21/2021 :: Appropriate  6/30/2022 ::  Appropriate  12/22/2022 :: Appropriate   7/19/2023 :: pending

## 2023-07-19 ENCOUNTER — OFFICE VISIT (OUTPATIENT)
Dept: PAIN MEDICINE | Facility: CLINIC | Age: 73
End: 2023-07-19
Payer: MEDICARE

## 2023-07-19 VITALS
HEART RATE: 77 BPM | SYSTOLIC BLOOD PRESSURE: 161 MMHG | BODY MASS INDEX: 26.15 KG/M2 | WEIGHT: 210.31 LBS | HEIGHT: 75 IN | DIASTOLIC BLOOD PRESSURE: 91 MMHG

## 2023-07-19 DIAGNOSIS — G89.21 CHRONIC PAIN DUE TO TRAUMA: ICD-10-CM

## 2023-07-19 DIAGNOSIS — S68.119A TRAUMATIC AMPUTATION OF DIGIT OF ONE HAND WITHOUT COMPLICATION: ICD-10-CM

## 2023-07-19 DIAGNOSIS — G89.4 CHRONIC PAIN DISORDER: ICD-10-CM

## 2023-07-19 DIAGNOSIS — S68.119A TRAUMATIC AMPUTATION OF DIGIT OF ONE HAND WITHOUT COMPLICATION: Primary | ICD-10-CM

## 2023-07-19 DIAGNOSIS — Z79.891 OPIOID USE AGREEMENT EXISTS: ICD-10-CM

## 2023-07-19 PROCEDURE — 1125F PR PAIN SEVERITY QUANTIFIED, PAIN PRESENT: ICD-10-PCS | Mod: HCNC,CPTII,S$GLB, | Performed by: PHYSICIAN ASSISTANT

## 2023-07-19 PROCEDURE — 1160F PR REVIEW ALL MEDS BY PRESCRIBER/CLIN PHARMACIST DOCUMENTED: ICD-10-PCS | Mod: HCNC,CPTII,S$GLB, | Performed by: PHYSICIAN ASSISTANT

## 2023-07-19 PROCEDURE — 3008F PR BODY MASS INDEX (BMI) DOCUMENTED: ICD-10-PCS | Mod: HCNC,CPTII,S$GLB, | Performed by: PHYSICIAN ASSISTANT

## 2023-07-19 PROCEDURE — 1101F PR PT FALLS ASSESS DOC 0-1 FALLS W/OUT INJ PAST YR: ICD-10-PCS | Mod: HCNC,CPTII,S$GLB, | Performed by: PHYSICIAN ASSISTANT

## 2023-07-19 PROCEDURE — 3080F PR MOST RECENT DIASTOLIC BLOOD PRESSURE >= 90 MM HG: ICD-10-PCS | Mod: HCNC,CPTII,S$GLB, | Performed by: PHYSICIAN ASSISTANT

## 2023-07-19 PROCEDURE — 3080F DIAST BP >= 90 MM HG: CPT | Mod: HCNC,CPTII,S$GLB, | Performed by: PHYSICIAN ASSISTANT

## 2023-07-19 PROCEDURE — 3072F LOW RISK FOR RETINOPATHY: CPT | Mod: HCNC,CPTII,S$GLB, | Performed by: PHYSICIAN ASSISTANT

## 2023-07-19 PROCEDURE — 3044F HG A1C LEVEL LT 7.0%: CPT | Mod: HCNC,CPTII,S$GLB, | Performed by: PHYSICIAN ASSISTANT

## 2023-07-19 PROCEDURE — 1160F RVW MEDS BY RX/DR IN RCRD: CPT | Mod: HCNC,CPTII,S$GLB, | Performed by: PHYSICIAN ASSISTANT

## 2023-07-19 PROCEDURE — 3288F FALL RISK ASSESSMENT DOCD: CPT | Mod: HCNC,CPTII,S$GLB, | Performed by: PHYSICIAN ASSISTANT

## 2023-07-19 PROCEDURE — 1159F MED LIST DOCD IN RCRD: CPT | Mod: HCNC,CPTII,S$GLB, | Performed by: PHYSICIAN ASSISTANT

## 2023-07-19 PROCEDURE — 1159F PR MEDICATION LIST DOCUMENTED IN MEDICAL RECORD: ICD-10-PCS | Mod: HCNC,CPTII,S$GLB, | Performed by: PHYSICIAN ASSISTANT

## 2023-07-19 PROCEDURE — 99214 PR OFFICE/OUTPT VISIT, EST, LEVL IV, 30-39 MIN: ICD-10-PCS | Mod: HCNC,S$GLB,, | Performed by: PHYSICIAN ASSISTANT

## 2023-07-19 PROCEDURE — 3077F SYST BP >= 140 MM HG: CPT | Mod: HCNC,CPTII,S$GLB, | Performed by: PHYSICIAN ASSISTANT

## 2023-07-19 PROCEDURE — 3077F PR MOST RECENT SYSTOLIC BLOOD PRESSURE >= 140 MM HG: ICD-10-PCS | Mod: HCNC,CPTII,S$GLB, | Performed by: PHYSICIAN ASSISTANT

## 2023-07-19 PROCEDURE — 3072F PR LOW RISK FOR RETINOPATHY: ICD-10-PCS | Mod: HCNC,CPTII,S$GLB, | Performed by: PHYSICIAN ASSISTANT

## 2023-07-19 PROCEDURE — 3288F PR FALLS RISK ASSESSMENT DOCUMENTED: ICD-10-PCS | Mod: HCNC,CPTII,S$GLB, | Performed by: PHYSICIAN ASSISTANT

## 2023-07-19 PROCEDURE — 99214 OFFICE O/P EST MOD 30 MIN: CPT | Mod: HCNC,S$GLB,, | Performed by: PHYSICIAN ASSISTANT

## 2023-07-19 PROCEDURE — 99999 PR PBB SHADOW E&M-EST. PATIENT-LVL IV: ICD-10-PCS | Mod: PBBFAC,HCNC,, | Performed by: PHYSICIAN ASSISTANT

## 2023-07-19 PROCEDURE — 1101F PT FALLS ASSESS-DOCD LE1/YR: CPT | Mod: HCNC,CPTII,S$GLB, | Performed by: PHYSICIAN ASSISTANT

## 2023-07-19 PROCEDURE — 99999 PR PBB SHADOW E&M-EST. PATIENT-LVL IV: CPT | Mod: PBBFAC,HCNC,, | Performed by: PHYSICIAN ASSISTANT

## 2023-07-19 PROCEDURE — 1125F AMNT PAIN NOTED PAIN PRSNT: CPT | Mod: HCNC,CPTII,S$GLB, | Performed by: PHYSICIAN ASSISTANT

## 2023-07-19 PROCEDURE — 3044F PR MOST RECENT HEMOGLOBIN A1C LEVEL <7.0%: ICD-10-PCS | Mod: HCNC,CPTII,S$GLB, | Performed by: PHYSICIAN ASSISTANT

## 2023-07-19 PROCEDURE — 3008F BODY MASS INDEX DOCD: CPT | Mod: HCNC,CPTII,S$GLB, | Performed by: PHYSICIAN ASSISTANT

## 2023-07-19 RX ORDER — HYDROCODONE BITARTRATE AND ACETAMINOPHEN 10; 325 MG/1; MG/1
1 TABLET ORAL EVERY 12 HOURS PRN
Qty: 60 TABLET | Refills: 0 | Status: SHIPPED | OUTPATIENT
Start: 2023-08-30 | End: 2024-01-04 | Stop reason: SDUPTHER

## 2023-07-19 RX ORDER — HYDROCODONE BITARTRATE AND ACETAMINOPHEN 10; 325 MG/1; MG/1
1 TABLET ORAL EVERY 12 HOURS PRN
Qty: 60 TABLET | Refills: 0 | Status: SHIPPED | OUTPATIENT
Start: 2023-07-31 | End: 2023-09-13

## 2023-07-31 ENCOUNTER — EXTERNAL CHRONIC CARE MANAGEMENT (OUTPATIENT)
Dept: PRIMARY CARE CLINIC | Facility: CLINIC | Age: 73
End: 2023-07-31
Payer: MEDICARE

## 2023-07-31 PROCEDURE — 99490 PR CHRONIC CARE MGMT, 1ST 20 MIN: ICD-10-PCS | Mod: S$GLB,,, | Performed by: FAMILY MEDICINE

## 2023-07-31 PROCEDURE — 99490 CHRNC CARE MGMT STAFF 1ST 20: CPT | Mod: S$GLB,,, | Performed by: FAMILY MEDICINE

## 2023-08-31 ENCOUNTER — EXTERNAL CHRONIC CARE MANAGEMENT (OUTPATIENT)
Dept: PRIMARY CARE CLINIC | Facility: CLINIC | Age: 73
End: 2023-08-31
Payer: MEDICARE

## 2023-08-31 PROCEDURE — 99490 CHRNC CARE MGMT STAFF 1ST 20: CPT | Mod: S$GLB,,, | Performed by: FAMILY MEDICINE

## 2023-08-31 PROCEDURE — 99490 PR CHRONIC CARE MGMT, 1ST 20 MIN: ICD-10-PCS | Mod: S$GLB,,, | Performed by: FAMILY MEDICINE

## 2023-09-05 ENCOUNTER — LAB VISIT (OUTPATIENT)
Dept: LAB | Facility: HOSPITAL | Age: 73
End: 2023-09-05
Attending: PHYSICIAN ASSISTANT
Payer: MEDICARE

## 2023-09-05 DIAGNOSIS — E11.59 HYPERTENSION ASSOCIATED WITH DIABETES: ICD-10-CM

## 2023-09-05 DIAGNOSIS — I15.2 HYPERTENSION ASSOCIATED WITH DIABETES: ICD-10-CM

## 2023-09-05 DIAGNOSIS — E11.22 TYPE 2 DIABETES MELLITUS WITH STAGE 3A CHRONIC KIDNEY DISEASE, WITH LONG-TERM CURRENT USE OF INSULIN: ICD-10-CM

## 2023-09-05 DIAGNOSIS — E78.5 HYPERLIPIDEMIA ASSOCIATED WITH TYPE 2 DIABETES MELLITUS: ICD-10-CM

## 2023-09-05 DIAGNOSIS — E11.69 HYPERLIPIDEMIA ASSOCIATED WITH TYPE 2 DIABETES MELLITUS: ICD-10-CM

## 2023-09-05 DIAGNOSIS — N18.31 TYPE 2 DIABETES MELLITUS WITH STAGE 3A CHRONIC KIDNEY DISEASE, WITH LONG-TERM CURRENT USE OF INSULIN: ICD-10-CM

## 2023-09-05 DIAGNOSIS — E11.9 TYPE 2 DIABETES MELLITUS WITHOUT COMPLICATION: ICD-10-CM

## 2023-09-05 DIAGNOSIS — Z79.4 TYPE 2 DIABETES MELLITUS WITH STAGE 3A CHRONIC KIDNEY DISEASE, WITH LONG-TERM CURRENT USE OF INSULIN: ICD-10-CM

## 2023-09-05 LAB
ALBUMIN SERPL BCP-MCNC: 3.9 G/DL (ref 3.5–5.2)
ALP SERPL-CCNC: 58 U/L (ref 55–135)
ALT SERPL W/O P-5'-P-CCNC: 22 U/L (ref 10–44)
ANION GAP SERPL CALC-SCNC: 11 MMOL/L (ref 8–16)
AST SERPL-CCNC: 24 U/L (ref 10–40)
BASOPHILS # BLD AUTO: 0.05 K/UL (ref 0–0.2)
BASOPHILS NFR BLD: 0.8 % (ref 0–1.9)
BILIRUB SERPL-MCNC: 0.7 MG/DL (ref 0.1–1)
BUN SERPL-MCNC: 13 MG/DL (ref 8–23)
CALCIUM SERPL-MCNC: 9.9 MG/DL (ref 8.7–10.5)
CHLORIDE SERPL-SCNC: 99 MMOL/L (ref 95–110)
CHOLEST SERPL-MCNC: 166 MG/DL (ref 120–199)
CHOLEST/HDLC SERPL: 3.5 {RATIO} (ref 2–5)
CO2 SERPL-SCNC: 31 MMOL/L (ref 23–29)
CREAT SERPL-MCNC: 1.3 MG/DL (ref 0.5–1.4)
DIFFERENTIAL METHOD: ABNORMAL
EOSINOPHIL # BLD AUTO: 0.2 K/UL (ref 0–0.5)
EOSINOPHIL NFR BLD: 2.7 % (ref 0–8)
ERYTHROCYTE [DISTWIDTH] IN BLOOD BY AUTOMATED COUNT: 13 % (ref 11.5–14.5)
EST. GFR  (NO RACE VARIABLE): 58.7 ML/MIN/1.73 M^2
ESTIMATED AVG GLUCOSE: 114 MG/DL (ref 68–131)
GLUCOSE SERPL-MCNC: 118 MG/DL (ref 70–110)
HBA1C MFR BLD: 5.6 % (ref 4–5.6)
HCT VFR BLD AUTO: 50.8 % (ref 40–54)
HDLC SERPL-MCNC: 48 MG/DL (ref 40–75)
HDLC SERPL: 28.9 % (ref 20–50)
HGB BLD-MCNC: 16.8 G/DL (ref 14–18)
IMM GRANULOCYTES # BLD AUTO: 0.01 K/UL (ref 0–0.04)
IMM GRANULOCYTES NFR BLD AUTO: 0.2 % (ref 0–0.5)
LDLC SERPL CALC-MCNC: 96.4 MG/DL (ref 63–159)
LYMPHOCYTES # BLD AUTO: 2.9 K/UL (ref 1–4.8)
LYMPHOCYTES NFR BLD: 48.6 % (ref 18–48)
MCH RBC QN AUTO: 32.3 PG (ref 27–31)
MCHC RBC AUTO-ENTMCNC: 33.1 G/DL (ref 32–36)
MCV RBC AUTO: 98 FL (ref 82–98)
MONOCYTES # BLD AUTO: 0.7 K/UL (ref 0.3–1)
MONOCYTES NFR BLD: 12.4 % (ref 4–15)
NEUTROPHILS # BLD AUTO: 2.1 K/UL (ref 1.8–7.7)
NEUTROPHILS NFR BLD: 35.3 % (ref 38–73)
NONHDLC SERPL-MCNC: 118 MG/DL
NRBC BLD-RTO: 0 /100 WBC
PLATELET # BLD AUTO: 237 K/UL (ref 150–450)
PMV BLD AUTO: 10.9 FL (ref 9.2–12.9)
POTASSIUM SERPL-SCNC: 3.8 MMOL/L (ref 3.5–5.1)
PROT SERPL-MCNC: 7.6 G/DL (ref 6–8.4)
RBC # BLD AUTO: 5.2 M/UL (ref 4.6–6.2)
SODIUM SERPL-SCNC: 141 MMOL/L (ref 136–145)
TRIGL SERPL-MCNC: 108 MG/DL (ref 30–150)
TSH SERPL DL<=0.005 MIU/L-ACNC: 0.73 UIU/ML (ref 0.4–4)
WBC # BLD AUTO: 5.91 K/UL (ref 3.9–12.7)

## 2023-09-05 PROCEDURE — 85025 COMPLETE CBC W/AUTO DIFF WBC: CPT | Mod: HCNC | Performed by: PHYSICIAN ASSISTANT

## 2023-09-05 PROCEDURE — 80053 COMPREHEN METABOLIC PANEL: CPT | Mod: HCNC | Performed by: PHYSICIAN ASSISTANT

## 2023-09-05 PROCEDURE — 36415 COLL VENOUS BLD VENIPUNCTURE: CPT | Mod: HCNC | Performed by: PHYSICIAN ASSISTANT

## 2023-09-05 PROCEDURE — 83036 HEMOGLOBIN GLYCOSYLATED A1C: CPT | Mod: HCNC | Performed by: PHYSICIAN ASSISTANT

## 2023-09-05 PROCEDURE — 80061 LIPID PANEL: CPT | Mod: HCNC | Performed by: PHYSICIAN ASSISTANT

## 2023-09-05 PROCEDURE — 84443 ASSAY THYROID STIM HORMONE: CPT | Mod: HCNC | Performed by: PHYSICIAN ASSISTANT

## 2023-09-11 ENCOUNTER — OFFICE VISIT (OUTPATIENT)
Dept: INTERNAL MEDICINE | Facility: CLINIC | Age: 73
End: 2023-09-11
Payer: MEDICARE

## 2023-09-11 VITALS
HEART RATE: 83 BPM | RESPIRATION RATE: 18 BRPM | BODY MASS INDEX: 25.61 KG/M2 | DIASTOLIC BLOOD PRESSURE: 90 MMHG | SYSTOLIC BLOOD PRESSURE: 150 MMHG | OXYGEN SATURATION: 98 % | WEIGHT: 205.94 LBS | HEIGHT: 75 IN | TEMPERATURE: 98 F

## 2023-09-11 DIAGNOSIS — K21.9 GASTROESOPHAGEAL REFLUX DISEASE WITHOUT ESOPHAGITIS: ICD-10-CM

## 2023-09-11 DIAGNOSIS — E11.59 HYPERTENSION ASSOCIATED WITH DIABETES: ICD-10-CM

## 2023-09-11 DIAGNOSIS — E11.22 TYPE 2 DIABETES MELLITUS WITH STAGE 3A CHRONIC KIDNEY DISEASE, WITH LONG-TERM CURRENT USE OF INSULIN: ICD-10-CM

## 2023-09-11 DIAGNOSIS — I15.2 HYPERTENSION ASSOCIATED WITH DIABETES: ICD-10-CM

## 2023-09-11 DIAGNOSIS — Z79.4 TYPE 2 DIABETES MELLITUS WITH STAGE 3A CHRONIC KIDNEY DISEASE, WITH LONG-TERM CURRENT USE OF INSULIN: ICD-10-CM

## 2023-09-11 DIAGNOSIS — E78.5 HYPERLIPIDEMIA ASSOCIATED WITH TYPE 2 DIABETES MELLITUS: ICD-10-CM

## 2023-09-11 DIAGNOSIS — Z00.00 ROUTINE GENERAL MEDICAL EXAMINATION AT A HEALTH CARE FACILITY: Primary | ICD-10-CM

## 2023-09-11 DIAGNOSIS — N18.31 TYPE 2 DIABETES MELLITUS WITH STAGE 3A CHRONIC KIDNEY DISEASE, WITH LONG-TERM CURRENT USE OF INSULIN: ICD-10-CM

## 2023-09-11 DIAGNOSIS — E11.69 HYPERLIPIDEMIA ASSOCIATED WITH TYPE 2 DIABETES MELLITUS: ICD-10-CM

## 2023-09-11 PROCEDURE — 3077F SYST BP >= 140 MM HG: CPT | Mod: HCNC,CPTII,S$GLB, | Performed by: FAMILY MEDICINE

## 2023-09-11 PROCEDURE — 3044F HG A1C LEVEL LT 7.0%: CPT | Mod: HCNC,CPTII,S$GLB, | Performed by: FAMILY MEDICINE

## 2023-09-11 PROCEDURE — 3080F PR MOST RECENT DIASTOLIC BLOOD PRESSURE >= 90 MM HG: ICD-10-PCS | Mod: HCNC,CPTII,S$GLB, | Performed by: FAMILY MEDICINE

## 2023-09-11 PROCEDURE — 1159F PR MEDICATION LIST DOCUMENTED IN MEDICAL RECORD: ICD-10-PCS | Mod: HCNC,CPTII,S$GLB, | Performed by: FAMILY MEDICINE

## 2023-09-11 PROCEDURE — 3080F DIAST BP >= 90 MM HG: CPT | Mod: HCNC,CPTII,S$GLB, | Performed by: FAMILY MEDICINE

## 2023-09-11 PROCEDURE — 3008F BODY MASS INDEX DOCD: CPT | Mod: HCNC,CPTII,S$GLB, | Performed by: FAMILY MEDICINE

## 2023-09-11 PROCEDURE — 3044F PR MOST RECENT HEMOGLOBIN A1C LEVEL <7.0%: ICD-10-PCS | Mod: HCNC,CPTII,S$GLB, | Performed by: FAMILY MEDICINE

## 2023-09-11 PROCEDURE — 1160F PR REVIEW ALL MEDS BY PRESCRIBER/CLIN PHARMACIST DOCUMENTED: ICD-10-PCS | Mod: HCNC,CPTII,S$GLB, | Performed by: FAMILY MEDICINE

## 2023-09-11 PROCEDURE — 3288F FALL RISK ASSESSMENT DOCD: CPT | Mod: HCNC,CPTII,S$GLB, | Performed by: FAMILY MEDICINE

## 2023-09-11 PROCEDURE — 3077F PR MOST RECENT SYSTOLIC BLOOD PRESSURE >= 140 MM HG: ICD-10-PCS | Mod: HCNC,CPTII,S$GLB, | Performed by: FAMILY MEDICINE

## 2023-09-11 PROCEDURE — 1101F PR PT FALLS ASSESS DOC 0-1 FALLS W/OUT INJ PAST YR: ICD-10-PCS | Mod: HCNC,CPTII,S$GLB, | Performed by: FAMILY MEDICINE

## 2023-09-11 PROCEDURE — 3288F PR FALLS RISK ASSESSMENT DOCUMENTED: ICD-10-PCS | Mod: HCNC,CPTII,S$GLB, | Performed by: FAMILY MEDICINE

## 2023-09-11 PROCEDURE — 3072F PR LOW RISK FOR RETINOPATHY: ICD-10-PCS | Mod: HCNC,CPTII,S$GLB, | Performed by: FAMILY MEDICINE

## 2023-09-11 PROCEDURE — 1160F RVW MEDS BY RX/DR IN RCRD: CPT | Mod: HCNC,CPTII,S$GLB, | Performed by: FAMILY MEDICINE

## 2023-09-11 PROCEDURE — 3008F PR BODY MASS INDEX (BMI) DOCUMENTED: ICD-10-PCS | Mod: HCNC,CPTII,S$GLB, | Performed by: FAMILY MEDICINE

## 2023-09-11 PROCEDURE — 99397 PR PREVENTIVE VISIT,EST,65 & OVER: ICD-10-PCS | Mod: HCNC,S$GLB,, | Performed by: FAMILY MEDICINE

## 2023-09-11 PROCEDURE — 3072F LOW RISK FOR RETINOPATHY: CPT | Mod: HCNC,CPTII,S$GLB, | Performed by: FAMILY MEDICINE

## 2023-09-11 PROCEDURE — 99397 PER PM REEVAL EST PAT 65+ YR: CPT | Mod: HCNC,S$GLB,, | Performed by: FAMILY MEDICINE

## 2023-09-11 PROCEDURE — 99999 PR PBB SHADOW E&M-EST. PATIENT-LVL V: ICD-10-PCS | Mod: PBBFAC,HCNC,, | Performed by: FAMILY MEDICINE

## 2023-09-11 PROCEDURE — 99999 PR PBB SHADOW E&M-EST. PATIENT-LVL V: CPT | Mod: PBBFAC,HCNC,, | Performed by: FAMILY MEDICINE

## 2023-09-11 PROCEDURE — 1159F MED LIST DOCD IN RCRD: CPT | Mod: HCNC,CPTII,S$GLB, | Performed by: FAMILY MEDICINE

## 2023-09-11 PROCEDURE — 1126F AMNT PAIN NOTED NONE PRSNT: CPT | Mod: HCNC,CPTII,S$GLB, | Performed by: FAMILY MEDICINE

## 2023-09-11 PROCEDURE — 1101F PT FALLS ASSESS-DOCD LE1/YR: CPT | Mod: HCNC,CPTII,S$GLB, | Performed by: FAMILY MEDICINE

## 2023-09-11 PROCEDURE — 1126F PR PAIN SEVERITY QUANTIFIED, NO PAIN PRESENT: ICD-10-PCS | Mod: HCNC,CPTII,S$GLB, | Performed by: FAMILY MEDICINE

## 2023-09-11 RX ORDER — NEBIVOLOL 20 MG/1
20 TABLET ORAL DAILY
Qty: 90 TABLET | Refills: 3 | Status: SHIPPED | OUTPATIENT
Start: 2023-09-11 | End: 2024-09-10

## 2023-09-11 RX ORDER — PANTOPRAZOLE SODIUM 20 MG/1
20 TABLET, DELAYED RELEASE ORAL DAILY
Qty: 30 TABLET | Refills: 0 | Status: SHIPPED | OUTPATIENT
Start: 2023-09-11 | End: 2023-09-12 | Stop reason: SDUPTHER

## 2023-09-11 NOTE — PROGRESS NOTES
Subjective:       Patient ID: Ulysses Boreaux is a 73 y.o. male.    Chief Complaint: Annual Exam    Patient presents to clinic today for annual physical exam. Reports nocturnal cough x 3-4 months. On further questioning, he does report GERD, takes tums prn.       Review of Systems   Constitutional:  Negative for chills, fatigue, fever and unexpected weight change.   HENT:  Positive for congestion. Negative for dental problem, ear pain, hearing loss, rhinorrhea and trouble swallowing.    Eyes:  Positive for pain and visual disturbance.   Respiratory:  Positive for cough (at night x 3-4 months). Negative for shortness of breath.    Cardiovascular:  Negative for chest pain, palpitations and leg swelling.   Gastrointestinal:  Negative for abdominal distention, abdominal pain, blood in stool, constipation, diarrhea, nausea and vomiting.   Genitourinary:  Negative for difficulty urinating, scrotal swelling and testicular pain.   Musculoskeletal:  Positive for arthralgias. Negative for myalgias.   Skin:  Negative for rash.   Neurological:  Negative for dizziness, weakness, numbness and headaches.   Hematological:  Negative for adenopathy. Does not bruise/bleed easily.   Psychiatric/Behavioral:  Positive for sleep disturbance. Negative for dysphoric mood. The patient is not nervous/anxious.          Objective:      Physical Exam  Vitals reviewed.   Constitutional:       General: He is not in acute distress.     Appearance: He is well-developed.   HENT:      Head: Normocephalic and atraumatic.      Right Ear: Hearing, tympanic membrane, ear canal and external ear normal.      Left Ear: Hearing, tympanic membrane, ear canal and external ear normal.      Nose: Nose normal.      Mouth/Throat:      Pharynx: Uvula midline.   Eyes:      General: Lids are normal. No scleral icterus.     Conjunctiva/sclera: Conjunctivae normal.      Pupils: Pupils are equal, round, and reactive to light.   Neck:      Thyroid: No thyromegaly.    Cardiovascular:      Rate and Rhythm: Normal rate and regular rhythm.      Heart sounds: No murmur heard.     No friction rub. No gallop.   Pulmonary:      Effort: Pulmonary effort is normal.      Breath sounds: Normal breath sounds. No wheezing or rales.   Abdominal:      General: Bowel sounds are normal. There is no distension.      Palpations: Abdomen is soft. There is no mass.      Tenderness: There is no abdominal tenderness.   Musculoskeletal:         General: No tenderness. Normal range of motion.      Cervical back: Normal range of motion and neck supple.   Lymphadenopathy:      Cervical: No cervical adenopathy.   Skin:     General: Skin is warm and dry.      Findings: No rash.   Neurological:      Mental Status: He is alert and oriented to person, place, and time.      Cranial Nerves: No cranial nerve deficit.      Gait: Gait normal.   Psychiatric:         Mood and Affect: Mood and affect normal.         Assessment:       1. Routine general medical examination at a health care facility    2. Type 2 diabetes mellitus with stage 3a chronic kidney disease, with long-term current use of insulin    3. Hyperlipidemia associated with type 2 diabetes mellitus    4. Hypertension associated with diabetes    5. Gastroesophageal reflux disease without esophagitis        Plan:   1. Routine general medical examination at a health care facility    2. Type 2 diabetes mellitus with stage 3a chronic kidney disease, with long-term current use of insulin  Assessment & Plan:  Controlled, continue current medications    Lab Results   Component Value Date    HGBA1C 5.6 09/05/2023    HGBA1C 6.8 (H) 03/09/2023    LDLCALC 96.4 09/05/2023    LABMICR 31.0 08/19/2021    CREATRANDUR 116.0 08/19/2021    MICALBCREAT 26.7 08/19/2021     Lab Results   Component Value Date    BUN 13 09/05/2023    CREATININE 1.3 09/05/2023    ESTGFRAFRICA >60.0 02/21/2022    EGFRNONAA 55.7 (A) 02/21/2022    EGFRNORACEVR 58.7 (A) 09/05/2023          Orders:  -     Ambulatory referral/consult to Optometry; Future; Expected date: 09/18/2023  -     Microalbumin/Creatinine Ratio, Urine; Future  -     Hemoglobin A1C; Future; Expected date: 03/11/2024    3. Hyperlipidemia associated with type 2 diabetes mellitus  Assessment & Plan:  Improving, continue atorvastatin     Lab Results   Component Value Date    CHOL 166 09/05/2023    LDLCALC 96.4 09/05/2023    TRIG 108 09/05/2023    HDL 48 09/05/2023    ALT 22 09/05/2023    AST 24 09/05/2023    ALKPHOS 58 09/05/2023         Orders:  -     Comprehensive Metabolic Panel; Future; Expected date: 03/11/2024  -     Lipid Panel; Future; Expected date: 03/11/2024    4. Hypertension associated with diabetes  Assessment & Plan:  Above goal, increase bystolic to 20 mg daily and continue losartan-HCTZ 100-25 mg daily; follow up in 1 month to reassess    Orders:  -     nebivoloL (BYSTOLIC) 20 mg Tab; Take 20 mg by mouth once daily.  Dispense: 90 tablet; Refill: 3    5. Gastroesophageal reflux disease without esophagitis  Assessment & Plan:  Nocturnal cough likely secondary to GERD; advised 30 day course of protonix and bland diet; reasess in 1 month; if worse/persistent will refer to GI for further evaluation; patient expressed understanding and agreement with plan.    Orders:  -     pantoprazole (PROTONIX) 20 MG tablet; Take 1 tablet (20 mg total) by mouth once daily.  Dispense: 30 tablet; Refill: 0        Health Maintenance reviewed/updated.

## 2023-09-11 NOTE — PATIENT INSTRUCTIONS
Check with your pharmacist regarding shingrix vaccine #2.    Get your flu vaccine this season, Flu vaccines start becoming available in September and we continue vaccinating through the winter months. The [facility] is having a drive-thru flu vaccine clinic on Saturday, September 23rd. Flu vaccines are also available at most local pharmacies.     You are eligible for a covid booster, covid vaccines are available at most pharmacies.     Seek immediate medical attention for severe headache, vision changes, chest pain, shortness of breath, speech difficulty, altered mental status, tingling/numbness/weakness or other focal neurologic deficits.     Lifestyle Changes for Controlling GERD  When you have GERD, stomach acid feels as if its backing up toward your mouth. Whether or not you take medication to control your GERD, your symptoms can often be improved with lifestyle changes. Talk to your doctor about the following suggestions, which may help you get relief from your symptoms.  Raise Your Head    Reflux is more likely to strike when youre lying down flat, because stomach fluid can flow backward more easily. Raising the head of your bed 4-6 inches can help. To do this:  Slide blocks or books under the legs at the head of your bed. Or, place a wedge under the mattress. Many foam [employer] can make a suitable wedge for you. The wedge should run from your waist to the top of your head.  Dont just prop your head on several pillows. This increases pressure on your stomach. It can make GERD worse.  Watch Your Eating Habits  Certain foods may increase the acid in your stomach or relax the lower esophageal sphincter, making GERD more likely. Its best to avoid the following:  Coffee, tea, and carbonated drinks (with and without caffeine)  Fatty, fried, or spicy food  Mint, chocolate, onions, and tomatoes  Any other foods that seem to irritate your stomach or cause you pain  Relieve the Pressure  Eat smaller meals,  even if you have to eat more often.  Dont lie down right after you eat. Wait a few hours for your stomach to empty.  Avoid tight belts and tight-fitting clothes.  Lose excess weight.  Tobacco and Alcohol  Avoid smoking tobacco and drinking alcohol. They can make GERD symptoms worse.  © 3898-4339 [employer], [address] [place] [address], [place] [address]. All rights reserved. This information is not intended as a substitute for professional medical care. Always follow your healthcare professional's instructions.

## 2023-09-12 DIAGNOSIS — K21.9 GASTROESOPHAGEAL REFLUX DISEASE WITHOUT ESOPHAGITIS: ICD-10-CM

## 2023-09-12 RX ORDER — PANTOPRAZOLE SODIUM 20 MG/1
20 TABLET, DELAYED RELEASE ORAL DAILY
Qty: 90 TABLET | Refills: 1 | Status: SHIPPED | OUTPATIENT
Start: 2023-09-12 | End: 2023-10-19 | Stop reason: SDUPTHER

## 2023-09-12 NOTE — ASSESSMENT & PLAN NOTE
Improving, continue atorvastatin     Lab Results   Component Value Date    CHOL 166 09/05/2023    LDLCALC 96.4 09/05/2023    TRIG 108 09/05/2023    HDL 48 09/05/2023    ALT 22 09/05/2023    AST 24 09/05/2023    ALKPHOS 58 09/05/2023

## 2023-09-12 NOTE — PROGRESS NOTES
Subjective:     Chief Complaint:  Left Hand Pain    Interval History (9/13/2023): Ulysses Boreaux was last seen on 7/19/2023. he presents today for follow-up for medication refill. he feels the pain medication is providing adequate pain relief and reduces the negative effects of chronic pain that affects quality of life. No major SE from medications. No major changes since previous visit; patient is stable overall. Patient reports pain as 2/10 today.     History of Present Illness:  This patient is a 65 year old male who presents today for f/u complaining of the above noted pains. The patient describes this pain as follows.    - duration (acute, chronic): left hand pain since 1997 after table saw amputation of digits 2 through 5 at work, left lower quadrant pain since hernia surgery in 2004  - timing (constant, intermittent): Constant  - character (sharp, dull, aching, burning): Throbbing  - radiating: No  - dermatomal distribution: No  - side: Left   - aggravating factors: Nothing worsens left digit pain, left lower quadrant pain worse with exercise or walking  - relieving factors: Medication / Norco  - antecedent trauma: Amputation via skill saw and 1997, hernia repair in 2004  - prior spinal surgery: None  - pertinent negatives: No fever, No chills, No weight loss, No bladder dysfunction, No bowel dysfunction, No extremity weakness, No saddle anesthesia  - medications tried: Norco, Percocet, over-the-counter medications, compound pain cream  - other therapies tried (physical therapy, injections):     >> physical therapy tried in the past    >> no prior injections        Imaging/ Labs (reviewed on 9/13/2023):    7/12/2018 US ABDOMINAL AORTA  CLINICAL HISTORY:  Abnormal findings on diagnostic imaging of other specified body structures  TECHNIQUE:  Limited ultrasound was performed of the abdominal aorta, with cross sectional diameter measurements obtained.  COMPARISON:  01/10/2018  FINDINGS:  The proximal abdominal  "aorta measures 2.7 cm.  The mid abdominal aorta measures 1.8 cm.  The distal abdominal aorta measures 1.9 cm, slightly ectatic and unchanged from prior.  The right iliac artery measures 1.0 cm.  The left iliac artery measures 1.1 cm.  Aortoiliac atherosclerosis: Mild  Impression: Stable examination with slight distal abdominal aortic ectasia.  Negative for aneurysm.         ROS:  CONSTITUTIONAL: No fever, chills, weight loss  SKIN: No rash or itching  CARDIOVASCULAR:  No chest pain, palpitations  RESPIRATORY: No shortness of breath  GASTROINTESTINAL: No diarrhea, No constipation, abdominal pain, left lower quadrant  GENITOURINARY: No urinary incontinence    MUSCULOSKELETAL:  - patient reports pain as above, see chief complaint     NEUROLOGICAL:   - Pain as above  - Strength in lower extremities is normal  - Sensation in lower extremities is normal  - No bowel or bladder incontinence     PSYCHIATRIC: No change in mood noticed         Objective:     Physical Exam:  Vitals:    09/13/23 0911   BP: (!) 158/98   Pulse: 81   Resp: 17   Weight: 95.4 kg (210 lb 5.1 oz)   Height: 6' 3" (1.905 m)   PainSc:   2   PainLoc: Hand   Body mass index is 26.29 kg/m².   (reviewed on 9/13/2023)    General: alert and oriented, in no apparent distress.   Gait: normal gait  Skin: No rashes, No discoloration   HEENT: EOMI  Respiratory: respirations nonlabored. No audible wheezing.  Cardiology: No obvious peripheral edema  GI: no obvious distention.     Musculoskeletal:  - No pain with lumbar flexion/ extension  - Left upper extremity range of motion intact throughout  - Digital stumps are hypersensitive to palpation, hypersensitivity does not extend further proximal  - No color change, no swelling, no changes in hair growth along left hand    Neuro:  - Lower extremities:      >> 5/5 strength bilaterally, throughout, symmetric  - Upper extremities:    >> 5/5 strength bilaterally  - Normal sensation    Psych: mood and affect " appropriate        Assessment:     Ulysses Boreaux is a 73 y.o. male who presents with     ICD-10-CM ICD-9-CM   1. 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 Chronic pain due to trauma  G89.21 338.21   3. Chronic pain disorder  G89.4 338.4   4. Opioid use agreement exists  Z79.891 V58.69       Plan:   1. Interventional: None.    2. Pharmacologic:   - Refill Norco 10/325 mg BID PRN (60 tabs) x 2 months. Will e-prescribe for 09/29/2023 and 10/29/2023. Patient tolerating opioids with no side effects, obtaining good pain control with functional improvement. He tolerates this medication well, and he denies any drowsiness or constipation.  - Updated opioid contract signed with Dr. Love on 10/28/20.  - [place]  reviewed and appropriate.     - Last UDS 7/19/2023 was compliant for medications prescribed.     3. Rehabilitative: Encouraged regular exercise.    4. Diagnostic: None for now.    5. Follow up: 8-9 week medication Refill     - This condition does not require this patient to take time off of work, and the primary goal of our Pain Management services is to improve the patient's functional capacity.   - I discussed the risks, benefits, and alternatives to potential treatment options. All questions and concerns were fully addressed today in clinic.         Ysabel Garcia PA-C  Interventional Pain Management - [facility]    Disclaimer:  This note was prepared using voice recognition system and is likely to have sound alike errors that may have been overlooked even after proof reading.  Please call me with any questions.       >> UDS:  8-18-15 :: appropriate  12-29-15 :: appropriate  5-3-16 :: appropriate  10-18-16 :: appropriate  4/13/2017 :: appropriate  9/29/2017 :: appropriate  3/9/2018 :: appropriate  9/4/2018 :: appropriate  6/19/2019 :: appropriate  12/12/2019 :: appropriate  6/12/2020 ::  Appropriate  10/2/2020 :: Appropriate  2/24/2021 :: Appropriate  8/24/2021  :: Appropriate  12/21/2021 :: Appropriate  6/30/2022 :: Appropriate  12/22/2022 :: Appropriate   7/19/2023 :: Appropriate

## 2023-09-12 NOTE — ASSESSMENT & PLAN NOTE
Nocturnal cough likely secondary to GERD; advised 30 day course of protonix and bland diet; reasess in 1 month; if worse/persistent will refer to GI for further evaluation; patient expressed understanding and agreement with plan.

## 2023-09-12 NOTE — TELEPHONE ENCOUNTER
No care due was identified.  Health [facility] Embedded Care Due Messages. Reference number: [id number].   9/12/2023 9:33:04 AM CDT

## 2023-09-12 NOTE — ASSESSMENT & PLAN NOTE
Above goal, increase bystolic to 20 mg daily and continue losartan-HCTZ 100-25 mg daily; follow up in 1 month to reassess

## 2023-09-12 NOTE — ASSESSMENT & PLAN NOTE
Controlled, continue current medications    Lab Results   Component Value Date    HGBA1C 5.6 09/05/2023    HGBA1C 6.8 (H) 03/09/2023    LDLCALC 96.4 09/05/2023    LABMICR 31.0 08/19/2021    CREATRANDUR 116.0 08/19/2021    MICALBCREAT 26.7 08/19/2021     Lab Results   Component Value Date    BUN 13 09/05/2023    CREATININE 1.3 09/05/2023    ESTGFRAFRICA >60.0 02/21/2022    EGFRNONAA 55.7 (A) 02/21/2022    EGFRNORACEVR 58.7 (A) 09/05/2023

## 2023-09-13 ENCOUNTER — OFFICE VISIT (OUTPATIENT)
Dept: PAIN MEDICINE | Facility: CLINIC | Age: 73
End: 2023-09-13
Payer: MEDICARE

## 2023-09-13 VITALS
DIASTOLIC BLOOD PRESSURE: 98 MMHG | HEART RATE: 81 BPM | WEIGHT: 210.31 LBS | RESPIRATION RATE: 17 BRPM | HEIGHT: 75 IN | SYSTOLIC BLOOD PRESSURE: 158 MMHG | BODY MASS INDEX: 26.15 KG/M2

## 2023-09-13 DIAGNOSIS — Z79.891 OPIOID USE AGREEMENT EXISTS: ICD-10-CM

## 2023-09-13 DIAGNOSIS — G89.4 CHRONIC PAIN DISORDER: ICD-10-CM

## 2023-09-13 DIAGNOSIS — S68.119A TRAUMATIC AMPUTATION OF DIGIT OF ONE HAND WITHOUT COMPLICATION: ICD-10-CM

## 2023-09-13 DIAGNOSIS — G89.21 CHRONIC PAIN DUE TO TRAUMA: ICD-10-CM

## 2023-09-13 DIAGNOSIS — S68.119A TRAUMATIC AMPUTATION OF DIGIT OF ONE HAND WITHOUT COMPLICATION: Primary | ICD-10-CM

## 2023-09-13 PROCEDURE — 1159F PR MEDICATION LIST DOCUMENTED IN MEDICAL RECORD: ICD-10-PCS | Mod: HCNC,CPTII,S$GLB, | Performed by: PHYSICIAN ASSISTANT

## 2023-09-13 PROCEDURE — 99214 PR OFFICE/OUTPT VISIT, EST, LEVL IV, 30-39 MIN: ICD-10-PCS | Mod: HCNC,S$GLB,, | Performed by: PHYSICIAN ASSISTANT

## 2023-09-13 PROCEDURE — 1101F PR PT FALLS ASSESS DOC 0-1 FALLS W/OUT INJ PAST YR: ICD-10-PCS | Mod: HCNC,CPTII,S$GLB, | Performed by: PHYSICIAN ASSISTANT

## 2023-09-13 PROCEDURE — 1160F RVW MEDS BY RX/DR IN RCRD: CPT | Mod: HCNC,CPTII,S$GLB, | Performed by: PHYSICIAN ASSISTANT

## 2023-09-13 PROCEDURE — 1160F PR REVIEW ALL MEDS BY PRESCRIBER/CLIN PHARMACIST DOCUMENTED: ICD-10-PCS | Mod: HCNC,CPTII,S$GLB, | Performed by: PHYSICIAN ASSISTANT

## 2023-09-13 PROCEDURE — 3008F BODY MASS INDEX DOCD: CPT | Mod: HCNC,CPTII,S$GLB, | Performed by: PHYSICIAN ASSISTANT

## 2023-09-13 PROCEDURE — 3072F PR LOW RISK FOR RETINOPATHY: ICD-10-PCS | Mod: HCNC,CPTII,S$GLB, | Performed by: PHYSICIAN ASSISTANT

## 2023-09-13 PROCEDURE — 99214 OFFICE O/P EST MOD 30 MIN: CPT | Mod: HCNC,S$GLB,, | Performed by: PHYSICIAN ASSISTANT

## 2023-09-13 PROCEDURE — 99999 PR PBB SHADOW E&M-EST. PATIENT-LVL IV: CPT | Mod: PBBFAC,HCNC,, | Performed by: PHYSICIAN ASSISTANT

## 2023-09-13 PROCEDURE — 3080F DIAST BP >= 90 MM HG: CPT | Mod: HCNC,CPTII,S$GLB, | Performed by: PHYSICIAN ASSISTANT

## 2023-09-13 PROCEDURE — 1125F AMNT PAIN NOTED PAIN PRSNT: CPT | Mod: HCNC,CPTII,S$GLB, | Performed by: PHYSICIAN ASSISTANT

## 2023-09-13 PROCEDURE — 3044F PR MOST RECENT HEMOGLOBIN A1C LEVEL <7.0%: ICD-10-PCS | Mod: HCNC,CPTII,S$GLB, | Performed by: PHYSICIAN ASSISTANT

## 2023-09-13 PROCEDURE — 3072F LOW RISK FOR RETINOPATHY: CPT | Mod: HCNC,CPTII,S$GLB, | Performed by: PHYSICIAN ASSISTANT

## 2023-09-13 PROCEDURE — 99999 PR PBB SHADOW E&M-EST. PATIENT-LVL IV: ICD-10-PCS | Mod: PBBFAC,HCNC,, | Performed by: PHYSICIAN ASSISTANT

## 2023-09-13 PROCEDURE — 3077F SYST BP >= 140 MM HG: CPT | Mod: HCNC,CPTII,S$GLB, | Performed by: PHYSICIAN ASSISTANT

## 2023-09-13 PROCEDURE — 3044F HG A1C LEVEL LT 7.0%: CPT | Mod: HCNC,CPTII,S$GLB, | Performed by: PHYSICIAN ASSISTANT

## 2023-09-13 PROCEDURE — 3077F PR MOST RECENT SYSTOLIC BLOOD PRESSURE >= 140 MM HG: ICD-10-PCS | Mod: HCNC,CPTII,S$GLB, | Performed by: PHYSICIAN ASSISTANT

## 2023-09-13 PROCEDURE — 1159F MED LIST DOCD IN RCRD: CPT | Mod: HCNC,CPTII,S$GLB, | Performed by: PHYSICIAN ASSISTANT

## 2023-09-13 PROCEDURE — 1125F PR PAIN SEVERITY QUANTIFIED, PAIN PRESENT: ICD-10-PCS | Mod: HCNC,CPTII,S$GLB, | Performed by: PHYSICIAN ASSISTANT

## 2023-09-13 PROCEDURE — 3288F PR FALLS RISK ASSESSMENT DOCUMENTED: ICD-10-PCS | Mod: HCNC,CPTII,S$GLB, | Performed by: PHYSICIAN ASSISTANT

## 2023-09-13 PROCEDURE — 3008F PR BODY MASS INDEX (BMI) DOCUMENTED: ICD-10-PCS | Mod: HCNC,CPTII,S$GLB, | Performed by: PHYSICIAN ASSISTANT

## 2023-09-13 PROCEDURE — 3288F FALL RISK ASSESSMENT DOCD: CPT | Mod: HCNC,CPTII,S$GLB, | Performed by: PHYSICIAN ASSISTANT

## 2023-09-13 PROCEDURE — 3080F PR MOST RECENT DIASTOLIC BLOOD PRESSURE >= 90 MM HG: ICD-10-PCS | Mod: HCNC,CPTII,S$GLB, | Performed by: PHYSICIAN ASSISTANT

## 2023-09-13 PROCEDURE — 1101F PT FALLS ASSESS-DOCD LE1/YR: CPT | Mod: HCNC,CPTII,S$GLB, | Performed by: PHYSICIAN ASSISTANT

## 2023-09-13 RX ORDER — HYDROCODONE BITARTRATE AND ACETAMINOPHEN 10; 325 MG/1; MG/1
1 TABLET ORAL EVERY 12 HOURS PRN
Qty: 60 TABLET | Refills: 0 | Status: SHIPPED | OUTPATIENT
Start: 2023-09-29 | End: 2023-10-29

## 2023-09-13 RX ORDER — HYDROCODONE BITARTRATE AND ACETAMINOPHEN 10; 325 MG/1; MG/1
1 TABLET ORAL EVERY 12 HOURS PRN
Qty: 60 TABLET | Refills: 0 | Status: SHIPPED | OUTPATIENT
Start: 2023-10-29 | End: 2024-01-04 | Stop reason: SDUPTHER

## 2023-09-30 ENCOUNTER — EXTERNAL CHRONIC CARE MANAGEMENT (OUTPATIENT)
Dept: PRIMARY CARE CLINIC | Facility: CLINIC | Age: 73
End: 2023-09-30
Payer: MEDICARE

## 2023-09-30 PROCEDURE — 99490 CHRNC CARE MGMT STAFF 1ST 20: CPT | Mod: S$GLB,,, | Performed by: FAMILY MEDICINE

## 2023-09-30 PROCEDURE — 99490 PR CHRONIC CARE MGMT, 1ST 20 MIN: ICD-10-PCS | Mod: S$GLB,,, | Performed by: FAMILY MEDICINE

## 2023-10-09 ENCOUNTER — TELEPHONE (OUTPATIENT)
Dept: PAIN MEDICINE | Facility: CLINIC | Age: 73
End: 2023-10-09
Payer: MEDICARE

## 2023-10-09 NOTE — TELEPHONE ENCOUNTER
----- Message from Maria Elena Louie sent at 10/9/2023  1:23 PM CDT -----  Patient is requesting a call back concerning a prescription. Call back at [phone]

## 2023-10-09 NOTE — TELEPHONE ENCOUNTER
Returned pt call. Pt states that his pharmacy is out of norco 10 and is wanting to know if the [facility] has medication in stock. Informed pt that norco 10 is out of stock nationwide. Informed him that I can check to see if his pharmacy has Noroc 7.5 or Norco 5 in stock to temporary substitute the Norco 10. Pt states he will wait until the pharmacy has Norco 10. All questions answered.

## 2023-10-11 ENCOUNTER — TELEPHONE (OUTPATIENT)
Dept: PAIN MEDICINE | Facility: CLINIC | Age: 73
End: 2023-10-11
Payer: MEDICARE

## 2023-10-11 RX ORDER — OXYCODONE AND ACETAMINOPHEN 5; 325 MG/1; MG/1
1 TABLET ORAL EVERY 6 HOURS PRN
Qty: 120 TABLET | Refills: 0 | Status: SHIPPED | OUTPATIENT
Start: 2023-10-11 | End: 2023-11-11

## 2023-10-11 NOTE — TELEPHONE ENCOUNTER
Pt notified. Pt will contact pharmacy to check if in stock and call back. All questions answered.//lp

## 2023-10-11 NOTE — TELEPHONE ENCOUNTER
----- Message from Elif Tolbert sent at 10/11/2023  1:13 PM CDT -----  Contact: BOREAUX, ULYSSES [[id number]]  ..Type:  Patient Requesting Call    Who Called: BOREAUX, ULYSSES [[id number]]  Does the patient know what this is regarding?:   Would the patient rather a call back or a response via [facility]?  Call  Best Call Back Number: .[phone] (home)   Additional Information:

## 2023-10-11 NOTE — TELEPHONE ENCOUNTER
----- Message from Ysabel Garcia PA-C sent at 10/11/2023 12:52 PM CDT -----  Contact: Ulysses  We can do 1 month of Percocet 5 mg 4 times a day, which is the equivalent to 30 morphine equivalents (what he is on now), although this may be also on back order.  ----- Message -----  From: Ilene Roberto LPN  Sent: 10/11/2023  11:45 AM CDT  To: Ysabel Garcia PA-C    Contacted pt. Pt states Norco 10-325mg on backorder at [facility]. Pharmacy unsure when [place] 10mg back in stock. Pt states he call [facility] who states [place] out of stock nationwide. Per last call, offered pt Norco 5mg or Norco 7.5 per Ysabel Garcia if pharmacy has in stock. Pt states he has tried both in the past with no relief. Pt would like to try Percocet. Please advise.//lp      ----- Message -----  From: Sotero Barragan  Sent: 10/11/2023  11:04 AM CDT  To: Radha Vargas    Ulysses is needing a call back in regards to his pain medication. He stated that pharmacy is on backorder for his Percocet. Please give him a call back at [phone]

## 2023-10-11 NOTE — TELEPHONE ENCOUNTER
----- Message from Yvon Love MD sent at 10/11/2023  3:07 PM CDT -----  Contact: BOREAUX, ULYSSES [[id number]]  Percocet sent to [facility]  ----- Message -----  From: Ilene Roberto LPN  Sent: 10/11/2023   1:24 PM CDT  To: Yvon Love MD    Contacted [facility]. Percocet 5mg QID #120 in stock. Can you send?    ----- Message from Ysabel Garcia PA-C sent at 10/11/2023 12:52 PM CDT -----  Contact: Ulysses  We can do 1 month of Percocet 5 mg 4 times a day, which is the equivalent to 30 morphine equivalents (what he is on now), although this may be also on back order.    ----- Message -----  From: Ilene Roberto LPN  Sent: 10/11/2023  11:45 AM CDT  To: Ysabel Garcia PA-C     Contacted pt. Pt states Norco 10-325mg on backorder at [facility]. Pharmacy unsure when [place] 10mg back in stock. Pt states he call [facility] who states [place] out of stock nationwide. Per last call, offered pt Norco 5mg or Norco 7.5 per Ysabel Garcia if pharmacy has in stock. Pt states he has tried both in the past with no relief. Pt would like to try Percocet. Please advise.//lp       ----- Message -----  From: Elif Tolbert  Sent: 10/11/2023   1:13 PM CDT  To: Radha Vargas    ..Type:  Patient Requesting Call    Who Called: BOREAUX, ULYSSES [[id number]]  Does the patient know what this is regarding?:   Would the patient rather a call back or a response via [facility]?  Call  Best Call Back Number: .[phone] (home)   Additional Information:

## 2023-10-19 ENCOUNTER — OFFICE VISIT (OUTPATIENT)
Dept: INTERNAL MEDICINE | Facility: CLINIC | Age: 73
End: 2023-10-19
Payer: MEDICARE

## 2023-10-19 VITALS
BODY MASS INDEX: 26.08 KG/M2 | DIASTOLIC BLOOD PRESSURE: 82 MMHG | WEIGHT: 209.75 LBS | SYSTOLIC BLOOD PRESSURE: 128 MMHG | HEART RATE: 77 BPM | HEIGHT: 75 IN | OXYGEN SATURATION: 98 % | TEMPERATURE: 96 F

## 2023-10-19 DIAGNOSIS — E11.59 HYPERTENSION ASSOCIATED WITH DIABETES: Primary | ICD-10-CM

## 2023-10-19 DIAGNOSIS — K21.9 GASTROESOPHAGEAL REFLUX DISEASE WITHOUT ESOPHAGITIS: ICD-10-CM

## 2023-10-19 DIAGNOSIS — I15.2 HYPERTENSION ASSOCIATED WITH DIABETES: Primary | ICD-10-CM

## 2023-10-19 PROCEDURE — 1101F PT FALLS ASSESS-DOCD LE1/YR: CPT | Mod: HCNC,CPTII,S$GLB, | Performed by: FAMILY MEDICINE

## 2023-10-19 PROCEDURE — 3008F BODY MASS INDEX DOCD: CPT | Mod: HCNC,CPTII,S$GLB, | Performed by: FAMILY MEDICINE

## 2023-10-19 PROCEDURE — 3079F PR MOST RECENT DIASTOLIC BLOOD PRESSURE 80-89 MM HG: ICD-10-PCS | Mod: HCNC,CPTII,S$GLB, | Performed by: FAMILY MEDICINE

## 2023-10-19 PROCEDURE — 99999 PR PBB SHADOW E&M-EST. PATIENT-LVL V: CPT | Mod: PBBFAC,HCNC,, | Performed by: FAMILY MEDICINE

## 2023-10-19 PROCEDURE — 1126F PR PAIN SEVERITY QUANTIFIED, NO PAIN PRESENT: ICD-10-PCS | Mod: HCNC,CPTII,S$GLB, | Performed by: FAMILY MEDICINE

## 2023-10-19 PROCEDURE — 90694 FLU VACCINE - QUADRIVALENT - ADJUVANTED: ICD-10-PCS | Mod: HCNC,S$GLB,, | Performed by: FAMILY MEDICINE

## 2023-10-19 PROCEDURE — 3288F FALL RISK ASSESSMENT DOCD: CPT | Mod: HCNC,CPTII,S$GLB, | Performed by: FAMILY MEDICINE

## 2023-10-19 PROCEDURE — 3008F PR BODY MASS INDEX (BMI) DOCUMENTED: ICD-10-PCS | Mod: HCNC,CPTII,S$GLB, | Performed by: FAMILY MEDICINE

## 2023-10-19 PROCEDURE — 3044F HG A1C LEVEL LT 7.0%: CPT | Mod: HCNC,CPTII,S$GLB, | Performed by: FAMILY MEDICINE

## 2023-10-19 PROCEDURE — 99999 PR PBB SHADOW E&M-EST. PATIENT-LVL V: ICD-10-PCS | Mod: PBBFAC,HCNC,, | Performed by: FAMILY MEDICINE

## 2023-10-19 PROCEDURE — 1101F PR PT FALLS ASSESS DOC 0-1 FALLS W/OUT INJ PAST YR: ICD-10-PCS | Mod: HCNC,CPTII,S$GLB, | Performed by: FAMILY MEDICINE

## 2023-10-19 PROCEDURE — 3072F PR LOW RISK FOR RETINOPATHY: ICD-10-PCS | Mod: HCNC,CPTII,S$GLB, | Performed by: FAMILY MEDICINE

## 2023-10-19 PROCEDURE — 3044F PR MOST RECENT HEMOGLOBIN A1C LEVEL <7.0%: ICD-10-PCS | Mod: HCNC,CPTII,S$GLB, | Performed by: FAMILY MEDICINE

## 2023-10-19 PROCEDURE — 3074F PR MOST RECENT SYSTOLIC BLOOD PRESSURE < 130 MM HG: ICD-10-PCS | Mod: HCNC,CPTII,S$GLB, | Performed by: FAMILY MEDICINE

## 2023-10-19 PROCEDURE — 1159F MED LIST DOCD IN RCRD: CPT | Mod: HCNC,CPTII,S$GLB, | Performed by: FAMILY MEDICINE

## 2023-10-19 PROCEDURE — G0008 FLU VACCINE - QUADRIVALENT - ADJUVANTED: ICD-10-PCS | Mod: HCNC,S$GLB,, | Performed by: FAMILY MEDICINE

## 2023-10-19 PROCEDURE — 99214 OFFICE O/P EST MOD 30 MIN: CPT | Mod: HCNC,S$GLB,, | Performed by: FAMILY MEDICINE

## 2023-10-19 PROCEDURE — 99214 PR OFFICE/OUTPT VISIT, EST, LEVL IV, 30-39 MIN: ICD-10-PCS | Mod: HCNC,S$GLB,, | Performed by: FAMILY MEDICINE

## 2023-10-19 PROCEDURE — 1159F PR MEDICATION LIST DOCUMENTED IN MEDICAL RECORD: ICD-10-PCS | Mod: HCNC,CPTII,S$GLB, | Performed by: FAMILY MEDICINE

## 2023-10-19 PROCEDURE — 3072F LOW RISK FOR RETINOPATHY: CPT | Mod: HCNC,CPTII,S$GLB, | Performed by: FAMILY MEDICINE

## 2023-10-19 PROCEDURE — 90694 VACC AIIV4 NO PRSRV 0.5ML IM: CPT | Mod: HCNC,S$GLB,, | Performed by: FAMILY MEDICINE

## 2023-10-19 PROCEDURE — 3288F PR FALLS RISK ASSESSMENT DOCUMENTED: ICD-10-PCS | Mod: HCNC,CPTII,S$GLB, | Performed by: FAMILY MEDICINE

## 2023-10-19 PROCEDURE — 3074F SYST BP LT 130 MM HG: CPT | Mod: HCNC,CPTII,S$GLB, | Performed by: FAMILY MEDICINE

## 2023-10-19 PROCEDURE — 3079F DIAST BP 80-89 MM HG: CPT | Mod: HCNC,CPTII,S$GLB, | Performed by: FAMILY MEDICINE

## 2023-10-19 PROCEDURE — G0008 ADMIN INFLUENZA VIRUS VAC: HCPCS | Mod: HCNC,S$GLB,, | Performed by: FAMILY MEDICINE

## 2023-10-19 PROCEDURE — 1126F AMNT PAIN NOTED NONE PRSNT: CPT | Mod: HCNC,CPTII,S$GLB, | Performed by: FAMILY MEDICINE

## 2023-10-19 RX ORDER — PANTOPRAZOLE SODIUM 20 MG/1
20 TABLET, DELAYED RELEASE ORAL DAILY
Qty: 90 TABLET | Refills: 3 | Status: SHIPPED | OUTPATIENT
Start: 2023-10-19

## 2023-10-19 NOTE — ASSESSMENT & PLAN NOTE
After discussion, we have agreed to resume protonix to control GERD; advised if his cough does not gradually resolve he should follow up for further evaluation; patient expressed understanding and agreement with plan.

## 2023-10-19 NOTE — PROGRESS NOTES
Subjective:       Patient ID: Ulysses Boreaux is a 73 y.o. male.    Chief Complaint: Follow-up (1 month f/u)    Patient presents to clinic today for followup of hypertension, GERD, and nocturnal cough. He reports GERD resolved completely with protonix, but recurred last night after being out for 2 days. He desires to resume. He reports significant improvement in cough since taking protonix for GERD. Patient is otherwise without concerns today.     Review of Systems   Constitutional:  Negative for chills, fatigue, fever and unexpected weight change.   Eyes:  Negative for visual disturbance.   Respiratory:  Positive for cough (resolving). Negative for shortness of breath.    Cardiovascular:  Negative for chest pain.   Musculoskeletal:  Negative for myalgias.   Neurological:  Negative for headaches.       Objective:      Physical Exam  Vitals reviewed.   Constitutional:       General: He is not in acute distress.     Appearance: He is well-developed.   HENT:      Head: Normocephalic and atraumatic.   Eyes:      General: Lids are normal. No scleral icterus.     Extraocular Movements: Extraocular movements intact.      Conjunctiva/sclera: Conjunctivae normal.      Pupils: Pupils are equal, round, and reactive to light.   Pulmonary:      Effort: Pulmonary effort is normal.   Neurological:      Mental Status: He is alert and oriented to person, place, and time.      Cranial Nerves: No cranial nerve deficit.      Gait: Gait normal.   Psychiatric:         Mood and Affect: Mood and affect normal.         Assessment:       1. Hypertension associated with diabetes    2. Gastroesophageal reflux disease without esophagitis        Plan:   1. Hypertension associated with diabetes  Assessment & Plan:  Controlled, continue bystolic and losartan-HCTZ      2. Gastroesophageal reflux disease without esophagitis  Assessment & Plan:  After discussion, we have agreed to resume protonix to control GERD; advised if his cough does not  gradually resolve he should follow up for further evaluation; patient expressed understanding and agreement with plan.    Orders:  -     pantoprazole (PROTONIX) 20 MG tablet; Take 1 tablet (20 mg total) by mouth once daily.  Dispense: 90 tablet; Refill: 3    Other orders  -     Influenza - Quadrivalent (Adjuvanted)        Health Maintenance reviewed/updated.

## 2023-10-31 ENCOUNTER — EXTERNAL CHRONIC CARE MANAGEMENT (OUTPATIENT)
Dept: PRIMARY CARE CLINIC | Facility: CLINIC | Age: 73
End: 2023-10-31
Payer: MEDICARE

## 2023-10-31 PROCEDURE — 99490 PR CHRONIC CARE MGMT, 1ST 20 MIN: ICD-10-PCS | Mod: S$GLB,,, | Performed by: FAMILY MEDICINE

## 2023-10-31 PROCEDURE — 99490 CHRNC CARE MGMT STAFF 1ST 20: CPT | Mod: S$GLB,,, | Performed by: FAMILY MEDICINE

## 2023-11-06 ENCOUNTER — OFFICE VISIT (OUTPATIENT)
Dept: DIABETES | Facility: CLINIC | Age: 73
End: 2023-11-06
Payer: MEDICARE

## 2023-11-06 VITALS
HEART RATE: 74 BPM | BODY MASS INDEX: 26.49 KG/M2 | SYSTOLIC BLOOD PRESSURE: 153 MMHG | WEIGHT: 213 LBS | DIASTOLIC BLOOD PRESSURE: 93 MMHG | HEIGHT: 75 IN

## 2023-11-06 DIAGNOSIS — Z79.4 TYPE 2 DIABETES MELLITUS WITHOUT COMPLICATION, WITH LONG-TERM CURRENT USE OF INSULIN: Primary | ICD-10-CM

## 2023-11-06 DIAGNOSIS — I15.2 HYPERTENSION ASSOCIATED WITH DIABETES: ICD-10-CM

## 2023-11-06 DIAGNOSIS — E11.9 TYPE 2 DIABETES MELLITUS WITHOUT COMPLICATION, WITH LONG-TERM CURRENT USE OF INSULIN: Primary | ICD-10-CM

## 2023-11-06 DIAGNOSIS — E11.59 HYPERTENSION ASSOCIATED WITH DIABETES: ICD-10-CM

## 2023-11-06 DIAGNOSIS — E78.5 HYPERLIPIDEMIA ASSOCIATED WITH TYPE 2 DIABETES MELLITUS: ICD-10-CM

## 2023-11-06 DIAGNOSIS — E11.69 HYPERLIPIDEMIA ASSOCIATED WITH TYPE 2 DIABETES MELLITUS: ICD-10-CM

## 2023-11-06 LAB — GLUCOSE SERPL-MCNC: 177 MG/DL (ref 70–110)

## 2023-11-06 PROCEDURE — 1159F MED LIST DOCD IN RCRD: CPT | Mod: HCNC,CPTII,S$GLB, | Performed by: NURSE PRACTITIONER

## 2023-11-06 PROCEDURE — 1101F PT FALLS ASSESS-DOCD LE1/YR: CPT | Mod: HCNC,CPTII,S$GLB, | Performed by: NURSE PRACTITIONER

## 2023-11-06 PROCEDURE — 3288F FALL RISK ASSESSMENT DOCD: CPT | Mod: HCNC,CPTII,S$GLB, | Performed by: NURSE PRACTITIONER

## 2023-11-06 PROCEDURE — 99999 PR PBB SHADOW E&M-EST. PATIENT-LVL IV: CPT | Mod: PBBFAC,HCNC,, | Performed by: NURSE PRACTITIONER

## 2023-11-06 PROCEDURE — 3072F PR LOW RISK FOR RETINOPATHY: ICD-10-PCS | Mod: HCNC,CPTII,S$GLB, | Performed by: NURSE PRACTITIONER

## 2023-11-06 PROCEDURE — 3080F DIAST BP >= 90 MM HG: CPT | Mod: HCNC,CPTII,S$GLB, | Performed by: NURSE PRACTITIONER

## 2023-11-06 PROCEDURE — 82962 POCT GLUCOSE, HAND-HELD DEVICE: ICD-10-PCS | Mod: HCNC,S$GLB,, | Performed by: NURSE PRACTITIONER

## 2023-11-06 PROCEDURE — 3008F PR BODY MASS INDEX (BMI) DOCUMENTED: ICD-10-PCS | Mod: HCNC,CPTII,S$GLB, | Performed by: NURSE PRACTITIONER

## 2023-11-06 PROCEDURE — 1159F PR MEDICATION LIST DOCUMENTED IN MEDICAL RECORD: ICD-10-PCS | Mod: HCNC,CPTII,S$GLB, | Performed by: NURSE PRACTITIONER

## 2023-11-06 PROCEDURE — 82962 GLUCOSE BLOOD TEST: CPT | Mod: HCNC,S$GLB,, | Performed by: NURSE PRACTITIONER

## 2023-11-06 PROCEDURE — 99214 PR OFFICE/OUTPT VISIT, EST, LEVL IV, 30-39 MIN: ICD-10-PCS | Mod: HCNC,S$GLB,, | Performed by: NURSE PRACTITIONER

## 2023-11-06 PROCEDURE — 1101F PR PT FALLS ASSESS DOC 0-1 FALLS W/OUT INJ PAST YR: ICD-10-PCS | Mod: HCNC,CPTII,S$GLB, | Performed by: NURSE PRACTITIONER

## 2023-11-06 PROCEDURE — 99214 OFFICE O/P EST MOD 30 MIN: CPT | Mod: HCNC,S$GLB,, | Performed by: NURSE PRACTITIONER

## 2023-11-06 PROCEDURE — 3044F PR MOST RECENT HEMOGLOBIN A1C LEVEL <7.0%: ICD-10-PCS | Mod: HCNC,CPTII,S$GLB, | Performed by: NURSE PRACTITIONER

## 2023-11-06 PROCEDURE — 1160F RVW MEDS BY RX/DR IN RCRD: CPT | Mod: HCNC,CPTII,S$GLB, | Performed by: NURSE PRACTITIONER

## 2023-11-06 PROCEDURE — 3077F SYST BP >= 140 MM HG: CPT | Mod: HCNC,CPTII,S$GLB, | Performed by: NURSE PRACTITIONER

## 2023-11-06 PROCEDURE — 3008F BODY MASS INDEX DOCD: CPT | Mod: HCNC,CPTII,S$GLB, | Performed by: NURSE PRACTITIONER

## 2023-11-06 PROCEDURE — 3288F PR FALLS RISK ASSESSMENT DOCUMENTED: ICD-10-PCS | Mod: HCNC,CPTII,S$GLB, | Performed by: NURSE PRACTITIONER

## 2023-11-06 PROCEDURE — 3077F PR MOST RECENT SYSTOLIC BLOOD PRESSURE >= 140 MM HG: ICD-10-PCS | Mod: HCNC,CPTII,S$GLB, | Performed by: NURSE PRACTITIONER

## 2023-11-06 PROCEDURE — 99999 PR PBB SHADOW E&M-EST. PATIENT-LVL IV: ICD-10-PCS | Mod: PBBFAC,HCNC,, | Performed by: NURSE PRACTITIONER

## 2023-11-06 PROCEDURE — 3072F LOW RISK FOR RETINOPATHY: CPT | Mod: HCNC,CPTII,S$GLB, | Performed by: NURSE PRACTITIONER

## 2023-11-06 PROCEDURE — 3080F PR MOST RECENT DIASTOLIC BLOOD PRESSURE >= 90 MM HG: ICD-10-PCS | Mod: HCNC,CPTII,S$GLB, | Performed by: NURSE PRACTITIONER

## 2023-11-06 PROCEDURE — 3044F HG A1C LEVEL LT 7.0%: CPT | Mod: HCNC,CPTII,S$GLB, | Performed by: NURSE PRACTITIONER

## 2023-11-06 PROCEDURE — 1160F PR REVIEW ALL MEDS BY PRESCRIBER/CLIN PHARMACIST DOCUMENTED: ICD-10-PCS | Mod: HCNC,CPTII,S$GLB, | Performed by: NURSE PRACTITIONER

## 2023-11-06 RX ORDER — DULAGLUTIDE 1.5 MG/.5ML
1.5 INJECTION, SOLUTION SUBCUTANEOUS
Qty: 4 PEN | Refills: 5 | Status: SHIPPED | OUTPATIENT
Start: 2023-11-06 | End: 2023-12-29 | Stop reason: DRUGHIGH

## 2023-11-06 NOTE — PATIENT INSTRUCTIONS
Medication Regimen:   Decrease Novolin 70/30, 16 units in the morning and 20 units in the evening  Increase Trulicity 1.5 mg weekly  Continue Jardiance 10 mg every morning    Patient Instructions:  Carbohydrate Count: 30-45 grams/meal and 15 grams/snack with limit of 2 snacks per day.  Exercise: Goal is 150 minutes or more per week.  Bring meter and blood sugar log to each appointment.     Goals for Blood Sugar:  Fastin-130 mg/dl  2 hours after meal:  mg/dl  Before Bed: 100-150 mg/dl  If Blood sugar is below 70 mg/dl, DO NOT take diabetes medication. Eat first and then recheck blood sugar in 20 minutes.  Foods to eat if blood sugar is below 70 mg/dl  1/2 glass of orange juice   1/2 regular cola can   3-4 hard candies   3-4 small glucose tabs.   Foods to eat if blood sugar is below 50 mg/dl  1 glass of orange juice  1 regular cola can  8 hard candies  8 small glucose tabs.   If you have repeated eating/blood sugar recheck process 2 times and blood sugar still below 70 mg/dl, call 911.

## 2023-11-06 NOTE — PROGRESS NOTES
PCP: Elza Pantoja MD    Subjective:     Chief Complaint: Diabetes follow up.  Last visit (audio) with me: 23    HPI: 73 y.o.   male for diabetes follow up.   Previously managed by Dr. Jackson and YASEMIN Dale NP.  Type II diabetes since  .  Comorbidities: HTN, HLD and Hyperthyroidism  Diabetes complications: without complications    Interval history:  Denies recent hospital admissions or ER visits.   Admits having hypoglycemia., early evening.  Endorses the following diabetes related symptoms: None.    Blood glucose monitoring fingerstick: 2x/day   Per patient's recall over the last 8 weeks,   Average 130's    No BG log for review.    Activity level: Sedentary  Meal Plann[place] meals/day, breakfast and dinner, infrequent snacks/day.  Skips lunch.     Medication compliant all of the time; diet compliant most of the time.   Has attended diabetes education in the past.     Previous regimen: Januvia - stopped once started on Trulicity (DPP4 not recommended)    Past failed treatment include: none    Current DM Medications:  Diabetes Medications               dulaglutide (TRULICITY) 0.75 mg/0.5 mL pen injector Inject 0.75 mg into the skin every 7 days.    empagliflozin (JARDIANCE) 10 mg tablet Take 1 tablet (10 mg total) by mouth every morning.    insulin NPH-insulin regular, 70/30, (NOVOLIN 70/30 U-100 INSULIN) 100 unit/mL (70-30) injection Inject 22 units in the morning and 26 units in the evening.         Allergies  Review of patient's allergies indicates:  No Known Allergies    Labs Reviewed:  His most recent A1C is:  Lab Results   Component Value Date    HGBA1C 5.6 2023    HGBA1C 6.8 (H) 2023    HGBA1C 6.5 (H) 2022       His most recent BMP is:  Lab Results   Component Value Date     2023    K 3.8 2023    CL 99 2023    CO2 31 (H) 2023    BUN 13 2023    CREATININE 1.3 2023    CALCIUM 9.9 2023    ANIONGAP 11 2023     "ESTGFRAFRICA >60.0 2022    EGFRNONAA 55.7 (A) 2022       Lab Results   Component Value Date    CPEPTIDE 1.33 10/20/2017     Lab Results   Component Value Date    GLUTAMICACID 0.00 10/20/2017       Body mass index is 26.62 kg/m².    Weight gain 7 lbs since last visit.  Wt Readings from Last 3 Encounters:   23 1306 96.6 kg (213 lb)   10/19/23 0714 95.1 kg (209 lb 12.3 oz)   23 0911 95.4 kg (210 lb 5.1 oz)        His blood sugar in clinic today is:  Lab Results   Component Value Date    POCGLU 177 (A) 2023       Diabetes Management Status  Statin: Taking  ACE/ARB: Taking    Screening or Prevention Patient's value Goal Complete/Controlled?   HgA1C Testing and Control   Lab Results   Component Value Date    HGBA1C 5.6 2023      Annually/Less than 8% Yes   Lipid profile : 2023 Annually Yes   LDL control Lab Results   Component Value Date    LDLCALC 96.4 2023    Annually/Less than 100 mg/dl  Yes   Nephropathy screening Lab Results   Component Value Date    MICALBCREAT 26.7 2021     No results found for: "PROTEINUA" Annually No   Blood pressure BP Readings from Last 1 Encounters:   23 (!) 153/93    Less than 140/90 No   Dilated retinal exam : 10/04/2022 Annually No    Foot exam   : 2023 Annually Yes     Social History     Socioeconomic History    Marital status:     Number of children: 2    Highest education level: 11th grade   Occupational History    Occupation: retired   Tobacco Use    Smoking status: Former     Current packs/day: 0.00     Types: Cigarettes     Quit date: 1972     Years since quittin.7    Smokeless tobacco: Never   Substance and Sexual Activity    Alcohol use: Yes     Alcohol/week: 0.0 standard drinks of alcohol     Comment: " Every blue moon"    Drug use: No    Sexual activity: Yes     Partners: Female   Social History Narrative    . Has 2 children. Patient disabled- was .      Social Determinants of " Health     Financial Resource Strain: Low Risk  (1/12/2021)    Overall Financial Resource Strain (CARDIA)     Difficulty of Paying Living Expenses: Not very hard   Food Insecurity: Food Insecurity Present (1/12/2021)    Hunger Vital Sign     Worried About Running Out of Food in the Last Year: Sometimes true     Ran Out of Food in the Last Year: Never true   Transportation Needs: No Transportation Needs (1/12/2021)    PRAPARE - Transportation     Lack of Transportation (Medical): No     Lack of Transportation (Non-Medical): No   Physical Activity: Insufficiently Active (1/12/2021)    Exercise Vital Sign     Days of Exercise per Week: 2 days     Minutes of Exercise per Session: 10 min   Stress: No Stress Concern Present (1/12/2021)    [place] [employer] - Occupational Stress Questionnaire     Feeling of Stress : Not at all   Social Connections: Socially Isolated (1/12/2021)    Social Connection and Isolation Panel [NHANES]     Frequency of Communication with Friends and Family: Three times a week     Frequency of Social Gatherings with Friends and Family: Once a week     Attends Oriental orthodox Services: Never     Active Member of Clubs or Organizations: No     Attends Club or Organization Meetings: Never     Marital Status:    Housing Stability: Low Risk  (1/12/2021)    Housing Stability Vital Sign     Unable to Pay for Housing in the Last Year: No     Number of Places Lived in the Last Year: 1     Unstable Housing in the Last Year: No     Past Medical History:   Diagnosis Date    Allergy     Amputation of finger of left hand     all fingers except for thumb    Cataract     OS NGCL     Chronic pain due to trauma     traumatic amputations left hand    Diabetes mellitus, type 2     History of hepatitis C 02/13/2014    tx'd w/ Js 2018    Hyperlipidemia     Hypertension     Hyperthyroidism 10/10/2019    Refractive error      Review of Systems   Constitutional: Negative for activity change,  appetite change, fatigue and unexpected weight change.   HENT: Negative for dental problem and trouble swallowing.    Eyes: Negative for visual disturbance.   Respiratory: Negative for chest tightness and shortness of breath.    Cardiovascular: Negative for chest pain, palpitations and leg swelling.   Gastrointestinal: Negative for abdominal pain, constipation, diarrhea, nausea and vomiting.   Endocrine: Negative for polydipsia, polyphagia and polyuria.   Genitourinary: Negative for difficulty urinating, dysuria, frequency and urgency.        Negative yeast infection   Musculoskeletal: Negative for gait problem, myalgias and neck pain.   Integumentary:  Negative for rash and wound.   Allergic/Immunologic: Negative.    Neurological: Negative for dizziness, syncope, weakness, numbness and headaches.   Hematological: Negative.    Psychiatric/Behavioral: Negative for confusion and sleep disturbance.   All other systems reviewed and are negative.    Objective:      Physical Exam  Vitals reviewed.   Constitutional:       Appearance: Normal appearance.   HENT:      Head: Normocephalic and atraumatic.   Eyes:      General: Vision grossly intact.      Extraocular Movements: Extraocular movements intact.      Pupils: Pupils are equal, round, and reactive to light.   Neck:      Thyroid: No thyroid mass or thyroid tenderness.   Cardiovascular:      Rate and Rhythm: Normal rate and regular rhythm.      Pulses: Normal pulses.           Radial pulses are 2+ on the right side and 2+ on the left side.        Dorsalis pedis pulses are 2+ on the right side and 2+ on the left side.      Heart sounds: Normal heart sounds.   Pulmonary:      Effort: Pulmonary effort is normal.      Breath sounds: Normal breath sounds.   Abdominal:      General: Bowel sounds are normal.      Palpations: Abdomen is soft.   Musculoskeletal:         General: Normal range of motion.      Cervical back: Normal range of motion and neck supple.      Right lower  leg: No edema.      Left lower leg: No edema.      Right foot: No deformity or bunion.      Left foot: No deformity or bunion.      Comments: left hand digits 2-5 amputated (1997 injury)    Feet:      Right foot:      Skin integrity: Callus present. No ulcer, skin breakdown or dry skin.      Toenail Condition: Right toenails are normal.      Left foot:      Skin integrity: Callus present. No ulcer, skin breakdown or dry skin.      Toenail Condition: Left toenails are normal.   Lymphadenopathy:      Cervical: No cervical adenopathy.   Skin:     General: Skin is warm and dry.      Findings: No rash or wound.      Comments: Negative for acanthosis nigricans. Well-healed SQ injection sites.   Neurological:      Mental Status: He is alert and oriented to person, place, and time.      Coordination: Coordination is intact.      Gait: Gait is intact.   Psychiatric:         Attention and Perception: Attention normal.         Mood and Affect: Mood normal.         Speech: Speech normal.         Behavior: Behavior normal. Behavior is cooperative.         Thought Content: Thought content normal.         Cognition and Memory: Cognition normal.       Assessment / Plan:     Diagnoses and all orders for this visit:    Type 2 diabetes mellitus without complication, with long-term current use of insulin  -     POCT Glucose, Hand-Held Device  -     dulaglutide (TRULICITY) 1.5 mg/0.5 mL pen injector; Inject 1.5 mg into the skin every 7 days.  -     empagliflozin (JARDIANCE) 10 mg tablet; Take 1 tablet (10 mg total) by mouth every morning.  -     insulin NPH-insulin regular, 70/30, (NOVOLIN 70/30 U-100 INSULIN) 100 unit/mL (70-30) injection; Inject 16 units in the morning and 20 units in the evening.    Hypertension associated with diabetes    Hyperlipidemia associated with type 2 diabetes mellitus    Additional Plan Details:  - Condition: controlled, most recent A1C 5.8% was completed 2 months ago.   - Monitor blood glucose:  3x  daily.Goals reviewed. Maintain BG log and bring to next visit for review.   - Hypoglycemia protocol: Reviewed and risk discussed. Instructed to inform with BG readings below 80.  - Diet and activity: Reviewed, limit carbohydrate intake to 30-45 grams per meal, 3x daily and 15 grams per snack with a limit of two daily. Recommend at least 150 minutes of exercise in a week.  - BP and LDL: Recommend lifestyle modifications and follow up with PCP for management.   - Medication Regimen:     Decrease Novolin 70/30, 16 units in the morning and 20 units in the evening  Increase Trulicity 1.5 mg weekly  Continue Jardiance 10 mg every morning    - Medication consideration: Increased Trulicity dose, otherwise continue regimen. May increase Jardiance dose when able.    - Lab results: A1C discussed.  - Health Maintenance standards addressed today:  A1c scheduled.   - Risks of uncontrolled DM explained. Importance of routine foot and eye exams reviewed. Scheduled as appropriate.  -The patient was explained the above plan and given opportunity to ask questions.  He understands, chooses and consents to this plan and accepts all the risks, which include but are not limited to the risks mentioned above.   - Labs ordered as above.  - Follow up: 6 weeks in clinic.         Charlotte T. Delacruz, FNP-C [facility]    A total of 30 minutes was spent in face to face time, of which over 50 % was spent in counseling patient on disease process, complications, treatment, and side effects of medications.

## 2023-11-08 DIAGNOSIS — Z79.4 TYPE 2 DIABETES MELLITUS WITHOUT COMPLICATION, WITH LONG-TERM CURRENT USE OF INSULIN: ICD-10-CM

## 2023-11-08 DIAGNOSIS — E11.9 TYPE 2 DIABETES MELLITUS WITHOUT COMPLICATION, WITH LONG-TERM CURRENT USE OF INSULIN: ICD-10-CM

## 2023-11-08 RX ORDER — CALCIUM CITRATE/VITAMIN D3 200MG-6.25
TABLET ORAL
Qty: 100 STRIP | Refills: 11 | Status: SHIPPED | OUTPATIENT
Start: 2023-11-08

## 2023-11-08 NOTE — PROGRESS NOTES
Subjective:     Chief Complaint:  Left Hand Pain    Interval History (11/9/2023): Patient was last seen on 9/13/2023. No major changes since previous visit; patient is stable overall.  he presents today for follow-up for medication refill. he feels the pain medication is providing adequate pain relief and reduces the negative effects of chronic pain that affects quality of life. No major SE from medications. Patient reports pain as 8/10 today.     Interval History (9/13/2023): Ulysses Boreaux was last seen on 7/19/2023. he presents today for follow-up for medication refill. he feels the pain medication is providing adequate pain relief and reduces the negative effects of chronic pain that affects quality of life. No major SE from medications. No major changes since previous visit; patient is stable overall. Patient reports pain as 2/10 today.     History of Present Illness:  This patient is a 65 year old male who presents today for f/u complaining of the above noted pains. The patient describes this pain as follows.    - duration (acute, chronic): left hand pain since 1997 after table saw amputation of digits 2 through 5 at work, left lower quadrant pain since hernia surgery in 2004  - timing (constant, intermittent): Constant  - character (sharp, dull, aching, burning): Throbbing  - radiating: No  - dermatomal distribution: No  - side: Left   - aggravating factors: Nothing worsens left digit pain, left lower quadrant pain worse with exercise or walking  - relieving factors: Medication / Norco  - antecedent trauma: Amputation via skill saw and 1997, hernia repair in 2004  - prior spinal surgery: None  - pertinent negatives: No fever, No chills, No weight loss, No bladder dysfunction, No bowel dysfunction, No extremity weakness, No saddle anesthesia  - medications tried: Norco, Percocet, over-the-counter medications, compound pain cream  - other therapies tried (physical therapy, injections):     >> physical therapy  "tried in the past    >> no prior injections        Imaging/ Labs (reviewed on 11/9/2023):    7/12/2018 US ABDOMINAL AORTA  CLINICAL HISTORY:  Abnormal findings on diagnostic imaging of other specified body structures  TECHNIQUE:  Limited ultrasound was performed of the abdominal aorta, with cross sectional diameter measurements obtained.  COMPARISON:  01/10/2018  FINDINGS:  The proximal abdominal aorta measures 2.7 cm.  The mid abdominal aorta measures 1.8 cm.  The distal abdominal aorta measures 1.9 cm, slightly ectatic and unchanged from prior.  The right iliac artery measures 1.0 cm.  The left iliac artery measures 1.1 cm.  Aortoiliac atherosclerosis: Mild  Impression: Stable examination with slight distal abdominal aortic ectasia.  Negative for aneurysm.         ROS:  CONSTITUTIONAL: No fever, chills, weight loss  SKIN: No rash or itching  CARDIOVASCULAR:  No chest pain, palpitations  RESPIRATORY: No shortness of breath  GASTROINTESTINAL: No diarrhea, No constipation, abdominal pain, left lower quadrant  GENITOURINARY: No urinary incontinence    MUSCULOSKELETAL:  - patient reports pain as above, see chief complaint     NEUROLOGICAL:   - Pain as above  - Strength in lower extremities is normal  - Sensation in lower extremities is normal  - No bowel or bladder incontinence     PSYCHIATRIC: No change in mood noticed         Objective:     Physical Exam:  Vitals:    11/09/23 0806   BP: (!) 153/91   Pulse: 68   Weight: 96.4 kg (212 lb 8.4 oz)   Height: 6' 3" (1.905 m)   PainSc:   8   PainLoc: Hand   Body mass index is 26.56 kg/m².   (reviewed on 11/9/2023)    General: alert and oriented, in no apparent distress.   Gait: normal gait  Skin: No rashes, No discoloration   HEENT: EOMI  Respiratory: respirations nonlabored. No audible wheezing.  Cardiology: No obvious peripheral edema. No audible wheezing.    Musculoskeletal:  - No pain with lumbar flexion/ extension  - Left upper extremity range of motion intact " throughout  - Digital stumps are hypersensitive to palpation, hypersensitivity does not extend further proximal  - No color change, no swelling, no changes in hair growth along left hand    Neuro:  - Lower extremities:      >> 5/5 strength bilaterally, throughout, symmetric  - Upper extremities:    >> 5/5 strength bilaterally  - Normal sensation    Psych: mood and affect appropriate        Assessment:     Ulysses Boreaux is a 73 y.o. male who presents with     ICD-10-CM ICD-9-CM   1. 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 Chronic pain due to trauma  G89.21 338.21   3. Chronic pain disorder  G89.4 338.4   4. Opioid use agreement exists  Z79.891 V58.69         Plan:   1. Interventional: None.    2. Pharmacologic:   - Refill Norco 10/325 mg BID PRN (60 tabs) x 2 months. Will e-prescribe for  and .  Patient tolerating opioids with no side effects, obtaining good pain control with functional improvement. He tolerates this medication well, and he denies any drowsiness or constipation.  - Updated opioid contract signed with Dr. Love on 10/28/20.  - [place]  reviewed and appropriate.     - Last UDS 7/19/2023 was compliant for medications prescribed.     3. Rehabilitative: Encouraged regular exercise.    4. Diagnostic: None for now.    5. Follow up: 8-9 week medication Refill     - This condition does not require this patient to take time off of work, and the primary goal of our Pain Management services is to improve the patient's functional capacity.   - I discussed the risks, benefits, and alternatives to potential treatment options. All questions and concerns were fully addressed today in clinic.         Ysabel Garcia PA-C  Interventional Pain Management - [facility]    Disclaimer:  This note was prepared using voice recognition system and is likely to have sound alike errors that may have been overlooked even after proof reading.  Please call me with any  questions.       >> UDS:  8-18-15 :: appropriate  12-29-15 :: appropriate  5-3-16 :: appropriate  10-18-16 :: appropriate  4/13/2017 :: appropriate  9/29/2017 :: appropriate  3/9/2018 :: appropriate  9/4/2018 :: appropriate  6/19/2019 :: appropriate  12/12/2019 :: appropriate  6/12/2020 ::  Appropriate  10/2/2020 :: Appropriate  2/24/2021 :: Appropriate  8/24/2021 :: Appropriate  12/21/2021 :: Appropriate  6/30/2022 :: Appropriate  12/22/2022 :: Appropriate   7/19/2023 :: Appropriate

## 2023-11-09 ENCOUNTER — OFFICE VISIT (OUTPATIENT)
Dept: PAIN MEDICINE | Facility: CLINIC | Age: 73
End: 2023-11-09
Payer: MEDICARE

## 2023-11-09 VITALS
HEART RATE: 68 BPM | WEIGHT: 212.5 LBS | HEIGHT: 75 IN | BODY MASS INDEX: 26.42 KG/M2 | SYSTOLIC BLOOD PRESSURE: 153 MMHG | DIASTOLIC BLOOD PRESSURE: 91 MMHG

## 2023-11-09 DIAGNOSIS — S68.119A TRAUMATIC AMPUTATION OF DIGIT OF ONE HAND WITHOUT COMPLICATION: Primary | ICD-10-CM

## 2023-11-09 DIAGNOSIS — G89.4 CHRONIC PAIN DISORDER: ICD-10-CM

## 2023-11-09 DIAGNOSIS — Z79.891 OPIOID USE AGREEMENT EXISTS: ICD-10-CM

## 2023-11-09 DIAGNOSIS — S68.119A TRAUMATIC AMPUTATION OF DIGIT OF ONE HAND WITHOUT COMPLICATION: ICD-10-CM

## 2023-11-09 DIAGNOSIS — G89.21 CHRONIC PAIN DUE TO TRAUMA: ICD-10-CM

## 2023-11-09 PROCEDURE — 99999 PR PBB SHADOW E&M-EST. PATIENT-LVL IV: ICD-10-PCS | Mod: PBBFAC,HCNC,, | Performed by: PHYSICIAN ASSISTANT

## 2023-11-09 PROCEDURE — 3077F SYST BP >= 140 MM HG: CPT | Mod: HCNC,CPTII,S$GLB, | Performed by: PHYSICIAN ASSISTANT

## 2023-11-09 PROCEDURE — 3288F PR FALLS RISK ASSESSMENT DOCUMENTED: ICD-10-PCS | Mod: HCNC,CPTII,S$GLB, | Performed by: PHYSICIAN ASSISTANT

## 2023-11-09 PROCEDURE — 3072F PR LOW RISK FOR RETINOPATHY: ICD-10-PCS | Mod: HCNC,CPTII,S$GLB, | Performed by: PHYSICIAN ASSISTANT

## 2023-11-09 PROCEDURE — 3044F HG A1C LEVEL LT 7.0%: CPT | Mod: HCNC,CPTII,S$GLB, | Performed by: PHYSICIAN ASSISTANT

## 2023-11-09 PROCEDURE — 1101F PR PT FALLS ASSESS DOC 0-1 FALLS W/OUT INJ PAST YR: ICD-10-PCS | Mod: HCNC,CPTII,S$GLB, | Performed by: PHYSICIAN ASSISTANT

## 2023-11-09 PROCEDURE — 3072F LOW RISK FOR RETINOPATHY: CPT | Mod: HCNC,CPTII,S$GLB, | Performed by: PHYSICIAN ASSISTANT

## 2023-11-09 PROCEDURE — 1125F PR PAIN SEVERITY QUANTIFIED, PAIN PRESENT: ICD-10-PCS | Mod: HCNC,CPTII,S$GLB, | Performed by: PHYSICIAN ASSISTANT

## 2023-11-09 PROCEDURE — 99214 OFFICE O/P EST MOD 30 MIN: CPT | Mod: HCNC,S$GLB,, | Performed by: PHYSICIAN ASSISTANT

## 2023-11-09 PROCEDURE — 1101F PT FALLS ASSESS-DOCD LE1/YR: CPT | Mod: HCNC,CPTII,S$GLB, | Performed by: PHYSICIAN ASSISTANT

## 2023-11-09 PROCEDURE — 3077F PR MOST RECENT SYSTOLIC BLOOD PRESSURE >= 140 MM HG: ICD-10-PCS | Mod: HCNC,CPTII,S$GLB, | Performed by: PHYSICIAN ASSISTANT

## 2023-11-09 PROCEDURE — 1159F MED LIST DOCD IN RCRD: CPT | Mod: HCNC,CPTII,S$GLB, | Performed by: PHYSICIAN ASSISTANT

## 2023-11-09 PROCEDURE — 1160F PR REVIEW ALL MEDS BY PRESCRIBER/CLIN PHARMACIST DOCUMENTED: ICD-10-PCS | Mod: HCNC,CPTII,S$GLB, | Performed by: PHYSICIAN ASSISTANT

## 2023-11-09 PROCEDURE — 99999 PR PBB SHADOW E&M-EST. PATIENT-LVL IV: CPT | Mod: PBBFAC,HCNC,, | Performed by: PHYSICIAN ASSISTANT

## 2023-11-09 PROCEDURE — 99214 PR OFFICE/OUTPT VISIT, EST, LEVL IV, 30-39 MIN: ICD-10-PCS | Mod: HCNC,S$GLB,, | Performed by: PHYSICIAN ASSISTANT

## 2023-11-09 PROCEDURE — 3008F PR BODY MASS INDEX (BMI) DOCUMENTED: ICD-10-PCS | Mod: HCNC,CPTII,S$GLB, | Performed by: PHYSICIAN ASSISTANT

## 2023-11-09 PROCEDURE — 3044F PR MOST RECENT HEMOGLOBIN A1C LEVEL <7.0%: ICD-10-PCS | Mod: HCNC,CPTII,S$GLB, | Performed by: PHYSICIAN ASSISTANT

## 2023-11-09 PROCEDURE — 3008F BODY MASS INDEX DOCD: CPT | Mod: HCNC,CPTII,S$GLB, | Performed by: PHYSICIAN ASSISTANT

## 2023-11-09 PROCEDURE — 3080F PR MOST RECENT DIASTOLIC BLOOD PRESSURE >= 90 MM HG: ICD-10-PCS | Mod: HCNC,CPTII,S$GLB, | Performed by: PHYSICIAN ASSISTANT

## 2023-11-09 PROCEDURE — 1159F PR MEDICATION LIST DOCUMENTED IN MEDICAL RECORD: ICD-10-PCS | Mod: HCNC,CPTII,S$GLB, | Performed by: PHYSICIAN ASSISTANT

## 2023-11-09 PROCEDURE — 1160F RVW MEDS BY RX/DR IN RCRD: CPT | Mod: HCNC,CPTII,S$GLB, | Performed by: PHYSICIAN ASSISTANT

## 2023-11-09 PROCEDURE — 3080F DIAST BP >= 90 MM HG: CPT | Mod: HCNC,CPTII,S$GLB, | Performed by: PHYSICIAN ASSISTANT

## 2023-11-09 PROCEDURE — 1125F AMNT PAIN NOTED PAIN PRSNT: CPT | Mod: HCNC,CPTII,S$GLB, | Performed by: PHYSICIAN ASSISTANT

## 2023-11-09 PROCEDURE — 3288F FALL RISK ASSESSMENT DOCD: CPT | Mod: HCNC,CPTII,S$GLB, | Performed by: PHYSICIAN ASSISTANT

## 2023-11-09 RX ORDER — HYDROCODONE BITARTRATE AND ACETAMINOPHEN 10; 325 MG/1; MG/1
1 TABLET ORAL EVERY 12 HOURS PRN
Qty: 60 TABLET | Refills: 0 | Status: CANCELLED | OUTPATIENT
Start: 2023-11-09

## 2023-11-15 DIAGNOSIS — Z79.4 TYPE 2 DIABETES MELLITUS WITHOUT COMPLICATION, WITH LONG-TERM CURRENT USE OF INSULIN: ICD-10-CM

## 2023-11-15 DIAGNOSIS — E11.9 TYPE 2 DIABETES MELLITUS WITHOUT COMPLICATION, WITH LONG-TERM CURRENT USE OF INSULIN: ICD-10-CM

## 2023-11-15 DIAGNOSIS — Z79.4 UNCONTROLLED TYPE 2 DIABETES MELLITUS WITH HYPERGLYCEMIA, WITH LONG-TERM CURRENT USE OF INSULIN: ICD-10-CM

## 2023-11-15 DIAGNOSIS — E11.65 UNCONTROLLED TYPE 2 DIABETES MELLITUS WITH HYPERGLYCEMIA, WITH LONG-TERM CURRENT USE OF INSULIN: ICD-10-CM

## 2023-11-15 RX ORDER — ISOPROPYL ALCOHOL 70 ML/100ML
SWAB TOPICAL
Refills: 0 | OUTPATIENT
Start: 2023-11-15

## 2023-11-15 RX ORDER — INSULIN PUMP SYRINGE, 3 ML
EACH MISCELLANEOUS
Refills: 0 | OUTPATIENT
Start: 2023-11-15

## 2023-11-15 RX ORDER — LANCETS 33 GAUGE
EACH MISCELLANEOUS
Refills: 0 | OUTPATIENT
Start: 2023-11-15

## 2023-11-15 RX ORDER — BLOOD-GLUCOSE METER
EACH MISCELLANEOUS
Refills: 0 | OUTPATIENT
Start: 2023-11-15

## 2023-11-15 NOTE — TELEPHONE ENCOUNTER
Refill Decision Note   Ulysses Boreaux  is requesting a refill authorization.  Brief Assessment and Rationale for Refill:  Quick Discontinue     Medication Therapy Plan:   Pharmacy is requesting new scripts for the following medications without required information, (sig/ frequency/qty/etc)         Comments: Pharmacies have been requesting medications for patients without required information, (sig, frequency, qty, etc.). In addition, requests are sent for medication(s) pt. are currently not taking, and medications patients have never taken.    We have spoken to the pharmacies about these request types and advised their teams previously that we are unable to assess these New Script requests and require all details for these requests. This is a known issue and has been reported.      Note composed:2:18 PM 11/15/2023

## 2023-11-15 NOTE — TELEPHONE ENCOUNTER
No care due was identified.  [facility] Embedded Care Due Messages. Reference number: [id number].   11/15/2023 2:11:38 PM CST

## 2023-11-30 ENCOUNTER — EXTERNAL CHRONIC CARE MANAGEMENT (OUTPATIENT)
Dept: PRIMARY CARE CLINIC | Facility: CLINIC | Age: 73
End: 2023-11-30
Payer: MEDICARE

## 2023-11-30 PROCEDURE — 99439 PR CHRONIC CARE MGMT, EA ADDTL 20 MIN: ICD-10-PCS | Mod: S$GLB,,, | Performed by: FAMILY MEDICINE

## 2023-11-30 PROCEDURE — 99490 CHRNC CARE MGMT STAFF 1ST 20: CPT | Mod: S$GLB,,, | Performed by: FAMILY MEDICINE

## 2023-11-30 PROCEDURE — 99439 CHRNC CARE MGMT STAF EA ADDL: CPT | Mod: S$GLB,,, | Performed by: FAMILY MEDICINE

## 2023-11-30 PROCEDURE — 99490 PR CHRONIC CARE MGMT, 1ST 20 MIN: ICD-10-PCS | Mod: S$GLB,,, | Performed by: FAMILY MEDICINE

## 2023-12-05 ENCOUNTER — OFFICE VISIT (OUTPATIENT)
Dept: OPHTHALMOLOGY | Facility: CLINIC | Age: 73
End: 2023-12-05
Payer: MEDICARE

## 2023-12-05 DIAGNOSIS — H52.7 REFRACTIVE ERROR: ICD-10-CM

## 2023-12-05 DIAGNOSIS — E11.22 TYPE 2 DIABETES MELLITUS WITH STAGE 3A CHRONIC KIDNEY DISEASE, WITH LONG-TERM CURRENT USE OF INSULIN: ICD-10-CM

## 2023-12-05 DIAGNOSIS — E11.59 HYPERTENSION ASSOCIATED WITH DIABETES: ICD-10-CM

## 2023-12-05 DIAGNOSIS — E11.36 DIABETIC CATARACT: ICD-10-CM

## 2023-12-05 DIAGNOSIS — I15.2 HYPERTENSION ASSOCIATED WITH DIABETES: ICD-10-CM

## 2023-12-05 DIAGNOSIS — Z79.4 TYPE 2 DIABETES MELLITUS WITH STAGE 3A CHRONIC KIDNEY DISEASE, WITH LONG-TERM CURRENT USE OF INSULIN: ICD-10-CM

## 2023-12-05 DIAGNOSIS — N18.31 TYPE 2 DIABETES MELLITUS WITH STAGE 3A CHRONIC KIDNEY DISEASE, WITH LONG-TERM CURRENT USE OF INSULIN: ICD-10-CM

## 2023-12-05 DIAGNOSIS — E11.9 DIABETES MELLITUS TYPE 2 WITHOUT RETINOPATHY: Primary | ICD-10-CM

## 2023-12-05 PROCEDURE — 92014 COMPRE OPH EXAM EST PT 1/>: CPT | Mod: HCNC,S$GLB,, | Performed by: OPTOMETRIST

## 2023-12-05 PROCEDURE — 92014 PR EYE EXAM, EST PATIENT,COMPREHESV: ICD-10-PCS | Mod: HCNC,S$GLB,, | Performed by: OPTOMETRIST

## 2023-12-05 PROCEDURE — 1159F PR MEDICATION LIST DOCUMENTED IN MEDICAL RECORD: ICD-10-PCS | Mod: HCNC,CPTII,S$GLB, | Performed by: OPTOMETRIST

## 2023-12-05 PROCEDURE — 3044F PR MOST RECENT HEMOGLOBIN A1C LEVEL <7.0%: ICD-10-PCS | Mod: HCNC,CPTII,S$GLB, | Performed by: OPTOMETRIST

## 2023-12-05 PROCEDURE — 2023F DILAT RTA XM W/O RTNOPTHY: CPT | Mod: HCNC,CPTII,S$GLB, | Performed by: OPTOMETRIST

## 2023-12-05 PROCEDURE — 92015 PR REFRACTION: ICD-10-PCS | Mod: HCNC,S$GLB,, | Performed by: OPTOMETRIST

## 2023-12-05 PROCEDURE — 99999 PR PBB SHADOW E&M-EST. PATIENT-LVL III: ICD-10-PCS | Mod: PBBFAC,HCNC,, | Performed by: OPTOMETRIST

## 2023-12-05 PROCEDURE — 1159F MED LIST DOCD IN RCRD: CPT | Mod: HCNC,CPTII,S$GLB, | Performed by: OPTOMETRIST

## 2023-12-05 PROCEDURE — 2023F PR DILATED RETINAL EXAM W/O EVID OF RETINOPATHY: ICD-10-PCS | Mod: HCNC,CPTII,S$GLB, | Performed by: OPTOMETRIST

## 2023-12-05 PROCEDURE — 3044F HG A1C LEVEL LT 7.0%: CPT | Mod: HCNC,CPTII,S$GLB, | Performed by: OPTOMETRIST

## 2023-12-05 PROCEDURE — 99999 PR PBB SHADOW E&M-EST. PATIENT-LVL III: CPT | Mod: PBBFAC,HCNC,, | Performed by: OPTOMETRIST

## 2023-12-05 PROCEDURE — 92015 DETERMINE REFRACTIVE STATE: CPT | Mod: HCNC,S$GLB,, | Performed by: OPTOMETRIST

## 2023-12-05 NOTE — PROGRESS NOTES
HPI    Last visit with TRF on 10/04/2022    Lab Results       Component                Value               Date                       HGBA1C                   5.6                 09/05/2023              Patient states decrease with overall vision and trouble with driving at   night due to glare but more so with the left eye.  No ocular pain/discomfort  Using Visine for red eyes  Wear otc readers +2.25 - +2.50  Last edited by Lalita Joy on 12/5/2023  9:33 AM.            Assessment /Plan     For exam results, see Encounter Report.    Diabetes mellitus type 2 without retinopathy    Type 2 diabetes mellitus with stage 3a chronic kidney disease, with long-term current use of insulin  -     Ambulatory referral/consult to Optometry    Hypertension associated with diabetes    Diabetic cataract    Refractive error      No Background Diabetic Retinopathy  Strict BG control, f/u w/ PCP, and annual DFE  Stressed importance of DM control to preserve vision    No HTN Retinopathy    Significant cataracts OS, discussed cataract surgery including Specialty IOL's  Refer to Dr Maldonado   Consult Ophthalmology for cataract evaluation at next available appointment.

## 2023-12-29 ENCOUNTER — OFFICE VISIT (OUTPATIENT)
Dept: DIABETES | Facility: CLINIC | Age: 73
End: 2023-12-29
Payer: MEDICARE

## 2023-12-29 DIAGNOSIS — E11.59 HYPERTENSION ASSOCIATED WITH DIABETES: ICD-10-CM

## 2023-12-29 DIAGNOSIS — I15.2 HYPERTENSION ASSOCIATED WITH DIABETES: ICD-10-CM

## 2023-12-29 DIAGNOSIS — E11.9 TYPE 2 DIABETES MELLITUS WITHOUT COMPLICATION, WITH LONG-TERM CURRENT USE OF INSULIN: Primary | ICD-10-CM

## 2023-12-29 DIAGNOSIS — E11.69 HYPERLIPIDEMIA ASSOCIATED WITH TYPE 2 DIABETES MELLITUS: ICD-10-CM

## 2023-12-29 DIAGNOSIS — Z79.4 TYPE 2 DIABETES MELLITUS WITHOUT COMPLICATION, WITH LONG-TERM CURRENT USE OF INSULIN: Primary | ICD-10-CM

## 2023-12-29 DIAGNOSIS — E78.5 HYPERLIPIDEMIA ASSOCIATED WITH TYPE 2 DIABETES MELLITUS: ICD-10-CM

## 2023-12-29 PROCEDURE — 1160F PR REVIEW ALL MEDS BY PRESCRIBER/CLIN PHARMACIST DOCUMENTED: ICD-10-PCS | Mod: HCNC,CPTII,95, | Performed by: NURSE PRACTITIONER

## 2023-12-29 PROCEDURE — 99442 PR PHYSICIAN TELEPHONE EVALUATION 11-20 MIN: ICD-10-PCS | Mod: HCNC,95,, | Performed by: NURSE PRACTITIONER

## 2023-12-29 PROCEDURE — 99442 PR PHYSICIAN TELEPHONE EVALUATION 11-20 MIN: CPT | Mod: HCNC,95,, | Performed by: NURSE PRACTITIONER

## 2023-12-29 PROCEDURE — 1160F RVW MEDS BY RX/DR IN RCRD: CPT | Mod: HCNC,CPTII,95, | Performed by: NURSE PRACTITIONER

## 2023-12-29 PROCEDURE — 1159F MED LIST DOCD IN RCRD: CPT | Mod: HCNC,CPTII,95, | Performed by: NURSE PRACTITIONER

## 2023-12-29 PROCEDURE — 3044F PR MOST RECENT HEMOGLOBIN A1C LEVEL <7.0%: ICD-10-PCS | Mod: HCNC,CPTII,95, | Performed by: NURSE PRACTITIONER

## 2023-12-29 PROCEDURE — 3044F HG A1C LEVEL LT 7.0%: CPT | Mod: HCNC,CPTII,95, | Performed by: NURSE PRACTITIONER

## 2023-12-29 PROCEDURE — 1159F PR MEDICATION LIST DOCUMENTED IN MEDICAL RECORD: ICD-10-PCS | Mod: HCNC,CPTII,95, | Performed by: NURSE PRACTITIONER

## 2023-12-29 RX ORDER — DULAGLUTIDE 0.75 MG/.5ML
0.75 INJECTION, SOLUTION SUBCUTANEOUS
Qty: 4 PEN | Refills: 5 | Status: SHIPPED | OUTPATIENT
Start: 2023-12-29

## 2023-12-29 RX ORDER — INSULIN PUMP SYRINGE, 3 ML
EACH MISCELLANEOUS
Qty: 1 EACH | Refills: 0 | Status: SHIPPED | OUTPATIENT
Start: 2023-12-29 | End: 2024-12-28

## 2023-12-29 NOTE — PROGRESS NOTES
Established Patient - Audio Only Telehealth Visit     The patient location is: Home ([place])  The chief complaint leading to consultation is: Diabetes Follow-up  Visit type: Virtual visit with audio only (telephone)  Total time spent with patient: 15 minutes    The reason for the audio only service rather than synchronous audio and video virtual visit was related to technical difficulties or patient preference/necessity.     Each patient to whom I provide medical services by telemedicine is:  (1) informed of the relationship between the physician and patient and the respective role of any other health care provider with respect to management of the patient; and (2) notified that they may decline to receive medical services by telemedicine and may withdraw from such care at any time. Patient verbally consented to receive this service via voice-only telephone call.    This service was not originating from a related E/M service provided within the previous 7 days nor will  to an E/M service or procedure within the next 24 hours or my soonest available appointment.  Prevailing standard of care was able to be met in this audio-only visit.     PCP: Elza Pantoja MD    Subjective:     Chief Complaint: Diabetes follow up.  Last visit with me: 11/6/2023    HPI: 73 y.o.   male for diabetes follow up.   Previously managed by Dr. Jackson and YASEMIN Dale NP.  Type II diabetes since 2005.  Comorbidities: HTN, HLD and Hyperthyroidism  Personal history of pancreatitis: denies  Family history of pancreatic cancer in first-degree relative: denies  Family history of MTC/MEN/endocrine tumors: denies   Diabetes complications: without complications    Interval history:  Since last visit, increased Trulicity dose.  Reports significant decreased appetite.  Meal intake limited to once daily.    Denies recent hospital admissions or ER visits.   Denies having hypoglycemia.  Endorses the following diabetes related  symptoms: None.    Blood glucose monitoring fingerstick: 2x/day   Per patient's recall over the last 4 weeks,   Average 120-130's    No BG log for review.    Activity level: Sedentary  Meal Plannin meals/day, breakfast and dinner, infrequent snacks/day.  Skips lunch.     Medication compliant all of the time; diet compliant most of the time.   Has attended diabetes education in the past.     Previous regimen: Januvia - stopped once started on Trulicity (DPP4 not recommended)    Past failed treatment include: Trulicity 1.5 mg - significant decreased appetite    Current DM Medications:  Diabetes Medications               dulaglutide (TRULICITY) 1.5 mg/0.5 mL pen injector Inject 1.5 mg into the skin every 7 days.    empagliflozin (JARDIANCE) 10 mg tablet Take 1 tablet (10 mg total) by mouth every morning.    insulin NPH-insulin regular, 70/30, (NOVOLIN 70/30 U-100 INSULIN) 100 unit/mL (70-30) injection Inject 16 units in the morning and 20 units in the evening.  Taking 12 units in am and 20 units in the evening     Allergies  Review of patient's allergies indicates:  No Known Allergies    Labs Reviewed:  His most recent A1C is:  Lab Results   Component Value Date    HGBA1C 5.6 2023    HGBA1C 6.8 (H) 2023    HGBA1C 6.5 (H) 2022       His most recent BMP is:  Lab Results   Component Value Date     2023    K 3.8 2023    CL 99 2023    CO2 31 (H) 2023    BUN 13 2023    CREATININE 1.3 2023    CALCIUM 9.9 2023    ANIONGAP 11 2023    ESTGFRAFRICA >60.0 2022    EGFRNONAA 55.7 (A) 2022       Lab Results   Component Value Date    CPEPTIDE 1.33 10/20/2017     Lab Results   Component Value Date    GLUTAMICACID 0.00 10/20/2017       There is no height or weight on file to calculate BMI.    Wt Readings from Last 3 Encounters:   23 0806 96.4 kg (212 lb 8.4 oz)   23 1306 96.6 kg (213 lb)   10/19/23 0714 95.1 kg (209 lb 12.3 oz)        His  "blood sugar in clinic today is: Not assessed due to type of visit.    Diabetes Management Status  Statin: Taking  ACE/ARB: Taking    Screening or Prevention Patient's value Goal Complete/Controlled?   HgA1C Testing and Control   Lab Results   Component Value Date    HGBA1C 5.6 2023      Annually/Less than 8% Yes   Lipid profile : 2023 Annually Yes   LDL control Lab Results   Component Value Date    LDLCALC 96.4 2023    Annually/Less than 100 mg/dl  Yes   Nephropathy screening Lab Results   Component Value Date    MICALBCREAT 26.7 2021     No results found for: "PROTEINUA" Annually No   Blood pressure BP Readings from Last 1 Encounters:   23 (!) 153/91    Less than 140/90 No   Dilated retinal exam : 2023 Annually Yes    Foot exam   : 2023 Annually Yes     Social History     Socioeconomic History    Marital status:     Number of children: 2    Highest education level: 11th grade   Occupational History    Occupation: retired   Tobacco Use    Smoking status: Former     Current packs/day: 0.00     Types: Cigarettes     Quit date: 1972     Years since quittin.8    Smokeless tobacco: Never   Substance and Sexual Activity    Alcohol use: Yes     Alcohol/week: 0.0 standard drinks of alcohol     Comment: " Every blue moon"    Drug use: No    Sexual activity: Yes     Partners: Female   Social History Narrative    . Has 2 children. Patient disabled- was .      Social Determinants of Health     Financial Resource Strain: Low Risk  (2021)    Overall Financial Resource Strain (CARDIA)     Difficulty of Paying Living Expenses: Not very hard   Food Insecurity: Food Insecurity Present (2021)    Hunger Vital Sign     Worried About Running Out of Food in the Last Year: Sometimes true     Ran Out of Food in the Last Year: Never true   Transportation Needs: No Transportation Needs (2021)    PRAPARE - Transportation     Lack of Transportation " (Medical): No     Lack of Transportation (Non-Medical): No   Physical Activity: Insufficiently Active (1/12/2021)    Exercise Vital Sign     Days of Exercise per Week: 2 days     Minutes of Exercise per Session: 10 min   Stress: No Stress Concern Present (1/12/2021)    [employer] - Occupational Stress Questionnaire     Feeling of Stress : Not at all   Social Connections: Socially Isolated (1/12/2021)    Social Connection and Isolation Panel [NHANES]     Frequency of Communication with Friends and Family: Three times a week     Frequency of Social Gatherings with Friends and Family: Once a week     Attends [employer] Services: Never     Active Member of Clubs or Organizations: No     Attends Club or Organization Meetings: Never     Marital Status:    Housing Stability: Low Risk  (1/12/2021)    Housing Stability Vital Sign     Unable to Pay for Housing in the Last Year: No     Number of Places Lived in the Last Year: 1     Unstable Housing in the Last Year: No     Past Medical History:   Diagnosis Date    Allergy     Amputation of finger of left hand     all fingers except for thumb    Cataract     OS NGCL     Chronic pain due to trauma     traumatic amputations left hand    Diabetes mellitus, type 2     History of hepatitis C 02/13/2014    tx'd w/ Js 2018    Hyperlipidemia     Hypertension     Hyperthyroidism 10/10/2019    Refractive error      Review of Systems   Constitutional: Negative for activity change, appetite change, fatigue and unexpected weight change.   HENT: Negative for dental problem and trouble swallowing.    Eyes: Negative for visual disturbance.   Respiratory: Negative for chest tightness and shortness of breath.    Cardiovascular: Negative for chest pain, palpitations and leg swelling.   Gastrointestinal: Negative for abdominal pain, constipation, diarrhea, nausea and vomiting.   Endocrine: Negative for polydipsia, polyphagia and polyuria.   Genitourinary:  Negative for difficulty urinating, dysuria, frequency and urgency.        Negative yeast infection   Musculoskeletal: Negative for gait problem, myalgias and neck pain.   Integumentary:  Negative for rash and wound.   Allergic/Immunologic: Negative.    Neurological: Negative for dizziness, syncope, weakness, numbness and headaches.   Hematological: Negative.    Psychiatric/Behavioral: Negative for confusion and sleep disturbance.   All other systems reviewed and are negative.    Objective:      Physical Exam  Neurological:      Mental Status: Alert and oriented to person, place, and time. Mental status is at baseline.   Psychiatric:         Attention and Perception: Attention normal.         Mood and Affect: Mood normal.         Speech: Speech normal.         Thought Content: Thought content normal.         Cognition and Memory: Cognition normal.         Judgment: Judgment normal.        Assessment / Plan:     Diagnoses and all orders for this visit:    Type 2 diabetes mellitus without complication, with long-term current use of insulin  -     dulaglutide (TRULICITY) 0.75 mg/0.5 mL pen injector; Inject 0.75 mg into the skin every 7 days.  -     blood-glucose meter (TRUE METRIX GLUCOSE METER) kit; Use as instructed to check blood sugar 3 times daily.  -     insulin NPH-insulin regular, 70/30, (NOVOLIN 70/30 U-100 INSULIN) 100 unit/mL (70-30) injection; Inject 22 units in the morning and 26 units in the evening.    Hypertension associated with diabetes    Hyperlipidemia associated with type 2 diabetes mellitus    Additional Plan Details:  - Condition: controlled, most recent A1C 5.8% was completed 2 months ago.   - Monitor blood glucose:  3x daily.Goals reviewed. Maintain BG log and bring to next visit for review.   - Hypoglycemia protocol: Reviewed and risk discussed. Instructed to inform with BG readings below 80.  - Diet and activity: Reviewed, limit carbohydrate intake to 30-45 grams per meal, 3x daily and 15  grams per snack with a limit of two daily. Recommend at least 150 minutes of exercise in a week.  - BP and LDL: Recommend lifestyle modifications and follow up with PCP for management.   - Medication Regimen:     Resume Novolin 70/30, 22 units in the morning and 26 units in the evening  Decrease Trulicity 0.75 mg weekly  Continue Jardiance 10 mg every morning    - Medication consideration: Resume previous Trulicity dose, unable to tolerate dose titration. May consider increasing Jardiance dose.  - Lab results: A1C discussed.  - Health Maintenance standards addressed today:  A1c scheduled.   - Risks of uncontrolled DM explained. Importance of routine foot and eye exams reviewed. Scheduled as appropriate.  -The patient was explained the above plan and given opportunity to ask questions.  He understands, chooses and consents to this plan and accepts all the risks, which include but are not limited to the risks mentioned above.   - Labs ordered as above.  - Follow up: 6 months in clinic, [facility].         Charlotte T. Delacruz, FNP-C [facility]    A total of 15 minutes was spent in face to face time, of which over 50 % was spent in counseling patient on disease process, complications, treatment, and side effects of medications.

## 2023-12-29 NOTE — PATIENT INSTRUCTIONS
Medication Regimen:   Resume Novolin 70/30, 22 units in the morning and 26 units in the evening  Decrease Trulicity 0.75 mg weekly  Continue Jardiance 10 mg every morning    Patient Instructions:  Carbohydrate Count: 30-45 grams/meal and 15 grams/snack with limit of 2 snacks per day.  Exercise: Goal is 150 minutes or more per week.  Bring meter and blood sugar log to each appointment.     Goals for Blood Sugar:  Fastin-130 mg/dl  2 hours after meal:  mg/dl  Before Bed: 100-150 mg/dl  If Blood sugar is below 70 mg/dl, DO NOT take diabetes medication. Eat first and then recheck blood sugar in 20 minutes.  Foods to eat if blood sugar is below 70 mg/dl  1/2 glass of orange juice   1/2 regular cola can   3-4 hard candies   3-4 small glucose tabs.   Foods to eat if blood sugar is below 50 mg/dl  1 glass of orange juice  1 regular cola can  8 hard candies  8 small glucose tabs.   If you have repeated eating/blood sugar recheck process 2 times and blood sugar still below 70 mg/dl, call 911.

## 2023-12-31 ENCOUNTER — EXTERNAL CHRONIC CARE MANAGEMENT (OUTPATIENT)
Dept: PRIMARY CARE CLINIC | Facility: CLINIC | Age: 73
End: 2023-12-31
Payer: MEDICARE

## 2023-12-31 PROCEDURE — 99490 CHRNC CARE MGMT STAFF 1ST 20: CPT | Mod: S$GLB,,, | Performed by: FAMILY MEDICINE

## 2024-01-03 ENCOUNTER — TELEPHONE (OUTPATIENT)
Dept: PAIN MEDICINE | Facility: CLINIC | Age: 74
End: 2024-01-03
Payer: MEDICARE

## 2024-01-04 ENCOUNTER — OFFICE VISIT (OUTPATIENT)
Dept: PAIN MEDICINE | Facility: CLINIC | Age: 74
End: 2024-01-04
Payer: MEDICARE

## 2024-01-04 VITALS
SYSTOLIC BLOOD PRESSURE: 168 MMHG | DIASTOLIC BLOOD PRESSURE: 92 MMHG | HEART RATE: 71 BPM | WEIGHT: 212.31 LBS | RESPIRATION RATE: 18 BRPM | HEIGHT: 75 IN | BODY MASS INDEX: 26.4 KG/M2

## 2024-01-04 DIAGNOSIS — S68.119A TRAUMATIC AMPUTATION OF DIGIT OF ONE HAND WITHOUT COMPLICATION: Primary | ICD-10-CM

## 2024-01-04 DIAGNOSIS — G89.4 CHRONIC PAIN DISORDER: ICD-10-CM

## 2024-01-04 DIAGNOSIS — G89.21 CHRONIC PAIN DUE TO TRAUMA: ICD-10-CM

## 2024-01-04 DIAGNOSIS — Z79.891 OPIOID USE AGREEMENT EXISTS: ICD-10-CM

## 2024-01-04 DIAGNOSIS — S68.119A TRAUMATIC AMPUTATION OF DIGIT OF ONE HAND WITHOUT COMPLICATION: ICD-10-CM

## 2024-01-04 PROCEDURE — 3077F SYST BP >= 140 MM HG: CPT | Mod: HCNC,CPTII,S$GLB, | Performed by: PHYSICIAN ASSISTANT

## 2024-01-04 PROCEDURE — 1126F AMNT PAIN NOTED NONE PRSNT: CPT | Mod: HCNC,CPTII,S$GLB, | Performed by: PHYSICIAN ASSISTANT

## 2024-01-04 PROCEDURE — 3008F BODY MASS INDEX DOCD: CPT | Mod: HCNC,CPTII,S$GLB, | Performed by: PHYSICIAN ASSISTANT

## 2024-01-04 PROCEDURE — 3072F LOW RISK FOR RETINOPATHY: CPT | Mod: HCNC,CPTII,S$GLB, | Performed by: PHYSICIAN ASSISTANT

## 2024-01-04 PROCEDURE — 99999 PR PBB SHADOW E&M-EST. PATIENT-LVL IV: CPT | Mod: PBBFAC,HCNC,, | Performed by: PHYSICIAN ASSISTANT

## 2024-01-04 PROCEDURE — 3080F DIAST BP >= 90 MM HG: CPT | Mod: HCNC,CPTII,S$GLB, | Performed by: PHYSICIAN ASSISTANT

## 2024-01-04 PROCEDURE — 99214 OFFICE O/P EST MOD 30 MIN: CPT | Mod: HCNC,S$GLB,, | Performed by: PHYSICIAN ASSISTANT

## 2024-01-04 PROCEDURE — 1101F PT FALLS ASSESS-DOCD LE1/YR: CPT | Mod: HCNC,CPTII,S$GLB, | Performed by: PHYSICIAN ASSISTANT

## 2024-01-04 PROCEDURE — 1160F RVW MEDS BY RX/DR IN RCRD: CPT | Mod: HCNC,CPTII,S$GLB, | Performed by: PHYSICIAN ASSISTANT

## 2024-01-04 PROCEDURE — 3288F FALL RISK ASSESSMENT DOCD: CPT | Mod: HCNC,CPTII,S$GLB, | Performed by: PHYSICIAN ASSISTANT

## 2024-01-04 PROCEDURE — 1159F MED LIST DOCD IN RCRD: CPT | Mod: HCNC,CPTII,S$GLB, | Performed by: PHYSICIAN ASSISTANT

## 2024-01-04 RX ORDER — HYDROCODONE BITARTRATE AND ACETAMINOPHEN 10; 325 MG/1; MG/1
1 TABLET ORAL EVERY 12 HOURS PRN
Qty: 60 TABLET | Refills: 0 | Status: SHIPPED | OUTPATIENT
Start: 2024-01-05 | End: 2024-01-05

## 2024-01-04 RX ORDER — HYDROCODONE BITARTRATE AND ACETAMINOPHEN 10; 325 MG/1; MG/1
1 TABLET ORAL EVERY 12 HOURS PRN
Qty: 60 TABLET | Refills: 0 | Status: SHIPPED | OUTPATIENT
Start: 2024-02-04

## 2024-01-04 RX ORDER — HYDROCODONE BITARTRATE AND ACETAMINOPHEN 10; 325 MG/1; MG/1
1 TABLET ORAL EVERY 12 HOURS PRN
Qty: 60 TABLET | Refills: 0 | Status: SHIPPED | OUTPATIENT
Start: 2024-03-05

## 2024-01-04 NOTE — PROGRESS NOTES
Subjective:     Chief Complaint:  Left Hand Pain    Interval History (1/4/2024): Ulysses Boreaux was last seen on 11/9/2023. he presents today for follow-up for medication refill. he feels the pain medication is providing adequate pain relief and reduces the negative effects of chronic pain that affects quality of life. No major SE from medications. Patient reports pain as 0/10 today.     Interval History (11/9/2023): Patient was last seen on 9/13/2023. No major changes since previous visit; patient is stable overall.  he presents today for follow-up for medication refill. he feels the pain medication is providing adequate pain relief and reduces the negative effects of chronic pain that affects quality of life. No major SE from medications. Patient reports pain as 8/10 today.     Interval History (9/13/2023): Ulysses Boreaux was last seen on 7/19/2023. he presents today for follow-up for medication refill. he feels the pain medication is providing adequate pain relief and reduces the negative effects of chronic pain that affects quality of life. No major SE from medications. No major changes since previous visit; patient is stable overall. Patient reports pain as 2/10 today.     History of Present Illness:  This patient is a 65 year old male who presents today for f/u complaining of the above noted pains. The patient describes this pain as follows.    - duration (acute, chronic): left hand pain since 1997 after table saw amputation of digits 2 through 5 at work, left lower quadrant pain since hernia surgery in 2004  - timing (constant, intermittent): Constant  - character (sharp, dull, aching, burning): Throbbing  - radiating: No  - dermatomal distribution: No  - side: Left   - aggravating factors: Nothing worsens left digit pain, left lower quadrant pain worse with exercise or walking  - relieving factors: Medication / Norco  - antecedent trauma: Amputation via skill saw and 1997, hernia repair in 2004  - prior  "spinal surgery: None  - pertinent negatives: No fever, No chills, No weight loss, No bladder dysfunction, No bowel dysfunction, No extremity weakness, No saddle anesthesia  - medications tried: Norco, Percocet, over-the-counter medications, compound pain cream  - other therapies tried (physical therapy, injections):     >> physical therapy tried in the past    >> no prior injections        Imaging/ Labs (reviewed on 1/4/2024):    7/12/2018 US ABDOMINAL AORTA  CLINICAL HISTORY:  Abnormal findings on diagnostic imaging of other specified body structures  TECHNIQUE:  Limited ultrasound was performed of the abdominal aorta, with cross sectional diameter measurements obtained.  COMPARISON:  01/10/2018  FINDINGS:  The proximal abdominal aorta measures 2.7 cm.  The mid abdominal aorta measures 1.8 cm.  The distal abdominal aorta measures 1.9 cm, slightly ectatic and unchanged from prior.  The right iliac artery measures 1.0 cm.  The left iliac artery measures 1.1 cm.  Aortoiliac atherosclerosis: Mild  Impression: Stable examination with slight distal abdominal aortic ectasia.  Negative for aneurysm.         ROS:  CONSTITUTIONAL: No fever, chills, weight loss  SKIN: No rash or itching  CARDIOVASCULAR:  No chest pain, palpitations  RESPIRATORY: No shortness of breath  GASTROINTESTINAL: No diarrhea, No constipation, abdominal pain, left lower quadrant  GENITOURINARY: No urinary incontinence    MUSCULOSKELETAL:  - patient reports pain as above, see chief complaint     NEUROLOGICAL:   - Pain as above  - Strength in lower extremities is normal  - Sensation in lower extremities is normal  - No bowel or bladder incontinence     PSYCHIATRIC: No change in mood noticed         Objective:     Physical Exam:  Vitals:    01/04/24 0907   BP: (!) 168/92   Pulse: 71   Resp: 18   Weight: 96.3 kg (212 lb 4.9 oz)   Height: 6' 3" (1.905 m)   PainSc: 0-No pain   Body mass index is 26.54 kg/m².   (reviewed on 1/4/2024)    General: alert and " oriented, in no apparent distress.   Gait: normal gait  Skin: No rashes, No discoloration   HEENT: EOMI  Respiratory: respirations nonlabored. No audible wheezing.  Cardiology: No obvious peripheral edema.   GI: no obvious distention.     Musculoskeletal:  - ROM fairly preserved   - No pain with lumbar flexion/ extension  - Left upper extremity range of motion intact throughout  - Digital stumps are hypersensitive to palpation, hypersensitivity does not extend further proximal  - No color change, no swelling, no changes in hair growth along left hand    Neuro:  - Lower extremities:      >> 5/5 strength bilaterally, throughout, symmetric  - Upper extremities:    >> 5/5 strength bilaterally  - Normal sensation    Psych: mood and affect appropriate        Assessment:     Ulysses Boreaux is a 73 y.o. male who presents with     ICD-10-CM ICD-9-CM   1. 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 Chronic pain due to trauma  G89.21 338.21   3. Chronic pain disorder  G89.4 338.4   4. Opioid use agreement exists  Z79.891 V58.69           Plan:   1. Interventional: None.    2. Pharmacologic:   - Refill Norco 10/325 mg BID PRN (60 tabs) x 3 months (due to availability). Will e-prescribe to fill on 01/05/2024, 02/04/2024, 03/05/2024.  Patient tolerating opioids with no side effects, obtaining good pain control with functional improvement. He tolerates this medication well, and he denies any drowsiness or constipation.  - Updated opioid contract signed with Dr. Love on 10/28/20.  - [place]  reviewed and appropriate.     - Last UDS 7/19/2023 was compliant for medications prescribed. Order drug screen (UDS/ oral swab) next visit to ensure med compliance.     3. Rehabilitative: Encouraged regular exercise.    4. Diagnostic/ Imaging: No new imaging ordered. Previous imaging reviewed.     5. Follow-up: [both appts scheduled today]:  12 week medication refill with Dr. Love - in clinic   12 week  medication refill (with Ysabel Garcia PA-C) following appointment with Dr. Love - in clinic (per pt request) - [facility]    - This condition does not require this patient to take time off of work, and the primary goal of our Pain Management services is to improve the patient's functional capacity.   - I discussed the risks, benefits, and alternatives to potential treatment options. All questions and concerns were fully addressed today in clinic.         Ysabel Garcia PA-C  Interventional Pain Management - [facility]    Disclaimer:  This note was prepared using voice recognition system and is likely to have sound alike errors that may have been overlooked even after proof reading.  Please call me with any questions.       >> UDS:  8-18-15 :: appropriate  12-29-15 :: appropriate  5-3-16 :: appropriate  10-18-16 :: appropriate  4/13/2017 :: appropriate  9/29/2017 :: appropriate  3/9/2018 :: appropriate  9/4/2018 :: appropriate  6/19/2019 :: appropriate  12/12/2019 :: appropriate  6/12/2020 ::  Appropriate  10/2/2020 :: Appropriate  2/24/2021 :: Appropriate  8/24/2021 :: Appropriate  12/21/2021 :: Appropriate  6/30/2022 :: Appropriate  12/22/2022 :: Appropriate   7/19/2023 :: Appropriate

## 2024-01-05 ENCOUNTER — TELEPHONE (OUTPATIENT)
Dept: PAIN MEDICINE | Facility: CLINIC | Age: 74
End: 2024-01-05
Payer: MEDICARE

## 2024-01-05 DIAGNOSIS — M79.642 PAIN OF LEFT HAND: ICD-10-CM

## 2024-01-05 DIAGNOSIS — S68.119A TRAUMATIC AMPUTATION OF DIGIT OF ONE HAND WITHOUT COMPLICATION: Primary | ICD-10-CM

## 2024-01-05 DIAGNOSIS — G89.4 CHRONIC PAIN DISORDER: ICD-10-CM

## 2024-01-05 RX ORDER — OXYCODONE AND ACETAMINOPHEN 7.5; 325 MG/1; MG/1
1 TABLET ORAL EVERY 12 HOURS PRN
Qty: 60 TABLET | Refills: 0 | Status: SHIPPED | OUTPATIENT
Start: 2024-01-05

## 2024-01-05 NOTE — TELEPHONE ENCOUNTER
----- Message from Amanda Dumont sent at 1/5/2024  1:04 PM CST -----  Regarding: refill  Type:  RX Refill Request    Who Called: Ulysses  Refill or New Rx:refill  RX Name and Strength:Hydrocodone 10  How is the patient currently taking it? (ex. 1XDay):  Is this a 30 day or 90 day RX:  Preferred Pharmacy with phone number:  Local or Mail Order:  Ordering Provider:  Would the patient rather a call back or a response via [facility]? Call back  Best Call Back Number: [phone]      Additional Information: Pt is requesting change of rx due to pharmacy is completely out

## 2024-01-31 ENCOUNTER — EXTERNAL CHRONIC CARE MANAGEMENT (OUTPATIENT)
Dept: PRIMARY CARE CLINIC | Facility: CLINIC | Age: 74
End: 2024-01-31
Payer: MEDICARE

## 2024-01-31 PROCEDURE — 99490 CHRNC CARE MGMT STAFF 1ST 20: CPT | Mod: S$GLB,,, | Performed by: FAMILY MEDICINE

## 2024-02-07 ENCOUNTER — TELEPHONE (OUTPATIENT)
Dept: OPHTHALMOLOGY | Facility: CLINIC | Age: 74
End: 2024-02-07
Payer: MEDICARE

## 2024-02-07 NOTE — TELEPHONE ENCOUNTER
----- Message from Martha Newby sent at 2/7/2024  3:24 PM CST -----  Name of Who is Calling:  BOREAUX, ULYSSES [[id number]]      What is the request in detail:Pt would like a callback to [facility] 3/14 appt pt states appt time does not work.Please advise thank you       Can the clinic reply by [facility]:NO        What Number to Call Back if not in [facility]:.Telephone Information:  Mobile          [phone]

## 2024-02-07 NOTE — TELEPHONE ENCOUNTER
Spoke with pt and explained to him that Ms Ramsey would be in touch to reschedule his appt. Pt is okay with the day but wants an appt between 9-11am.

## 2024-02-13 DIAGNOSIS — E11.59 HYPERTENSION ASSOCIATED WITH DIABETES: ICD-10-CM

## 2024-02-13 DIAGNOSIS — I15.2 HYPERTENSION ASSOCIATED WITH DIABETES: ICD-10-CM

## 2024-02-14 RX ORDER — LOSARTAN POTASSIUM AND HYDROCHLOROTHIAZIDE 25; 100 MG/1; MG/1
TABLET ORAL
Qty: 90 TABLET | Refills: 3 | Status: SHIPPED | OUTPATIENT
Start: 2024-02-14

## 2024-02-29 ENCOUNTER — EXTERNAL CHRONIC CARE MANAGEMENT (OUTPATIENT)
Dept: PRIMARY CARE CLINIC | Facility: CLINIC | Age: 74
End: 2024-02-29
Payer: MEDICARE

## 2024-02-29 DIAGNOSIS — J30.9 ALLERGIC RHINITIS, UNSPECIFIED SEASONALITY, UNSPECIFIED TRIGGER: ICD-10-CM

## 2024-02-29 PROCEDURE — 99490 CHRNC CARE MGMT STAFF 1ST 20: CPT | Mod: S$GLB,,, | Performed by: FAMILY MEDICINE

## 2024-02-29 RX ORDER — FLUTICASONE PROPIONATE 50 MCG
SPRAY, SUSPENSION (ML) NASAL
Qty: 48 G | Refills: 2 | Status: SHIPPED | OUTPATIENT
Start: 2024-02-29

## 2024-03-01 ENCOUNTER — LAB VISIT (OUTPATIENT)
Dept: LAB | Facility: HOSPITAL | Age: 74
End: 2024-03-01
Attending: FAMILY MEDICINE
Payer: MEDICARE

## 2024-03-01 DIAGNOSIS — E11.22 TYPE 2 DIABETES MELLITUS WITH STAGE 3A CHRONIC KIDNEY DISEASE, WITH LONG-TERM CURRENT USE OF INSULIN: ICD-10-CM

## 2024-03-01 DIAGNOSIS — N18.31 TYPE 2 DIABETES MELLITUS WITH STAGE 3A CHRONIC KIDNEY DISEASE, WITH LONG-TERM CURRENT USE OF INSULIN: ICD-10-CM

## 2024-03-01 DIAGNOSIS — E11.69 HYPERLIPIDEMIA ASSOCIATED WITH TYPE 2 DIABETES MELLITUS: ICD-10-CM

## 2024-03-01 DIAGNOSIS — E78.5 HYPERLIPIDEMIA ASSOCIATED WITH TYPE 2 DIABETES MELLITUS: ICD-10-CM

## 2024-03-01 DIAGNOSIS — Z79.4 TYPE 2 DIABETES MELLITUS WITH STAGE 3A CHRONIC KIDNEY DISEASE, WITH LONG-TERM CURRENT USE OF INSULIN: ICD-10-CM

## 2024-03-01 LAB
ALBUMIN SERPL BCP-MCNC: 4.1 G/DL (ref 3.5–5.2)
ALP SERPL-CCNC: 66 U/L (ref 55–135)
ALT SERPL W/O P-5'-P-CCNC: 33 U/L (ref 10–44)
ANION GAP SERPL CALC-SCNC: 10 MMOL/L (ref 8–16)
AST SERPL-CCNC: 34 U/L (ref 10–40)
BILIRUB SERPL-MCNC: 0.7 MG/DL (ref 0.1–1)
BUN SERPL-MCNC: 13 MG/DL (ref 8–23)
CALCIUM SERPL-MCNC: 9.9 MG/DL (ref 8.7–10.5)
CHLORIDE SERPL-SCNC: 99 MMOL/L (ref 95–110)
CHOLEST SERPL-MCNC: 179 MG/DL (ref 120–199)
CHOLEST/HDLC SERPL: 3.1 {RATIO} (ref 2–5)
CO2 SERPL-SCNC: 30 MMOL/L (ref 23–29)
CREAT SERPL-MCNC: 1.2 MG/DL (ref 0.5–1.4)
EST. GFR  (NO RACE VARIABLE): >60 ML/MIN/1.73 M^2
ESTIMATED AVG GLUCOSE: 120 MG/DL (ref 68–131)
GLUCOSE SERPL-MCNC: 117 MG/DL (ref 70–110)
HBA1C MFR BLD: 5.8 % (ref 4–5.6)
HDLC SERPL-MCNC: 58 MG/DL (ref 40–75)
HDLC SERPL: 32.4 % (ref 20–50)
LDLC SERPL CALC-MCNC: 101.6 MG/DL (ref 63–159)
NONHDLC SERPL-MCNC: 121 MG/DL
POTASSIUM SERPL-SCNC: 4.4 MMOL/L (ref 3.5–5.1)
PROT SERPL-MCNC: 7.8 G/DL (ref 6–8.4)
SODIUM SERPL-SCNC: 139 MMOL/L (ref 136–145)
TRIGL SERPL-MCNC: 97 MG/DL (ref 30–150)

## 2024-03-01 PROCEDURE — 80053 COMPREHEN METABOLIC PANEL: CPT | Mod: HCNC | Performed by: FAMILY MEDICINE

## 2024-03-01 PROCEDURE — 36415 COLL VENOUS BLD VENIPUNCTURE: CPT | Mod: HCNC | Performed by: FAMILY MEDICINE

## 2024-03-01 PROCEDURE — 83036 HEMOGLOBIN GLYCOSYLATED A1C: CPT | Mod: HCNC | Performed by: FAMILY MEDICINE

## 2024-03-01 PROCEDURE — 80061 LIPID PANEL: CPT | Mod: HCNC | Performed by: FAMILY MEDICINE

## 2024-03-01 NOTE — TELEPHONE ENCOUNTER
Refill Decision Note   Ulysses Boreaux  is requesting a refill authorization.  Brief Assessment and Rationale for Refill:  Approve     Medication Therapy Plan:         Comments:     Note composed:6:20 PM 02/29/2024

## 2024-03-01 NOTE — TELEPHONE ENCOUNTER
No care due was identified.  Health [facility] Embedded Care Due Messages. Reference number: [id number].   2/29/2024 6:07:37 PM CST

## 2024-03-12 ENCOUNTER — OFFICE VISIT (OUTPATIENT)
Dept: INTERNAL MEDICINE | Facility: CLINIC | Age: 74
End: 2024-03-12
Payer: MEDICARE

## 2024-03-12 VITALS
WEIGHT: 207.13 LBS | BODY MASS INDEX: 25.89 KG/M2 | SYSTOLIC BLOOD PRESSURE: 156 MMHG | HEART RATE: 82 BPM | DIASTOLIC BLOOD PRESSURE: 88 MMHG | OXYGEN SATURATION: 99 %

## 2024-03-12 DIAGNOSIS — F19.20 CONTROLLED DRUG DEPENDENCE: ICD-10-CM

## 2024-03-12 DIAGNOSIS — I15.2 HYPERTENSION ASSOCIATED WITH DIABETES: Primary | ICD-10-CM

## 2024-03-12 DIAGNOSIS — E11.22 TYPE 2 DIABETES MELLITUS WITH STAGE 3A CHRONIC KIDNEY DISEASE, WITH LONG-TERM CURRENT USE OF INSULIN: ICD-10-CM

## 2024-03-12 DIAGNOSIS — I70.0 ATHEROSCLEROSIS OF AORTA: ICD-10-CM

## 2024-03-12 DIAGNOSIS — E11.59 HYPERTENSION ASSOCIATED WITH DIABETES: Primary | ICD-10-CM

## 2024-03-12 DIAGNOSIS — K21.9 GASTROESOPHAGEAL REFLUX DISEASE WITHOUT ESOPHAGITIS: ICD-10-CM

## 2024-03-12 DIAGNOSIS — N18.31 TYPE 2 DIABETES MELLITUS WITH STAGE 3A CHRONIC KIDNEY DISEASE, WITH LONG-TERM CURRENT USE OF INSULIN: ICD-10-CM

## 2024-03-12 DIAGNOSIS — G89.21 CHRONIC PAIN DUE TO TRAUMA: ICD-10-CM

## 2024-03-12 DIAGNOSIS — Z79.4 TYPE 2 DIABETES MELLITUS WITH STAGE 3A CHRONIC KIDNEY DISEASE, WITH LONG-TERM CURRENT USE OF INSULIN: ICD-10-CM

## 2024-03-12 DIAGNOSIS — E11.69 HYPERLIPIDEMIA ASSOCIATED WITH TYPE 2 DIABETES MELLITUS: ICD-10-CM

## 2024-03-12 DIAGNOSIS — E78.5 HYPERLIPIDEMIA ASSOCIATED WITH TYPE 2 DIABETES MELLITUS: ICD-10-CM

## 2024-03-12 PROBLEM — E11.9 TYPE 2 DIABETES MELLITUS WITHOUT COMPLICATION, WITH LONG-TERM CURRENT USE OF INSULIN: Status: RESOLVED | Noted: 2023-04-26 | Resolved: 2024-03-12

## 2024-03-12 PROCEDURE — 1159F MED LIST DOCD IN RCRD: CPT | Mod: HCNC,CPTII,S$GLB, | Performed by: PHYSICIAN ASSISTANT

## 2024-03-12 PROCEDURE — 3288F FALL RISK ASSESSMENT DOCD: CPT | Mod: HCNC,CPTII,S$GLB, | Performed by: PHYSICIAN ASSISTANT

## 2024-03-12 PROCEDURE — 99999 PR PBB SHADOW E&M-EST. PATIENT-LVL IV: CPT | Mod: PBBFAC,HCNC,, | Performed by: PHYSICIAN ASSISTANT

## 2024-03-12 PROCEDURE — 1160F RVW MEDS BY RX/DR IN RCRD: CPT | Mod: HCNC,CPTII,S$GLB, | Performed by: PHYSICIAN ASSISTANT

## 2024-03-12 PROCEDURE — 3061F NEG MICROALBUMINURIA REV: CPT | Mod: HCNC,CPTII,S$GLB, | Performed by: PHYSICIAN ASSISTANT

## 2024-03-12 PROCEDURE — 3079F DIAST BP 80-89 MM HG: CPT | Mod: HCNC,CPTII,S$GLB, | Performed by: PHYSICIAN ASSISTANT

## 2024-03-12 PROCEDURE — 3066F NEPHROPATHY DOC TX: CPT | Mod: HCNC,CPTII,S$GLB, | Performed by: PHYSICIAN ASSISTANT

## 2024-03-12 PROCEDURE — 99214 OFFICE O/P EST MOD 30 MIN: CPT | Mod: HCNC,S$GLB,, | Performed by: PHYSICIAN ASSISTANT

## 2024-03-12 PROCEDURE — 1101F PT FALLS ASSESS-DOCD LE1/YR: CPT | Mod: HCNC,CPTII,S$GLB, | Performed by: PHYSICIAN ASSISTANT

## 2024-03-12 PROCEDURE — 3072F LOW RISK FOR RETINOPATHY: CPT | Mod: HCNC,CPTII,S$GLB, | Performed by: PHYSICIAN ASSISTANT

## 2024-03-12 PROCEDURE — 3077F SYST BP >= 140 MM HG: CPT | Mod: HCNC,CPTII,S$GLB, | Performed by: PHYSICIAN ASSISTANT

## 2024-03-12 PROCEDURE — 3008F BODY MASS INDEX DOCD: CPT | Mod: HCNC,CPTII,S$GLB, | Performed by: PHYSICIAN ASSISTANT

## 2024-03-12 PROCEDURE — 1126F AMNT PAIN NOTED NONE PRSNT: CPT | Mod: HCNC,CPTII,S$GLB, | Performed by: PHYSICIAN ASSISTANT

## 2024-03-12 PROCEDURE — 3044F HG A1C LEVEL LT 7.0%: CPT | Mod: HCNC,CPTII,S$GLB, | Performed by: PHYSICIAN ASSISTANT

## 2024-03-12 RX ORDER — EZETIMIBE 10 MG/1
10 TABLET ORAL DAILY
Qty: 90 TABLET | Refills: 1 | Status: SHIPPED | OUTPATIENT
Start: 2024-03-12

## 2024-03-12 RX ORDER — ATORVASTATIN CALCIUM 20 MG/1
20 TABLET, FILM COATED ORAL NIGHTLY
Qty: 90 TABLET | Refills: 3 | Status: SHIPPED | OUTPATIENT
Start: 2024-03-12 | End: 2025-03-12

## 2024-03-12 NOTE — PATIENT INSTRUCTIONS
Please bring your medication or a written list to your next office visit.    If you check your blood pressure at home, please bring written your blood pressure log and your blood pressure machine to your next office visit.

## 2024-03-12 NOTE — PROGRESS NOTES
Subjective:       Patient ID: Ulysses Boreaux is a 73 y.o. male.    Chief Complaint: Follow-up      Patient presents to clinic today for followup of chronic conditions.        Review of Systems   Constitutional:  Negative for chills, fatigue, fever and unexpected weight change.   Eyes:  Negative for visual disturbance.   Respiratory:  Negative for shortness of breath.    Cardiovascular:  Negative for chest pain.   Musculoskeletal:  Negative for myalgias.   Neurological:  Negative for headaches.       Objective:      Physical Exam  Vitals and nursing note reviewed.   Constitutional:       General: He is not in acute distress.     Appearance: He is well-developed.   HENT:      Head: Normocephalic and atraumatic.   Eyes:      General: Lids are normal. No scleral icterus.     Extraocular Movements: Extraocular movements intact.      Conjunctiva/sclera: Conjunctivae normal.   Cardiovascular:      Rate and Rhythm: Normal rate and regular rhythm.   Pulmonary:      Effort: Pulmonary effort is normal.      Breath sounds: Normal breath sounds. No decreased breath sounds, wheezing, rhonchi or rales.   Neurological:      Mental Status: He is alert.      Cranial Nerves: No cranial nerve deficit.   Psychiatric:         Mood and Affect: Mood and affect normal.        Protective Sensation (w/ 10 gram monofilament):  Right: Intact  Left: Intact    Visual Inspection:  Callus -  Bilateral and Onychomycosis -  Bilateral    Pedal Pulses:   Right: Present  Left: Present    Posterior Tibialis Pulses:   Right:Present  Left: Present    Assessment:       1. Hypertension associated with diabetes    2. Hyperlipidemia associated with type 2 diabetes mellitus    3. Type 2 diabetes mellitus with stage 3a chronic kidney disease, with long-term current use of insulin    4. Controlled drug dependence    5. Atherosclerosis of aorta    6. Gastroesophageal reflux disease without esophagitis    7. Chronic pain due to trauma        Plan:   1.  Hypertension associated with diabetes  Assessment & Plan:  /88, above goal, continue losartan-hydrochlorothiazide and nebivolol  Lab Results   Component Value Date     03/01/2024    K 4.4 03/01/2024    BUN 13 03/01/2024    CREATININE 1.2 03/01/2024    ESTGFRAFRICA >60.0 02/21/2022    EGFRNONAA 55.7 (A) 02/21/2022    EGFRNORACEVR >60.0 03/01/2024        Orders:  -     TSH; Future; Expected date: 09/12/2024  -     CBC Auto Differential; Future; Expected date: 09/12/2024    2. Hyperlipidemia associated with type 2 diabetes mellitus  Assessment & Plan:  LDL not at goal, did not tolerate atorvastatin 40 so will remain at 20 mg and add Zetia, recheck labs in 3 months  Lab Results   Component Value Date    CHOL 179 03/01/2024    LDLCALC 101.6 03/01/2024    TRIG 97 03/01/2024    HDL 58 03/01/2024    ALT 33 03/01/2024    AST 34 03/01/2024    ALKPHOS 66 03/01/2024        Orders:  -     Comprehensive Metabolic Panel; Future; Expected date: 09/12/2024  -     Lipid Panel; Future; Expected date: 09/12/2024  -     atorvastatin (LIPITOR) 20 MG tablet; Take 1 tablet (20 mg total) by mouth every evening.  Dispense: 90 tablet; Refill: 3  -     ezetimibe (ZETIA) 10 mg tablet; Take 1 tablet (10 mg total) by mouth once daily.  Dispense: 90 tablet; Refill: 1  -     Comprehensive Metabolic Panel; Future; Expected date: 06/12/2024  -     Lipid Panel; Future; Expected date: 06/12/2024    3. Type 2 diabetes mellitus with stage 3a chronic kidney disease, with long-term current use of insulin  Assessment & Plan:  Controlled, continue Trulicity, Jardiance and insulin  Lab Results   Component Value Date    HGBA1C 5.8 (H) 03/01/2024    HGBA1C 5.6 09/05/2023    LDLCALC 101.6 03/01/2024    LABMICR 7.0 03/01/2024    CREATRANDUR 61.0 03/01/2024    MICALBCREAT 11.5 03/01/2024        Orders:  -     Hemoglobin A1C; Future; Expected date: 09/12/2024    4. Controlled drug dependence  Overview:  Followed by Pain Management, continue current  treatment plan       5. Atherosclerosis of aorta  Overview:  US 7/2018, on ASA and statin      6. Gastroesophageal reflux disease without esophagitis  Overview:  Stable on Protonix      7. Chronic pain due to trauma  Overview:  Followed by Pain Management, continue current treatment plan           Recent labs reviewed with patient.    Annual with Dr. Pantoja scheduled with fasting labs PTA   Health Maintenance reviewed/updated.

## 2024-03-12 NOTE — ASSESSMENT & PLAN NOTE
LDL not at goal, did not tolerate atorvastatin 40 so will remain at 20 mg and add Zetia, recheck labs in 3 months  Lab Results   Component Value Date    CHOL 179 03/01/2024    LDLCALC 101.6 03/01/2024    TRIG 97 03/01/2024    HDL 58 03/01/2024    ALT 33 03/01/2024    AST 34 03/01/2024    ALKPHOS 66 03/01/2024

## 2024-03-12 NOTE — ASSESSMENT & PLAN NOTE
/88, above goal, continue losartan-hydrochlorothiazide and nebivolol  Lab Results   Component Value Date     03/01/2024    K 4.4 03/01/2024    BUN 13 03/01/2024    CREATININE 1.2 03/01/2024    ESTGFRAFRICA >60.0 02/21/2022    EGFRNONAA 55.7 (A) 02/21/2022    EGFRNORACEVR >60.0 03/01/2024

## 2024-03-12 NOTE — ASSESSMENT & PLAN NOTE
Controlled, continue Trulicity, Jardiance and insulin  Lab Results   Component Value Date    HGBA1C 5.8 (H) 03/01/2024    HGBA1C 5.6 09/05/2023    LDLCALC 101.6 03/01/2024    LABMICR 7.0 03/01/2024    CREATRANDUR 61.0 03/01/2024    MICALBCREAT 11.5 03/01/2024

## 2024-03-27 ENCOUNTER — OFFICE VISIT (OUTPATIENT)
Dept: PAIN MEDICINE | Facility: CLINIC | Age: 74
End: 2024-03-27
Payer: MEDICARE

## 2024-03-27 VITALS — WEIGHT: 212.44 LBS | HEIGHT: 75 IN | BODY MASS INDEX: 26.41 KG/M2

## 2024-03-27 DIAGNOSIS — Z79.891 LONG TERM PRESCRIPTION OPIATE USE: ICD-10-CM

## 2024-03-27 DIAGNOSIS — Z79.891 OPIOID USE AGREEMENT EXISTS: Primary | ICD-10-CM

## 2024-03-27 DIAGNOSIS — G89.4 CHRONIC PAIN DISORDER: ICD-10-CM

## 2024-03-27 DIAGNOSIS — G89.21 CHRONIC PAIN DUE TO TRAUMA: ICD-10-CM

## 2024-03-27 DIAGNOSIS — S68.119A TRAUMATIC AMPUTATION OF DIGIT OF ONE HAND WITHOUT COMPLICATION: ICD-10-CM

## 2024-03-27 PROCEDURE — 99999 PR PBB SHADOW E&M-EST. PATIENT-LVL III: CPT | Mod: PBBFAC,HCNC,, | Performed by: PHYSICAL MEDICINE & REHABILITATION

## 2024-03-27 PROCEDURE — 3066F NEPHROPATHY DOC TX: CPT | Mod: HCNC,CPTII,S$GLB, | Performed by: PHYSICAL MEDICINE & REHABILITATION

## 2024-03-27 PROCEDURE — 99214 OFFICE O/P EST MOD 30 MIN: CPT | Mod: HCNC,S$GLB,, | Performed by: PHYSICAL MEDICINE & REHABILITATION

## 2024-03-27 PROCEDURE — 3044F HG A1C LEVEL LT 7.0%: CPT | Mod: HCNC,CPTII,S$GLB, | Performed by: PHYSICAL MEDICINE & REHABILITATION

## 2024-03-27 PROCEDURE — 80326 AMPHETAMINES 5 OR MORE: CPT | Mod: HCNC | Performed by: PHYSICAL MEDICINE & REHABILITATION

## 2024-03-27 PROCEDURE — 3072F LOW RISK FOR RETINOPATHY: CPT | Mod: HCNC,CPTII,S$GLB, | Performed by: PHYSICAL MEDICINE & REHABILITATION

## 2024-03-27 PROCEDURE — 1101F PT FALLS ASSESS-DOCD LE1/YR: CPT | Mod: HCNC,CPTII,S$GLB, | Performed by: PHYSICAL MEDICINE & REHABILITATION

## 2024-03-27 PROCEDURE — 3288F FALL RISK ASSESSMENT DOCD: CPT | Mod: HCNC,CPTII,S$GLB, | Performed by: PHYSICAL MEDICINE & REHABILITATION

## 2024-03-27 PROCEDURE — 80347 BENZODIAZEPINES 13 OR MORE: CPT | Mod: HCNC | Performed by: PHYSICAL MEDICINE & REHABILITATION

## 2024-03-27 PROCEDURE — 3008F BODY MASS INDEX DOCD: CPT | Mod: HCNC,CPTII,S$GLB, | Performed by: PHYSICAL MEDICINE & REHABILITATION

## 2024-03-27 PROCEDURE — 1159F MED LIST DOCD IN RCRD: CPT | Mod: HCNC,CPTII,S$GLB, | Performed by: PHYSICAL MEDICINE & REHABILITATION

## 2024-03-27 PROCEDURE — 3061F NEG MICROALBUMINURIA REV: CPT | Mod: HCNC,CPTII,S$GLB, | Performed by: PHYSICAL MEDICINE & REHABILITATION

## 2024-03-27 PROCEDURE — 80307 DRUG TEST PRSMV CHEM ANLYZR: CPT | Mod: HCNC | Performed by: PHYSICAL MEDICINE & REHABILITATION

## 2024-03-27 PROCEDURE — 1126F AMNT PAIN NOTED NONE PRSNT: CPT | Mod: HCNC,CPTII,S$GLB, | Performed by: PHYSICAL MEDICINE & REHABILITATION

## 2024-03-27 RX ORDER — HYDROCODONE BITARTRATE AND ACETAMINOPHEN 10; 325 MG/1; MG/1
1 TABLET ORAL EVERY 12 HOURS PRN
Qty: 60 TABLET | Refills: 0 | Status: SHIPPED | OUTPATIENT
Start: 2024-04-30

## 2024-03-27 RX ORDER — HYDROCODONE BITARTRATE AND ACETAMINOPHEN 10; 325 MG/1; MG/1
1 TABLET ORAL EVERY 12 HOURS PRN
Qty: 60 TABLET | Refills: 0 | Status: SHIPPED | OUTPATIENT
Start: 2024-04-02

## 2024-03-27 RX ORDER — HYDROCODONE BITARTRATE AND ACETAMINOPHEN 10; 325 MG/1; MG/1
1 TABLET ORAL EVERY 12 HOURS PRN
Qty: 60 TABLET | Refills: 0 | Status: SHIPPED | OUTPATIENT
Start: 2024-05-28

## 2024-03-27 NOTE — PROGRESS NOTES
Established chronic pain patient note (follow-up visit):     No chief complaint on file.       Ulysses Boreaux is a 73 y.o. male presents today for follow-up medication refill.  He reports that the pain is well managed with at least 75% reduction in his pain with the use of Norco 10/325 mg.  He denies any adverse effects related to this medication as well.  Current pain intensity 0/10.  He continues to report pain of the left hand and left lower quadrant.  No new injuries or trauma.    Interval History (1/4/2024): Ulysses Boreaux was last seen on 11/9/2023. he presents today for follow-up for medication refill. he feels the pain medication is providing adequate pain relief and reduces the negative effects of chronic pain that affects quality of life. No major SE from medications. Patient reports pain as 0/10 today.     Interval History (11/9/2023): Patient was last seen on 9/13/2023. No major changes since previous visit; patient is stable overall.  he presents today for follow-up for medication refill. he feels the pain medication is providing adequate pain relief and reduces the negative effects of chronic pain that affects quality of life. No major SE from medications. Patient reports pain as 8/10 today.     Interval History (9/13/2023): Ulysses Boreaux was last seen on 7/19/2023. he presents today for follow-up for medication refill. he feels the pain medication is providing adequate pain relief and reduces the negative effects of chronic pain that affects quality of life. No major SE from medications. No major changes since previous visit; patient is stable overall. Patient reports pain as 2/10 today.     History of Present Illness:  This patient is a 65 year old male who presents today for f/u complaining of the above noted pains. The patient describes this pain as follows.    - duration (acute, chronic): left hand pain since 1997 after table saw amputation of digits 2 through 5 at work, left lower quadrant  pain since hernia surgery in 2004  - timing (constant, intermittent): Constant  - character (sharp, dull, aching, burning): Throbbing  - radiating: No  - dermatomal distribution: No  - side: Left   - aggravating factors: Nothing worsens left digit pain, left lower quadrant pain worse with exercise or walking  - relieving factors: Medication / Norco  - antecedent trauma: Amputation via skill saw and 1997, hernia repair in 2004  - prior spinal surgery: None  - pertinent negatives: No fever, No chills, No weight loss, No bladder dysfunction, No bowel dysfunction, No extremity weakness, No saddle anesthesia  - medications tried: Norco, Percocet, over-the-counter medications, compound pain cream  - other therapies tried (physical therapy, injections):     >> physical therapy tried in the past    >> no prior injections        Imaging/ Labs (reviewed on 3/27/2024):    7/12/2018 US ABDOMINAL AORTA  CLINICAL HISTORY:  Abnormal findings on diagnostic imaging of other specified body structures  TECHNIQUE:  Limited ultrasound was performed of the abdominal aorta, with cross sectional diameter measurements obtained.  COMPARISON:  01/10/2018  FINDINGS:  The proximal abdominal aorta measures 2.7 cm.  The mid abdominal aorta measures 1.8 cm.  The distal abdominal aorta measures 1.9 cm, slightly ectatic and unchanged from prior.  The right iliac artery measures 1.0 cm.  The left iliac artery measures 1.1 cm.  Aortoiliac atherosclerosis: Mild  Impression: Stable examination with slight distal abdominal aortic ectasia.  Negative for aneurysm.         ROS:  CONSTITUTIONAL: No fever, chills, weight loss  SKIN: No rash or itching  CARDIOVASCULAR:  No chest pain, palpitations  RESPIRATORY: No shortness of breath  GASTROINTESTINAL: No diarrhea, No constipation, abdominal pain, left lower quadrant  GENITOURINARY: No urinary incontinence    MUSCULOSKELETAL:  - patient reports pain as above, see chief complaint     NEUROLOGICAL:   - Pain as  "above  - Strength in lower extremities is normal  - Sensation in lower extremities is normal  - No bowel or bladder incontinence     PSYCHIATRIC: No change in mood noticed         Objective:     Physical Exam:  Vitals:    03/27/24 1347   Weight: 96.3 kg (212 lb 6.6 oz)   Height: 6' 3" (1.905 m)   PainSc: 0-No pain     Body mass index is 26.55 kg/m².   (reviewed on 3/27/2024)    General: alert and oriented, in no apparent distress.   Gait: normal gait  Skin: No rashes, No discoloration   HEENT: EOMI  Respiratory: respirations nonlabored. No audible wheezing.  Cardiology: No obvious peripheral edema.   GI: no obvious distention.     Musculoskeletal:  - ROM fairly preserved   - No pain with lumbar flexion/ extension  - Left upper extremity range of motion intact throughout  - Digital stumps are hypersensitive to palpation, hypersensitivity does not extend further proximal  - No color change, no swelling, no changes in hair growth along left hand    Neuro:  - Lower extremities:      >> 5/5 strength bilaterally, throughout, symmetric  - Upper extremities:    >> 5/5 strength bilaterally  - Normal sensation    Psych: mood and affect appropriate        Assessment:     Ulysses Boreaux is a 73 y.o. male who presents with     ICD-10-CM ICD-9-CM   1. Opioid use agreement exists  Z79.891 V58.69   2. Long term prescription opiate use  Z79.891 V58.69   3. 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 Chronic pain disorder  G89.4 338.4   5. Chronic pain due to trauma  G89.21 338.21             Plan:   - Interventional: None.    - Pharmacologic:   - Refill Norco 10/325 mg BID PRN (60 tabs) x 3 months (due to availability). Will e-prescribe to fill on 04/02/2024, 04/30/2024, and 05/28/2024 Patient tolerating opioids with no side effects, obtaining good pain control with functional improvement. He tolerates this medication well, and he denies any drowsiness or constipation.  - Updated opioid " contract signed with Dr. Love on 10/28/20.  - [place]  reviewed and appropriate.       - Last UDS 7/19/2023 was compliant for medications prescribed. Order drug screen (UDS/ oral swab) today to ensure med compliance.     - Rehabilitative: Encouraged regular exercise.    - Diagnostic/ Imaging: No new imaging ordered. Previous imaging reviewed.     -Follow-up:  12 week medication refill    - This condition does not require this patient to take time off of work, and the primary goal of our Pain Management services is to improve the patient's functional capacity.     - I discussed the risks, benefits, and alternatives to potential treatment options. All questions and concerns were fully addressed today in clinic.       Yvon Love MD  Interventional Pain Medicine  [facility] [place]      Disclaimer:  This note was prepared using voice recognition system and is likely to have sound alike errors that may have been overlooked even after proof reading.  Please call me with any questions.       >> UDS:  8-18-15 :: appropriate  12-29-15 :: appropriate  5-3-16 :: appropriate  10-18-16 :: appropriate  4/13/2017 :: appropriate  9/29/2017 :: appropriate  3/9/2018 :: appropriate  9/4/2018 :: appropriate  6/19/2019 :: appropriate  12/12/2019 :: appropriate  6/12/2020 ::  Appropriate  10/2/2020 :: Appropriate  2/24/2021 :: Appropriate  8/24/2021 :: Appropriate  12/21/2021 :: Appropriate  6/30/2022 :: Appropriate  12/22/2022 :: Appropriate   7/19/2023 :: Appropriate   03/27/2024:: Pending

## 2024-03-31 ENCOUNTER — EXTERNAL CHRONIC CARE MANAGEMENT (OUTPATIENT)
Dept: PRIMARY CARE CLINIC | Facility: CLINIC | Age: 74
End: 2024-03-31
Payer: MEDICARE

## 2024-03-31 PROCEDURE — 99490 CHRNC CARE MGMT STAFF 1ST 20: CPT | Mod: S$GLB,,, | Performed by: FAMILY MEDICINE

## 2024-04-01 LAB
6MAM UR QL: NOT DETECTED
7AMINOCLONAZEPAM UR QL: NOT DETECTED
A-OH ALPRAZ UR QL: NOT DETECTED
ALPHA-OH-MIDAZOLAM: NOT DETECTED
ALPRAZ UR QL: NOT DETECTED
AMPHET UR QL SCN: NOT DETECTED
ANNOTATION COMMENT IMP: NORMAL
BARBITURATES UR QL: NEGATIVE
BUPRENORPHINE UR QL: NOT DETECTED
BZE UR QL: NEGATIVE
CARBOXYTHC UR QL: NORMAL
CARISOPRODOL UR QL: NEGATIVE
CLONAZEPAM UR QL: NOT DETECTED
CODEINE UR QL: NOT DETECTED
CREAT UR-MCNC: 26.8 MG/DL (ref 20–400)
DIAZEPAM UR QL: NOT DETECTED
ETHYL GLUCURONIDE UR QL: NORMAL
FENTANYL UR QL: NOT DETECTED
GABAPENTIN: NOT DETECTED
HYDROCODONE UR QL: PRESENT
HYDROMORPHONE UR QL: NOT DETECTED
LORAZEPAM UR QL: NOT DETECTED
MDA UR QL: NOT DETECTED
MDEA UR QL: NOT DETECTED
MDMA UR QL: NOT DETECTED
ME-PHENIDATE UR QL: NOT DETECTED
METHADONE UR QL: NEGATIVE
METHAMPHET UR QL: NOT DETECTED
MIDAZOLAM UR QL SCN: NOT DETECTED
MORPHINE UR QL: NOT DETECTED
NALOXONE: NOT DETECTED
NORBUPRENORPHINE UR QL CFM: NOT DETECTED
NORDIAZEPAM UR QL: NOT DETECTED
NORFENTANYL UR QL: NOT DETECTED
NORHYDROCODONE UR QL CFM: PRESENT
NORMEPERIDINE UR QL CFM: NOT DETECTED
NOROXYCODONE UR QL CFM: NOT DETECTED
NOROXYMORPHONE UR QL SCN: NOT DETECTED
OXAZEPAM UR QL: NOT DETECTED
OXYCODONE UR QL: NOT DETECTED
OXYMORPHONE UR QL: NOT DETECTED
PATHOLOGY STUDY: NORMAL
PCP UR QL: NEGATIVE
PHENTERMINE UR QL: NOT DETECTED
PREGABALIN: NOT DETECTED
SERVICE CMNT-IMP: NORMAL
TAPENTADOL UR QL SCN: NOT DETECTED
TAPENTADOL UR QL SCN: NOT DETECTED
TEMAZEPAM UR QL: NOT DETECTED
TRAMADOL UR QL: NEGATIVE
ZOLPIDEM METABOLITE: NOT DETECTED
ZOLPIDEM UR QL: NOT DETECTED

## 2024-04-04 ENCOUNTER — OFFICE VISIT (OUTPATIENT)
Dept: OPHTHALMOLOGY | Facility: CLINIC | Age: 74
End: 2024-04-04
Payer: MEDICARE

## 2024-04-04 DIAGNOSIS — H35.033 HYPERTENSIVE RETINOPATHY OF BOTH EYES: ICD-10-CM

## 2024-04-04 DIAGNOSIS — H25.12 NUCLEAR SCLEROSIS OF LEFT EYE: ICD-10-CM

## 2024-04-04 DIAGNOSIS — E11.3211 MILD NONPROLIFERATIVE DIABETIC RETINOPATHY OF RIGHT EYE WITH MACULAR EDEMA ASSOCIATED WITH TYPE 2 DIABETES MELLITUS: ICD-10-CM

## 2024-04-04 DIAGNOSIS — E11.9 TYPE 2 DIABETES MELLITUS WITHOUT COMPLICATION, WITHOUT LONG-TERM CURRENT USE OF INSULIN: Primary | ICD-10-CM

## 2024-04-04 DIAGNOSIS — H43.813 PVD (POSTERIOR VITREOUS DETACHMENT), BOTH EYES: ICD-10-CM

## 2024-04-04 DIAGNOSIS — Z96.1 PSEUDOPHAKIA OF RIGHT EYE: ICD-10-CM

## 2024-04-04 DIAGNOSIS — E11.3292 MILD NONPROLIFERATIVE DIABETIC RETINOPATHY OF LEFT EYE WITHOUT MACULAR EDEMA ASSOCIATED WITH TYPE 2 DIABETES MELLITUS: ICD-10-CM

## 2024-04-04 DIAGNOSIS — H26.491 POSTERIOR CAPSULAR OPACIFICATION, RIGHT EYE: ICD-10-CM

## 2024-04-04 DIAGNOSIS — H18.513 FUCHS' CORNEAL DYSTROPHY OF BOTH EYES: ICD-10-CM

## 2024-04-04 PROCEDURE — 92025 CPTRIZED CORNEAL TOPOGRAPHY: CPT | Mod: HCNC,LT,S$GLB, | Performed by: STUDENT IN AN ORGANIZED HEALTH CARE EDUCATION/TRAINING PROGRAM

## 2024-04-04 PROCEDURE — 3066F NEPHROPATHY DOC TX: CPT | Mod: HCNC,CPTII,S$GLB, | Performed by: STUDENT IN AN ORGANIZED HEALTH CARE EDUCATION/TRAINING PROGRAM

## 2024-04-04 PROCEDURE — 2022F DILAT RTA XM EVC RTNOPTHY: CPT | Mod: HCNC,CPTII,S$GLB, | Performed by: STUDENT IN AN ORGANIZED HEALTH CARE EDUCATION/TRAINING PROGRAM

## 2024-04-04 PROCEDURE — 99999 PR PBB SHADOW E&M-EST. PATIENT-LVL III: CPT | Mod: PBBFAC,HCNC,, | Performed by: STUDENT IN AN ORGANIZED HEALTH CARE EDUCATION/TRAINING PROGRAM

## 2024-04-04 PROCEDURE — 3061F NEG MICROALBUMINURIA REV: CPT | Mod: HCNC,CPTII,S$GLB, | Performed by: STUDENT IN AN ORGANIZED HEALTH CARE EDUCATION/TRAINING PROGRAM

## 2024-04-04 PROCEDURE — 99204 OFFICE O/P NEW MOD 45 MIN: CPT | Mod: HCNC,S$GLB,, | Performed by: STUDENT IN AN ORGANIZED HEALTH CARE EDUCATION/TRAINING PROGRAM

## 2024-04-04 PROCEDURE — 1159F MED LIST DOCD IN RCRD: CPT | Mod: HCNC,CPTII,S$GLB, | Performed by: STUDENT IN AN ORGANIZED HEALTH CARE EDUCATION/TRAINING PROGRAM

## 2024-04-04 PROCEDURE — 3044F HG A1C LEVEL LT 7.0%: CPT | Mod: HCNC,CPTII,S$GLB, | Performed by: STUDENT IN AN ORGANIZED HEALTH CARE EDUCATION/TRAINING PROGRAM

## 2024-04-04 PROCEDURE — 92134 CPTRZ OPH DX IMG PST SGM RTA: CPT | Mod: HCNC,S$GLB,, | Performed by: STUDENT IN AN ORGANIZED HEALTH CARE EDUCATION/TRAINING PROGRAM

## 2024-04-04 RX ORDER — PREDNISOLONE ACETATE 10 MG/ML
1 SUSPENSION/ DROPS OPHTHALMIC EVERY 4 HOURS
Qty: 5 ML | Refills: 1 | Status: SHIPPED | OUTPATIENT
Start: 2024-04-04 | End: 2024-04-04

## 2024-04-04 NOTE — PROGRESS NOTES
HPI    Lab Results       Component                Value               Date                       HGBA1C                   5.8 (H)             03/01/2024              Pt in today for a cataract eval.   C/o blurred vision , glare, trouble driving at night , and difficulty   reading over the past several months    Which eye is most affected? OS    Do you have difficulty, even with glasses, with the following activities?    Reading small print such as labels on medicine bottles, a telephone book,   or food labels.   Yes  Reading a newspaper or book?  Yes  Seeing steps, stairs, or curbs  No  Reading traffic signs, street signs, or store signs  No  Doing fine handwork like sewing, knitting, crocheting or carpentry?  No  Writing checks or filling out forms  No  Playing games such as [employer], card games or [employer]?  No  Watching television?  No  Driving at night?  Yes    Do you have any upcoming procedures or surgeries?  No    Have you had any eye surgeries including LASIK or PRK?  No (if so, what kind)    Do you have VA insurance?   No (if so, notify MD for potential toric lens)              Last edited by Elroy Fournier on 4/4/2024  8:22 AM.            Assessment /Plan     For exam results, see Encounter Report.    Type 2 diabetes mellitus without complication, without long-term current use of insulin    Nuclear sclerosis of left eye  Visually significant cataract. Will have patient seen Dr. Preciado prior to CEIOL considering macular edema in fellow eye (may be old maru-lemuel vs. ME from NPDR)    PVD (posterior vitreous detachment), both eyes  No tears or breaks were seen after careful retinal evaluation. Present >3 months per patient report.   RT/RD precautions    Discussed retinal detachment signs and symptoms including flashes of lights, floaters, perceived curtains or veils. Advised to patient to monitor visual status including increase in flashes and floaters, or the development of visual field changes  including curtain and /or veils. Advised patient to RTC urgently if these symptoms occur. Explained the need for follow up exams to the patient even if there are no changes in the symptoms.      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

## 2024-04-25 ENCOUNTER — OFFICE VISIT (OUTPATIENT)
Dept: OPHTHALMOLOGY | Facility: CLINIC | Age: 74
End: 2024-04-25
Payer: MEDICARE

## 2024-04-25 DIAGNOSIS — E11.3211 MILD NONPROLIFERATIVE DIABETIC RETINOPATHY OF RIGHT EYE WITH MACULAR EDEMA ASSOCIATED WITH TYPE 2 DIABETES MELLITUS: Primary | ICD-10-CM

## 2024-04-25 DIAGNOSIS — E11.3299 BDR (BACKGROUND DIABETIC RETINOPATHY): ICD-10-CM

## 2024-04-25 PROCEDURE — 99499 UNLISTED E&M SERVICE: CPT | Mod: S$GLB,,, | Performed by: OPHTHALMOLOGY

## 2024-04-25 PROCEDURE — 92134 CPTRZ OPH DX IMG PST SGM RTA: CPT | Mod: S$GLB,,, | Performed by: OPHTHALMOLOGY

## 2024-04-25 PROCEDURE — 99999 PR PBB SHADOW E&M-EST. PATIENT-LVL III: CPT | Mod: PBBFAC,,, | Performed by: OPHTHALMOLOGY

## 2024-04-25 NOTE — PROGRESS NOTES
===============================  Date today is 4/25/2024  Ulysses Boreaux is a 73 y.o. male  Last visit [facility]: :Visit date not found   Last visit eye dept. 4/4/2024    Uncorrected distance visual acuity was 20/40 in the right eye and 20/50 in the left eye.  Tonometry       Tonometry (Applanation, 8:56 AM)         Right Left    Pressure 15 18                  Not recorded       Not recorded       Not recorded       Chief Complaint   Patient presents with    Diabetes     New pt to Dr. Preciado/  Jessika ou per Dr. Joel     HPI     Diabetes     Additional comments: New pt to Dr. Preciado/  Jessika ou per Earnest Santiago           Comments    Pt states PCIOL OD  15 yrs ago  diabetes          Last edited by Alana Spencer on 4/25/2024  9:11 AM.      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  Check OU independently daily.    Gave my office and personal cell phone number.  ________________  4/25/2024 today  Ulysses Boreaux    :    BCA 5.6  VA acuity best corrected 20/30 OD and 20/50  PC IOL OD   Clear Vitreous, no NVI   2+ cortical cataract OS  Disc sharp, well perfused 0.3  Macula looks good   No DMR OD  BDR few aneurysm     ..  RTC pre op avastin OS  Instructed to call 24/7 for any worsening of vision or symptoms. Check OU daily.   Gave my office and cell phone number.    =============================

## 2024-04-30 ENCOUNTER — EXTERNAL CHRONIC CARE MANAGEMENT (OUTPATIENT)
Dept: PRIMARY CARE CLINIC | Facility: CLINIC | Age: 74
End: 2024-04-30
Payer: MEDICARE

## 2024-04-30 PROCEDURE — 99490 CHRNC CARE MGMT STAFF 1ST 20: CPT | Mod: S$GLB,,, | Performed by: FAMILY MEDICINE

## 2024-05-01 DIAGNOSIS — Z79.4 TYPE 2 DIABETES MELLITUS WITHOUT COMPLICATION, WITH LONG-TERM CURRENT USE OF INSULIN: ICD-10-CM

## 2024-05-01 DIAGNOSIS — E11.9 TYPE 2 DIABETES MELLITUS WITHOUT COMPLICATION, WITH LONG-TERM CURRENT USE OF INSULIN: ICD-10-CM

## 2024-05-01 RX ORDER — SYRINGE,SAFETY WITH NEEDLE,1ML 25GX1"
SYRINGE (EA) MISCELLANEOUS
Qty: 100 EACH | Refills: 2 | Status: SHIPPED | OUTPATIENT
Start: 2024-05-01 | End: 2024-05-02 | Stop reason: SDUPTHER

## 2024-05-01 NOTE — TELEPHONE ENCOUNTER
----- Message from Dali Lopez sent at 5/1/2024 11:48 AM CDT -----  Contact: Patient  Patient is calling to speak with the nurse regarding his insulin needles. Please call patient at .[phone]

## 2024-05-02 RX ORDER — SYRINGE,SAFETY WITH NEEDLE,1ML 25GX1"
SYRINGE (EA) MISCELLANEOUS
Qty: 100 EACH | Refills: 2 | Status: SHIPPED | OUTPATIENT
Start: 2024-05-02 | End: 2024-05-07

## 2024-05-02 NOTE — TELEPHONE ENCOUNTER
No care due was identified.  Health [facility] Embedded Care Due Messages. Reference number: [id number].   5/02/2024 4:17:50 PM CDT

## 2024-05-02 NOTE — TELEPHONE ENCOUNTER
----- Message from Amanda Dumont sent at 5/2/2024  4:14 PM CDT -----  Regarding: concerns  Name of who is calling:   Ulysses      What is the request in detail: Pt is requesting a call back n ref to refill rx for syringes/needles  / [id number] - [place], [place] - [address] AT [facility] [address] [facility] [address]   Phone: [phone]  Fax: [phone]            Can the clinic reply by [facility]:yes      What number to call back if not [facility]: [phone]

## 2024-05-07 DIAGNOSIS — Z79.4 TYPE 2 DIABETES MELLITUS WITHOUT COMPLICATION, WITH LONG-TERM CURRENT USE OF INSULIN: ICD-10-CM

## 2024-05-07 DIAGNOSIS — E11.9 TYPE 2 DIABETES MELLITUS WITHOUT COMPLICATION, WITH LONG-TERM CURRENT USE OF INSULIN: ICD-10-CM

## 2024-05-07 RX ORDER — SYRINGE,SAFETY WITH NEEDLE,1ML 25GX1"
SYRINGE (EA) MISCELLANEOUS
Qty: 200 EACH | Refills: 3 | Status: SHIPPED | OUTPATIENT
Start: 2024-05-07

## 2024-05-07 NOTE — TELEPHONE ENCOUNTER
Refill Decision Note   Ulysses Boreaux  is requesting a refill authorization.  Brief Assessment and Rationale for Refill:  Approve     Medication Therapy Plan:  Change of pharmacy      Comments:     Note composed:4:52 PM 05/07/2024

## 2024-05-07 NOTE — TELEPHONE ENCOUNTER
No care due was identified.  Health [facility] Embedded Care Due Messages. Reference number: [id number].   5/07/2024 3:30:13 PM CDT

## 2024-05-17 DIAGNOSIS — Z79.4 TYPE 2 DIABETES MELLITUS WITHOUT COMPLICATION, WITH LONG-TERM CURRENT USE OF INSULIN: ICD-10-CM

## 2024-05-17 DIAGNOSIS — E11.9 TYPE 2 DIABETES MELLITUS WITHOUT COMPLICATION, WITH LONG-TERM CURRENT USE OF INSULIN: ICD-10-CM

## 2024-05-17 NOTE — TELEPHONE ENCOUNTER
----- Message from Kathleen Weiss MA sent at 5/17/2024 11:05 AM CDT -----  Type: RX Refill Request    Who Called:  Pt   Have you contacted your pharmacy:    Refill    RX Name and Strength:  empagliflozin (JARDIANCE) 10 mg tablet  Preferred Pharmacy with phone number:  [id number] [place] [place] [facility] [address] AT [facility]  [phone]  Local or Mail Order:    Would the patient rather a call back or a response via [facility]?    Best Call Back Number:  [phone]  Additional Information:  Has been out since Monday   Thank you.

## 2024-05-17 NOTE — TELEPHONE ENCOUNTER
No care due was identified.  [facility] Embedded Care Due Messages. Reference number: [id number].   5/17/2024 11:16:39 AM CDT

## 2024-05-28 ENCOUNTER — DOCUMENTATION ONLY (OUTPATIENT)
Dept: OPHTHALMOLOGY | Facility: CLINIC | Age: 74
End: 2024-05-28
Payer: MEDICARE

## 2024-05-28 ENCOUNTER — OFFICE VISIT (OUTPATIENT)
Dept: OPHTHALMOLOGY | Facility: CLINIC | Age: 74
End: 2024-05-28
Payer: MEDICARE

## 2024-05-28 DIAGNOSIS — E11.22 TYPE 2 DIABETES MELLITUS WITH STAGE 3A CHRONIC KIDNEY DISEASE, WITH LONG-TERM CURRENT USE OF INSULIN: ICD-10-CM

## 2024-05-28 DIAGNOSIS — H25.12 NUCLEAR SCLEROSIS OF LEFT EYE: Primary | ICD-10-CM

## 2024-05-28 DIAGNOSIS — H26.491 POSTERIOR CAPSULAR OPACIFICATION, RIGHT EYE: ICD-10-CM

## 2024-05-28 DIAGNOSIS — E11.3292 MILD NONPROLIFERATIVE DIABETIC RETINOPATHY OF LEFT EYE WITHOUT MACULAR EDEMA ASSOCIATED WITH TYPE 2 DIABETES MELLITUS: ICD-10-CM

## 2024-05-28 DIAGNOSIS — N18.31 TYPE 2 DIABETES MELLITUS WITH STAGE 3A CHRONIC KIDNEY DISEASE, WITH LONG-TERM CURRENT USE OF INSULIN: ICD-10-CM

## 2024-05-28 DIAGNOSIS — Z79.4 TYPE 2 DIABETES MELLITUS WITH STAGE 3A CHRONIC KIDNEY DISEASE, WITH LONG-TERM CURRENT USE OF INSULIN: ICD-10-CM

## 2024-05-28 DIAGNOSIS — H18.513 FUCHS' CORNEAL DYSTROPHY OF BOTH EYES: ICD-10-CM

## 2024-05-28 DIAGNOSIS — E11.3211 MILD NONPROLIFERATIVE DIABETIC RETINOPATHY OF RIGHT EYE WITH MACULAR EDEMA ASSOCIATED WITH TYPE 2 DIABETES MELLITUS: ICD-10-CM

## 2024-05-28 PROCEDURE — 1159F MED LIST DOCD IN RCRD: CPT | Mod: HCNC,CPTII,S$GLB, | Performed by: STUDENT IN AN ORGANIZED HEALTH CARE EDUCATION/TRAINING PROGRAM

## 2024-05-28 PROCEDURE — 2022F DILAT RTA XM EVC RTNOPTHY: CPT | Mod: HCNC,CPTII,S$GLB, | Performed by: STUDENT IN AN ORGANIZED HEALTH CARE EDUCATION/TRAINING PROGRAM

## 2024-05-28 PROCEDURE — 3066F NEPHROPATHY DOC TX: CPT | Mod: HCNC,CPTII,S$GLB, | Performed by: STUDENT IN AN ORGANIZED HEALTH CARE EDUCATION/TRAINING PROGRAM

## 2024-05-28 PROCEDURE — 66821 AFTER CATARACT LASER SURGERY: CPT | Mod: HCNC,RT,S$GLB, | Performed by: STUDENT IN AN ORGANIZED HEALTH CARE EDUCATION/TRAINING PROGRAM

## 2024-05-28 PROCEDURE — 99214 OFFICE O/P EST MOD 30 MIN: CPT | Mod: 57,HCNC,S$GLB, | Performed by: STUDENT IN AN ORGANIZED HEALTH CARE EDUCATION/TRAINING PROGRAM

## 2024-05-28 PROCEDURE — 92136 OPHTHALMIC BIOMETRY: CPT | Mod: HCNC,LT,S$GLB, | Performed by: STUDENT IN AN ORGANIZED HEALTH CARE EDUCATION/TRAINING PROGRAM

## 2024-05-28 PROCEDURE — 92025 CPTRIZED CORNEAL TOPOGRAPHY: CPT | Mod: HCNC,S$GLB,, | Performed by: STUDENT IN AN ORGANIZED HEALTH CARE EDUCATION/TRAINING PROGRAM

## 2024-05-28 PROCEDURE — 3044F HG A1C LEVEL LT 7.0%: CPT | Mod: HCNC,CPTII,S$GLB, | Performed by: STUDENT IN AN ORGANIZED HEALTH CARE EDUCATION/TRAINING PROGRAM

## 2024-05-28 PROCEDURE — 3061F NEG MICROALBUMINURIA REV: CPT | Mod: HCNC,CPTII,S$GLB, | Performed by: STUDENT IN AN ORGANIZED HEALTH CARE EDUCATION/TRAINING PROGRAM

## 2024-05-28 PROCEDURE — 1160F RVW MEDS BY RX/DR IN RCRD: CPT | Mod: HCNC,CPTII,S$GLB, | Performed by: STUDENT IN AN ORGANIZED HEALTH CARE EDUCATION/TRAINING PROGRAM

## 2024-05-28 PROCEDURE — 99999 PR PBB SHADOW E&M-EST. PATIENT-LVL III: CPT | Mod: PBBFAC,HCNC,, | Performed by: STUDENT IN AN ORGANIZED HEALTH CARE EDUCATION/TRAINING PROGRAM

## 2024-05-28 RX ORDER — KETOROLAC TROMETHAMINE 5 MG/ML
1 SOLUTION OPHTHALMIC 4 TIMES DAILY
Qty: 10 ML | Refills: 1 | Status: SHIPPED | OUTPATIENT
Start: 2024-05-28 | End: 2025-05-28

## 2024-05-28 RX ORDER — PREDNISOLONE ACETATE 10 MG/ML
1 SUSPENSION/ DROPS OPHTHALMIC 4 TIMES DAILY
Qty: 10 ML | Refills: 1 | Status: SHIPPED | OUTPATIENT
Start: 2024-05-28 | End: 2025-05-28

## 2024-05-28 NOTE — PROGRESS NOTES
HPI     Cataract     Additional comments: Cat os   Pre op   Pt co glare os   No night driving due to glare issues  No pain no gtts              Comments    Last [facility] Exam 10/11/13  DM  Cataract OS  PCIOL OD  RE             Last edited by Carole Maldonado MD on 5/28/2024  8:47 AM.            Assessment /Plan     For exam results, see Encounter Report.    Nuclear sclerosis of left eye- Visually Significant Cataract OS  Patient reports decreased vision consistent with the clinical amount of lenticular opacity, which reaches the level of visual significance and affects activities of daily living including reading and glare. Risks, benefits, and alternatives to cataract surgery were discussed.  Discussion of risks included possibility of infection as well as permanent vision loss.The pt expressed a desire to proceed with surgery with the potential for some reasonable degree of visual improvement. Recommended regular use of artificial tears and good lid hygiene to optimize surgical outcome.     Discussed IOL options and refractive outcomes for this patient.    Phaco left eye, Topical    Additional considerations:   Kenalog, Pre-Op Anti-VEGF    Will aim for Monofocal - Distance IOL    Intraop Kenalog 40: Yes    Post op gtts:   PF, Keto      Discussed that vision may be limited by:  NPDR, Fuchs'    Explained that patient may need glasses after surgery.  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 year old patient experiencing a symptomatic decrease in vision OD with inability to perform activities of daily living including  reading.      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 Including risks of retinal detachment (1-3%), macular edema, dislocated implant, pain, inflammation elevated intraocular pressure and vision loss. Consent signed.  Time out procedure form completed prior to laser.    Medications given:  Tetracaine    Laser energy settings:    2.3 energy per shot  29 bursts  1 to 1 ratio per shot     Central capsular opening created without difficulty.    Start PF and Keto QID to operated eye for 7 days then stop    Fuchs' corneal dystrophy of both eyes- ATs QID and lid hygiene w/ baby shampoo    Type 2 diabetes mellitus with stage 3a chronic kidney disease, with long-term current use of insulin- last A1c 5.8   Strict BG control, f/u w/ PCP, and annual DFE  Stressed importance of DM control to preserve vision

## 2024-05-28 NOTE — PROGRESS NOTES
"    Short Stay Record    Diagnosis: Nuclear Sclerotic Cataract left    CC: Blurry Vision     HPI:  Ulysses Boreaux is a 73 y.o. male who presents for evaluation prior to ophthalmic surgery. No current complaints.     Past Medical History:   Diagnosis Date    Allergy     Amputation of finger of left hand     all fingers except for thumb    Cataract     OS NGCL     Chronic pain due to trauma     traumatic amputations left hand    Diabetes mellitus, type 2     History of hepatitis C 2014    tx'd w/ Harvoni     Hyperlipidemia     Hypertension     Hyperthyroidism 10/10/2019    Refractive error      Past Surgical History:   Procedure Laterality Date    APPENDECTOMY          CATARACT EXTRACTION      OD     COLONOSCOPY N/A 2019    Procedure: COLONOSCOPY;  Surgeon: Elif Toribio MD;  Location: [facility];  Service: Endoscopy;  Laterality: N/A;    HERNIA REPAIR       Social History     Tobacco Use    Smoking status: Former     Current packs/day: 0.00     Types: Cigarettes     Quit date: 1972     Years since quittin.2    Smokeless tobacco: Never   Substance Use Topics    Alcohol use: Yes     Alcohol/week: 0.0 standard drinks of alcohol     Comment: " Every blue moon"     Family History   Problem Relation Name Age of Onset    Cancer Mother      Hypertension Mother      Hypertension Father      Diabetes Sister      Hypertension Sister      Heart disease Sister      Diabetes Brother      Hypertension Brother      Cancer Brother       Review of patient's allergies indicates:  No Known Allergies      Current Outpatient Medications:     aspirin (ECOTRIN) 81 MG EC tablet, Take 81 mg by mouth once daily., Disp: , Rfl:     atorvastatin (LIPITOR) 20 MG tablet, Take 1 tablet (20 mg total) by mouth every evening., Disp: 90 tablet, Rfl: 3    blood-glucose meter (TRUE METRIX GLUCOSE METER) kit, Use as instructed to check blood sugar 3 times daily., Disp: 1 each, Rfl: 0    dulaglutide (TRULICITY) 0.75 mg/0.5 mL " pen injector, Inject 0.75 mg into the skin every 7 days., Disp: 4 pen , Rfl: 5    empagliflozin (JARDIANCE) 10 mg tablet, Take 1 tablet (10 mg total) by mouth every morning., Disp: 90 tablet, Rfl: 3    ezetimibe (ZETIA) 10 mg tablet, Take 1 tablet (10 mg total) by mouth once daily., Disp: 90 tablet, Rfl: 1    fluticasone propionate (FLONASE) 50 mcg/actuation nasal spray, SHAKE LIQUID AND USE 2 SPRAYS(100 MCG) IN EACH NOSTRIL DAILY AS NEEDED FOR RHINITIS, Disp: 48 g, Rfl: 2    HYDROcodone-acetaminophen (NORCO)  mg per tablet, Take 1 tablet by mouth every 12 (twelve) hours as needed for Pain., Disp: 60 tablet, Rfl: 0    HYDROcodone-acetaminophen (NORCO)  mg per tablet, Take 1 tablet by mouth every 12 (twelve) hours as needed for Pain., Disp: 60 tablet, Rfl: 0    HYDROcodone-acetaminophen (NORCO)  mg per tablet, Take 1 tablet by mouth every 12 (twelve) hours as needed for Pain., Disp: 60 tablet, Rfl: 0    insulin NPH-insulin regular, 70/30, (NOVOLIN 70/30 U-100 INSULIN) 100 unit/mL (70-30) injection, Inject 22 units in the morning and 26 units in the evening., Disp: 30 mL, Rfl: 5    insulin syringe-needle U-100 1 mL 31 gauge x 5/16 Syrg, Use to administer insulin under the skin two (2) times a day; discard pen needle after each use., Disp: 200 each, Rfl: 3    ketorolac 0.5% (ACULAR) 0.5 % Drop, Place 1 drop into both eyes 4 (four) times daily., Disp: 10 mL, Rfl: 1    lancets (TRUEPLUS LANCETS) 33 gauge Misc, Check blood sugar 3 times daily., Disp: 100 each, Rfl: 11    losartan-hydrochlorothiazide 100-25 mg (HYZAAR) 100-25 mg per tablet, TAKE 1 TABLET BY MOUTH EVERY DAY, Disp: 90 tablet, Rfl: 3    nebivoloL (BYSTOLIC) 20 mg Tab, Take 20 mg by mouth once daily., Disp: 90 tablet, Rfl: 3    pantoprazole (PROTONIX) 20 MG tablet, Take 1 tablet (20 mg total) by mouth once daily., Disp: 90 tablet, Rfl: 3    prednisoLONE acetate (PRED FORTE) 1 % DrpS, Place 1 drop into both eyes 4 (four) times daily., Disp: 10  mL, Rfl: 1    TRUE METRIX GLUCOSE TEST STRIP Strp, USE TO TEST BLOOD GLUCOSE THREE TIMES DAILY, Disp: 100 strip, Rfl: 11    Review of Systems:  10 Pt ROS negative except as stated in HPI    Physical Exam:  General Appearance:    A&Ox3, no distress, appears stated age   Head:    Normocephalic, without obvious abnormality, atraumatic   Eyes:    PERRL, EOM's intact   Back:     Symmetric, no curvature   Lungs:     respirations unlabored   Chest Wall:    No tenderness or deformity    Heart:  Abdomen:  Extremities:  Skin:    S1 and S2 present    Soft, non-tender    Extremities normal, atraumatic    Skin color, texture, turgor normal     Patient is stable for ophthalmic surgery under local and MAC.       _

## 2024-05-31 ENCOUNTER — EXTERNAL CHRONIC CARE MANAGEMENT (OUTPATIENT)
Dept: PRIMARY CARE CLINIC | Facility: CLINIC | Age: 74
End: 2024-05-31
Payer: MEDICARE

## 2024-05-31 PROCEDURE — 99490 CHRNC CARE MGMT STAFF 1ST 20: CPT | Mod: S$GLB,,, | Performed by: FAMILY MEDICINE

## 2024-06-12 ENCOUNTER — PROCEDURE VISIT (OUTPATIENT)
Dept: OPHTHALMOLOGY | Facility: CLINIC | Age: 74
End: 2024-06-12
Payer: MEDICARE

## 2024-06-12 ENCOUNTER — LAB VISIT (OUTPATIENT)
Dept: LAB | Facility: HOSPITAL | Age: 74
End: 2024-06-12
Attending: PHYSICIAN ASSISTANT
Payer: MEDICARE

## 2024-06-12 DIAGNOSIS — E11.3292 MILD NONPROLIFERATIVE DIABETIC RETINOPATHY OF LEFT EYE WITHOUT MACULAR EDEMA ASSOCIATED WITH TYPE 2 DIABETES MELLITUS: Primary | ICD-10-CM

## 2024-06-12 DIAGNOSIS — E78.5 HYPERLIPIDEMIA ASSOCIATED WITH TYPE 2 DIABETES MELLITUS: ICD-10-CM

## 2024-06-12 DIAGNOSIS — H25.12 NUCLEAR SCLEROSIS OF LEFT EYE: ICD-10-CM

## 2024-06-12 DIAGNOSIS — E11.69 HYPERLIPIDEMIA ASSOCIATED WITH TYPE 2 DIABETES MELLITUS: ICD-10-CM

## 2024-06-12 LAB
ALBUMIN SERPL BCP-MCNC: 4.1 G/DL (ref 3.5–5.2)
ALP SERPL-CCNC: 66 U/L (ref 55–135)
ALT SERPL W/O P-5'-P-CCNC: 58 U/L (ref 10–44)
ANION GAP SERPL CALC-SCNC: 8 MMOL/L (ref 8–16)
AST SERPL-CCNC: 53 U/L (ref 10–40)
BILIRUB SERPL-MCNC: 0.6 MG/DL (ref 0.1–1)
BUN SERPL-MCNC: 12 MG/DL (ref 8–23)
CALCIUM SERPL-MCNC: 9.7 MG/DL (ref 8.7–10.5)
CHLORIDE SERPL-SCNC: 99 MMOL/L (ref 95–110)
CHOLEST SERPL-MCNC: 174 MG/DL (ref 120–199)
CHOLEST/HDLC SERPL: 2.5 {RATIO} (ref 2–5)
CO2 SERPL-SCNC: 30 MMOL/L (ref 23–29)
CREAT SERPL-MCNC: 1.3 MG/DL (ref 0.5–1.4)
EST. GFR  (NO RACE VARIABLE): 58.7 ML/MIN/1.73 M^2
GLUCOSE SERPL-MCNC: 127 MG/DL (ref 70–110)
HDLC SERPL-MCNC: 70 MG/DL (ref 40–75)
HDLC SERPL: 40.2 % (ref 20–50)
LDLC SERPL CALC-MCNC: 85.2 MG/DL (ref 63–159)
NONHDLC SERPL-MCNC: 104 MG/DL
POTASSIUM SERPL-SCNC: 3.7 MMOL/L (ref 3.5–5.1)
PROT SERPL-MCNC: 7.6 G/DL (ref 6–8.4)
SODIUM SERPL-SCNC: 137 MMOL/L (ref 136–145)
TRIGL SERPL-MCNC: 94 MG/DL (ref 30–150)

## 2024-06-12 PROCEDURE — 99499 UNLISTED E&M SERVICE: CPT | Mod: HCNC,S$GLB,, | Performed by: OPHTHALMOLOGY

## 2024-06-12 PROCEDURE — 80053 COMPREHEN METABOLIC PANEL: CPT | Mod: HCNC | Performed by: PHYSICIAN ASSISTANT

## 2024-06-12 PROCEDURE — 92134 CPTRZ OPH DX IMG PST SGM RTA: CPT | Mod: HCNC,S$GLB,, | Performed by: OPHTHALMOLOGY

## 2024-06-12 PROCEDURE — 80061 LIPID PANEL: CPT | Mod: HCNC | Performed by: PHYSICIAN ASSISTANT

## 2024-06-12 PROCEDURE — 67028 INJECTION EYE DRUG: CPT | Mod: 79,HCNC,LT,S$GLB | Performed by: OPHTHALMOLOGY

## 2024-06-12 PROCEDURE — 36415 COLL VENOUS BLD VENIPUNCTURE: CPT | Mod: HCNC | Performed by: PHYSICIAN ASSISTANT

## 2024-06-12 RX ADMIN — Medication 1.25 MG: at 08:06

## 2024-06-12 NOTE — PROGRESS NOTES
===============================  Date today is 6/12/2024  Ulysses Boreaux is a 73 y.o. male  Last visit [facility]: :4/25/2024   Last visit eye dept. 5/28/2024    Uncorrected distance visual acuity was 20/50 in the right eye and 20/50 in the left eye.  Tonometry       Tonometry (Applanation, 9:02 AM)         Right Left    Pressure 20 21                  Not recorded       Not recorded       Not recorded       Chief Complaint   Patient presents with    PDR/ME     PRE OP AVASTIN OS   PER DR. PABLO     HPI     PDR/ME            Comments: PRE OP AVASTIN OS   PER DR. PABLO          Comments    Last [facility] Exam 10/11/13  DM  Cataract OS  PCIOL OD  RE             Last edited by Alana Spencer on 6/12/2024  8:52 AM.      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  Check OU independently daily.    Gave my office and personal cell phone number.  ________________  6/12/2024 today  Ulysses Boreaux    OS BDR  Avastin as preop today, CE scheduled next week    ..    Injection Procedure Note:    6/12/2024  Diagnosis :  OS BDR preop  Today:   Avastin (Bevacizumab) 1.25 mg/0.05 ml Intravitreal Injection , OS   Follow up: rtc 5 weeks eval avastin OS    Instructed to call 24/7 for any worsening of vision. Check Both eyes daily. Gave patient my home phone number.  Risks, benefits, and alternatives to treatment discussed in detail with the patient.  The patient voiced understanding and wished to proceed with the  procedure.     Patient Identified and Time Out complete  Subconjunctival bleb - xylocaine with epi 2%   and Betadine.  Inject at Avastin (Bevacizumab) 1.25 mg/0.05 ml Intravitreal Injection , OS 6:00 @ 3.5-4mm posterior to limbus  1 stop: no   Post Operative Dx: Same  Complications: None  Follow up as above.          =============================

## 2024-06-13 ENCOUNTER — TELEPHONE (OUTPATIENT)
Dept: OPHTHALMOLOGY | Facility: CLINIC | Age: 74
End: 2024-06-13
Payer: MEDICARE

## 2024-06-17 DIAGNOSIS — E11.9 TYPE 2 DIABETES MELLITUS WITHOUT COMPLICATION, WITH LONG-TERM CURRENT USE OF INSULIN: ICD-10-CM

## 2024-06-17 DIAGNOSIS — Z79.4 TYPE 2 DIABETES MELLITUS WITHOUT COMPLICATION, WITH LONG-TERM CURRENT USE OF INSULIN: ICD-10-CM

## 2024-06-17 NOTE — TELEPHONE ENCOUNTER
No care due was identified.  [facility] Embedded Care Due Messages. Reference number: [id number].   6/17/2024 11:55:00 AM CDT

## 2024-06-17 NOTE — TELEPHONE ENCOUNTER
Refill Routing Note   Medication(s) are not appropriate for processing by [facility] for the following reason(s):        No active prescription written by provider    [facility] action(s):  Defer        Medication Therapy Plan: Last ordered: 5 months ago (12/29/2023) by Laurie Alvarado NP      Appointments  past 12m or future 3m with PCP    Date Provider   Last Visit   10/19/2023 Elza Pantoja MD   Next Visit   9/12/2024 Elza Pantoja MD   ED visits in past 90 days: 0        Note composed:4:57 PM 06/17/2024

## 2024-06-18 RX ORDER — DULAGLUTIDE 0.75 MG/.5ML
0.75 INJECTION, SOLUTION SUBCUTANEOUS
Qty: 12 PEN | Refills: 0 | Status: SHIPPED | OUTPATIENT
Start: 2024-06-18 | End: 2024-09-16

## 2024-06-19 ENCOUNTER — OUTSIDE PLACE OF SERVICE (OUTPATIENT)
Dept: OPHTHALMOLOGY | Facility: CLINIC | Age: 74
End: 2024-06-19
Payer: MEDICARE

## 2024-06-19 PROCEDURE — 66984 XCAPSL CTRC RMVL W/O ECP: CPT | Mod: 79,LT,, | Performed by: STUDENT IN AN ORGANIZED HEALTH CARE EDUCATION/TRAINING PROGRAM

## 2024-06-20 ENCOUNTER — OFFICE VISIT (OUTPATIENT)
Dept: OPHTHALMOLOGY | Facility: CLINIC | Age: 74
End: 2024-06-20
Payer: MEDICARE

## 2024-06-20 DIAGNOSIS — Z98.890 POST-OPERATIVE STATE: Primary | ICD-10-CM

## 2024-06-20 DIAGNOSIS — Z98.42 CATARACT EXTRACTION STATUS OF EYE, LEFT: ICD-10-CM

## 2024-06-20 PROCEDURE — 1160F RVW MEDS BY RX/DR IN RCRD: CPT | Mod: HCNC,CPTII,S$GLB, | Performed by: STUDENT IN AN ORGANIZED HEALTH CARE EDUCATION/TRAINING PROGRAM

## 2024-06-20 PROCEDURE — 99024 POSTOP FOLLOW-UP VISIT: CPT | Mod: HCNC,S$GLB,, | Performed by: STUDENT IN AN ORGANIZED HEALTH CARE EDUCATION/TRAINING PROGRAM

## 2024-06-20 PROCEDURE — 99999 PR PBB SHADOW E&M-EST. PATIENT-LVL III: CPT | Mod: PBBFAC,HCNC,, | Performed by: STUDENT IN AN ORGANIZED HEALTH CARE EDUCATION/TRAINING PROGRAM

## 2024-06-20 PROCEDURE — 1159F MED LIST DOCD IN RCRD: CPT | Mod: HCNC,CPTII,S$GLB, | Performed by: STUDENT IN AN ORGANIZED HEALTH CARE EDUCATION/TRAINING PROGRAM

## 2024-06-20 PROCEDURE — 3044F HG A1C LEVEL LT 7.0%: CPT | Mod: HCNC,CPTII,S$GLB, | Performed by: STUDENT IN AN ORGANIZED HEALTH CARE EDUCATION/TRAINING PROGRAM

## 2024-06-20 PROCEDURE — 3061F NEG MICROALBUMINURIA REV: CPT | Mod: HCNC,CPTII,S$GLB, | Performed by: STUDENT IN AN ORGANIZED HEALTH CARE EDUCATION/TRAINING PROGRAM

## 2024-06-20 PROCEDURE — 3066F NEPHROPATHY DOC TX: CPT | Mod: HCNC,CPTII,S$GLB, | Performed by: STUDENT IN AN ORGANIZED HEALTH CARE EDUCATION/TRAINING PROGRAM

## 2024-06-20 NOTE — PROGRESS NOTES
HPI    POD#1 s/p CEIOL OD. Has received appropriate post-op drops. Denies any   discomfort. No new ocular complaints.      1. +DM  2. PCIOL OS 6/19/24  PCIOL OD  3. RE    Preop avastin OS 6/12/24    OS- Pred QID     Last edited by Sandra Reese on 6/20/2024  9:34 AM.            Assessment /Plan     For exam results, see Encounter Report.    Post-operative state  Cataract extraction status of eye, left- POD#1 S/P CEIOL OS Doing well. Mild edema    Continue gtts to operative eye:  PF QID      Reinstructed in importance of absolute compliance with Post-OP instructions including medications, shield at bedtime, protective glasses during the day, and limitation of activities. Follow up appointments in approximately one and six weeks or call immediately for increased pain, redness or vision loss.     RTC 1 week. MOCT if PH worse than 20/25

## 2024-06-24 DIAGNOSIS — S68.119A TRAUMATIC AMPUTATION OF DIGIT OF ONE HAND WITHOUT COMPLICATION: ICD-10-CM

## 2024-06-24 RX ORDER — HYDROCODONE BITARTRATE AND ACETAMINOPHEN 10; 325 MG/1; MG/1
1 TABLET ORAL EVERY 12 HOURS PRN
Qty: 60 TABLET | Refills: 0 | Status: SHIPPED | OUTPATIENT
Start: 2024-08-25

## 2024-06-24 RX ORDER — HYDROCODONE BITARTRATE AND ACETAMINOPHEN 10; 325 MG/1; MG/1
1 TABLET ORAL EVERY 12 HOURS PRN
Qty: 60 TABLET | Refills: 0 | Status: SHIPPED | OUTPATIENT
Start: 2024-06-27 | End: 2024-07-27

## 2024-06-24 RX ORDER — HYDROCODONE BITARTRATE AND ACETAMINOPHEN 10; 325 MG/1; MG/1
1 TABLET ORAL EVERY 12 HOURS PRN
Qty: 60 TABLET | Refills: 0 | Status: SHIPPED | OUTPATIENT
Start: 2024-07-26 | End: 2024-08-25

## 2024-06-25 ENCOUNTER — TELEPHONE (OUTPATIENT)
Dept: PAIN MEDICINE | Facility: CLINIC | Age: 74
End: 2024-06-25
Payer: MEDICARE

## 2024-06-25 ENCOUNTER — OFFICE VISIT (OUTPATIENT)
Dept: PAIN MEDICINE | Facility: CLINIC | Age: 74
End: 2024-06-25
Payer: MEDICARE

## 2024-06-25 VITALS — HEIGHT: 75 IN | BODY MASS INDEX: 26.55 KG/M2

## 2024-06-25 DIAGNOSIS — S68.119A TRAUMATIC AMPUTATION OF DIGIT OF ONE HAND WITHOUT COMPLICATION: ICD-10-CM

## 2024-06-25 DIAGNOSIS — G89.21 CHRONIC PAIN DUE TO TRAUMA: ICD-10-CM

## 2024-06-25 DIAGNOSIS — Z79.891 OPIOID USE AGREEMENT EXISTS: ICD-10-CM

## 2024-06-25 DIAGNOSIS — Z79.891 LONG TERM PRESCRIPTION OPIATE USE: ICD-10-CM

## 2024-06-25 DIAGNOSIS — G89.4 CHRONIC PAIN DISORDER: Primary | ICD-10-CM

## 2024-06-25 DIAGNOSIS — M79.642 PAIN OF LEFT HAND: ICD-10-CM

## 2024-06-25 PROCEDURE — 3066F NEPHROPATHY DOC TX: CPT | Mod: HCNC,CPTII,95, | Performed by: PHYSICIAN ASSISTANT

## 2024-06-25 PROCEDURE — 1160F RVW MEDS BY RX/DR IN RCRD: CPT | Mod: HCNC,CPTII,95, | Performed by: PHYSICIAN ASSISTANT

## 2024-06-25 PROCEDURE — 99442 PR PHYSICIAN TELEPHONE EVALUATION 11-20 MIN: CPT | Mod: HCNC,95,, | Performed by: PHYSICIAN ASSISTANT

## 2024-06-25 PROCEDURE — 1159F MED LIST DOCD IN RCRD: CPT | Mod: HCNC,CPTII,95, | Performed by: PHYSICIAN ASSISTANT

## 2024-06-25 PROCEDURE — 3044F HG A1C LEVEL LT 7.0%: CPT | Mod: HCNC,CPTII,95, | Performed by: PHYSICIAN ASSISTANT

## 2024-06-25 PROCEDURE — 3061F NEG MICROALBUMINURIA REV: CPT | Mod: HCNC,CPTII,95, | Performed by: PHYSICIAN ASSISTANT

## 2024-06-25 NOTE — TELEPHONE ENCOUNTER
Called pt and pt informed me that we left a voicemail message on answering machine and wanted to know why we called. I told patient I was unsure why we called because there wasn't a telephone encounter stating that we called. Pt verbalized understanding.    Marvin VOGEL

## 2024-06-25 NOTE — PROGRESS NOTES
Established Patient - Audio Only Telehealth Visit     The patient location is: [place], home  The chief complaint leading to consultation is: hand pain   Visit type: Virtual visit with audio only (telephone)  Total time spent with patient: 12-20 minutes       The reason for the audio only service rather than synchronous audio and video virtual visit was related to technical difficulties or patient preference/necessity.     Each patient to whom I provide medical services by telemedicine is:  (1) informed of the relationship between the physician and patient and the respective role of any other health care provider with respect to management of the patient; and (2) notified that they may decline to receive medical services by telemedicine and may withdraw from such care at any time. Patient verbally consented to receive this service via voice-only telephone call.         Chronic Pain -- Established Patient (Follow-up visit)    Chief Complaint   Patient presents with    Follow-up         Interval HPI (3/27/2024):  Ulysses Boreaux is a 73 y.o. male presents today for follow-up medication refill.  He reports that the pain is well managed with at least 75% reduction in his pain with the use of Norco 10/325 mg.  He denies any adverse effects related to this medication as well.  Current pain intensity 0/10.  He continues to report pain of the left hand and left lower quadrant.  No new injuries or trauma.    Interval History (1/4/2024): Ulysses Boreaux was last seen on 11/9/2023. he presents today for follow-up for medication refill. he feels the pain medication is providing adequate pain relief and reduces the negative effects of chronic pain that affects quality of life. No major SE from medications. Patient reports pain as 0/10 today.     Interval History (11/9/2023): Patient was last seen on 9/13/2023. No major changes since previous visit; patient is stable overall.  he presents today for follow-up for medication refill. he  feels the pain medication is providing adequate pain relief and reduces the negative effects of chronic pain that affects quality of life. No major SE from medications. Patient reports pain as 8/10 today.     Interval History (9/13/2023): Ulysses Boreaux was last seen on 7/19/2023. he presents today for follow-up for medication refill. he feels the pain medication is providing adequate pain relief and reduces the negative effects of chronic pain that affects quality of life. No major SE from medications. No major changes since previous visit; patient is stable overall. Patient reports pain as 2/10 today.     History of Present Illness:  This patient is a 65 year old male who presents today for f/u complaining of the above noted pains. The patient describes this pain as follows.    - duration (acute, chronic): left hand pain since 1997 after table saw amputation of digits 2 through 5 at work, left lower quadrant pain since hernia surgery in 2004  - timing (constant, intermittent): Constant  - character (sharp, dull, aching, burning): Throbbing  - radiating: No  - dermatomal distribution: No  - side: Left   - aggravating factors: Nothing worsens left digit pain, left lower quadrant pain worse with exercise or walking  - relieving factors: Medication / Norco  - antecedent trauma: Amputation via skill saw and 1997, hernia repair in 2004  - prior spinal surgery: None  - pertinent negatives: No fever, No chills, No weight loss, No bladder dysfunction, No bowel dysfunction, No extremity weakness, No saddle anesthesia  - medications tried: Norco, Percocet, over-the-counter medications, compound pain cream  - other therapies tried (physical therapy, injections):     >> physical therapy tried in the past    >> no prior injections        Imaging/ Labs (reviewed on 6/24/2024):    7/12/2018 US ABDOMINAL AORTA  CLINICAL HISTORY:  Abnormal findings on diagnostic imaging of other specified body structures  TECHNIQUE:  Limited  "ultrasound was performed of the abdominal aorta, with cross sectional diameter measurements obtained.  COMPARISON:  01/10/2018  FINDINGS:  The proximal abdominal aorta measures 2.7 cm.  The mid abdominal aorta measures 1.8 cm.  The distal abdominal aorta measures 1.9 cm, slightly ectatic and unchanged from prior.  The right iliac artery measures 1.0 cm.  The left iliac artery measures 1.1 cm.  Aortoiliac atherosclerosis: Mild  Impression: Stable examination with slight distal abdominal aortic ectasia.  Negative for aneurysm.         ROS:  CONSTITUTIONAL: No fever, chills, weight loss  SKIN: No rash or itching  CARDIOVASCULAR:  No chest pain, palpitations  RESPIRATORY: No shortness of breath  GASTROINTESTINAL: No diarrhea, No constipation, abdominal pain, left lower quadrant  GENITOURINARY: No urinary incontinence    MUSCULOSKELETAL:  - patient reports pain as above, see chief complaint     NEUROLOGICAL:   - Pain as above  - Strength in lower extremities is normal  - Sensation in lower extremities is normal  - No bowel or bladder incontinence     PSYCHIATRIC: No change in mood noticed         Objective:     Physical Exam:  Vitals:    06/25/24 1148   Height: 6' 3" (1.905 m)  Comment: pt reported     Body mass index is 26.55 kg/m².   (reviewed on 6/25/2024)     *No Physical Exam performed today - audio visit only*    Physical Exam from last in clinic visit:   General: alert and oriented, in no apparent distress.   Gait: normal gait  Skin: No rashes, No discoloration   HEENT: EOMI  Respiratory: respirations nonlabored. No audible wheezing.  Cardiology: No obvious peripheral edema.   GI: no obvious distention.     Musculoskeletal:  - ROM fairly preserved   - No pain with lumbar flexion/ extension  - Left upper extremity range of motion intact throughout  - Digital stumps are hypersensitive to palpation, hypersensitivity does not extend further proximal  - No color change, no swelling, no changes in hair growth along left " hand    Neuro:  - Lower extremities:      >> 5/5 strength bilaterally, throughout, symmetric  - Upper extremities:    >> 5/5 strength bilaterally  - Normal sensation    Psych: mood and affect appropriate        Assessment:     Ulysses Boreaux is a 73 y.o. male who presents with       ICD-10-CM ICD-9-CM    1. Chronic pain disorder  G89.4 338.4       2. Chronic pain due to trauma  G89.21 338.21       3. Pain of left hand  M79.642 729.5       4. Traumatic amputation of digit of one hand without complication  S68.119A 886.0       5. Long term prescription opiate use  Z79.891 V58.69       6. Opioid use agreement exists  Z79.891 V58.69                    Plan:   - Interventional: None.    - Pharmacologic:   - Refill Norco 10/325 mg BID PRN (60 tabs) x 2 months. Will e-prescribe to fill on 6/27/24 and 7/26/24. Patient tolerating opioids with no side effects, obtaining good pain control with functional improvement. He tolerates this medication well, and he denies any drowsiness or constipation.  - Updated opioid contract signed with Dr. Love on 10/28/20. *03/27/2024  - [place]  reviewed and appropriate. Last filled on 05/28/2024.     - Last UDS 03/27/2024 was compliant for medications prescribed.      - Rehabilitative: Encouraged regular exercise.    - Diagnostic/ Imaging: No new imaging ordered. Previous imaging reviewed.     - Follow-up:  8-12 week medication refill    - Patient Questions: Answered all of the patient's questions regarding diagnosis, therapy, and treatment.      - This condition does not require this patient to take time off of work, and the primary goal of our Pain Management services is to improve the patient's functional capacity.   - I discussed the risks, benefits, and alternatives to potential treatment options. All questions and concerns were fully addressed today in clinic.     This service was not originating from a related E/M service provided within the previous 7 days nor will  to an E/M  service or procedure within the next 24 hours or my soonest available appointment.  Prevailing standard of care was able to be met in this audio-only visit.        Ysabel Garcia PA-C  Interventional Pain Management - [facility]    Disclaimer:  This note was prepared using voice recognition system and is likely to have sound alike errors that may have been overlooked even after proof reading.  Please call me with any questions.       ______________________________________________________________________  >> UDS:  8-18-15 :: appropriate  12-29-15 :: appropriate  5-3-16 :: appropriate  10-18-16 :: appropriate  4/13/2017 :: appropriate  9/29/2017 :: appropriate  3/9/2018 :: appropriate  9/4/2018 :: appropriate  6/19/2019 :: appropriate  12/12/2019 :: appropriate  6/12/2020 ::  Appropriate  10/2/2020 :: Appropriate  2/24/2021 :: Appropriate  8/24/2021 :: Appropriate  12/21/2021 :: Appropriate  6/30/2022 :: Appropriate  12/22/2022 :: Appropriate   7/19/2023 :: Appropriate   03/27/2024:: Appropriate

## 2024-06-25 NOTE — TELEPHONE ENCOUNTER
----- Message from Keke Logan sent at 6/25/2024  8:10 AM CDT -----  Contact: Ulysses  .Type:  Patient Returning Call    Who Called: Ulysses   Who Left Message for Patient: nurse   Does the patient know what this is regarding?: not sure but is aware of the appt on today  Would the patient rather a call back or a response via [facility]?  Call back   Best Call Back Number: .[phone] (home)     Additional Information:      Thanks

## 2024-06-27 ENCOUNTER — OFFICE VISIT (OUTPATIENT)
Dept: DIABETES | Facility: CLINIC | Age: 74
End: 2024-06-27
Payer: MEDICARE

## 2024-06-27 VITALS
HEART RATE: 70 BPM | HEIGHT: 75 IN | WEIGHT: 214.31 LBS | DIASTOLIC BLOOD PRESSURE: 95 MMHG | BODY MASS INDEX: 26.65 KG/M2 | SYSTOLIC BLOOD PRESSURE: 183 MMHG

## 2024-06-27 DIAGNOSIS — E78.5 HYPERLIPIDEMIA ASSOCIATED WITH TYPE 2 DIABETES MELLITUS: ICD-10-CM

## 2024-06-27 DIAGNOSIS — I70.0 ATHEROSCLEROSIS OF AORTA: ICD-10-CM

## 2024-06-27 DIAGNOSIS — I15.2 HYPERTENSION ASSOCIATED WITH DIABETES: ICD-10-CM

## 2024-06-27 DIAGNOSIS — E04.1 THYROID NODULE: ICD-10-CM

## 2024-06-27 DIAGNOSIS — K21.9 GASTROESOPHAGEAL REFLUX DISEASE WITHOUT ESOPHAGITIS: ICD-10-CM

## 2024-06-27 DIAGNOSIS — E11.59 HYPERTENSION ASSOCIATED WITH DIABETES: ICD-10-CM

## 2024-06-27 DIAGNOSIS — E11.9 TYPE 2 DIABETES MELLITUS WITHOUT COMPLICATION, WITH LONG-TERM CURRENT USE OF INSULIN: Primary | ICD-10-CM

## 2024-06-27 DIAGNOSIS — E11.69 HYPERLIPIDEMIA ASSOCIATED WITH TYPE 2 DIABETES MELLITUS: ICD-10-CM

## 2024-06-27 DIAGNOSIS — Z79.4 TYPE 2 DIABETES MELLITUS WITHOUT COMPLICATION, WITH LONG-TERM CURRENT USE OF INSULIN: Primary | ICD-10-CM

## 2024-06-27 LAB — GLUCOSE SERPL-MCNC: 212 MG/DL (ref 70–110)

## 2024-06-27 PROCEDURE — 99999 PR PBB SHADOW E&M-EST. PATIENT-LVL IV: CPT | Mod: PBBFAC,HCNC,, | Performed by: NURSE PRACTITIONER

## 2024-06-27 RX ORDER — PEN NEEDLE, DIABETIC 30 GX3/16"
NEEDLE, DISPOSABLE MISCELLANEOUS
Qty: 300 EACH | Refills: 3 | Status: SHIPPED | OUTPATIENT
Start: 2024-06-27

## 2024-06-27 RX ORDER — HUMAN INSULIN 100 [IU]/ML
INJECTION, SUSPENSION SUBCUTANEOUS
Qty: 15 ML | Refills: 5 | Status: SHIPPED | OUTPATIENT
Start: 2024-06-27

## 2024-06-27 NOTE — PATIENT INSTRUCTIONS
Medication Regimen:   Continue Novolin 70/30, 21 units in the morning and 26 units in the evening  Stop Trulicity for now. Once appetite improves, we can discuss re-starting this vs switching to Ozempic with slower titration.  Continue Jardiance 10 mg every morning    Patient Instructions:  Carbohydrate Count: 30-45 grams/meal and 15 grams/snack with limit of 2 snacks per day.  Exercise: Goal is 150 minutes or more per week.  Bring meter and blood sugar log to each appointment.     Goals for Blood Sugar:  Fastin-130 mg/dl  2 hours after meal:  mg/dl  Before Bed: 100-150 mg/dl  If Blood sugar is below 70 mg/dl, DO NOT take diabetes medication. Eat first and then recheck blood sugar in 20 minutes.  Foods to eat if blood sugar is below 70 mg/dl  1/2 glass of orange juice   1/2 regular cola can   3-4 hard candies   3-4 small glucose tabs.   Foods to eat if blood sugar is below 50 mg/dl  1 glass of orange juice  1 regular cola can  8 hard candies  8 small glucose tabs.   If you have repeated eating/blood sugar recheck process 2 times and blood sugar still below 70 mg/dl, call 911.

## 2024-06-27 NOTE — PROGRESS NOTES
Subjective:         Patient ID: Ulysses Boreaux is a 73 y.o. male.  Patient's current PCP is Elza Pantoja MD.     Chief Complaint: Diabetes Mellitus    HPI  Ulysses Boreaux is a 73 y.o. Black or  male presenting for a new consult with me, previously seen by Laurie Alvarado NP  for diabetes. Patient has been diagnosed with type 2 diabetes since 2005 .  Received diabetes education: Yes    CURRENT DM MEDICATIONS:   Diabetes Medications               dulaglutide (TRULICITY) 0.75 mg/0.5 mL pen injector Inject 0.75 mg into the skin every 7 days.    empagliflozin (JARDIANCE) 10 mg tablet Take 1 tablet (10 mg total) by mouth every morning.    insulin NPH-insulin regular, 70/30, (NOVOLIN 70/30 U-100 INSULIN) 100 unit/mL (70-30) injection Inject 22 units in the morning and 26 units in the evening. 21 AM, 26 PM            Past failed treatment include: Januvia-failure to control diabetes; Higher doses of Trulicity-GI side effects     Blood glucose testing is performed regularly. Patient is testing 2 times per day.  Declines CGM  Meter: Did not bring to appt  Preferred lab: [facility]    Any episodes of hypoglycemia? no     Complications related to diabetes: none    His blood sugar in the clinic today was:   Lab Results   Component Value Date    POCGLU 212 (A) 06/27/2024       Ulysses Boreaux presents today for follow up visit to discuss diabetes management. Last visit with Laurie Alvarado NP 12/29/2023. This is his first visit with me. Main concern is markedly decreased appetite and constipation since Trulicity was increased. He did not experience this with the lower dose of Trulicity. We discussed stopping the medication for a couple of weeks, and may benefit from switch to a different medication, or re-start of lower dose of Trulicity.     Currently AM blood sugars: 100-120s.  Pre-supper: 70, typically 90s-110    We discussed changing Novolin 70/30 syringes to pens. Pen demonstration given. He is  "agreeable to switch.     Current diet:   Activity Level:     Lab Results   Component Value Date    HGBA1C 5.8 (H) 03/01/2024    HGBA1C 5.6 09/05/2023    HGBA1C 6.8 (H) 03/09/2023     STANDARDS OF CARE  Diabetes Management Status    Statin: Taking  ACE/ARB: Taking    Screening or Prevention Patient's value Goal Complete/Controlled?   HgA1C Testing and Control   Lab Results   Component Value Date    HGBA1C 5.8 (H) 03/01/2024      Annually/Less than 8% Yes   Lipid profile : 06/12/2024 Annually Yes   LDL control Lab Results   Component Value Date    LDLCALC 85.2 06/12/2024    Annually/Less than 100 mg/dl  Yes   Nephropathy screening Lab Results   Component Value Date    LABMICR 7.0 03/01/2024     No results found for: "PROTEINUA"  No results found for: "UTPCR"   Annually Yes   Blood pressure BP Readings from Last 1 Encounters:   06/27/24 (!) 183/95    Less than 140/90 No   Dilated retinal exam : 05/28/2024 Annually Yes   Foot exam   : 03/12/2024 Annually Yes      Labs reviewed and are noted below.    Lab Results   Component Value Date    WBC 5.91 09/05/2023    HGB 16.8 09/05/2023    HCT 50.8 09/05/2023     09/05/2023    CHOL 174 06/12/2024    TRIG 94 06/12/2024    HDL 70 06/12/2024    LDLCALC 85.2 06/12/2024    ALT 58 (H) 06/12/2024    AST 53 (H) 06/12/2024     06/12/2024    K 3.7 06/12/2024    CL 99 06/12/2024    ANIONGAP 8 06/12/2024    CREATININE 1.3 06/12/2024    ESTGFRAFRICA >60.0 02/21/2022    EGFRNONAA 55.7 (A) 02/21/2022    BUN 12 06/12/2024    CO2 30 (H) 06/12/2024    TSH 0.735 09/05/2023    INR 1.0 02/02/2021     (H) 06/12/2024     Lab Results   Component Value Date    GLUTAMICACID 0.00 10/20/2017    CPEPTIDE 1.33 10/20/2017    T3FREE 3.7 12/09/2020    FREET4 0.96 08/19/2021    TSH 0.735 09/05/2023    THYROPEROXID <6.0 03/26/2018    THYGLBTUM 20 (H) 03/26/2018    THGABSCRN <1.8 03/26/2018    IRON 151 02/17/2015    TIBC 377 02/17/2015    FERRITIN 148 02/13/2014    CALCIUM 9.7 06/12/2024 " "    Lab Results   Component Value Date    CPEPTIDE 1.33 10/20/2017     Lab Results   Component Value Date    GLUTAMICACID 0.00 10/20/2017     Glucose   Date Value Ref Range Status   2024 127 (H) 70 - 110 mg/dL Final     Anion Gap   Date Value Ref Range Status   2024 8 8 - 16 mmol/L Final     eGFR if    Date Value Ref Range Status   2022 >60.0 >60 mL/min/1.73 m^2 Final     eGFR if non    Date Value Ref Range Status   2022 55.7 (A) >60 mL/min/1.73 m^2 Final     Comment:     Calculation used to obtain the estimated glomerular filtration  rate (eGFR) is the CKD-EPI equation.          The following portions of the patient's history were reviewed and updated as appropriate: allergies, current medications, past family history, past medical history, past social history, past surgical history, and problem list.    Review of patient's allergies indicates:  No Known Allergies  Social History     Socioeconomic History    Marital status:     Number of children: 2    Highest education level: 11th grade   Occupational History    Occupation: retired   Tobacco Use    Smoking status: Former     Current packs/day: 0.00     Types: Cigarettes     Quit date: 1972     Years since quittin.3     Passive exposure: Past    Smokeless tobacco: Never   Substance and Sexual Activity    Alcohol use: Yes     Alcohol/week: 0.0 standard drinks of alcohol     Comment: " Every blue moon"    Drug use: No    Sexual activity: Yes     Partners: Female   Social History Narrative    . Has 2 children. Patient disabled- was .      Social Determinants of Health     Financial Resource Strain: Low Risk  (2021)    Overall Financial Resource Strain (CARDIA)     Difficulty of Paying Living Expenses: Not very hard   Food Insecurity: Food Insecurity Present (2021)    Hunger Vital Sign     Worried About Running Out of Food in the Last Year: Sometimes true     Ran Out " of Food in the Last Year: Never true   Transportation Needs: No Transportation Needs (1/12/2021)    PRAPARE - Transportation     Lack of Transportation (Medical): No     Lack of Transportation (Non-Medical): No   Physical Activity: Insufficiently Active (1/12/2021)    Exercise Vital Sign     Days of Exercise per Week: 2 days     Minutes of Exercise per Session: 10 min   Stress: No Stress Concern Present (1/12/2021)    [employer] [place] [employer] - Occupational Stress Questionnaire     Feeling of Stress : Not at all   Housing Stability: Low Risk  (1/12/2021)    Housing Stability Vital Sign     Unable to Pay for Housing in the Last Year: No     Number of Places Lived in the Last Year: 1     Unstable Housing in the Last Year: No     Past Medical History:   Diagnosis Date    Allergy     Amputation of finger of left hand     all fingers except for thumb    Cataract     OS NGCL     Chronic pain due to trauma     traumatic amputations left hand    Diabetes mellitus, type 2     History of hepatitis C 02/13/2014    tx'd w/ Js 2018    Hyperlipidemia     Hypertension     Hyperthyroidism 10/10/2019    Refractive error        REVIEW OF SYSTEMS:  Eyes No history of DR.  Cardiovascular: History of   GI: Denies nausea,vomiting,constipation,or diarrhea.  : Denies dysuria.  SKIN: Denies rashes and lesions.  Neuro: No neuropathy.  PSYCH: No tobacco use.  ENDO: See HPI.        Objective:      Vitals:    06/27/24 1011   BP: (!) 183/95   Pulse: 70     RESPIRATORY: No respiratory distress  NEUROLOGIC: Cranial nerves II-XII grossly intact.   PSYCHIATRIC: Alert & oriented x3. Normal mood and affect.  FOOT EXAMINATION:   Assessment:       1. Type 2 diabetes mellitus without complication, with long-term current use of insulin    2. Hypertension associated with diabetes    3. Hyperlipidemia associated with type 2 diabetes mellitus    4. Atherosclerosis of aorta    5. Gastroesophageal reflux disease without esophagitis    6.  "Thyroid nodule        Plan:   Type 2 diabetes mellitus without complication, with long-term current use of insulin  -     POCT Glucose, Hand-Held Device  -     insulin NPH/Reg human (NOVOLIN 70-30 FLEXPEN U-100) 100 unit/mL (70-30) InPn pen; Inject 21 units before breakfast and 26 units before supper.  Dispense: 15 mL; Refill: 5  -     pen needle, diabetic (BD AME 2ND GEN PEN NEEDLE) 32 gauge x 5/32" Ndle; Use with Novolin 70/30 insulin pen twice daily  Dispense: 300 each; Refill: 3    Chronic -     Medication Regimen:   Continue Novolin 70/30, 21 units in the morning and 26 units in the evening  Stop Trulicity for now. Once appetite improves, we can discuss re-starting this vs switching to Ozempic with slower titration.  Continue Jardiance 10 mg every morning      Hypertension associated with diabetes    Hyperlipidemia associated with type 2 diabetes mellitus    Atherosclerosis of aorta    Gastroesophageal reflux disease without esophagitis    Thyroid nodule        - Follow up: 3 months      Portions of this note may have been created with voice recognition software. Occasional "wrong-word" or "sound-a-like" substitutions may have occurred due to the inherent limitations of voice recognition software. Please, read the note carefully and recognize, using context, where substitutions have occurred.           SKYLA Sanchez, BC-ADM  [facility]  "

## 2024-06-28 ENCOUNTER — OFFICE VISIT (OUTPATIENT)
Dept: OPHTHALMOLOGY | Facility: CLINIC | Age: 74
End: 2024-06-28
Payer: MEDICARE

## 2024-06-28 DIAGNOSIS — Z98.890 POST-OPERATIVE STATE: Primary | ICD-10-CM

## 2024-06-28 DIAGNOSIS — Z98.42 CATARACT EXTRACTION STATUS OF EYE, LEFT: ICD-10-CM

## 2024-06-28 PROCEDURE — 99999 PR PBB SHADOW E&M-EST. PATIENT-LVL III: CPT | Mod: PBBFAC,HCNC,, | Performed by: STUDENT IN AN ORGANIZED HEALTH CARE EDUCATION/TRAINING PROGRAM

## 2024-06-28 NOTE — PROGRESS NOTES
HPI     Post-op Evaluation     Additional comments: Week post sp pciol 6/19/24  No pain  Pt states va is on and off os     Lab Results       Component                Value               Date                       HGBA1C                   5.8 (H)             03/01/2024                               Comments    1. +DM  2. PCIOL OS 6/19/24  PCIOL OD  3. RE    Preop avastin OS 6/12/24    OS- Pred QID and KETO             Last edited by Sidney Rain on 6/28/2024  8:52 AM.            Assessment /Plan     For exam results, see Encounter Report.    Post-operative state  Cataract extraction status of eye, left- Impression/Plan  POW#1 S/P CEIOL OS : Doing well with no evidence of infection.     Continue gtts to operative eye:   Trace Cell. PF and Keto Taper 4-3-2-1 then stop    Pt given and instructed in one week postop instructions. Can resume normal activitites and d/c eye shield. OTC reading glasses can be used until evaluated for final MR.     Return to clinic in 1M as gil. W/

## 2024-06-30 ENCOUNTER — EXTERNAL CHRONIC CARE MANAGEMENT (OUTPATIENT)
Dept: PRIMARY CARE CLINIC | Facility: CLINIC | Age: 74
End: 2024-06-30
Payer: MEDICARE

## 2024-06-30 PROCEDURE — 99490 CHRNC CARE MGMT STAFF 1ST 20: CPT | Mod: S$GLB,,, | Performed by: FAMILY MEDICINE

## 2024-07-18 ENCOUNTER — PROCEDURE VISIT (OUTPATIENT)
Dept: OPHTHALMOLOGY | Facility: CLINIC | Age: 74
End: 2024-07-18
Payer: MEDICARE

## 2024-07-18 DIAGNOSIS — H02.402 PTOSIS OF LEFT EYELID: ICD-10-CM

## 2024-07-18 DIAGNOSIS — E11.3211 MILD NONPROLIFERATIVE DIABETIC RETINOPATHY OF RIGHT EYE WITH MACULAR EDEMA ASSOCIATED WITH TYPE 2 DIABETES MELLITUS: Primary | ICD-10-CM

## 2024-07-18 PROCEDURE — 92134 CPTRZ OPH DX IMG PST SGM RTA: CPT | Mod: HCNC,S$GLB,, | Performed by: OPHTHALMOLOGY

## 2024-07-18 PROCEDURE — 99213 OFFICE O/P EST LOW 20 MIN: CPT | Mod: 24,HCNC,S$GLB, | Performed by: OPHTHALMOLOGY

## 2024-07-18 NOTE — PROGRESS NOTES
===============================  Date today is 7/18/2024  Ulysses Boreaux is a 73 y.o. male  Last visit [facility]: :6/12/2024   Last visit eye dept. 6/28/2024    Uncorrected distance visual acuity was 20/30 in the right eye and 20/30- in the left eye.  Tonometry       Tonometry (Applanation, 9:35 AM)         Right Left    Pressure 16 16                  Not recorded       Not recorded       Not recorded       Chief Complaint   Patient presents with    Injections     eval Avastin OS     HPI     Injections            Comments: eval Avastin OS          Comments    1. +DM  2. PCIOL OS 6/19/24  PCIOL OD  3. RE    Preop avastin OS 6/12/24    OS- Pred QID and KETO             Last edited by Sandra Uribe on 7/18/2024  9:20 AM.      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  Check OU independently daily.    Gave my office and personal cell phone number.  ________________  7/18/2024 today  Ulysses Boreaux      DME OD    OCT stable OU   No injection today  5mm ptosis to pupil OS  PC IOL OU  Prescribed upneeq, consider sx     RTC 6 months dilated exam   Instructed to call 24/7 for any worsening of vision or symptoms. Check OU daily.   Gave my office and cell phone number.  =============================

## 2024-07-31 ENCOUNTER — EXTERNAL CHRONIC CARE MANAGEMENT (OUTPATIENT)
Dept: PRIMARY CARE CLINIC | Facility: CLINIC | Age: 74
End: 2024-07-31
Payer: MEDICARE

## 2024-07-31 PROCEDURE — 99439 CHRNC CARE MGMT STAF EA ADDL: CPT | Mod: S$GLB,,, | Performed by: FAMILY MEDICINE

## 2024-07-31 PROCEDURE — 99490 CHRNC CARE MGMT STAFF 1ST 20: CPT | Mod: S$GLB,,, | Performed by: FAMILY MEDICINE

## 2024-08-07 DIAGNOSIS — E11.59 HYPERTENSION ASSOCIATED WITH DIABETES: ICD-10-CM

## 2024-08-07 DIAGNOSIS — I15.2 HYPERTENSION ASSOCIATED WITH DIABETES: ICD-10-CM

## 2024-08-08 RX ORDER — NEBIVOLOL 20 MG/1
1 TABLET ORAL
Qty: 90 TABLET | Refills: 0 | Status: SHIPPED | OUTPATIENT
Start: 2024-08-08

## 2024-08-26 NOTE — PROGRESS NOTES
Chronic Pain -- Established Patient (Follow-up visit)    Chief Complaint   Patient presents with    Abdominal Pain     Left sided         Interval History (8/26/2024): Patient was last seen on 6/25/2024. he presents today for follow-up for medication refill. he feels the pain medication is providing adequate pain relief and reduces the negative effects of chronic pain that affects quality of life. No major SE from medications. No major changes since previous visit; patient is stable overall.  He is out of medication today though because his appointment was scheduled too far out.  Patient reports pain as 6/10 today.     Interval HPI (3/27/2024):  Ulysses Boreaux is a 73 y.o. male presents today for follow-up medication refill.  He reports that the pain is well managed with at least 75% reduction in his pain with the use of Norco 10/325 mg.  He denies any adverse effects related to this medication as well.  Current pain intensity 0/10.  He continues to report pain of the left hand and left lower quadrant.  No new injuries or trauma.    Interval History (1/4/2024): Ulysses Boreaux was last seen on 11/9/2023. he presents today for follow-up for medication refill. he feels the pain medication is providing adequate pain relief and reduces the negative effects of chronic pain that affects quality of life. No major SE from medications. Patient reports pain as 0/10 today.     Interval History (11/9/2023): Patient was last seen on 9/13/2023. No major changes since previous visit; patient is stable overall.  he presents today for follow-up for medication refill. he feels the pain medication is providing adequate pain relief and reduces the negative effects of chronic pain that affects quality of life. No major SE from medications. Patient reports pain as 8/10 today.     Interval History (9/13/2023): Ulysses Boreaux was last seen on 7/19/2023. he presents today for follow-up for medication refill. he feels the pain medication  is providing adequate pain relief and reduces the negative effects of chronic pain that affects quality of life. No major SE from medications. No major changes since previous visit; patient is stable overall. Patient reports pain as 2/10 today.     History of Present Illness:  This patient is a 65 year old male who presents today for f/u complaining of the above noted pains. The patient describes this pain as follows.    - duration (acute, chronic): left hand pain since 1997 after table saw amputation of digits 2 through 5 at work, left lower quadrant pain since hernia surgery in 2004  - timing (constant, intermittent): Constant  - character (sharp, dull, aching, burning): Throbbing  - radiating: No  - dermatomal distribution: No  - side: Left   - aggravating factors: Nothing worsens left digit pain, left lower quadrant pain worse with exercise or walking  - relieving factors: Medication / Norco  - antecedent trauma: Amputation via skill saw and 1997, hernia repair in 2004  - prior spinal surgery: None  - pertinent negatives: No fever, No chills, No weight loss, No bladder dysfunction, No bowel dysfunction, No extremity weakness, No saddle anesthesia  - medications tried: Norco, Percocet, over-the-counter medications, compound pain cream  - other therapies tried (physical therapy, injections):     >> physical therapy tried in the past    >> no prior injections        Imaging/ Diagnostic Studies/ Labs (Reviewed on 8/27/2024):    7/12/2018 US ABDOMINAL AORTA  CLINICAL HISTORY:  Abnormal findings on diagnostic imaging of other specified body structures  TECHNIQUE:  Limited ultrasound was performed of the abdominal aorta, with cross sectional diameter measurements obtained.  COMPARISON:  01/10/2018  FINDINGS:  The proximal abdominal aorta measures 2.7 cm.  The mid abdominal aorta measures 1.8 cm.  The distal abdominal aorta measures 1.9 cm, slightly ectatic and unchanged from prior.  The right iliac artery measures 1.0  "cm.  The left iliac artery measures 1.1 cm.  Aortoiliac atherosclerosis: Mild  Impression: Stable examination with slight distal abdominal aortic ectasia.  Negative for aneurysm.         ROS:  CONSTITUTIONAL: No fever, chills, weight loss  SKIN: No rash or itching  CARDIOVASCULAR:  No chest pain, palpitations  RESPIRATORY: No shortness of breath  GASTROINTESTINAL: No diarrhea, No constipation, abdominal pain, left lower quadrant  GENITOURINARY: No urinary incontinence    MUSCULOSKELETAL:  - patient reports pain as above, see chief complaint     NEUROLOGICAL:   - Pain as above  - Strength in lower extremities is normal  - Sensation in lower extremities is normal  - No bowel or bladder incontinence     PSYCHIATRIC: No change in mood noticed         Objective:     Physical Exam:  Vitals:    08/27/24 1048   BP: (!) 172/86   Pulse: 66   Resp: 17   Weight: 99.1 kg (218 lb 7.6 oz)   Height: 6' 3" (1.905 m)   Body mass index is 27.31 kg/m².   (reviewed on 8/27/2024)     General: alert and oriented, in no apparent distress.   Gait: normal gait  Skin: No rashes, No discoloration   HEENT: EOMI  Respiratory: respirations nonlabored. No audible wheezing.  Cardiology: No obvious peripheral edema.   GI: no obvious distention.     Musculoskeletal:  - ROM fairly preserved   - No pain with lumbar flexion/ extension  - Left upper extremity range of motion intact throughout  - Digital stumps are hypersensitive to palpation, hypersensitivity does not extend further proximal  - No color change, no swelling, no changes in hair growth along left hand    Neuro:  - Lower extremities:      >> 5/5 strength bilaterally, throughout, symmetric  - Upper extremities:    >> 5/5 strength bilaterally  - Normal sensation    Psych: mood and affect appropriate        Assessment:     Ulysses Boreaux is a 74 y.o. male who presents with       ICD-10-CM ICD-9-CM    1. Chronic pain disorder  G89.4 338.4       2. Chronic pain due to trauma  G89.21 338.21     "   3. Pain of left hand  M79.642 729.5       4. Traumatic amputation of digit of one hand without complication  S68.119A 886.0       5. Opioid use agreement exists  Z79.891 V58.69              Plan:   - Interventional: None.    - Pharmacologic:   - Refill Norco 10/325 mg BID PRN (60 tabs) x 3 months. Will e-prescribe to fill on 08/27/24, 09/26/2024, 10/26/2024. Patient tolerating opioids with no side effects, obtaining good pain control with functional improvement. He tolerates this medication well, and he denies any drowsiness or constipation.    - Updated opioid contract signed with Dr. Love on 10/28/20. *03/27/2024-seen in clinic by Dr. Love.    - LA  reviewed and appropriate.       - Last UDS 03/27/2024 was compliant for medications prescribed.      - Rehabilitative: Encouraged regular exercise.    - Diagnostic/ Imaging: No new imaging ordered. Previous imaging reviewed.     - Follow-up:  8-12 week medication refill    Future Appointments   Date Time Provider Department Center   9/6/2024  9:00 AM LABORATORY, [facility]   9/12/2024  9:40 AM [facility]    10/1/2024 10:30 AM Chelly Hong NP [facility]   11/13/2024  9:40 AM Ysabel Garcia PA-C [facility] INT [facility]   1/28/2025  9:00 AM VINCENT Preciado MD [facility] [place] [place]        - Patient Questions: Answered all of the patient's questions regarding diagnosis, therapy, and treatment.    - This condition does not require this patient to take time off of work, and the primary goal of our Pain Management services is to improve the patient's functional capacity.   - I discussed the risks, benefits, and alternatives to potential treatment options. All questions and concerns were fully addressed today in clinic.         Ysabel Garcia PA-C  Interventional Pain Management - [facility]    Disclaimer:  This note was prepared using voice recognition system and is likely to have  sound alike errors that may have been overlooked even after proof reading.  Please call me with any questions.       ______________________________________________________________________  >> UDS:  8-18-15 :: appropriate  12-29-15 :: appropriate  5-3-16 :: appropriate  10-18-16 :: appropriate  4/13/2017 :: appropriate  9/29/2017 :: appropriate  3/9/2018 :: appropriate  9/4/2018 :: appropriate  6/19/2019 :: appropriate  12/12/2019 :: appropriate  6/12/2020 ::  Appropriate  10/2/2020 :: Appropriate  2/24/2021 :: Appropriate  8/24/2021 :: Appropriate  12/21/2021 :: Appropriate  6/30/2022 :: Appropriate  12/22/2022 :: Appropriate   7/19/2023 :: Appropriate   03/27/2024:: Appropriate

## 2024-08-27 ENCOUNTER — OFFICE VISIT (OUTPATIENT)
Dept: PAIN MEDICINE | Facility: CLINIC | Age: 74
End: 2024-08-27
Payer: MEDICARE

## 2024-08-27 VITALS
BODY MASS INDEX: 27.17 KG/M2 | HEIGHT: 75 IN | RESPIRATION RATE: 17 BRPM | WEIGHT: 218.5 LBS | SYSTOLIC BLOOD PRESSURE: 172 MMHG | HEART RATE: 66 BPM | DIASTOLIC BLOOD PRESSURE: 86 MMHG

## 2024-08-27 DIAGNOSIS — M79.642 PAIN OF LEFT HAND: ICD-10-CM

## 2024-08-27 DIAGNOSIS — Z79.891 OPIOID USE AGREEMENT EXISTS: ICD-10-CM

## 2024-08-27 DIAGNOSIS — G89.21 CHRONIC PAIN DUE TO TRAUMA: ICD-10-CM

## 2024-08-27 DIAGNOSIS — S68.119A TRAUMATIC AMPUTATION OF DIGIT OF ONE HAND WITHOUT COMPLICATION: ICD-10-CM

## 2024-08-27 DIAGNOSIS — G89.4 CHRONIC PAIN DISORDER: Primary | ICD-10-CM

## 2024-08-27 PROCEDURE — 3079F DIAST BP 80-89 MM HG: CPT | Mod: HCNC,CPTII,S$GLB, | Performed by: PHYSICIAN ASSISTANT

## 2024-08-27 PROCEDURE — 3044F HG A1C LEVEL LT 7.0%: CPT | Mod: HCNC,CPTII,S$GLB, | Performed by: PHYSICIAN ASSISTANT

## 2024-08-27 PROCEDURE — 3008F BODY MASS INDEX DOCD: CPT | Mod: HCNC,CPTII,S$GLB, | Performed by: PHYSICIAN ASSISTANT

## 2024-08-27 PROCEDURE — 1101F PT FALLS ASSESS-DOCD LE1/YR: CPT | Mod: HCNC,CPTII,S$GLB, | Performed by: PHYSICIAN ASSISTANT

## 2024-08-27 PROCEDURE — 3077F SYST BP >= 140 MM HG: CPT | Mod: HCNC,CPTII,S$GLB, | Performed by: PHYSICIAN ASSISTANT

## 2024-08-27 PROCEDURE — 3288F FALL RISK ASSESSMENT DOCD: CPT | Mod: HCNC,CPTII,S$GLB, | Performed by: PHYSICIAN ASSISTANT

## 2024-08-27 PROCEDURE — 1160F RVW MEDS BY RX/DR IN RCRD: CPT | Mod: HCNC,CPTII,S$GLB, | Performed by: PHYSICIAN ASSISTANT

## 2024-08-27 PROCEDURE — 3061F NEG MICROALBUMINURIA REV: CPT | Mod: HCNC,CPTII,S$GLB, | Performed by: PHYSICIAN ASSISTANT

## 2024-08-27 PROCEDURE — 1125F AMNT PAIN NOTED PAIN PRSNT: CPT | Mod: HCNC,CPTII,S$GLB, | Performed by: PHYSICIAN ASSISTANT

## 2024-08-27 PROCEDURE — 1159F MED LIST DOCD IN RCRD: CPT | Mod: HCNC,CPTII,S$GLB, | Performed by: PHYSICIAN ASSISTANT

## 2024-08-27 PROCEDURE — 99999 PR PBB SHADOW E&M-EST. PATIENT-LVL IV: CPT | Mod: PBBFAC,HCNC,, | Performed by: PHYSICIAN ASSISTANT

## 2024-08-27 PROCEDURE — 3066F NEPHROPATHY DOC TX: CPT | Mod: HCNC,CPTII,S$GLB, | Performed by: PHYSICIAN ASSISTANT

## 2024-08-27 PROCEDURE — 99214 OFFICE O/P EST MOD 30 MIN: CPT | Mod: HCNC,S$GLB,, | Performed by: PHYSICIAN ASSISTANT

## 2024-08-27 RX ORDER — HYDROCODONE BITARTRATE AND ACETAMINOPHEN 10; 325 MG/1; MG/1
1 TABLET ORAL EVERY 12 HOURS PRN
Qty: 60 TABLET | Refills: 0 | Status: SHIPPED | OUTPATIENT
Start: 2024-09-26 | End: 2024-10-26

## 2024-08-27 RX ORDER — HYDROCODONE BITARTRATE AND ACETAMINOPHEN 10; 325 MG/1; MG/1
1 TABLET ORAL EVERY 12 HOURS PRN
Qty: 60 TABLET | Refills: 0 | Status: SHIPPED | OUTPATIENT
Start: 2024-10-26

## 2024-08-27 RX ORDER — HYDROCODONE BITARTRATE AND ACETAMINOPHEN 10; 325 MG/1; MG/1
1 TABLET ORAL EVERY 12 HOURS PRN
Qty: 60 TABLET | Refills: 0 | Status: SHIPPED | OUTPATIENT
Start: 2024-08-27 | End: 2024-09-26

## 2024-08-31 ENCOUNTER — EXTERNAL CHRONIC CARE MANAGEMENT (OUTPATIENT)
Dept: PRIMARY CARE CLINIC | Facility: CLINIC | Age: 74
End: 2024-08-31
Payer: MEDICARE

## 2024-08-31 PROCEDURE — 99490 CHRNC CARE MGMT STAFF 1ST 20: CPT | Mod: S$GLB,,, | Performed by: FAMILY MEDICINE

## 2024-09-03 ENCOUNTER — PATIENT OUTREACH (OUTPATIENT)
Dept: ADMINISTRATIVE | Facility: HOSPITAL | Age: 74
End: 2024-09-03
Payer: MEDICARE

## 2024-09-06 ENCOUNTER — LAB VISIT (OUTPATIENT)
Dept: LAB | Facility: HOSPITAL | Age: 74
End: 2024-09-06
Attending: PHYSICIAN ASSISTANT
Payer: MEDICARE

## 2024-09-06 DIAGNOSIS — E11.69 HYPERLIPIDEMIA ASSOCIATED WITH TYPE 2 DIABETES MELLITUS: ICD-10-CM

## 2024-09-06 DIAGNOSIS — E11.22 TYPE 2 DIABETES MELLITUS WITH STAGE 3A CHRONIC KIDNEY DISEASE, WITH LONG-TERM CURRENT USE OF INSULIN: ICD-10-CM

## 2024-09-06 DIAGNOSIS — E11.59 HYPERTENSION ASSOCIATED WITH DIABETES: ICD-10-CM

## 2024-09-06 DIAGNOSIS — Z79.4 TYPE 2 DIABETES MELLITUS WITH STAGE 3A CHRONIC KIDNEY DISEASE, WITH LONG-TERM CURRENT USE OF INSULIN: ICD-10-CM

## 2024-09-06 DIAGNOSIS — N18.31 TYPE 2 DIABETES MELLITUS WITH STAGE 3A CHRONIC KIDNEY DISEASE, WITH LONG-TERM CURRENT USE OF INSULIN: ICD-10-CM

## 2024-09-06 DIAGNOSIS — I15.2 HYPERTENSION ASSOCIATED WITH DIABETES: ICD-10-CM

## 2024-09-06 DIAGNOSIS — E78.5 HYPERLIPIDEMIA ASSOCIATED WITH TYPE 2 DIABETES MELLITUS: ICD-10-CM

## 2024-09-06 LAB
ALBUMIN SERPL BCP-MCNC: 4.1 G/DL (ref 3.5–5.2)
ALP SERPL-CCNC: 58 U/L (ref 55–135)
ALT SERPL W/O P-5'-P-CCNC: 46 U/L (ref 10–44)
ANION GAP SERPL CALC-SCNC: 13 MMOL/L (ref 8–16)
AST SERPL-CCNC: 42 U/L (ref 10–40)
BASOPHILS # BLD AUTO: 0.05 K/UL (ref 0–0.2)
BASOPHILS NFR BLD: 1 % (ref 0–1.9)
BILIRUB SERPL-MCNC: 0.5 MG/DL (ref 0.1–1)
BUN SERPL-MCNC: 12 MG/DL (ref 8–23)
CALCIUM SERPL-MCNC: 9.7 MG/DL (ref 8.7–10.5)
CHLORIDE SERPL-SCNC: 101 MMOL/L (ref 95–110)
CHOLEST SERPL-MCNC: 159 MG/DL (ref 120–199)
CHOLEST/HDLC SERPL: 2.5 {RATIO} (ref 2–5)
CO2 SERPL-SCNC: 26 MMOL/L (ref 23–29)
CREAT SERPL-MCNC: 1.3 MG/DL (ref 0.5–1.4)
DIFFERENTIAL METHOD BLD: ABNORMAL
EOSINOPHIL # BLD AUTO: 0.2 K/UL (ref 0–0.5)
EOSINOPHIL NFR BLD: 4.1 % (ref 0–8)
ERYTHROCYTE [DISTWIDTH] IN BLOOD BY AUTOMATED COUNT: 12.6 % (ref 11.5–14.5)
EST. GFR  (NO RACE VARIABLE): 58.4 ML/MIN/1.73 M^2
ESTIMATED AVG GLUCOSE: 160 MG/DL (ref 68–131)
GLUCOSE SERPL-MCNC: 171 MG/DL (ref 70–110)
HBA1C MFR BLD: 7.2 % (ref 4–5.6)
HCT VFR BLD AUTO: 45.1 % (ref 40–54)
HDLC SERPL-MCNC: 63 MG/DL (ref 40–75)
HDLC SERPL: 39.6 % (ref 20–50)
HGB BLD-MCNC: 14.6 G/DL (ref 14–18)
IMM GRANULOCYTES # BLD AUTO: 0.01 K/UL (ref 0–0.04)
IMM GRANULOCYTES NFR BLD AUTO: 0.2 % (ref 0–0.5)
LDLC SERPL CALC-MCNC: 80.2 MG/DL (ref 63–159)
LYMPHOCYTES # BLD AUTO: 2.7 K/UL (ref 1–4.8)
LYMPHOCYTES NFR BLD: 51.3 % (ref 18–48)
MCH RBC QN AUTO: 31.5 PG (ref 27–31)
MCHC RBC AUTO-ENTMCNC: 32.4 G/DL (ref 32–36)
MCV RBC AUTO: 97 FL (ref 82–98)
MONOCYTES # BLD AUTO: 0.5 K/UL (ref 0.3–1)
MONOCYTES NFR BLD: 9.3 % (ref 4–15)
NEUTROPHILS # BLD AUTO: 1.8 K/UL (ref 1.8–7.7)
NEUTROPHILS NFR BLD: 34.1 % (ref 38–73)
NONHDLC SERPL-MCNC: 96 MG/DL
NRBC BLD-RTO: 0 /100 WBC
PLATELET # BLD AUTO: 235 K/UL (ref 150–450)
PMV BLD AUTO: 10.9 FL (ref 9.2–12.9)
POTASSIUM SERPL-SCNC: 4.6 MMOL/L (ref 3.5–5.1)
PROT SERPL-MCNC: 7.4 G/DL (ref 6–8.4)
RBC # BLD AUTO: 4.64 M/UL (ref 4.6–6.2)
SODIUM SERPL-SCNC: 140 MMOL/L (ref 136–145)
TRIGL SERPL-MCNC: 79 MG/DL (ref 30–150)
TSH SERPL DL<=0.005 MIU/L-ACNC: 0.92 UIU/ML (ref 0.4–4)
WBC # BLD AUTO: 5.17 K/UL (ref 3.9–12.7)

## 2024-09-06 PROCEDURE — 36415 COLL VENOUS BLD VENIPUNCTURE: CPT | Mod: HCNC | Performed by: PHYSICIAN ASSISTANT

## 2024-09-06 PROCEDURE — 85025 COMPLETE CBC W/AUTO DIFF WBC: CPT | Mod: HCNC | Performed by: PHYSICIAN ASSISTANT

## 2024-09-06 PROCEDURE — 84443 ASSAY THYROID STIM HORMONE: CPT | Mod: HCNC | Performed by: PHYSICIAN ASSISTANT

## 2024-09-06 PROCEDURE — 83036 HEMOGLOBIN GLYCOSYLATED A1C: CPT | Mod: HCNC | Performed by: PHYSICIAN ASSISTANT

## 2024-09-06 PROCEDURE — 80061 LIPID PANEL: CPT | Mod: HCNC | Performed by: PHYSICIAN ASSISTANT

## 2024-09-06 PROCEDURE — 80053 COMPREHEN METABOLIC PANEL: CPT | Mod: HCNC | Performed by: PHYSICIAN ASSISTANT

## 2024-09-12 ENCOUNTER — TELEPHONE (OUTPATIENT)
Dept: INTERNAL MEDICINE | Facility: CLINIC | Age: 74
End: 2024-09-12
Payer: MEDICARE

## 2024-09-12 NOTE — TELEPHONE ENCOUNTER
----- Message from Zarina Barragan sent at 9/12/2024 12:39 PM CDT -----  Contact: Ulysses Pt is calling in regards to getting his apt on 09/12 rescheduled.please call back at .[phone]         Thanks  DAKOTA

## 2024-09-16 ENCOUNTER — OFFICE VISIT (OUTPATIENT)
Dept: INTERNAL MEDICINE | Facility: CLINIC | Age: 74
End: 2024-09-16
Payer: MEDICARE

## 2024-09-16 VITALS
RESPIRATION RATE: 18 BRPM | BODY MASS INDEX: 26.78 KG/M2 | SYSTOLIC BLOOD PRESSURE: 136 MMHG | WEIGHT: 215.38 LBS | HEIGHT: 75 IN | HEART RATE: 65 BPM | DIASTOLIC BLOOD PRESSURE: 88 MMHG | OXYGEN SATURATION: 99 %

## 2024-09-16 DIAGNOSIS — I15.2 HYPERTENSION ASSOCIATED WITH DIABETES: ICD-10-CM

## 2024-09-16 DIAGNOSIS — Z79.4 TYPE 2 DIABETES MELLITUS WITH STAGE 3A CHRONIC KIDNEY DISEASE, WITH LONG-TERM CURRENT USE OF INSULIN: ICD-10-CM

## 2024-09-16 DIAGNOSIS — E11.69 HYPERLIPIDEMIA ASSOCIATED WITH TYPE 2 DIABETES MELLITUS: ICD-10-CM

## 2024-09-16 DIAGNOSIS — E78.5 HYPERLIPIDEMIA ASSOCIATED WITH TYPE 2 DIABETES MELLITUS: ICD-10-CM

## 2024-09-16 DIAGNOSIS — N18.31 TYPE 2 DIABETES MELLITUS WITH STAGE 3A CHRONIC KIDNEY DISEASE, WITH LONG-TERM CURRENT USE OF INSULIN: ICD-10-CM

## 2024-09-16 DIAGNOSIS — Z00.00 ROUTINE GENERAL MEDICAL EXAMINATION AT A HEALTH CARE FACILITY: Primary | ICD-10-CM

## 2024-09-16 DIAGNOSIS — E11.22 TYPE 2 DIABETES MELLITUS WITH STAGE 3A CHRONIC KIDNEY DISEASE, WITH LONG-TERM CURRENT USE OF INSULIN: ICD-10-CM

## 2024-09-16 DIAGNOSIS — Z12.11 COLON CANCER SCREENING: ICD-10-CM

## 2024-09-16 DIAGNOSIS — R79.89 ELEVATED LFTS: ICD-10-CM

## 2024-09-16 DIAGNOSIS — E11.59 HYPERTENSION ASSOCIATED WITH DIABETES: ICD-10-CM

## 2024-09-16 PROCEDURE — 1101F PT FALLS ASSESS-DOCD LE1/YR: CPT | Mod: HCNC,CPTII,S$GLB, | Performed by: FAMILY MEDICINE

## 2024-09-16 PROCEDURE — 3079F DIAST BP 80-89 MM HG: CPT | Mod: HCNC,CPTII,S$GLB, | Performed by: FAMILY MEDICINE

## 2024-09-16 PROCEDURE — 1159F MED LIST DOCD IN RCRD: CPT | Mod: HCNC,CPTII,S$GLB, | Performed by: FAMILY MEDICINE

## 2024-09-16 PROCEDURE — 3288F FALL RISK ASSESSMENT DOCD: CPT | Mod: HCNC,CPTII,S$GLB, | Performed by: FAMILY MEDICINE

## 2024-09-16 PROCEDURE — 3061F NEG MICROALBUMINURIA REV: CPT | Mod: HCNC,CPTII,S$GLB, | Performed by: FAMILY MEDICINE

## 2024-09-16 PROCEDURE — 3075F SYST BP GE 130 - 139MM HG: CPT | Mod: HCNC,CPTII,S$GLB, | Performed by: FAMILY MEDICINE

## 2024-09-16 PROCEDURE — 99999 PR PBB SHADOW E&M-EST. PATIENT-LVL V: CPT | Mod: PBBFAC,HCNC,, | Performed by: FAMILY MEDICINE

## 2024-09-16 PROCEDURE — 3051F HG A1C>EQUAL 7.0%<8.0%: CPT | Mod: HCNC,CPTII,S$GLB, | Performed by: FAMILY MEDICINE

## 2024-09-16 PROCEDURE — 1160F RVW MEDS BY RX/DR IN RCRD: CPT | Mod: HCNC,CPTII,S$GLB, | Performed by: FAMILY MEDICINE

## 2024-09-16 PROCEDURE — 99214 OFFICE O/P EST MOD 30 MIN: CPT | Mod: HCNC,S$GLB,, | Performed by: FAMILY MEDICINE

## 2024-09-16 PROCEDURE — G2211 COMPLEX E/M VISIT ADD ON: HCPCS | Mod: HCNC,S$GLB,, | Performed by: FAMILY MEDICINE

## 2024-09-16 PROCEDURE — 3066F NEPHROPATHY DOC TX: CPT | Mod: HCNC,CPTII,S$GLB, | Performed by: FAMILY MEDICINE

## 2024-09-16 PROCEDURE — 1126F AMNT PAIN NOTED NONE PRSNT: CPT | Mod: HCNC,CPTII,S$GLB, | Performed by: FAMILY MEDICINE

## 2024-09-16 PROCEDURE — 3008F BODY MASS INDEX DOCD: CPT | Mod: HCNC,CPTII,S$GLB, | Performed by: FAMILY MEDICINE

## 2024-09-16 RX ORDER — HUMAN INSULIN 100 [USP'U]/ML
INJECTION, SUSPENSION SUBCUTANEOUS
COMMUNITY
Start: 2024-08-29

## 2024-09-16 NOTE — PATIENT INSTRUCTIONS
Get your flu vaccine this season, Flu vaccines start becoming available in September and we continue vaccinating through the winter months. The [facility] is having a drive-thru flu vaccine clinic on Saturday, September 21st from 8 am - 2 pm. Flu vaccines are also available at most local pharmacies.     Check with your pharmacist regarding RSV vaccine.      You are eligible for a covid booster which are available at most pharmacies.

## 2024-09-16 NOTE — ASSESSMENT & PLAN NOTE
Slightly above goal, continue current medications and monitoring with diabetes GLADIS    Lab Results   Component Value Date    HGBA1C 7.2 (H) 09/06/2024    HGBA1C 5.8 (H) 03/01/2024    LDLCALC 80.2 09/06/2024    LABMICR 7.0 03/01/2024    CREATRANDUR 26.8 03/27/2024    MICALBCREAT 11.5 03/01/2024

## 2024-09-16 NOTE — ASSESSMENT & PLAN NOTE
LDL continuing to improve, continue atorvastatin and monitor    Lab Results   Component Value Date    CHOL 159 09/06/2024    LDLCALC 80.2 09/06/2024    TRIG 79 09/06/2024    HDL 63 09/06/2024    ALT 46 (H) 09/06/2024    AST 42 (H) 09/06/2024    ALKPHOS 58 09/06/2024

## 2024-09-16 NOTE — PROGRESS NOTES
Subjective:       Patient ID: Ulysses Boreaux is a 74 y.o. male.    Chief Complaint: Annual Exam      History of Present Illness    Patient presents to clinic today for annual physical exam.  - Vision problems: persistent spots and peripheral visual disturbances  - Started after cataract surgery  - Worsened after discontinuing prescribed eye drops  - Visual distortions and left-sided peripheral vision changes  - Minimal improvement post-surgery  - Appointment with ophthalmologist, Dr. Preciado, scheduled for January 1st  - New symptoms not yet reported to Dr. Preciado  - Last follow-up with Dr. Preciado occurred after the cataract surgery  - Uncertainty about symptoms at last follow-up  - Discontinued Trulicity  - Ongoing efforts to manage diabetes  - Slight increase in A1C levels  - No additional problems or concerns beyond vision issues    ROS:  General: -fever, -chills, -fatigue, -weight gain, -weight loss  Eyes: -eye pain, +VISION CHANGE  ENMT: -hearing loss, -ear pain, -nasal congestion, -rhinorrhea, -dental problem, -trouble swallowing  Cardiovascular: -chest pain, -palpitations, -lower extremity edema  Respiratory: -cough, -trouble breathing  Gastrointestinal: -abdominal pain, -abdominal swelling, -nausea, -vomiting, -diarrhea, -constipation, -blood in stool  Genitourinary: -difficulty urinating, -genital problem  Musculoskeletal: -joint pain, -muscle aches  Integumentary: -rash  Lymphatics: -swollen glands, -easy bruising  Neurologic: -headache, -dizziness, -numbness, -weakness  Psychiatric: -anxiety, -depression, -sleep difficulty      Objective:      Physical Exam  Vitals reviewed.   Constitutional:       General: He is not in acute distress.     Appearance: He is well-developed.   HENT:      Head: Normocephalic and atraumatic.      Right Ear: Hearing, tympanic membrane, ear canal and external ear normal.      Left Ear: Hearing, tympanic membrane, ear canal and external ear normal.      Nose: Nose normal.       Mouth/Throat:      Pharynx: Uvula midline.   Eyes:      General: Lids are normal. No scleral icterus.     Conjunctiva/sclera: Conjunctivae normal.      Pupils: Pupils are equal, round, and reactive to light.   Neck:      Thyroid: No thyromegaly.   Cardiovascular:      Rate and Rhythm: Normal rate and regular rhythm.      Heart sounds: No murmur heard.     No friction rub. No gallop.   Pulmonary:      Effort: Pulmonary effort is normal.      Breath sounds: Normal breath sounds. No wheezing or rales.   Abdominal:      General: Bowel sounds are normal. There is no distension.      Palpations: Abdomen is soft. There is no mass.      Tenderness: There is no abdominal tenderness.   Musculoskeletal:         General: No tenderness. Normal range of motion.      Cervical back: Normal range of motion and neck supple.   Lymphadenopathy:      Cervical: No cervical adenopathy.   Skin:     General: Skin is warm and dry.      Findings: No rash.   Neurological:      Mental Status: He is alert and oriented to person, place, and time.      Cranial Nerves: No cranial nerve deficit.      Gait: Gait normal.   Psychiatric:         Mood and Affect: Mood and affect normal.         Assessment:       1. Routine general medical examination at a health care facility    2. Type 2 diabetes mellitus with stage 3a chronic kidney disease, with long-term current use of insulin    3. Hyperlipidemia associated with type 2 diabetes mellitus    4. Hypertension associated with diabetes    5. Elevated LFTs    6. Colon cancer screening        Plan:   1. Routine general medical examination at a health care facility    2. Type 2 diabetes mellitus with stage 3a chronic kidney disease, with long-term current use of insulin  Assessment & Plan:  Slightly above goal, continue current medications and monitoring with diabetes GLADIS    Lab Results   Component Value Date    HGBA1C 7.2 (H) 09/06/2024    HGBA1C 5.8 (H) 03/01/2024    LDLCALC 80.2 09/06/2024    LABMICR 7.0  03/01/2024    CREATRANDUR 26.8 03/27/2024    MICALBCREAT 11.5 03/01/2024         Orders:  -     Hemoglobin A1C; Future; Expected date: 03/16/2025    3. Hyperlipidemia associated with type 2 diabetes mellitus  Assessment & Plan:  LDL continuing to improve, continue atorvastatin and monitor    Lab Results   Component Value Date    CHOL 159 09/06/2024    LDLCALC 80.2 09/06/2024    TRIG 79 09/06/2024    HDL 63 09/06/2024    ALT 46 (H) 09/06/2024    AST 42 (H) 09/06/2024    ALKPHOS 58 09/06/2024         Orders:  -     Comprehensive Metabolic Panel; Future; Expected date: 03/16/2025  -     Lipid Panel; Future; Expected date: 03/16/2025    4. Hypertension associated with diabetes  Assessment & Plan:  Controlled, continue losartan-hydrochlorothiazide and nebivolol    BP Readings from Last 1 Encounters:   09/16/24 136/88           5. Elevated LFTs  Assessment & Plan:  Mild, improving, will monitor overtime      6. Colon cancer screening  -     Ambulatory referral/consult to Endo Procedure ; Future; Expected date: 09/17/2024       Message sent to Dr. Preciado's staff regarding vision issues since surgery to assist patient with follow up; if not contacted, advise the patient to reach out to Dr. Preciado directly or inform this office.    Patient expressed understanding and agreement with plan.    Visit today included increased complexity associated with the care of the episodic problem diabetes, which was addressed while instituting co-management of the longitudinal care of the patient due to the serious and/or complex managed problem(s) .    I have evaluated and discussed management associated with medical care services that serve as the continuing focal point for all needed health care services and/or with medical care services that are part of ongoing care related to my patient's single, serious condition or a complex condition(s).    I am providing ongoing care and I am the primary care provider for this patient, and  they are being managed, monitored, and/or observed for their chronic conditions over time.     I have addressed their ongoing health maintenance requirements and needs for all health care services and reviewed co-management plans provided by specialty providers when available.    Health Maintenance Due   Topic Date Due    RSV Vaccine (Age 60+ and Pregnant patients) (1 - 1-dose 60+ series) Never done    Colorectal Cancer Screening  08/28/2024    Influenza Vaccine (1) 09/01/2024    COVID-19 Vaccine (6 - 2023-24 season) 09/01/2024     Health Maintenance reviewed/updated.    Follow up in about 6 months (around 3/16/2025), or if symptoms worsen or fail to improve, for EP/f/u 6 month with Mikki BEAR, labs PTA.    This note was generated with the assistance of ambient listening technology. Verbal consent was obtained by the patient and accompanying visitor(s) for the recording of patient appointment to facilitate this note. I attest to having reviewed and edited the generated note for accuracy, though some syntax or spelling errors may persist. Please contact the author of this note for any clarification.

## 2024-09-16 NOTE — Clinical Note
Patient reporting seeing spots since cataract surgery several months ago. He reports he has not notified or followed up with Dr. Preciado. Please contact him to advise and arrange follow up. Thank you!

## 2024-09-16 NOTE — ASSESSMENT & PLAN NOTE
Controlled, continue losartan-hydrochlorothiazide and nebivolol    BP Readings from Last 1 Encounters:   09/16/24 136/88

## 2024-09-30 ENCOUNTER — EXTERNAL CHRONIC CARE MANAGEMENT (OUTPATIENT)
Dept: PRIMARY CARE CLINIC | Facility: CLINIC | Age: 74
End: 2024-09-30
Payer: MEDICARE

## 2024-09-30 PROCEDURE — 99490 CHRNC CARE MGMT STAFF 1ST 20: CPT | Mod: S$GLB,,, | Performed by: FAMILY MEDICINE

## 2024-09-30 PROCEDURE — 99439 CHRNC CARE MGMT STAF EA ADDL: CPT | Mod: S$GLB,,, | Performed by: FAMILY MEDICINE

## 2024-10-01 ENCOUNTER — OFFICE VISIT (OUTPATIENT)
Dept: DIABETES | Facility: CLINIC | Age: 74
End: 2024-10-01
Payer: MEDICARE

## 2024-10-01 VITALS
WEIGHT: 216.06 LBS | BODY MASS INDEX: 26.86 KG/M2 | HEIGHT: 75 IN | HEART RATE: 67 BPM | SYSTOLIC BLOOD PRESSURE: 173 MMHG | DIASTOLIC BLOOD PRESSURE: 86 MMHG

## 2024-10-01 DIAGNOSIS — E11.69 HYPERLIPIDEMIA ASSOCIATED WITH TYPE 2 DIABETES MELLITUS: ICD-10-CM

## 2024-10-01 DIAGNOSIS — K21.9 GASTROESOPHAGEAL REFLUX DISEASE WITHOUT ESOPHAGITIS: ICD-10-CM

## 2024-10-01 DIAGNOSIS — I15.2 HYPERTENSION ASSOCIATED WITH DIABETES: ICD-10-CM

## 2024-10-01 DIAGNOSIS — I70.0 ATHEROSCLEROSIS OF AORTA: ICD-10-CM

## 2024-10-01 DIAGNOSIS — Z79.4 TYPE 2 DIABETES MELLITUS WITHOUT COMPLICATION, WITH LONG-TERM CURRENT USE OF INSULIN: Primary | ICD-10-CM

## 2024-10-01 DIAGNOSIS — E11.59 HYPERTENSION ASSOCIATED WITH DIABETES: ICD-10-CM

## 2024-10-01 DIAGNOSIS — E04.1 THYROID NODULE: ICD-10-CM

## 2024-10-01 DIAGNOSIS — E11.9 TYPE 2 DIABETES MELLITUS WITHOUT COMPLICATION, WITH LONG-TERM CURRENT USE OF INSULIN: Primary | ICD-10-CM

## 2024-10-01 DIAGNOSIS — E78.5 HYPERLIPIDEMIA ASSOCIATED WITH TYPE 2 DIABETES MELLITUS: ICD-10-CM

## 2024-10-01 LAB — GLUCOSE SERPL-MCNC: 225 MG/DL (ref 70–110)

## 2024-10-01 PROCEDURE — 1160F RVW MEDS BY RX/DR IN RCRD: CPT | Mod: HCNC,CPTII,S$GLB, | Performed by: NURSE PRACTITIONER

## 2024-10-01 PROCEDURE — 3061F NEG MICROALBUMINURIA REV: CPT | Mod: HCNC,CPTII,S$GLB, | Performed by: NURSE PRACTITIONER

## 2024-10-01 PROCEDURE — 3008F BODY MASS INDEX DOCD: CPT | Mod: HCNC,CPTII,S$GLB, | Performed by: NURSE PRACTITIONER

## 2024-10-01 PROCEDURE — 3066F NEPHROPATHY DOC TX: CPT | Mod: HCNC,CPTII,S$GLB, | Performed by: NURSE PRACTITIONER

## 2024-10-01 PROCEDURE — 99999 PR PBB SHADOW E&M-EST. PATIENT-LVL III: CPT | Mod: PBBFAC,HCNC,, | Performed by: NURSE PRACTITIONER

## 2024-10-01 PROCEDURE — 82962 GLUCOSE BLOOD TEST: CPT | Mod: HCNC,S$GLB,, | Performed by: NURSE PRACTITIONER

## 2024-10-01 PROCEDURE — 1101F PT FALLS ASSESS-DOCD LE1/YR: CPT | Mod: HCNC,CPTII,S$GLB, | Performed by: NURSE PRACTITIONER

## 2024-10-01 PROCEDURE — 3288F FALL RISK ASSESSMENT DOCD: CPT | Mod: HCNC,CPTII,S$GLB, | Performed by: NURSE PRACTITIONER

## 2024-10-01 PROCEDURE — 99214 OFFICE O/P EST MOD 30 MIN: CPT | Mod: HCNC,S$GLB,, | Performed by: NURSE PRACTITIONER

## 2024-10-01 PROCEDURE — 1159F MED LIST DOCD IN RCRD: CPT | Mod: HCNC,CPTII,S$GLB, | Performed by: NURSE PRACTITIONER

## 2024-10-01 PROCEDURE — 3077F SYST BP >= 140 MM HG: CPT | Mod: HCNC,CPTII,S$GLB, | Performed by: NURSE PRACTITIONER

## 2024-10-01 PROCEDURE — 3079F DIAST BP 80-89 MM HG: CPT | Mod: HCNC,CPTII,S$GLB, | Performed by: NURSE PRACTITIONER

## 2024-10-01 PROCEDURE — 3051F HG A1C>EQUAL 7.0%<8.0%: CPT | Mod: HCNC,CPTII,S$GLB, | Performed by: NURSE PRACTITIONER

## 2024-10-01 NOTE — PROGRESS NOTES
Subjective:         Patient ID: Ulysses Boreaux is a 74 y.o. male.  Patient's current PCP is Elza Pantoja MD.     Chief Complaint: Diabetes Mellitus    HPI  Ulysses Boreaux is a 74 y.o. Black or  male presenting for a follow up for diabetes. Patient has been diagnosed with type 2 diabetes since 2005 .  Received diabetes education: Yes    CURRENT DM MEDICATIONS:   Diabetes Medications               empagliflozin (JARDIANCE) 10 mg tablet Take 1 tablet (10 mg total) by mouth every morning.    insulin NPH/Reg human (NOVOLIN 70-30 FLEXPEN U-100) 100 unit/mL (70-30) InPn pen Inject 21 units before breakfast and 26 units before supper.    NOVOLIN 70/30 U-100 INSULIN 100 unit/mL (70-30) injection INJECT 16 UNITS UNDER THE SKIN EVERY MORNING AND 20 UNITS EVERY EVENING. MAY TITRATE UP TO A MAX DOSE OF 50 UNITS PER DAY     Past failed treatment include: Januvia-failure to control diabetes; Trulicity-excessive weight loss/GI side effects      Blood glucose testing is performed regularly. Patient is testing 1 time per day,fasting.  Declines CGM  Meter: Did not bring to appt  Preferred lab: [facility]    Any episodes of hypoglycemia? no     Complications related to diabetes: none    His blood sugar in the clinic today was:   Lab Results   Component Value Date    POCGLU 225 (A) 10/01/2024     Ulysses Boreaux presents today for follow up visit to discuss diabetes management.     At last visit, it was recommended to stop higher dose of Trulicity and re-start lower dose, however he prefers to remain off treatment. He states he is just now getting his appetite back and taste for food. He is checking once per day,fasting. Advised to begin checking a glucose 2 hours after a meal and to vary the reading.     He prefers to continue with vial and syringe method over pen for injections.    AM readings: 130-140 mg/dL range    Recently with A1c worsening - now 7.2%. We discussed increasing insulin doses today.     Reports  "some erectile dysfunction - recently, states since Pcp added second cholesterol medication. Declines referral to see urology at this time.     Lab Results   Component Value Date    HGBA1C 7.2 (H) 09/06/2024    HGBA1C 5.8 (H) 03/01/2024    HGBA1C 5.6 09/05/2023     STANDARDS OF CARE  Diabetes Management Status    Statin: Taking  ACE/ARB: Taking    Screening or Prevention Patient's value Goal Complete/Controlled?   HgA1C Testing and Control   Lab Results   Component Value Date    HGBA1C 7.2 (H) 09/06/2024      Annually/Less than 8% Yes   Lipid profile : 09/06/2024 Annually Yes   LDL control Lab Results   Component Value Date    LDLCALC 80.2 09/06/2024    Annually/Less than 100 mg/dl  Yes   Nephropathy screening Lab Results   Component Value Date    LABMICR 7.0 03/01/2024     No results found for: "PROTEINUA"  No results found for: "UTPCR"   Annually Yes   Blood pressure BP Readings from Last 1 Encounters:   10/01/24 (!) 173/86    Less than 140/90 No   Dilated retinal exam : 05/28/2024 Annually Yes   Foot exam   : 03/12/2024 Annually Yes      Labs reviewed and are noted below.    Lab Results   Component Value Date    WBC 5.17 09/06/2024    HGB 14.6 09/06/2024    HCT 45.1 09/06/2024     09/06/2024    CHOL 159 09/06/2024    TRIG 79 09/06/2024    HDL 63 09/06/2024    LDLCALC 80.2 09/06/2024    ALT 46 (H) 09/06/2024    AST 42 (H) 09/06/2024     09/06/2024    K 4.6 09/06/2024     09/06/2024    ANIONGAP 13 09/06/2024    CREATININE 1.3 09/06/2024    ESTGFRAFRICA >60.0 02/21/2022    EGFRNONAA 55.7 (A) 02/21/2022    BUN 12 09/06/2024    CO2 26 09/06/2024    TSH 0.917 09/06/2024    INR 1.0 02/02/2021     (H) 09/06/2024     Lab Results   Component Value Date    GLUTAMICACID 0.00 10/20/2017    CPEPTIDE 1.33 10/20/2017    T3FREE 3.7 12/09/2020    FREET4 0.96 08/19/2021    TSH 0.917 09/06/2024    THYROPEROXID <6.0 03/26/2018    THYGLBTUM 20 (H) 03/26/2018    THGABSCRN <1.8 03/26/2018    IRON 151 02/17/2015 " "   TIBC 377 2015    FERRITIN 148 2014    CALCIUM 9.7 2024     Lab Results   Component Value Date    CPEPTIDE 1.33 10/20/2017     Lab Results   Component Value Date    GLUTAMICACID 0.00 10/20/2017     Glucose   Date Value Ref Range Status   2024 171 (H) 70 - 110 mg/dL Final     Anion Gap   Date Value Ref Range Status   2024 13 8 - 16 mmol/L Final     eGFR if    Date Value Ref Range Status   2022 >60.0 >60 mL/min/1.73 m^2 Final     eGFR if non    Date Value Ref Range Status   2022 55.7 (A) >60 mL/min/1.73 m^2 Final     Comment:     Calculation used to obtain the estimated glomerular filtration  rate (eGFR) is the CKD-EPI equation.          The following portions of the patient's history were reviewed and updated as appropriate: allergies, current medications, past family history, past medical history, past social history, past surgical history, and problem list.    Review of patient's allergies indicates:  No Known Allergies  Social History     Socioeconomic History    Marital status:     Number of children: 2    Highest education level: 11th grade   Occupational History    Occupation: retired   Tobacco Use    Smoking status: Former     Current packs/day: 0.00     Types: Cigarettes     Quit date: 1972     Years since quittin.6     Passive exposure: Past    Smokeless tobacco: Never   Substance and Sexual Activity    Alcohol use: Yes     Alcohol/week: 0.0 standard drinks of alcohol     Comment: " Every blue moon"    Drug use: No    Sexual activity: Yes     Partners: Female   Social History Narrative    . Has 2 children. Patient disabled- was .      Social Drivers of Health     Financial Resource Strain: Low Risk  (2021)    Overall Financial Resource Strain (CARDIA)     Difficulty of Paying Living Expenses: Not very hard   Food Insecurity: Food Insecurity Present (2021)    Hunger Vital Sign     Worried " About Running Out of Food in the Last Year: Sometimes true     Ran Out of Food in the Last Year: Never true   Transportation Needs: No Transportation Needs (1/12/2021)    PRAPARE - Transportation     Lack of Transportation (Medical): No     Lack of Transportation (Non-Medical): No   Physical Activity: Insufficiently Active (1/12/2021)    Exercise Vital Sign     Days of Exercise per Week: 2 days     Minutes of Exercise per Session: 10 min   Stress: No Stress Concern Present (1/12/2021)    [employer] - Occupational Stress Questionnaire     Feeling of Stress : Not at all   Housing Stability: Low Risk  (1/12/2021)    Housing Stability Vital Sign     Unable to Pay for Housing in the Last Year: No     Number of Places Lived in the Last Year: 1     Unstable Housing in the Last Year: No     Past Medical History:   Diagnosis Date    Allergy     Amputation of finger of left hand     all fingers except for thumb    Cataract     OS NGCL     Chronic pain due to trauma     traumatic amputations left hand    Diabetes mellitus, type 2     History of hepatitis C 02/13/2014    tx'd w/ Js 2018    Hyperlipidemia     Hypertension     Hyperthyroidism 10/10/2019    Refractive error      REVIEW OF SYSTEMS:  Eyes No history of DR. H/o cataract.  Cardiovascular: History of HTN and HLD.  GI: No pancreatitis or gastroparesis.   : No CKD.  Neuro: No neuropathy.  PSYCH: No tobacco use. Former smoker.  ENDO: See HPI.        Objective:      Vitals:    10/01/24 1010   BP: (!) 173/86   Pulse: 67     RESPIRATORY: No respiratory distress  NEUROLOGIC: Cranial nerves II-XII grossly intact.   PSYCHIATRIC: Alert & oriented x3. Normal mood and affect.  FOOT EXAMINATION: UTD    Assessment:       1. Type 2 diabetes mellitus without complication, with long-term current use of insulin    2. Hypertension associated with diabetes    3. Hyperlipidemia associated with type 2 diabetes mellitus    4. Atherosclerosis of aorta   "  5. Gastroesophageal reflux disease without esophagitis    6. Thyroid nodule        Plan:   Ulysses was seen today for diabetes mellitus.    Diagnoses and all orders for this visit:    Type 2 diabetes mellitus without complication, with long-term current use of insulin  -     POCT Glucose, Hand-Held Device  -     Basic Metabolic Panel; Future  -     Hemoglobin A1C; Future  -     insulin NPH-insulin regular, 70/30, (NOVOLIN 70/30 U-100 INSULIN) 100 unit/mL (70-30) injection; Inject 23 units before breakfast, and 28 units before evening meal.    Medication Regimen:   Increase Novolin 70/30, 23 units in the morning and 28 units in the evening  Continue Jardiance 10 mg every morning      Hypertension associated with diabetes    Hyperlipidemia associated with type 2 diabetes mellitus    Atherosclerosis of aorta    Gastroesophageal reflux disease without esophagitis    Thyroid nodule      - Follow up: 3 months      Portions of this note may have been created with voice recognition software. Occasional "wrong-word" or "sound-a-like" substitutions may have occurred due to the inherent limitations of voice recognition software. Please, read the note carefully and recognize, using context, where substitutions have occurred.           LINDA SanchezC, BC-ADM  [facility]  "

## 2024-10-01 NOTE — PATIENT INSTRUCTIONS
Medication Regimen:   Increase Novolin 70/30, 23 units in the morning and 28 units in the evening  Continue Jardiance 10 mg every morning    Patient Instructions:  Carbohydrate Count: 30-45 grams/meal and 15 grams/snack with limit of 2 snacks per day.  Exercise: Goal is 150 minutes or more per week.  Bring meter and blood sugar log to each appointment.     Goals for Blood Sugar:  Fastin-130 mg/dl  2 hours after meal:  mg/dl  Before Bed: 100-150 mg/dl  If Blood sugar is below 70 mg/dl, DO NOT take diabetes medication. Eat first and then recheck blood sugar in 20 minutes.  Foods to eat if blood sugar is below 70 mg/dl  1/2 glass of orange juice   1/2 regular cola can   3-4 hard candies   3-4 small glucose tabs.   Foods to eat if blood sugar is below 50 mg/dl  1 glass of orange juice  1 regular cola can  8 hard candies  8 small glucose tabs.   If you have repeated eating/blood sugar recheck process 2 times and blood sugar still below 70 mg/dl, call 911.

## 2024-10-08 DIAGNOSIS — K21.9 GASTROESOPHAGEAL REFLUX DISEASE WITHOUT ESOPHAGITIS: ICD-10-CM

## 2024-10-08 RX ORDER — PANTOPRAZOLE SODIUM 20 MG/1
20 TABLET, DELAYED RELEASE ORAL
Qty: 90 TABLET | Refills: 3 | Status: SHIPPED | OUTPATIENT
Start: 2024-10-08

## 2024-10-08 NOTE — TELEPHONE ENCOUNTER
No care due was identified.  Health [facility] Embedded Care Due Messages. Reference number: [id number].   10/08/2024 2:34:40 PM CDT

## 2024-10-09 NOTE — TELEPHONE ENCOUNTER
----- Message from Katerina sent at 10/9/2024  1:04 PM CDT -----  Name of Caller:.Ulysses Boreaux   Best Call Back Number:.[phone]    Additional Information: Patient stated some of his medication was called in after his last appointment and he is requesting a call back at the number listed above.

## 2024-10-09 NOTE — TELEPHONE ENCOUNTER
Refill Decision Note   Ulysses Boreaux  is requesting a refill authorization.  Brief Assessment and Rationale for Refill:  Approve     Medication Therapy Plan:        Comments:     Note composed:11:42 PM 10/08/2024

## 2024-10-31 ENCOUNTER — EXTERNAL CHRONIC CARE MANAGEMENT (OUTPATIENT)
Dept: PRIMARY CARE CLINIC | Facility: CLINIC | Age: 74
End: 2024-10-31
Payer: MEDICARE

## 2024-10-31 PROCEDURE — 99490 CHRNC CARE MGMT STAFF 1ST 20: CPT | Mod: S$GLB,,, | Performed by: FAMILY MEDICINE

## 2024-10-31 PROCEDURE — 99439 CHRNC CARE MGMT STAF EA ADDL: CPT | Mod: S$GLB,,, | Performed by: FAMILY MEDICINE

## 2024-11-13 ENCOUNTER — OFFICE VISIT (OUTPATIENT)
Dept: PAIN MEDICINE | Facility: CLINIC | Age: 74
End: 2024-11-13
Payer: MEDICARE

## 2024-11-13 VITALS
RESPIRATION RATE: 17 BRPM | WEIGHT: 215.38 LBS | DIASTOLIC BLOOD PRESSURE: 80 MMHG | SYSTOLIC BLOOD PRESSURE: 156 MMHG | HEIGHT: 75 IN | HEART RATE: 58 BPM | BODY MASS INDEX: 26.78 KG/M2

## 2024-11-13 DIAGNOSIS — Z79.891 OPIOID USE AGREEMENT EXISTS: ICD-10-CM

## 2024-11-13 DIAGNOSIS — S68.119A TRAUMATIC AMPUTATION OF DIGIT OF ONE HAND WITHOUT COMPLICATION: Primary | ICD-10-CM

## 2024-11-13 DIAGNOSIS — G89.4 CHRONIC PAIN DISORDER: ICD-10-CM

## 2024-11-13 DIAGNOSIS — G89.21 CHRONIC PAIN DUE TO TRAUMA: ICD-10-CM

## 2024-11-13 DIAGNOSIS — M79.642 PAIN OF LEFT HAND: ICD-10-CM

## 2024-11-13 DIAGNOSIS — S68.119A TRAUMATIC AMPUTATION OF DIGIT OF ONE HAND WITHOUT COMPLICATION: ICD-10-CM

## 2024-11-13 PROCEDURE — 1101F PT FALLS ASSESS-DOCD LE1/YR: CPT | Mod: HCNC,CPTII,S$GLB, | Performed by: PHYSICIAN ASSISTANT

## 2024-11-13 PROCEDURE — 3079F DIAST BP 80-89 MM HG: CPT | Mod: HCNC,CPTII,S$GLB, | Performed by: PHYSICIAN ASSISTANT

## 2024-11-13 PROCEDURE — 3008F BODY MASS INDEX DOCD: CPT | Mod: HCNC,CPTII,S$GLB, | Performed by: PHYSICIAN ASSISTANT

## 2024-11-13 PROCEDURE — 99999 PR PBB SHADOW E&M-EST. PATIENT-LVL IV: CPT | Mod: PBBFAC,HCNC,, | Performed by: PHYSICIAN ASSISTANT

## 2024-11-13 PROCEDURE — 3061F NEG MICROALBUMINURIA REV: CPT | Mod: HCNC,CPTII,S$GLB, | Performed by: PHYSICIAN ASSISTANT

## 2024-11-13 PROCEDURE — 80355 GABAPENTIN NON-BLOOD: CPT | Mod: HCNC | Performed by: PHYSICIAN ASSISTANT

## 2024-11-13 PROCEDURE — 3066F NEPHROPATHY DOC TX: CPT | Mod: HCNC,CPTII,S$GLB, | Performed by: PHYSICIAN ASSISTANT

## 2024-11-13 PROCEDURE — 3077F SYST BP >= 140 MM HG: CPT | Mod: HCNC,CPTII,S$GLB, | Performed by: PHYSICIAN ASSISTANT

## 2024-11-13 PROCEDURE — 99214 OFFICE O/P EST MOD 30 MIN: CPT | Mod: HCNC,S$GLB,, | Performed by: PHYSICIAN ASSISTANT

## 2024-11-13 PROCEDURE — 1125F AMNT PAIN NOTED PAIN PRSNT: CPT | Mod: HCNC,CPTII,S$GLB, | Performed by: PHYSICIAN ASSISTANT

## 2024-11-13 PROCEDURE — 1159F MED LIST DOCD IN RCRD: CPT | Mod: HCNC,CPTII,S$GLB, | Performed by: PHYSICIAN ASSISTANT

## 2024-11-13 PROCEDURE — 3051F HG A1C>EQUAL 7.0%<8.0%: CPT | Mod: HCNC,CPTII,S$GLB, | Performed by: PHYSICIAN ASSISTANT

## 2024-11-13 PROCEDURE — 3288F FALL RISK ASSESSMENT DOCD: CPT | Mod: HCNC,CPTII,S$GLB, | Performed by: PHYSICIAN ASSISTANT

## 2024-11-13 PROCEDURE — 1160F RVW MEDS BY RX/DR IN RCRD: CPT | Mod: HCNC,CPTII,S$GLB, | Performed by: PHYSICIAN ASSISTANT

## 2024-11-13 RX ORDER — HYDROCODONE BITARTRATE AND ACETAMINOPHEN 10; 325 MG/1; MG/1
1 TABLET ORAL EVERY 12 HOURS PRN
Qty: 60 TABLET | Refills: 0 | Status: SHIPPED | OUTPATIENT
Start: 2024-11-25

## 2024-11-13 RX ORDER — HYDROCODONE BITARTRATE AND ACETAMINOPHEN 10; 325 MG/1; MG/1
1 TABLET ORAL EVERY 12 HOURS PRN
Qty: 60 TABLET | Refills: 0 | Status: SHIPPED | OUTPATIENT
Start: 2024-12-24

## 2024-11-13 RX ORDER — HYDROCODONE BITARTRATE AND ACETAMINOPHEN 10; 325 MG/1; MG/1
1 TABLET ORAL EVERY 12 HOURS PRN
Qty: 60 TABLET | Refills: 0 | Status: SHIPPED | OUTPATIENT
Start: 2025-01-23

## 2024-11-13 NOTE — PROGRESS NOTES
Chronic Pain -- Established Patient (Follow-up visit)    Chief complaint:  Medication Refill      Interval History (11/13/2024): Ulysses Boreaux was last seen on 8/27/2024. he presents today for follow-up for medication refill. he feels the pain medication is providing adequate pain relief and reduces the negative effects of chronic pain that affects quality of life. No major SE from medications. No major changes since previous visit; patient is stable overall. Patient reports pain as 5/10 today.     Interval HPI (3/27/2024):  Ulysses Boreaux is a 73 y.o. male presents today for follow-up medication refill.  He reports that the pain is well managed with at least 75% reduction in his pain with the use of Norco 10/325 mg.  He denies any adverse effects related to this medication as well.  Current pain intensity 0/10.  He continues to report pain of the left hand and left lower quadrant.  No new injuries or trauma.    History of Present Illness:  This patient is a 65 year old male who presents today for f/u complaining of the above noted pains. The patient describes this pain as follows.    - duration (acute, chronic): left hand pain since 1997 after table saw amputation of digits 2 through 5 at work, left lower quadrant pain since hernia surgery in 2004  - timing (constant, intermittent): Constant  - character (sharp, dull, aching, burning): Throbbing  - radiating: No  - dermatomal distribution: No  - side: Left   - aggravating factors: Nothing worsens left digit pain, left lower quadrant pain worse with exercise or walking  - relieving factors: Medication / Norco  - antecedent trauma: Amputation via skill saw and 1997, hernia repair in 2004  - prior spinal surgery: None  - pertinent negatives: No fever, No chills, No weight loss, No bladder dysfunction, No bowel dysfunction, No extremity weakness, No saddle anesthesia  - medications tried: Norco, Percocet, over-the-counter medications, compound pain cream  - other  "therapies tried (physical therapy, injections):     >> physical therapy tried in the past    >> no prior injections        Imaging/ Diagnostic Studies/ Labs (Reviewed on 11/13/2024):    7/12/2018 US ABDOMINAL AORTA  CLINICAL HISTORY:  Abnormal findings on diagnostic imaging of other specified body structures  TECHNIQUE:  Limited ultrasound was performed of the abdominal aorta, with cross sectional diameter measurements obtained.  COMPARISON:  01/10/2018  FINDINGS:  The proximal abdominal aorta measures 2.7 cm.  The mid abdominal aorta measures 1.8 cm.  The distal abdominal aorta measures 1.9 cm, slightly ectatic and unchanged from prior.  The right iliac artery measures 1.0 cm.  The left iliac artery measures 1.1 cm.  Aortoiliac atherosclerosis: Mild  Impression: Stable examination with slight distal abdominal aortic ectasia.  Negative for aneurysm.         ROS:  CONSTITUTIONAL: No fever, chills, weight loss  SKIN: No rash or itching  CARDIOVASCULAR:  No chest pain, palpitations  RESPIRATORY: No shortness of breath  GASTROINTESTINAL: No diarrhea, No constipation, abdominal pain, left lower quadrant  GENITOURINARY: No urinary incontinence    MUSCULOSKELETAL:  - patient reports pain as above, see chief complaint     NEUROLOGICAL:   - Pain as above  - Strength in lower extremities is normal  - Sensation in lower extremities is normal  - No bowel or bladder incontinence     PSYCHIATRIC: No change in mood noticed         Objective:     Physical Exam:  Vitals:    11/13/24 0929   BP: (!) 156/80   Pulse: (!) 58   Resp: 17   Weight: 97.7 kg (215 lb 6.2 oz)   Height: 6' 3" (1.905 m)   Body mass index is 26.92 kg/m².   (reviewed on 11/13/2024)     General: alert and oriented, in no apparent distress.   Gait: normal gait  Skin: No rashes, No discoloration   HEENT: EOMI  Respiratory: respirations nonlabored. No audible wheezing.  Cardiology: No obvious peripheral edema.     Musculoskeletal:  - ROM fairly preserved   - No pain " with lumbar flexion/ extension  - Left upper extremity range of motion intact throughout  - Digital stumps are hypersensitive to palpation, hypersensitivity does not extend further proximal  - No color change, no swelling, no changes in hair growth along left hand    Neuro:  - Lower extremities:      >> 5/5 strength bilaterally, throughout, symmetric  - Upper extremities:    >> 5/5 strength bilaterally  - Normal sensation    Psych: mood and affect appropriate        Assessment:     Ulysses Boreaux is a 74 y.o. male who presents with       ICD-10-CM ICD-9-CM    1. 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 Chronic pain disorder  G89.4 338.4       3. Chronic pain due to trauma  G89.21 338.21       4. Pain of left hand  M79.642 729.5       5. Opioid use agreement exists  Z79.891 V58.69 Pain Clinic Drug Screen               Plan:   - Interventional: None.    - Pharmacologic:   - Refill Norco 10/325 mg BID PRN (60 tabs) x 3 months. Will e-prescribe to fill on 11/25/2024, 12/24/2024, 01/23/2025. Patient tolerating opioids with no side effects, obtaining good pain control with functional improvement. He tolerates this medication well, and he denies any drowsiness or constipation.    - Updated opioid contract signed with Dr. Love on 10/28/20. *03/27/2024-seen in clinic by Dr. Love.    - LA  reviewed and appropriate.       - Last UDS 03/27/2024 was compliant for medications prescribed.  Will order drug screen (UDS or oral swab) today 11/13/2024 to ensure med compliance.     - Rehabilitative: Encouraged regular exercise.    - Diagnostic/ Imaging: No new imaging ordered. Previous imaging reviewed.     - Follow-up:  8-12 week medication refill - in clinic (per pt request)     Future Appointments   Date Time Provider Department Center   12/26/2024 10:05 AM LABORATORY, [facility] LAB [facility]   1/2/2025 10:00 AM Chelly Hong NP [facility] DI[facility]ETE [facility]   1/28/2025  9:00 AM  VINCENT Preciado MD [facility]   2/3/2025  9:00 AM Ysabel Garcia PA-C [facility]   3/12/2025  8:20 AM AUDREY ZAPATA [facility]   3/17/2025 11:20 AM [facility]        - Patient Questions: Answered all of the patient's questions regarding diagnosis, therapy, and treatment.    - This condition does not require this patient to take time off of work, and the primary goal of our Pain Management services is to improve the patient's functional capacity.   - I discussed the risks, benefits, and alternatives to potential treatment options. All questions and concerns were fully addressed today in clinic.         Ysabel Garcia PA-C  Interventional Pain Management - [facility]    Disclaimer:  This note was prepared using voice recognition system and is likely to have sound alike errors that may have been overlooked even after proof reading.  Please call me with any questions.       ______________________________________________________________________  >> UDS:  8-18-15 :: appropriate  12-29-15 :: appropriate  5-3-16 :: appropriate  10-18-16 :: appropriate  4/13/2017 :: appropriate  9/29/2017 :: appropriate  3/9/2018 :: appropriate  9/4/2018 :: appropriate  6/19/2019 :: appropriate  12/12/2019 :: appropriate  6/12/2020 ::  Appropriate  10/2/2020 :: Appropriate  2/24/2021 :: Appropriate  8/24/2021 :: Appropriate  12/21/2021 :: Appropriate  6/30/2022 :: Appropriate  12/22/2022 :: Appropriate   7/19/2023 :: Appropriate   03/27/2024:: Appropriate   11/13/2024 :: pending

## 2024-11-30 ENCOUNTER — EXTERNAL CHRONIC CARE MANAGEMENT (OUTPATIENT)
Dept: PRIMARY CARE CLINIC | Facility: CLINIC | Age: 74
End: 2024-11-30
Payer: MEDICARE

## 2024-11-30 PROCEDURE — 99490 CHRNC CARE MGMT STAFF 1ST 20: CPT | Mod: S$GLB,,, | Performed by: FAMILY MEDICINE

## 2024-12-11 DIAGNOSIS — J30.9 ALLERGIC RHINITIS, UNSPECIFIED SEASONALITY, UNSPECIFIED TRIGGER: ICD-10-CM

## 2024-12-11 RX ORDER — FLUTICASONE PROPIONATE 50 MCG
SPRAY, SUSPENSION (ML) NASAL
Qty: 48 G | Refills: 3 | Status: SHIPPED | OUTPATIENT
Start: 2024-12-11

## 2024-12-11 NOTE — TELEPHONE ENCOUNTER
No care due was identified.  [facility] Embedded Care Due Messages. Reference number: [id number].   12/11/2024 5:07:00 PM CST

## 2024-12-12 NOTE — TELEPHONE ENCOUNTER
Refill Decision Note   Ulysses Boreaux  is requesting a refill authorization.  Brief Assessment and Rationale for Refill:  Approve     Medication Therapy Plan:         Comments:     Note composed:7:24 PM 12/11/2024

## 2024-12-19 DIAGNOSIS — I15.2 HYPERTENSION ASSOCIATED WITH DIABETES: ICD-10-CM

## 2024-12-19 DIAGNOSIS — E11.59 HYPERTENSION ASSOCIATED WITH DIABETES: ICD-10-CM

## 2024-12-19 RX ORDER — NEBIVOLOL 20 MG/1
1 TABLET ORAL DAILY
Qty: 90 TABLET | Refills: 2 | Status: SHIPPED | OUTPATIENT
Start: 2024-12-19

## 2024-12-19 NOTE — TELEPHONE ENCOUNTER
Refill Routing Note   Medication(s) are not appropriate for processing by [facility] for the following reason(s):        Required vitals abnormal    [facility] action(s):  Defer             Appointments  past 12m or future 3m with PCP    Date Provider   Last Visit   9/16/2024 Elza Pantoja MD   Next Visit   Visit date not found Elza Pantoja MD   ED visits in past 90 days: 0        Note composed:2:59 PM 12/19/2024

## 2024-12-19 NOTE — TELEPHONE ENCOUNTER
No care due was identified.  [facility] Embedded Care Due Messages. Reference number: [id number].   12/19/2024 6:03:28 AM CST

## 2024-12-26 ENCOUNTER — LAB VISIT (OUTPATIENT)
Dept: LAB | Facility: HOSPITAL | Age: 74
End: 2024-12-26
Attending: NURSE PRACTITIONER
Payer: MEDICARE

## 2024-12-26 DIAGNOSIS — Z79.4 TYPE 2 DIABETES MELLITUS WITHOUT COMPLICATION, WITH LONG-TERM CURRENT USE OF INSULIN: ICD-10-CM

## 2024-12-26 DIAGNOSIS — E11.9 TYPE 2 DIABETES MELLITUS WITHOUT COMPLICATION, WITH LONG-TERM CURRENT USE OF INSULIN: ICD-10-CM

## 2024-12-26 LAB
ANION GAP SERPL CALC-SCNC: 9 MMOL/L (ref 8–16)
BUN SERPL-MCNC: 17 MG/DL (ref 8–23)
CALCIUM SERPL-MCNC: 9.6 MG/DL (ref 8.7–10.5)
CHLORIDE SERPL-SCNC: 103 MMOL/L (ref 95–110)
CO2 SERPL-SCNC: 27 MMOL/L (ref 23–29)
CREAT SERPL-MCNC: 1.4 MG/DL (ref 0.5–1.4)
EST. GFR  (NO RACE VARIABLE): 53.4 ML/MIN/1.73 M^2
ESTIMATED AVG GLUCOSE: 169 MG/DL (ref 68–131)
GLUCOSE SERPL-MCNC: 226 MG/DL (ref 70–110)
HBA1C MFR BLD: 7.5 % (ref 4–5.6)
POTASSIUM SERPL-SCNC: 4.6 MMOL/L (ref 3.5–5.1)
SODIUM SERPL-SCNC: 139 MMOL/L (ref 136–145)

## 2024-12-26 PROCEDURE — 83036 HEMOGLOBIN GLYCOSYLATED A1C: CPT | Mod: HCNC | Performed by: NURSE PRACTITIONER

## 2024-12-26 PROCEDURE — 80048 BASIC METABOLIC PNL TOTAL CA: CPT | Mod: HCNC | Performed by: NURSE PRACTITIONER

## 2024-12-26 PROCEDURE — 36415 COLL VENOUS BLD VENIPUNCTURE: CPT | Mod: HCNC | Performed by: NURSE PRACTITIONER

## 2024-12-31 ENCOUNTER — TELEPHONE (OUTPATIENT)
Dept: DIABETES | Facility: CLINIC | Age: 74
End: 2024-12-31
Payer: MEDICARE

## 2024-12-31 ENCOUNTER — EXTERNAL CHRONIC CARE MANAGEMENT (OUTPATIENT)
Dept: PRIMARY CARE CLINIC | Facility: CLINIC | Age: 74
End: 2024-12-31
Payer: MEDICARE

## 2024-12-31 PROCEDURE — 99490 CHRNC CARE MGMT STAFF 1ST 20: CPT | Mod: S$GLB,,, | Performed by: FAMILY MEDICINE

## 2024-12-31 NOTE — TELEPHONE ENCOUNTER
Incoming refill request for Novolin Insuln 70/30 Human received, medication written on 10/01/2024 with 5 refills

## 2025-01-02 ENCOUNTER — OFFICE VISIT (OUTPATIENT)
Dept: DIABETES | Facility: CLINIC | Age: 75
End: 2025-01-02
Payer: MEDICARE

## 2025-01-02 VITALS
DIASTOLIC BLOOD PRESSURE: 83 MMHG | BODY MASS INDEX: 26.73 KG/M2 | SYSTOLIC BLOOD PRESSURE: 170 MMHG | HEART RATE: 68 BPM | WEIGHT: 214.94 LBS | HEIGHT: 75 IN

## 2025-01-02 DIAGNOSIS — Z79.4 TYPE 2 DIABETES MELLITUS WITHOUT COMPLICATION, WITH LONG-TERM CURRENT USE OF INSULIN: Primary | ICD-10-CM

## 2025-01-02 DIAGNOSIS — E78.5 HYPERLIPIDEMIA ASSOCIATED WITH TYPE 2 DIABETES MELLITUS: ICD-10-CM

## 2025-01-02 DIAGNOSIS — E11.9 TYPE 2 DIABETES MELLITUS WITHOUT COMPLICATION, WITH LONG-TERM CURRENT USE OF INSULIN: Primary | ICD-10-CM

## 2025-01-02 DIAGNOSIS — I15.2 HYPERTENSION ASSOCIATED WITH DIABETES: ICD-10-CM

## 2025-01-02 DIAGNOSIS — E11.59 HYPERTENSION ASSOCIATED WITH DIABETES: ICD-10-CM

## 2025-01-02 DIAGNOSIS — K21.9 GASTROESOPHAGEAL REFLUX DISEASE WITHOUT ESOPHAGITIS: ICD-10-CM

## 2025-01-02 DIAGNOSIS — E11.69 HYPERLIPIDEMIA ASSOCIATED WITH TYPE 2 DIABETES MELLITUS: ICD-10-CM

## 2025-01-02 DIAGNOSIS — E04.1 THYROID NODULE: ICD-10-CM

## 2025-01-02 DIAGNOSIS — I70.0 ATHEROSCLEROSIS OF AORTA: ICD-10-CM

## 2025-01-02 LAB — GLUCOSE SERPL-MCNC: 210 MG/DL (ref 70–110)

## 2025-01-02 PROCEDURE — 82962 GLUCOSE BLOOD TEST: CPT | Mod: HCNC,S$GLB,, | Performed by: NURSE PRACTITIONER

## 2025-01-02 PROCEDURE — 3077F SYST BP >= 140 MM HG: CPT | Mod: HCNC,CPTII,S$GLB, | Performed by: NURSE PRACTITIONER

## 2025-01-02 PROCEDURE — 3008F BODY MASS INDEX DOCD: CPT | Mod: HCNC,CPTII,S$GLB, | Performed by: NURSE PRACTITIONER

## 2025-01-02 PROCEDURE — G2211 COMPLEX E/M VISIT ADD ON: HCPCS | Mod: HCNC,S$GLB,, | Performed by: NURSE PRACTITIONER

## 2025-01-02 PROCEDURE — 99999 PR PBB SHADOW E&M-EST. PATIENT-LVL IV: CPT | Mod: PBBFAC,HCNC,, | Performed by: NURSE PRACTITIONER

## 2025-01-02 PROCEDURE — 99214 OFFICE O/P EST MOD 30 MIN: CPT | Mod: HCNC,S$GLB,, | Performed by: NURSE PRACTITIONER

## 2025-01-02 PROCEDURE — 1101F PT FALLS ASSESS-DOCD LE1/YR: CPT | Mod: HCNC,CPTII,S$GLB, | Performed by: NURSE PRACTITIONER

## 2025-01-02 PROCEDURE — 1160F RVW MEDS BY RX/DR IN RCRD: CPT | Mod: HCNC,CPTII,S$GLB, | Performed by: NURSE PRACTITIONER

## 2025-01-02 PROCEDURE — 3288F FALL RISK ASSESSMENT DOCD: CPT | Mod: HCNC,CPTII,S$GLB, | Performed by: NURSE PRACTITIONER

## 2025-01-02 PROCEDURE — 1159F MED LIST DOCD IN RCRD: CPT | Mod: HCNC,CPTII,S$GLB, | Performed by: NURSE PRACTITIONER

## 2025-01-02 PROCEDURE — 1126F AMNT PAIN NOTED NONE PRSNT: CPT | Mod: HCNC,CPTII,S$GLB, | Performed by: NURSE PRACTITIONER

## 2025-01-02 PROCEDURE — 3079F DIAST BP 80-89 MM HG: CPT | Mod: HCNC,CPTII,S$GLB, | Performed by: NURSE PRACTITIONER

## 2025-01-02 NOTE — PATIENT INSTRUCTIONS
Medication Regimen:   Increase Novolin 70/30, 26 units in the morning and continue 28 units in the evening  Continue Jardiance 10 mg every morning    Patient Instructions:  Carbohydrate Count: 30-45 grams/meal and 15 grams/snack with limit of 2 snacks per day.  Exercise: Goal is 150 minutes or more per week.  Bring meter and blood sugar log to each appointment.     Goals for Blood Sugar:  Fastin-130 mg/dl  2 hours after meal:  mg/dl  Before Bed: 100-150 mg/dl  If Blood sugar is below 70 mg/dl, DO NOT take diabetes medication. Eat first and then recheck blood sugar in 20 minutes.  Foods to eat if blood sugar is below 70 mg/dl  1/2 glass of orange juice   1/2 regular cola can   3-4 hard candies   3-4 small glucose tabs.   Foods to eat if blood sugar is below 50 mg/dl  1 glass of orange juice  1 regular cola can  8 hard candies  8 small glucose tabs.   If you have repeated eating/blood sugar recheck process 2 times and blood sugar still below 70 mg/dl, call 911.                                                         
stated

## 2025-01-02 NOTE — PROGRESS NOTES
Subjective:         Patient ID: Ulysses Boreaux is a 74 y.o. male.  Patient's current PCP is Elza Pantoja MD.     Chief Complaint: Diabetes Mellitus    HPI  Ulysses Boreaux is a 74 y.o. Black or  male presenting for a follow up for diabetes. Patient has been diagnosed with type 2 diabetes since 2005 .  Received diabetes education: Yes    CURRENT DM MEDICATIONS:   Diabetes Medications               empagliflozin (JARDIANCE) 10 mg tablet Take 1 tablet (10 mg total) by mouth every morning.    insulin NPH-insulin regular, 70/30, (NOVOLIN 70/30 U-100 INSULIN) 100 unit/mL (70-30) injection Inject 23 units before breakfast, and 28 units before evening meal.     Past failed treatment include: Januvia-failure to control diabetes; Trulicity-excessive weight loss/GI side effects      Blood glucose testing is performed regularly. Patient is testing 1 time per day,fasting.  Declines CGM  Meter: Did not bring to appt  Preferred lab: [place]    Any episodes of hypoglycemia? no     Complications related to diabetes: none    His blood sugar in the clinic today was:   Lab Results   Component Value Date    POCGLU 210 (A) 01/02/2025     Ulysses Boreaux presents today for follow up visit to discuss diabetes management.     Fasting sugar this morning of 138. Breakfast of sandwich and sweet tea- glucose of 210 during visit. He will try to find a sugar substitute for his tea. He does feel glycemic control has worsened due to the holidays. States consistently 2 hour post prandial breakfast glucose is elevated.     Eye and foot exams UTD.    Tolerating Jardiance without  side effects.     He prefers to continue with vial and syringe method over pen for injections.    Reports some erectile dysfunction - recently, states since Pcp added second cholesterol medication. Declines referral to see urology at this time.     Lab Results   Component Value Date    HGBA1C 7.5 (H) 12/26/2024    HGBA1C 7.2 (H) 09/06/2024    HGBA1C  "5.8 (H) 03/01/2024     STANDARDS OF CARE  Diabetes Management Status    Statin: Taking  ACE/ARB: Taking    Screening or Prevention Patient's value Goal Complete/Controlled?   HgA1C Testing and Control   Lab Results   Component Value Date    HGBA1C 7.5 (H) 12/26/2024      Annually/Less than 8% Yes   Lipid profile : 09/06/2024 Annually Yes   LDL control Lab Results   Component Value Date    LDLCALC 80.2 09/06/2024    Annually/Less than 100 mg/dl  Yes   Nephropathy screening Lab Results   Component Value Date    LABMICR 7.0 03/01/2024     No results found for: "PROTEINUA"  No results found for: "UTPCR"   Annually Yes   Blood pressure BP Readings from Last 1 Encounters:   01/02/25 (!) 170/83    Less than 140/90 No   Dilated retinal exam : 10/18/2024 Annually Yes   Foot exam   : 03/12/2024 Annually Yes      Labs reviewed and are noted below.    Lab Results   Component Value Date    WBC 5.17 09/06/2024    HGB 14.6 09/06/2024    HCT 45.1 09/06/2024     09/06/2024    CHOL 159 09/06/2024    TRIG 79 09/06/2024    HDL 63 09/06/2024    LDLCALC 80.2 09/06/2024    ALT 46 (H) 09/06/2024    AST 42 (H) 09/06/2024     12/26/2024    K 4.6 12/26/2024     12/26/2024    ANIONGAP 9 12/26/2024    CREATININE 1.4 12/26/2024    ESTGFRAFRICA >60.0 02/21/2022    EGFRNONAA 55.7 (A) 02/21/2022    BUN 17 12/26/2024    CO2 27 12/26/2024    TSH 0.917 09/06/2024    INR 1.0 02/02/2021     (H) 12/26/2024     Lab Results   Component Value Date    GLUTAMICACID 0.00 10/20/2017    CPEPTIDE 1.33 10/20/2017    T3FREE 3.7 12/09/2020    FREET4 0.96 08/19/2021    TSH 0.917 09/06/2024    THYROPEROXID <6.0 03/26/2018    THYGLBTUM 20 (H) 03/26/2018    THGABSCRN <1.8 03/26/2018    IRON 151 02/17/2015    TIBC 377 02/17/2015    FERRITIN 148 02/13/2014    CALCIUM 9.6 12/26/2024     Lab Results   Component Value Date    CPEPTIDE 1.33 10/20/2017     Lab Results   Component Value Date    GLUTAMICACID 0.00 10/20/2017     Glucose   Date Value Ref " "Range Status   2024 226 (H) 70 - 110 mg/dL Final     Anion Gap   Date Value Ref Range Status   2024 9 8 - 16 mmol/L Final     eGFR if    Date Value Ref Range Status   2022 >60.0 >60 mL/min/1.73 m^2 Final     eGFR if non    Date Value Ref Range Status   2022 55.7 (A) >60 mL/min/1.73 m^2 Final     Comment:     Calculation used to obtain the estimated glomerular filtration  rate (eGFR) is the CKD-EPI equation.          The following portions of the patient's history were reviewed and updated as appropriate: allergies, current medications, past family history, past medical history, past social history, past surgical history, and problem list.    Review of patient's allergies indicates:  No Known Allergies  Social History     Socioeconomic History    Marital status:     Number of children: 2    Highest education level: 11th grade   Occupational History    Occupation: retired   Tobacco Use    Smoking status: Former     Current packs/day: 0.00     Types: Cigarettes     Quit date: 1972     Years since quittin.8     Passive exposure: Past    Smokeless tobacco: Never   Substance and Sexual Activity    Alcohol use: Yes     Alcohol/week: 0.0 standard drinks of alcohol     Comment: " Every blue moon"    Drug use: No    Sexual activity: Yes     Partners: Female   Social History Narrative    . Has 2 children. Patient disabled- was .      Social Drivers of Health     Financial Resource Strain: Low Risk  (2021)    Overall Financial Resource Strain (CARDIA)     Difficulty of Paying Living Expenses: Not very hard   Food Insecurity: Food Insecurity Present (2021)    Hunger Vital Sign     Worried About Running Out of Food in the Last Year: Sometimes true     Ran Out of Food in the Last Year: Never true   Transportation Needs: No Transportation Needs (2021)    PRAPARE - Transportation     Lack of Transportation (Medical): No     " Lack of Transportation (Non-Medical): No   Physical Activity: Insufficiently Active (1/12/2021)    Exercise Vital Sign     Days of Exercise per Week: 2 days     Minutes of Exercise per Session: 10 min   Stress: No Stress Concern Present (1/12/2021)    [employer] - Occupational Stress Questionnaire     Feeling of Stress : Not at all   Housing Stability: Low Risk  (1/12/2021)    Housing Stability Vital Sign     Unable to Pay for Housing in the Last Year: No     Number of Places Lived in the Last Year: 1     Unstable Housing in the Last Year: No     Past Medical History:   Diagnosis Date    Allergy     Amputation of finger of left hand     all fingers except for thumb    Cataract     OS NGCL     Chronic pain due to trauma     traumatic amputations left hand    Diabetes mellitus, type 2     History of hepatitis C 02/13/2014    tx'd w/ Js 2018    Hyperlipidemia     Hypertension     Hyperthyroidism 10/10/2019    Refractive error      REVIEW OF SYSTEMS:  Eyes No history of DR. H/o cataract.  Cardiovascular: History of HTN and HLD.  GI: No pancreatitis or gastroparesis.   : No CKD.  Neuro: No neuropathy.  PSYCH: No tobacco use. Former smoker.  ENDO: See HPI.        Objective:      Vitals:    01/02/25 0949   BP: (!) 170/83   Pulse: 68     RESPIRATORY: No respiratory distress  NEUROLOGIC: Cranial nerves II-XII grossly intact.   PSYCHIATRIC: Alert & oriented x3. Normal mood and affect.  FOOT EXAMINATION: UTD    Assessment:       1. Type 2 diabetes mellitus without complication, with long-term current use of insulin    2. Hypertension associated with diabetes    3. Hyperlipidemia associated with type 2 diabetes mellitus    4. Atherosclerosis of aorta    5. Gastroesophageal reflux disease without esophagitis    6. Thyroid nodule        Plan:   Ulysses was seen today for diabetes mellitus.    Diagnoses and all orders for this visit:    Type 2 diabetes mellitus without complication, with  "long-term current use of insulin  -     POCT Glucose, Hand-Held Device  -     insulin NPH-insulin regular, 70/30, (NOVOLIN 70/30 U-100 INSULIN) 100 unit/mL (70-30) injection; Inject 40 units twice a day - before breakfast and supper. Take 30 minutes before the meal.    Chronic -     Medication Regimen:   Increase Novolin 70/30, 26 units in the morning and continue 28 units in the evening  Continue Jardiance 10 mg every morning      Hypertension associated with diabetes    Hyperlipidemia associated with type 2 diabetes mellitus    Atherosclerosis of aorta    Gastroesophageal reflux disease without esophagitis    Thyroid nodule      - Follow up: 3 months      Portions of this note may have been created with voice recognition software. Occasional "wrong-word" or "sound-a-like" substitutions may have occurred due to the inherent limitations of voice recognition software. Please, read the note carefully and recognize, using context, where substitutions have occurred.           Chelly Hong,JOP-C, BC-ADM [facility]    "

## 2025-01-06 DIAGNOSIS — E11.9 TYPE 2 DIABETES MELLITUS WITHOUT COMPLICATION, WITH LONG-TERM CURRENT USE OF INSULIN: ICD-10-CM

## 2025-01-06 DIAGNOSIS — Z79.4 TYPE 2 DIABETES MELLITUS WITHOUT COMPLICATION, WITH LONG-TERM CURRENT USE OF INSULIN: ICD-10-CM

## 2025-01-13 DIAGNOSIS — Z00.00 ENCOUNTER FOR MEDICARE ANNUAL WELLNESS EXAM: ICD-10-CM

## 2025-01-31 ENCOUNTER — EXTERNAL CHRONIC CARE MANAGEMENT (OUTPATIENT)
Dept: PRIMARY CARE CLINIC | Facility: CLINIC | Age: 75
End: 2025-01-31
Payer: MEDICARE

## 2025-01-31 PROCEDURE — 99490 CHRNC CARE MGMT STAFF 1ST 20: CPT | Mod: S$GLB,,, | Performed by: FAMILY MEDICINE

## 2025-02-03 ENCOUNTER — OFFICE VISIT (OUTPATIENT)
Dept: PAIN MEDICINE | Facility: CLINIC | Age: 75
End: 2025-02-03
Payer: MEDICARE

## 2025-02-03 VITALS
DIASTOLIC BLOOD PRESSURE: 89 MMHG | SYSTOLIC BLOOD PRESSURE: 172 MMHG | HEIGHT: 75 IN | RESPIRATION RATE: 17 BRPM | BODY MASS INDEX: 26.86 KG/M2 | WEIGHT: 216.06 LBS | HEART RATE: 66 BPM

## 2025-02-03 DIAGNOSIS — G89.21 CHRONIC PAIN DUE TO TRAUMA: ICD-10-CM

## 2025-02-03 DIAGNOSIS — Z79.891 LONG TERM PRESCRIPTION OPIATE USE: ICD-10-CM

## 2025-02-03 DIAGNOSIS — M79.642 PAIN OF LEFT HAND: ICD-10-CM

## 2025-02-03 DIAGNOSIS — S68.119A TRAUMATIC AMPUTATION OF DIGIT OF ONE HAND WITHOUT COMPLICATION: Primary | ICD-10-CM

## 2025-02-03 DIAGNOSIS — Z79.891 OPIOID USE AGREEMENT EXISTS: ICD-10-CM

## 2025-02-03 DIAGNOSIS — G89.4 CHRONIC PAIN DISORDER: ICD-10-CM

## 2025-02-03 DIAGNOSIS — S68.119A TRAUMATIC AMPUTATION OF DIGIT OF ONE HAND WITHOUT COMPLICATION: ICD-10-CM

## 2025-02-03 PROCEDURE — 3008F BODY MASS INDEX DOCD: CPT | Mod: HCNC,CPTII,S$GLB, | Performed by: PHYSICIAN ASSISTANT

## 2025-02-03 PROCEDURE — 3077F SYST BP >= 140 MM HG: CPT | Mod: HCNC,CPTII,S$GLB, | Performed by: PHYSICIAN ASSISTANT

## 2025-02-03 PROCEDURE — 1101F PT FALLS ASSESS-DOCD LE1/YR: CPT | Mod: HCNC,CPTII,S$GLB, | Performed by: PHYSICIAN ASSISTANT

## 2025-02-03 PROCEDURE — 1160F RVW MEDS BY RX/DR IN RCRD: CPT | Mod: HCNC,CPTII,S$GLB, | Performed by: PHYSICIAN ASSISTANT

## 2025-02-03 PROCEDURE — 3288F FALL RISK ASSESSMENT DOCD: CPT | Mod: HCNC,CPTII,S$GLB, | Performed by: PHYSICIAN ASSISTANT

## 2025-02-03 PROCEDURE — 99214 OFFICE O/P EST MOD 30 MIN: CPT | Mod: HCNC,S$GLB,, | Performed by: PHYSICIAN ASSISTANT

## 2025-02-03 PROCEDURE — 3079F DIAST BP 80-89 MM HG: CPT | Mod: HCNC,CPTII,S$GLB, | Performed by: PHYSICIAN ASSISTANT

## 2025-02-03 PROCEDURE — 99999 PR PBB SHADOW E&M-EST. PATIENT-LVL IV: CPT | Mod: PBBFAC,HCNC,, | Performed by: PHYSICIAN ASSISTANT

## 2025-02-03 PROCEDURE — 1125F AMNT PAIN NOTED PAIN PRSNT: CPT | Mod: HCNC,CPTII,S$GLB, | Performed by: PHYSICIAN ASSISTANT

## 2025-02-03 PROCEDURE — 1159F MED LIST DOCD IN RCRD: CPT | Mod: HCNC,CPTII,S$GLB, | Performed by: PHYSICIAN ASSISTANT

## 2025-02-03 RX ORDER — HYDROCODONE BITARTRATE AND ACETAMINOPHEN 10; 325 MG/1; MG/1
1 TABLET ORAL EVERY 12 HOURS PRN
Qty: 60 TABLET | Refills: 0 | Status: SHIPPED | OUTPATIENT
Start: 2025-03-25 | End: 2025-04-24

## 2025-02-03 RX ORDER — HYDROCODONE BITARTRATE AND ACETAMINOPHEN 10; 325 MG/1; MG/1
1 TABLET ORAL EVERY 12 HOURS PRN
Qty: 60 TABLET | Refills: 0 | Status: SHIPPED | OUTPATIENT
Start: 2025-02-23 | End: 2025-03-25

## 2025-02-03 RX ORDER — HYDROCODONE BITARTRATE AND ACETAMINOPHEN 10; 325 MG/1; MG/1
1 TABLET ORAL EVERY 12 HOURS PRN
Qty: 60 TABLET | Refills: 0 | Status: SHIPPED | OUTPATIENT
Start: 2025-02-03

## 2025-02-03 NOTE — PROGRESS NOTES
Chronic Pain -- Established Patient (Follow-up visit)    Chief complaint:  Follow-up         Interval History (2/3/2025): Patient was last seen on 11/13/2024. No major changes since previous visit; patient is stable overall. he presents today for follow-up for medication refill. he feels the pain medication is providing adequate pain relief and reduces the negative effects of chronic pain that affects quality of life. No major SE from medications. Patient reports pain as 1/10 today.     Interval History (11/13/2024): Ulysses Boreaux was last seen on 8/27/2024. he presents today for follow-up for medication refill. he feels the pain medication is providing adequate pain relief and reduces the negative effects of chronic pain that affects quality of life. No major SE from medications. No major changes since previous visit; patient is stable overall. Patient reports pain as 5/10 today.     Interval HPI (3/27/2024):  Ulysses Boreaux is a 73 y.o. male presents today for follow-up medication refill.  He reports that the pain is well managed with at least 75% reduction in his pain with the use of Norco 10/325 mg.  He denies any adverse effects related to this medication as well.  Current pain intensity 0/10.  He continues to report pain of the left hand and left lower quadrant.  No new injuries or trauma.    History of Present Illness:  This patient is a 65 year old male who presents today for f/u complaining of the above noted pains. The patient describes this pain as follows.    - duration (acute, chronic): left hand pain since 1997 after table saw amputation of digits 2 through 5 at work, left lower quadrant pain since hernia surgery in 2004  - timing (constant, intermittent): Constant  - character (sharp, dull, aching, burning): Throbbing  - radiating: No  - dermatomal distribution: No  - side: Left   - aggravating factors: Nothing worsens left digit pain, left lower quadrant pain worse with exercise or walking  -  relieving factors: Medication / Norco  - antecedent trauma: Amputation via skill saw and 1997, hernia repair in 2004  - prior spinal surgery: None  - pertinent negatives: No fever, No chills, No weight loss, No bladder dysfunction, No bowel dysfunction, No extremity weakness, No saddle anesthesia  - medications tried: Norco, Percocet, over-the-counter medications, compound pain cream  - other therapies tried (physical therapy, injections):     >> physical therapy tried in the past    >> no prior injections        Imaging/ Diagnostic Studies/ Labs (Reviewed on 2/3/2025):    7/12/2018 US ABDOMINAL AORTA  CLINICAL HISTORY:  Abnormal findings on diagnostic imaging of other specified body structures  TECHNIQUE:  Limited ultrasound was performed of the abdominal aorta, with cross sectional diameter measurements obtained.  COMPARISON:  01/10/2018  FINDINGS:  The proximal abdominal aorta measures 2.7 cm.  The mid abdominal aorta measures 1.8 cm.  The distal abdominal aorta measures 1.9 cm, slightly ectatic and unchanged from prior.  The right iliac artery measures 1.0 cm.  The left iliac artery measures 1.1 cm.  Aortoiliac atherosclerosis: Mild  Impression: Stable examination with slight distal abdominal aortic ectasia.  Negative for aneurysm.         Review of Systems:   CONSTITUTIONAL: No fever, chills, weight loss  SKIN: No rash or itching  CARDIOVASCULAR:  No chest pain, palpitations  RESPIRATORY: No shortness of breath  GASTROINTESTINAL: No diarrhea, No constipation, abdominal pain, left lower quadrant  GENITOURINARY: No urinary incontinence    MUSCULOSKELETAL:  - patient reports pain as above, see chief complaint     NEUROLOGICAL:   - Pain as above  - Strength in lower extremities is normal  - Sensation in lower extremities is normal  - No bowel or bladder incontinence     PSYCHIATRIC: No change in mood noticed         Objective:     Physical Exam:  Vitals:    02/03/25 0857   BP: (!) 172/89  Comment: with B.P meds  "  Pulse: 66   Resp: 17   Weight: 98 kg (216 lb 0.8 oz)   Height: 6' 3" (1.905 m)     Body mass index is 27 kg/m².   (reviewed on 2/3/2025)     General: alert and oriented, in no apparent distress.  Cooperative.  Gait: normal gait  Skin: No rashes, No discoloration   HEENT: EOMI  Respiratory: respirations nonlabored.    Cardiology: No obvious peripheral edema.   GI: no obvious distention.     Musculoskeletal:  - ROM fairly preserved   - No pain with lumbar flexion/ extension  - Left upper extremity range of motion intact throughout  - Digital stumps are hypersensitive to palpation, hypersensitivity does not extend further proximal  - No color change, no swelling, no changes in hair growth along left hand    Neuro:  - Lower extremities:      >> 5/5 strength bilaterally, throughout, symmetric  - Upper extremities:    >> 5/5 strength bilaterally  - Normal sensation    Psych: mood and affect appropriate        Assessment:     Ulysses Boreaux is a 74 y.o. male who presents with       ICD-10-CM ICD-9-CM    1. 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 Chronic pain disorder  G89.4 338.4       3. Chronic pain due to trauma  G89.21 338.21       4. Pain of left hand  M79.642 729.5       5. Long term prescription opiate use  Z79.891 V58.69       6. Opioid use agreement exists  Z79.891 V58.69                  Plan:   - Interventional: None.    - Pharmacologic:   - Refill Norco 10/325 mg BID PRN (60 tabs) x 3 months. Will e-prescribe to fill on 02/23/2025, 03/25/2025, 04/24/2025.  Patient tolerating opioids with no side effects, obtaining good pain control with functional improvement. He tolerates this medication well, and he denies any drowsiness or constipation.    - Updated opioid contract signed with Dr. Love on 10/28/20. *03/27/2024-seen in clinic by Dr. Love.    - LA  reviewed and appropriate.       - Last UDS 11/13/2024 was compliant for medications prescribed.      - " Rehabilitative: Encouraged regular exercise.    - Diagnostic/ Imaging: No new imaging ordered. Previous imaging reviewed.     - Follow-up:  8-12 week medication refill - in clinic (per pt request)     Future Appointments   Date Time Provider Department Center   2/3/2025  9:20 AM Ysabel Garcia PA-C [facility] INT [facility]   3/12/2025  8:20 AM LABORATORY, [facility]   3/17/2025 11:20 AM Mikki Ring PA-C [facility]   4/2/2025  8:30 AM Fabi Radford NP [facility] DIABETE [facility]   4/29/2025  9:40 AM Ysabel Garcia PA-C [facility]        - Patient Questions: Answered all of the patient's questions regarding diagnosis, therapy, and treatment.    - This condition does not require this patient to take time off of work, and the primary goal of our Pain Management services is to improve the patient's functional capacity.   - I discussed the risks, benefits, and alternatives to potential treatment options. All questions and concerns were fully addressed today in clinic.         Ysabel Garcia PA-C  Interventional Pain Management - [facility]    Disclaimer:  This note was prepared using voice recognition system and is likely to have sound alike errors that may have been overlooked even after proof reading.  Please call me with any questions.       ______________________________________________________________________  >> UDS:  8-18-15 :: appropriate  12-29-15 :: appropriate  5-3-16 :: appropriate  10-18-16 :: appropriate  4/13/2017 :: appropriate  9/29/2017 :: appropriate  3/9/2018 :: appropriate  9/4/2018 :: appropriate  6/19/2019 :: appropriate  12/12/2019 :: appropriate  6/12/2020 ::  Appropriate  10/2/2020 :: Appropriate  2/24/2021 :: Appropriate  8/24/2021 :: Appropriate  12/21/2021 :: Appropriate  6/30/2022 :: Appropriate  12/22/2022 :: Appropriate   7/19/2023 :: Appropriate   03/27/2024:: Appropriate   11/13/2024 :: Appropriate

## 2025-02-04 DIAGNOSIS — E11.59 HYPERTENSION ASSOCIATED WITH DIABETES: ICD-10-CM

## 2025-02-04 DIAGNOSIS — I15.2 HYPERTENSION ASSOCIATED WITH DIABETES: ICD-10-CM

## 2025-02-04 RX ORDER — LOSARTAN POTASSIUM AND HYDROCHLOROTHIAZIDE 25; 100 MG/1; MG/1
TABLET ORAL
Qty: 90 TABLET | Refills: 2 | Status: SHIPPED | OUTPATIENT
Start: 2025-02-04

## 2025-02-04 NOTE — TELEPHONE ENCOUNTER
Requested Prescriptions     Pending Prescriptions Disp Refills    losartan-hydrochlorothiazide 100-25 mg (HYZAAR) 100-25 mg per tablet [Pharmacy Med Name: LOSARTAN/HCTZ 100/25MG TABLETS] 90 tablet 3     Sig: TAKE 1 TABLET BY MOUTH EVERY DAY     LV 09/16/2024   NV none scheduled.   LF 02/14/2024

## 2025-02-26 DIAGNOSIS — E11.9 TYPE 2 DIABETES MELLITUS WITHOUT COMPLICATION, WITH LONG-TERM CURRENT USE OF INSULIN: ICD-10-CM

## 2025-02-26 DIAGNOSIS — Z79.4 TYPE 2 DIABETES MELLITUS WITHOUT COMPLICATION, WITH LONG-TERM CURRENT USE OF INSULIN: ICD-10-CM

## 2025-02-26 RX ORDER — SYRINGE,SAFETY WITH NEEDLE,1ML 25GX1"
SYRINGE (EA) MISCELLANEOUS
Qty: 200 EACH | Refills: 3 | Status: SHIPPED | OUTPATIENT
Start: 2025-02-26

## 2025-02-26 NOTE — TELEPHONE ENCOUNTER
Refill Decision Note   Ulysses Boreaux  is requesting a refill authorization.  Brief Assessment and Rationale for Refill:  Approve     Medication Therapy Plan:         Comments:     Note composed:9:50 AM 02/26/2025             Appointments     Last Visit   9/16/2024 Elza Pantoja MD   Next Visit   Visit date not found Elza Pantoja MD

## 2025-02-26 NOTE — TELEPHONE ENCOUNTER
No care due was identified.  [facility] Embedded Care Due Messages. Reference number: [id number].   2/26/2025 1:54:24 AM CST

## 2025-02-28 ENCOUNTER — EXTERNAL CHRONIC CARE MANAGEMENT (OUTPATIENT)
Dept: PRIMARY CARE CLINIC | Facility: CLINIC | Age: 75
End: 2025-02-28
Payer: MEDICARE

## 2025-02-28 PROCEDURE — 99490 CHRNC CARE MGMT STAFF 1ST 20: CPT | Mod: S$GLB,,, | Performed by: FAMILY MEDICINE

## 2025-03-05 DIAGNOSIS — E11.9 TYPE 2 DIABETES MELLITUS WITHOUT COMPLICATION: ICD-10-CM

## 2025-03-11 ENCOUNTER — PATIENT OUTREACH (OUTPATIENT)
Dept: ADMINISTRATIVE | Facility: HOSPITAL | Age: 75
End: 2025-03-11
Payer: MEDICARE

## 2025-03-12 ENCOUNTER — LAB VISIT (OUTPATIENT)
Dept: LAB | Facility: HOSPITAL | Age: 75
End: 2025-03-12
Attending: FAMILY MEDICINE
Payer: MEDICARE

## 2025-03-12 DIAGNOSIS — E11.22 TYPE 2 DIABETES MELLITUS WITH STAGE 3A CHRONIC KIDNEY DISEASE, WITH LONG-TERM CURRENT USE OF INSULIN: ICD-10-CM

## 2025-03-12 DIAGNOSIS — N18.31 TYPE 2 DIABETES MELLITUS WITH STAGE 3A CHRONIC KIDNEY DISEASE, WITH LONG-TERM CURRENT USE OF INSULIN: ICD-10-CM

## 2025-03-12 DIAGNOSIS — Z79.4 TYPE 2 DIABETES MELLITUS WITH STAGE 3A CHRONIC KIDNEY DISEASE, WITH LONG-TERM CURRENT USE OF INSULIN: ICD-10-CM

## 2025-03-12 DIAGNOSIS — E11.9 TYPE 2 DIABETES MELLITUS WITHOUT COMPLICATION: ICD-10-CM

## 2025-03-12 DIAGNOSIS — E78.5 HYPERLIPIDEMIA ASSOCIATED WITH TYPE 2 DIABETES MELLITUS: ICD-10-CM

## 2025-03-12 DIAGNOSIS — E11.69 HYPERLIPIDEMIA ASSOCIATED WITH TYPE 2 DIABETES MELLITUS: ICD-10-CM

## 2025-03-12 LAB
ALBUMIN SERPL BCP-MCNC: 4.1 G/DL (ref 3.5–5.2)
ALP SERPL-CCNC: 66 U/L (ref 40–150)
ALT SERPL W/O P-5'-P-CCNC: 41 U/L (ref 10–44)
ANION GAP SERPL CALC-SCNC: 10 MMOL/L (ref 8–16)
AST SERPL-CCNC: 47 U/L (ref 10–40)
BILIRUB SERPL-MCNC: 0.6 MG/DL (ref 0.1–1)
BUN SERPL-MCNC: 15 MG/DL (ref 8–23)
CALCIUM SERPL-MCNC: 9.7 MG/DL (ref 8.7–10.5)
CHLORIDE SERPL-SCNC: 102 MMOL/L (ref 95–110)
CHOLEST SERPL-MCNC: 187 MG/DL (ref 120–199)
CHOLEST/HDLC SERPL: 2.8 {RATIO} (ref 2–5)
CO2 SERPL-SCNC: 27 MMOL/L (ref 23–29)
CREAT SERPL-MCNC: 1.3 MG/DL (ref 0.5–1.4)
EST. GFR  (NO RACE VARIABLE): 58.4 ML/MIN/1.73 M^2
ESTIMATED AVG GLUCOSE: 137 MG/DL (ref 68–131)
GLUCOSE SERPL-MCNC: 128 MG/DL (ref 70–110)
HBA1C MFR BLD: 6.4 % (ref 4–5.6)
HDLC SERPL-MCNC: 66 MG/DL (ref 40–75)
HDLC SERPL: 35.3 % (ref 20–50)
LDLC SERPL CALC-MCNC: 107.4 MG/DL (ref 63–159)
NONHDLC SERPL-MCNC: 121 MG/DL
POTASSIUM SERPL-SCNC: 4.2 MMOL/L (ref 3.5–5.1)
PROT SERPL-MCNC: 7.8 G/DL (ref 6–8.4)
SODIUM SERPL-SCNC: 139 MMOL/L (ref 136–145)
TRIGL SERPL-MCNC: 68 MG/DL (ref 30–150)

## 2025-03-12 PROCEDURE — 83036 HEMOGLOBIN GLYCOSYLATED A1C: CPT | Mod: HCNC | Performed by: FAMILY MEDICINE

## 2025-03-12 PROCEDURE — 80053 COMPREHEN METABOLIC PANEL: CPT | Mod: HCNC | Performed by: FAMILY MEDICINE

## 2025-03-12 PROCEDURE — 80061 LIPID PANEL: CPT | Mod: HCNC | Performed by: FAMILY MEDICINE

## 2025-03-14 ENCOUNTER — RESULTS FOLLOW-UP (OUTPATIENT)
Dept: INTERNAL MEDICINE | Facility: CLINIC | Age: 75
End: 2025-03-14

## 2025-03-17 ENCOUNTER — OFFICE VISIT (OUTPATIENT)
Dept: INTERNAL MEDICINE | Facility: CLINIC | Age: 75
End: 2025-03-17
Payer: MEDICARE

## 2025-03-17 VITALS
HEART RATE: 74 BPM | OXYGEN SATURATION: 97 % | DIASTOLIC BLOOD PRESSURE: 85 MMHG | HEIGHT: 75 IN | SYSTOLIC BLOOD PRESSURE: 150 MMHG | WEIGHT: 211.31 LBS | BODY MASS INDEX: 26.27 KG/M2

## 2025-03-17 DIAGNOSIS — K21.9 GASTROESOPHAGEAL REFLUX DISEASE WITHOUT ESOPHAGITIS: ICD-10-CM

## 2025-03-17 DIAGNOSIS — I15.2 HYPERTENSION ASSOCIATED WITH DIABETES: Primary | ICD-10-CM

## 2025-03-17 DIAGNOSIS — R79.89 ELEVATED LFTS: ICD-10-CM

## 2025-03-17 DIAGNOSIS — E11.22 TYPE 2 DIABETES MELLITUS WITH STAGE 3A CHRONIC KIDNEY DISEASE, WITH LONG-TERM CURRENT USE OF INSULIN: ICD-10-CM

## 2025-03-17 DIAGNOSIS — Z79.4 TYPE 2 DIABETES MELLITUS WITHOUT COMPLICATION, WITH LONG-TERM CURRENT USE OF INSULIN: ICD-10-CM

## 2025-03-17 DIAGNOSIS — E11.3211 MILD NONPROLIFERATIVE DIABETIC RETINOPATHY OF RIGHT EYE WITH MACULAR EDEMA ASSOCIATED WITH TYPE 2 DIABETES MELLITUS: ICD-10-CM

## 2025-03-17 DIAGNOSIS — E11.69 HYPERLIPIDEMIA ASSOCIATED WITH TYPE 2 DIABETES MELLITUS: ICD-10-CM

## 2025-03-17 DIAGNOSIS — E11.59 HYPERTENSION ASSOCIATED WITH DIABETES: Primary | ICD-10-CM

## 2025-03-17 DIAGNOSIS — G89.21 CHRONIC PAIN DUE TO TRAUMA: ICD-10-CM

## 2025-03-17 DIAGNOSIS — F19.20 CONTROLLED DRUG DEPENDENCE: ICD-10-CM

## 2025-03-17 DIAGNOSIS — G47.00 INSOMNIA, UNSPECIFIED TYPE: ICD-10-CM

## 2025-03-17 DIAGNOSIS — Z12.11 COLON CANCER SCREENING: ICD-10-CM

## 2025-03-17 DIAGNOSIS — E11.9 TYPE 2 DIABETES MELLITUS WITHOUT COMPLICATION, WITH LONG-TERM CURRENT USE OF INSULIN: ICD-10-CM

## 2025-03-17 DIAGNOSIS — Z79.4 TYPE 2 DIABETES MELLITUS WITH STAGE 3A CHRONIC KIDNEY DISEASE, WITH LONG-TERM CURRENT USE OF INSULIN: ICD-10-CM

## 2025-03-17 DIAGNOSIS — Z79.899 ENCOUNTER FOR LONG-TERM (CURRENT) USE OF MEDICATIONS: ICD-10-CM

## 2025-03-17 DIAGNOSIS — N18.31 TYPE 2 DIABETES MELLITUS WITH STAGE 3A CHRONIC KIDNEY DISEASE, WITH LONG-TERM CURRENT USE OF INSULIN: ICD-10-CM

## 2025-03-17 DIAGNOSIS — E78.5 HYPERLIPIDEMIA ASSOCIATED WITH TYPE 2 DIABETES MELLITUS: ICD-10-CM

## 2025-03-17 PROCEDURE — G2211 COMPLEX E/M VISIT ADD ON: HCPCS | Mod: HCNC,S$GLB,, | Performed by: PHYSICIAN ASSISTANT

## 2025-03-17 PROCEDURE — 3044F HG A1C LEVEL LT 7.0%: CPT | Mod: HCNC,CPTII,S$GLB, | Performed by: PHYSICIAN ASSISTANT

## 2025-03-17 PROCEDURE — 1126F AMNT PAIN NOTED NONE PRSNT: CPT | Mod: HCNC,CPTII,S$GLB, | Performed by: PHYSICIAN ASSISTANT

## 2025-03-17 PROCEDURE — 1160F RVW MEDS BY RX/DR IN RCRD: CPT | Mod: HCNC,CPTII,S$GLB, | Performed by: PHYSICIAN ASSISTANT

## 2025-03-17 PROCEDURE — 3008F BODY MASS INDEX DOCD: CPT | Mod: HCNC,CPTII,S$GLB, | Performed by: PHYSICIAN ASSISTANT

## 2025-03-17 PROCEDURE — 1101F PT FALLS ASSESS-DOCD LE1/YR: CPT | Mod: HCNC,CPTII,S$GLB, | Performed by: PHYSICIAN ASSISTANT

## 2025-03-17 PROCEDURE — 99999 PR PBB SHADOW E&M-EST. PATIENT-LVL V: CPT | Mod: PBBFAC,HCNC,, | Performed by: PHYSICIAN ASSISTANT

## 2025-03-17 PROCEDURE — 3288F FALL RISK ASSESSMENT DOCD: CPT | Mod: HCNC,CPTII,S$GLB, | Performed by: PHYSICIAN ASSISTANT

## 2025-03-17 PROCEDURE — 3079F DIAST BP 80-89 MM HG: CPT | Mod: HCNC,CPTII,S$GLB, | Performed by: PHYSICIAN ASSISTANT

## 2025-03-17 PROCEDURE — 1159F MED LIST DOCD IN RCRD: CPT | Mod: HCNC,CPTII,S$GLB, | Performed by: PHYSICIAN ASSISTANT

## 2025-03-17 PROCEDURE — 99214 OFFICE O/P EST MOD 30 MIN: CPT | Mod: HCNC,S$GLB,, | Performed by: PHYSICIAN ASSISTANT

## 2025-03-17 PROCEDURE — 3077F SYST BP >= 140 MM HG: CPT | Mod: HCNC,CPTII,S$GLB, | Performed by: PHYSICIAN ASSISTANT

## 2025-03-17 RX ORDER — ATORVASTATIN CALCIUM 20 MG/1
20 TABLET, FILM COATED ORAL NIGHTLY
Qty: 90 TABLET | Refills: 3 | Status: SHIPPED | OUTPATIENT
Start: 2025-03-17 | End: 2026-03-17

## 2025-03-17 RX ORDER — PREDNISOLONE ACETATE 10 MG/ML
1 SUSPENSION/ DROPS OPHTHALMIC 4 TIMES DAILY
COMMUNITY
Start: 2025-02-11

## 2025-03-17 NOTE — PROGRESS NOTES
Subjective:       Patient ID: Ulysses Boreaux is a 74 y.o. male.    History of Present Illness    CHIEF COMPLAINT:  - Ulysses presents for a routine six-month follow-up visit, including a foot exam for diabetes management.    HPI:  - Ulysses is a 74-year-old male presenting for his scheduled six-month follow-up visit. He reports no changes in his health since his last visit. He has a history of diabetes, high blood pressure, and chronic kidney disease. He has had elevated liver numbers for a while, with a previous scan suggesting possible chronic liver disease. He is evaluated by a diabetes nurse practitioner regularly, with his last visit on January 7th. He also follows up with a cardiologist and pain management specialist. He expresses concern regarding his cholesterol. He discusses a previous negative experience with colon cancer screening preparation, describing severe GI urgency that caused significant distress and inconvenience during travel.  - He denies any numbness or tingling in his feet.    MEDICATIONS:  - Atorvastatin, for cholesterol management  - Jardiance, for diabetes management    MEDICAL HISTORY:  - Diabetes  - Hypertension  - Chronic kidney disease  - Chronic liver disease    TEST RESULTS:  - A1c: Recent, 6.4, down from 7.5 previously  - Cholesterol: Recent, described as favorable  - Kidney function: Recent, improved slightly, GFR 58 (previously 53 a few months ago)  - Liver function: Recent, mildly elevated, stable for a long time  - HDL cholesterol: Recent, 66  - LDL cholesterol: Recent, 107, increased from previous result of approximately 80    IMAGING:  - Liver scan: A few years ago, suggested possible chronic liver disease           Laboratory Reviewed ({Yes)    Lab Results   Component Value Date     03/12/2025    K 4.2 03/12/2025     03/12/2025    CO2 27 03/12/2025     (H) 03/12/2025    BUN 15 03/12/2025    CREATININE 1.3 03/12/2025    CALCIUM 9.7 03/12/2025    PROT 7.8  03/12/2025    ALBUMIN 4.1 03/12/2025    BILITOT 0.6 03/12/2025    ALKPHOS 66 03/12/2025    AST 47 (H) 03/12/2025    ALT 41 03/12/2025    EGFRNORACEVR 58.4 (A) 03/12/2025    ANIONGAP 10 03/12/2025       Lab Results   Component Value Date    CHOL 187 03/12/2025    TRIG 68 03/12/2025    HDL 66 03/12/2025    LDLCALC 107.4 03/12/2025       Lab Results   Component Value Date    WBC 5.17 09/06/2024    RBC 4.64 09/06/2024    HGB 14.6 09/06/2024    HCT 45.1 09/06/2024    MCV 97 09/06/2024     09/06/2024    GRAN 1.8 09/06/2024    GRAN 34.1 (L) 09/06/2024    LYMPH 2.7 09/06/2024    LYMPH 51.3 (H) 09/06/2024    MONO 0.5 09/06/2024    MONO 9.3 09/06/2024    EOSINOPHIL 4.1 09/06/2024    BASOPHIL 1.0 09/06/2024       Lab Results   Component Value Date    TSH 0.917 09/06/2024    FREET4 0.96 08/19/2021       Lab Results   Component Value Date    HGBA1C 6.4 (H) 03/12/2025       Lab Results   Component Value Date    LABMICR 7.0 03/01/2024    CREATRANDUR 83.6 11/13/2024    MICALBCREAT 11.5 03/01/2024       Lab Results   Component Value Date    NWRQCTPL12BZ 30 06/09/2020         Review of Systems   Constitutional:  Negative for chills, fatigue, fever and unexpected weight change.   HENT:  Negative for congestion, dental problem, ear pain, hearing loss, rhinorrhea and trouble swallowing.    Eyes:  Negative for pain and visual disturbance.   Respiratory:  Negative for cough and shortness of breath.    Cardiovascular:  Negative for chest pain, palpitations and leg swelling.   Gastrointestinal:  Negative for abdominal distention, abdominal pain, blood in stool, constipation, diarrhea, nausea and vomiting.   Genitourinary:  Negative for difficulty urinating.   Musculoskeletal:  Negative for arthralgias and myalgias.   Skin:  Negative for rash.   Neurological:  Negative for dizziness, weakness, numbness and headaches.   Hematological:  Negative for adenopathy. Does not bruise/bleed easily.   Psychiatric/Behavioral:  Negative for  agitation, dysphoric mood and sleep disturbance.        Objective:      Physical Exam  Vitals reviewed.   Constitutional:       General: He is not in acute distress.     Appearance: Normal appearance. He is well-developed and normal weight. He is not ill-appearing or toxic-appearing.   HENT:      Head: Normocephalic and atraumatic.   Eyes:      General: Lids are normal. No scleral icterus.     Extraocular Movements: Extraocular movements intact.      Conjunctiva/sclera: Conjunctivae normal.      Pupils: Pupils are equal, round, and reactive to light.   Cardiovascular:      Rate and Rhythm: Normal rate and regular rhythm.      Heart sounds: Normal heart sounds. No murmur heard.     No friction rub. No gallop.   Pulmonary:      Effort: Pulmonary effort is normal.      Breath sounds: Normal breath sounds. No decreased breath sounds, wheezing, rhonchi or rales.   Musculoskeletal:      Right lower leg: No edema.      Left lower leg: No edema.   Neurological:      General: No focal deficit present.      Mental Status: He is alert and oriented to person, place, and time. Mental status is at baseline.      Cranial Nerves: No cranial nerve deficit.      Gait: Gait normal.   Psychiatric:         Mood and Affect: Mood and affect normal.         Behavior: Behavior normal.         Thought Content: Thought content normal.         Judgment: Judgment normal.         Protective Sensation (w/ 10 gram monofilament):  Right: Intact  Left: Intact    Visual Inspection:  Callus -  Bilateral    Pedal Pulses:   Right: Present  Left: Present    Posterior Tibialis Pulses:   Right:Present  Left: Present    Assessment:       1. Hypertension associated with diabetes    2. Controlled drug dependence    3. Type 2 diabetes mellitus with stage 3a chronic kidney disease, with long-term current use of insulin    4. Elevated LFTs    5. Gastroesophageal reflux disease without esophagitis    6. Chronic pain due to trauma    7. Insomnia, unspecified type     8. Hyperlipidemia associated with type 2 diabetes mellitus    9. Type 2 diabetes mellitus without complication, with long-term current use of insulin    10. 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 Colon cancer screening    12. Encounter for long-term (current) use of medications        Plan:   1. Hypertension associated with diabetes  -     TSH; Future; Expected date: 09/17/2025    2. Controlled drug dependence  Overview:  Followed by Pain Management, continue current treatment plan       3. Type 2 diabetes mellitus with stage 3a chronic kidney disease, with long-term current use of insulin  -     Hemoglobin A1C; Future; Expected date: 09/17/2025  -     Microalbumin/Creatinine Ratio, Urine; Future; Expected date: 09/17/2025    4. Elevated LFTs  Overview:  Liver US Feb 2021:   1. Findings most consistent with chronic liver disease.  No focal liver lesions.  2. Incidental note made of adenomyomatosis in the region of the gallbladder fundus.  This is confirmed on an MRI from 2016.    Orders:  -     Comprehensive Metabolic Panel; Future; Expected date: 09/17/2025    5. Gastroesophageal reflux disease without esophagitis  Overview:  Stable on Protonix      6. Chronic pain due to trauma  Overview:  Followed by Pain Management, continue current treatment plan       7. Insomnia, unspecified type    8. Hyperlipidemia associated with type 2 diabetes mellitus  -     atorvastatin (LIPITOR) 20 MG tablet; Take 1 tablet (20 mg total) by mouth every evening.  Dispense: 90 tablet; Refill: 3  -     Lipid Panel; Future; Expected date: 09/17/2025    9. Type 2 diabetes mellitus without complication, with long-term current use of insulin  -     empagliflozin (JARDIANCE) 10 mg tablet; Take 1 tablet (10 mg total) by mouth every morning.  Dispense: 90 tablet; Refill: 3  -     Hemoglobin A1C; Future; Expected date: 09/17/2025  -     Microalbumin/Creatinine  Ratio, Urine; Future; Expected date: 09/17/2025    10. 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 Colon cancer screening  -     Ambulatory referral/consult to Endo Procedure     12. Encounter for long-term (current) use of medications  -     CBC Auto Differential; Future; Expected date: 09/17/2025          Assessment & Plan    IMPRESSION:  - Reviewed lab results: A1c improved from 7.5 to 6.4, cholesterol within acceptable range, renal function slightly improved but still indicative of chronic CKD.  - Noted stable liver function tests, consistent with previous findings of possible chronic liver disease.  - Assessed blood pressure as elevated during visit, but patient had recently taken medication which may not have taken full effect yet.  - Evaluated cholesterol levels: HDL at 66 (above target of 50), LDL at 107 (increased from previous 80, but still acceptable unless cardiologist recommends lower target).  - Performed annual diabetic foot exam: no swelling, good pulses, intact sensation.    TYPE 2 DIABETES MELLITUS:   Noted improvement in the patient's A1c from 7.5 to 6.4, indicating better glycemic control.   Performed annual foot exam for diabetic care.   Refilled Jardiance, an oral hypoglycemic medication.   Instructed the patient to continue follow-ups with diabetes nurse practitioner as scheduled, with the last follow-up on January 7th.   Acknowledged improvement in diabetes management.   Continued the patient on long-term oral hypoglycemic medication (Jardiance).   Refilled Jardiance prescription.    ESSENTIAL HYPERTENSION:   Observed elevated blood pressure during the visit despite medication taken before the appointment.   Planned to recheck blood pressure before the patient leaves.   Continued the patient on antihypertensive medication.   Advised the patient to continue follow-ups with cardiologist as  scheduled.    CHRONIC KIDNEY DISEASE:   Noted improvement in kidney function with eGFR fluctuating between 53 and 58, current eGFR at 58.   Acknowledged chronic kidney disease related to age, hypertension, and diabetes.   Focused management on controlling blood pressure and diabetes.    LIVER DISEASE:   Observed mildly elevated but stable liver function tests for an extended period.   Noted previous scan showed possible chronic liver disease.   Planned to continue monitoring liver function through regular lab work.    HYPERLIPIDEMIA:   Monitored cholesterol levels, noting an increase in LDL from 80 to 107, while HDL remains good at 66.   Refilled atorvastatin for cholesterol management.   Advised the patient to discuss cholesterol levels with cardiologist at next appointment.           Health Maintenance reviewed/updated.      Check-out note:  Please follow up in 6 months for Annual exam In-person with Dr. Pantoja.  Labs: Fasting lab PTA  Additional orders: Print AVS      This note was generated with the assistance of ambient listening technology. Verbal consent was obtained by the patient and accompanying visitor(s) for the recording of patient appointment to facilitate this note. I attest to having reviewed and edited the generated note for accuracy, though some syntax or spelling errors may persist. Please contact the author of this note for any clarification.      Visit today included increased complexity associated with the care of the episodic problem hypertension, which was addressed while instituting co-management of the longitudinal care of the patient due to the serious and/or complex managed problem(s) .    I have evaluated and discussed management associated with medical care services that serve as the continuing focal point for all needed health care services and/or with medical care services that are part of ongoing care related to my patient's single, serious condition or a complex condition(s).    I  am providing ongoing care and I am the primary care provider for this patient, and they are being managed, monitored, and/or observed for their chronic conditions over time.     I have addressed their ongoing health maintenance requirements and needs for all health care services and reviewed co-management plans provided by specialty providers when available.    Health Maintenance Due   Topic Date Due    RSV Vaccine (Age 60+ and Pregnant patients) (1 - Risk 60-74 years 1-dose series) Never done    Colorectal Cancer Screening  08/28/2024    Diabetes Urine Screening  03/01/2025

## 2025-03-31 ENCOUNTER — EXTERNAL CHRONIC CARE MANAGEMENT (OUTPATIENT)
Dept: PRIMARY CARE CLINIC | Facility: CLINIC | Age: 75
End: 2025-03-31
Payer: MEDICARE

## 2025-03-31 PROCEDURE — 99490 CHRNC CARE MGMT STAFF 1ST 20: CPT | Mod: S$GLB,,, | Performed by: FAMILY MEDICINE

## 2025-04-02 ENCOUNTER — OFFICE VISIT (OUTPATIENT)
Dept: DIABETES | Facility: CLINIC | Age: 75
End: 2025-04-02
Payer: MEDICARE

## 2025-04-02 VITALS
DIASTOLIC BLOOD PRESSURE: 97 MMHG | BODY MASS INDEX: 26.98 KG/M2 | WEIGHT: 215.81 LBS | SYSTOLIC BLOOD PRESSURE: 182 MMHG | HEART RATE: 68 BPM

## 2025-04-02 DIAGNOSIS — I15.2 HYPERTENSION ASSOCIATED WITH DIABETES: ICD-10-CM

## 2025-04-02 DIAGNOSIS — N18.31 TYPE 2 DIABETES MELLITUS WITH STAGE 3A CHRONIC KIDNEY DISEASE, WITH LONG-TERM CURRENT USE OF INSULIN: ICD-10-CM

## 2025-04-02 DIAGNOSIS — E11.69 HYPERLIPIDEMIA ASSOCIATED WITH TYPE 2 DIABETES MELLITUS: ICD-10-CM

## 2025-04-02 DIAGNOSIS — Z79.4 TYPE 2 DIABETES MELLITUS WITHOUT COMPLICATION, WITH LONG-TERM CURRENT USE OF INSULIN: Primary | ICD-10-CM

## 2025-04-02 DIAGNOSIS — E11.22 TYPE 2 DIABETES MELLITUS WITH STAGE 3A CHRONIC KIDNEY DISEASE, WITH LONG-TERM CURRENT USE OF INSULIN: ICD-10-CM

## 2025-04-02 DIAGNOSIS — E11.9 TYPE 2 DIABETES MELLITUS WITHOUT COMPLICATION, WITH LONG-TERM CURRENT USE OF INSULIN: Primary | ICD-10-CM

## 2025-04-02 DIAGNOSIS — Z79.4 TYPE 2 DIABETES MELLITUS WITH STAGE 3A CHRONIC KIDNEY DISEASE, WITH LONG-TERM CURRENT USE OF INSULIN: ICD-10-CM

## 2025-04-02 DIAGNOSIS — E11.59 HYPERTENSION ASSOCIATED WITH DIABETES: ICD-10-CM

## 2025-04-02 DIAGNOSIS — E78.5 HYPERLIPIDEMIA ASSOCIATED WITH TYPE 2 DIABETES MELLITUS: ICD-10-CM

## 2025-04-02 LAB — GLUCOSE SERPL-MCNC: 202 MG/DL (ref 70–110)

## 2025-04-02 PROCEDURE — 99999 PR PBB SHADOW E&M-EST. PATIENT-LVL IV: CPT | Mod: PBBFAC,HCNC,, | Performed by: NURSE PRACTITIONER

## 2025-04-02 PROCEDURE — 3080F DIAST BP >= 90 MM HG: CPT | Mod: HCNC,CPTII,S$GLB, | Performed by: NURSE PRACTITIONER

## 2025-04-02 PROCEDURE — 82962 GLUCOSE BLOOD TEST: CPT | Mod: HCNC,S$GLB,, | Performed by: NURSE PRACTITIONER

## 2025-04-02 PROCEDURE — 99214 OFFICE O/P EST MOD 30 MIN: CPT | Mod: HCNC,S$GLB,, | Performed by: NURSE PRACTITIONER

## 2025-04-02 PROCEDURE — G2211 COMPLEX E/M VISIT ADD ON: HCPCS | Mod: HCNC,S$GLB,, | Performed by: NURSE PRACTITIONER

## 2025-04-02 PROCEDURE — 1101F PT FALLS ASSESS-DOCD LE1/YR: CPT | Mod: HCNC,CPTII,S$GLB, | Performed by: NURSE PRACTITIONER

## 2025-04-02 PROCEDURE — 3077F SYST BP >= 140 MM HG: CPT | Mod: HCNC,CPTII,S$GLB, | Performed by: NURSE PRACTITIONER

## 2025-04-02 PROCEDURE — 1126F AMNT PAIN NOTED NONE PRSNT: CPT | Mod: HCNC,CPTII,S$GLB, | Performed by: NURSE PRACTITIONER

## 2025-04-02 PROCEDURE — 3044F HG A1C LEVEL LT 7.0%: CPT | Mod: HCNC,CPTII,S$GLB, | Performed by: NURSE PRACTITIONER

## 2025-04-02 PROCEDURE — 1160F RVW MEDS BY RX/DR IN RCRD: CPT | Mod: HCNC,CPTII,S$GLB, | Performed by: NURSE PRACTITIONER

## 2025-04-02 PROCEDURE — 3008F BODY MASS INDEX DOCD: CPT | Mod: HCNC,CPTII,S$GLB, | Performed by: NURSE PRACTITIONER

## 2025-04-02 PROCEDURE — 1159F MED LIST DOCD IN RCRD: CPT | Mod: HCNC,CPTII,S$GLB, | Performed by: NURSE PRACTITIONER

## 2025-04-02 PROCEDURE — 3288F FALL RISK ASSESSMENT DOCD: CPT | Mod: HCNC,CPTII,S$GLB, | Performed by: NURSE PRACTITIONER

## 2025-04-02 NOTE — PROGRESS NOTES
Patient ID: Ulysses Boreaux is a 74 y.o. male.  Patient's current PCP is Elza Pantoja MD.   Collaborating Physician: GREG Angeles MD    Chief Complaint: Diabetes Mellitus    HPI  Ulysses Boreaux is a 74 y.o. Black or  male presenting for a new consult with me, previously seen by BRITTANY Hong NP  for diabetes.       Patient has been diagnosed with type 2 diabetes since 2005 .  Complications related to diabetes: nephropathy and retinopathy  Recent diabetes related hospitalizations: none    Previous diabetes education: yes  Occupation: Retired   LMP: ---    Current diet: Variable discretion  Current weight: 215#   Weight at FOV: 215# on 4/2/25  Activity level: None set    Changes made at the last visit: per Chelly  Increase Novolin 70/30, 26 units in the morning and continue 28 units in the evening  Continue Jardiance 10 mg every morning     Current issues: No complaints with medications. Trouble with eating breakfast. Issues currently with Right eye after cataract surgery - following with Dr Jones/Johanny    Personal history of pancreatitis: denies  Personal history of abdominal surgery: denies  Personal history of thyroid surgery: denies  Family history of pancreatic cancer in first-degree relative: denies  Family history of MTC/MEN/endocrine tumors: denies       Past Medical History:   Diagnosis Date    Allergy     Amputation of finger of left hand     all fingers except for thumb    Cataract     OS NGCL     Chronic pain due to trauma     traumatic amputations left hand    Diabetes mellitus, type 2     History of hepatitis C 02/13/2014    tx'd w/ Js 2018    Hyperlipidemia     Hypertension     Hyperthyroidism 10/10/2019    Refractive error      Social History[1]    Review of patient's allergies indicates:  No Known Allergies    CURRENT DM MEDICATIONS:   Diabetes Medications              empagliflozin (JARDIANCE) 10 mg tablet Take 1 tablet (10 mg total) by mouth every morning.  "   insulin NPH-insulin regular, 70/30, (NOVOLIN 70/30 U-100 INSULIN) 100 unit/mL (70-30) injection Inject 40 units twice a day - before breakfast and supper. Take 30 minutes before the meal.    26 in am and 28 in pm   He prefers to continue with vial and syringe method over pen for injections     Past failed treatment(s) include:   Januvia-failure to control diabetes;   Trulicity-excessive weight loss/GI side effects - could not eat    Meter/cgm: Meter  Blood glucose testing is performed regularly.   Patient is testing 2 times per day.  Any episodes of hypoglycemia? Denies  Glucose trends:    am and pm per report. Diet dependent    CGM data:  ---    His blood sugar in the clinic today was:   Lab Results   Component Value Date    POCGLU 202 (A) 04/02/2025       Statin: Taking  ACE/ARB: Taking    Labs reviewed and are noted below.    Screening or Prevention Patient's value Goal Complete/Controlled?   HgA1C Testing and Control   Lab Results   Component Value Date    HGBA1C 6.4 (H) 03/12/2025      Annually/Less than 8% Yes   Lipid profile : 03/12/2025 Annually Yes   LDL control Lab Results   Component Value Date    LDLCALC 107.4 03/12/2025    Annually/Less than 100 mg/dl  No   Nephropathy screening Lab Results   Component Value Date    MICALBCREAT 11.5 03/01/2024     No results found for: "PROTEINUA" Annually yes   Blood pressure BP Readings from Last 1 Encounters:   04/02/25 (!) 182/97    Less than 140/90 No   Dilated retinal exam : 10/18/2024 Annually Yes    Foot exam   : 03/17/2025 Annually Yes     Glucose   Date Value Ref Range Status   03/12/2025 128 (H) 70 - 110 mg/dL Final     Anion Gap   Date Value Ref Range Status   03/12/2025 10 8 - 16 mmol/L Final     eGFR if    Date Value Ref Range Status   02/21/2022 >60.0 >60 mL/min/1.73 m^2 Final     eGFR if non    Date Value Ref Range Status   02/21/2022 55.7 (A) >60 mL/min/1.73 m^2 Final     Comment:     Calculation used to obtain " the estimated glomerular filtration  rate (eGFR) is the CKD-EPI equation.        Lab Results   Component Value Date    GLUTAMICACID 0.00 10/20/2017    CPEPTIDE 1.33 10/20/2017    T3FREE 3.7 12/09/2020    FREET4 0.96 08/19/2021    TSH 0.917 09/06/2024    THYROPEROXID <6.0 03/26/2018    THYGLBTUM 20 (H) 03/26/2018    THGABSCRN <1.8 03/26/2018     Lab Results   Component Value Date    CPEPTIDE 1.33 10/20/2017     Lab Results   Component Value Date    GLUTAMICACID 0.00 10/20/2017       Wt Readings from Last 3 Encounters:   04/02/25 0845 97.9 kg (215 lb 13.3 oz)   03/17/25 1110 95.8 kg (211 lb 5 oz)   02/03/25 0857 98 kg (216 lb 0.8 oz)       Review of Systems   Constitutional:  Negative for malaise/fatigue and weight loss.   Eyes:  Negative for blurred vision and double vision.   Respiratory:  Negative for shortness of breath.    Cardiovascular: Negative.    Gastrointestinal: Negative.    Genitourinary:  Negative for frequency.   Musculoskeletal:  Negative for myalgias.   Neurological: Negative.    Psychiatric/Behavioral: Negative.         Physical Exam  Vitals reviewed.   Constitutional:       General: He is not in acute distress.     Appearance: Normal appearance. He is obese.   Eyes:      Conjunctiva/sclera: Conjunctivae normal.      Pupils: Pupils are equal, round, and reactive to light.   Neck:      Thyroid: Thyroid mass (right 0.5-1 cm nodule vs lymph node) present. No thyromegaly or thyroid tenderness.      Vascular: No carotid bruit.   Cardiovascular:      Rate and Rhythm: Normal rate and regular rhythm.      Pulses: Normal pulses.      Heart sounds: Normal heart sounds.   Pulmonary:      Effort: Pulmonary effort is normal.      Breath sounds: Normal breath sounds.   Abdominal:      General: Bowel sounds are normal.      Palpations: Abdomen is soft.   Musculoskeletal:      Cervical back: Normal range of motion and neck supple.      Right lower leg: No edema.      Left lower leg: No edema.   Skin:     General:  Skin is warm and dry.      Comments: Sites without hypertrophy and/or infection   Neurological:      General: No focal deficit present.      Mental Status: He is alert and oriented to person, place, and time.   Psychiatric:         Mood and Affect: Mood normal.         Behavior: Behavior normal.           Assessment & Plan    Ulysses was seen today for diabetes mellitus.    Diagnoses and all orders for this visit:    Type 2 diabetes mellitus without complication, with long-term current use of insulin - at goal  -     POCT Glucose, Hand-Held Device  -     blood sugar diagnostic (TRUE METRIX GLUCOSE TEST STRIP) Strp; Check blood sugar 3 times daily  -     insulin NPH-insulin regular, 70/30, (NOVOLIN 70/30 U-100 INSULIN) 100 unit/mL (70-30) injection; Inject 40 units twice a day - before breakfast and supper. Take 30 minutes before the meal.  -     No change    Type 2 diabetes mellitus with stage 3a chronic kidney disease, with long-term current use of insulin - on Jardiance and ARB    Hypertension associated with diabetes - not at goal. Denies missed medications. On ARB  - Monitor at home. To call if > 140/90  - Discussed salty meal would not cause that high of a bp. Reinforced need for ALL bp meds rxd  - Declined med change    Hyperlipidemia associated with type 2 diabetes mellitus - on statin    - Reviewed with patient:  The basic pathophysiology of Type 2 diabetes  Mechanism of action and action time of medications/insulins  Use of home glucose monitor/cgm  Basic diet/carbohydrate counting/avoiding simple sugars/plate method  Proper hydration   Risk of complications and preventive measures  When to call for assistance          - Follow up:  6 months    Visit today included increased complexity associated with the care of the episodic problem GLUCOSE CONTROL addressed and managing the longitudinal care of the patient due to the serious and/or complex managed problem(s) DIABETES.                   [1]   Social  "History  Socioeconomic History    Marital status:     Number of children: 2    Highest education level: 11th grade   Occupational History    Occupation: retired   Tobacco Use    Smoking status: Former     Current packs/day: 0.00     Types: Cigarettes     Quit date: 1972     Years since quittin.1     Passive exposure: Past    Smokeless tobacco: Never   Substance and Sexual Activity    Alcohol use: Yes     Alcohol/week: 0.0 standard drinks of alcohol     Comment: " Every blue moon"    Drug use: No    Sexual activity: Yes     Partners: Female   Social History Narrative    . Has 2 children. Patient disabled- was .      Social Drivers of Health     Financial Resource Strain: Low Risk  (2021)    Overall Financial Resource Strain (CARDIA)     Difficulty of Paying Living Expenses: Not very hard   Food Insecurity: Food Insecurity Present (2021)    Hunger Vital Sign     Worried About Running Out of Food in the Last Year: Sometimes true     Ran Out of Food in the Last Year: Never true   Transportation Needs: No Transportation Needs (2021)    PRAPARE - Transportation     Lack of Transportation (Medical): No     Lack of Transportation (Non-Medical): No   Physical Activity: Insufficiently Active (2021)    Exercise Vital Sign     Days of Exercise per Week: 2 days     Minutes of Exercise per Session: 10 min   Stress: No Stress Concern Present (2021)    [employer] - Occupational Stress Questionnaire     Feeling of Stress : Not at all   Housing Stability: Low Risk  (2021)    Housing Stability Vital Sign     Unable to Pay for Housing in the Last Year: No     Number of Places Lived in the Last Year: 1     Unstable Housing in the Last Year: No     "

## 2025-04-28 ENCOUNTER — TELEPHONE (OUTPATIENT)
Dept: PAIN MEDICINE | Facility: CLINIC | Age: 75
End: 2025-04-28
Payer: MEDICARE

## 2025-04-28 NOTE — PROGRESS NOTES
Chronic Pain -- Established Patient (Follow-up visit)    Chief complaint:  Follow-up         Interval History (4/29/2025): Ulysses Boreaux was last seen on 2/3/2025. he presents today for follow-up for medication refill. he feels the pain medication is providing adequate pain relief and reduces the negative effects of chronic pain that affects quality of life. No major SE from medications. No major changes since previous visit; patient is stable overall. Patient reports pain as 0/10 today.     History of Present Illness:  This patient is a 65 year old male who presents today for f/u complaining of the above noted pains. The patient describes this pain as follows.    - duration (acute, chronic): left hand pain since 1997 after table saw amputation of digits 2 through 5 at work, left lower quadrant pain since hernia surgery in 2004  - timing (constant, intermittent): Constant  - character (sharp, dull, aching, burning): Throbbing  - radiating: No  - dermatomal distribution: No  - side: Left   - aggravating factors: Nothing worsens left digit pain, left lower quadrant pain worse with exercise or walking  - relieving factors: Medication / Norco  - antecedent trauma: Amputation via skill saw and 1997, hernia repair in 2004  - prior spinal surgery: None  - pertinent negatives: No fever, No chills, No weight loss, No bladder dysfunction, No bowel dysfunction, No extremity weakness, No saddle anesthesia  - medications tried: Norco, Percocet, over-the-counter medications, compound pain cream  - other therapies tried (physical therapy, injections):     >> physical therapy tried in the past    >> no prior injections      Pain Disability Index (PDI) Score Review:      4/29/2025     9:28 AM 2/3/2025     8:58 AM 11/13/2024     9:30 AM   Last 3 PDI Scores   Pain Disability Index (PDI) 1 6 35         Imaging/ Diagnostic Studies/ Labs (Reviewed on 4/29/2025):    7/12/2018 US ABDOMINAL AORTA  CLINICAL HISTORY:  Abnormal findings on  "diagnostic imaging of other specified body structures  TECHNIQUE:  Limited ultrasound was performed of the abdominal aorta, with cross sectional diameter measurements obtained.  COMPARISON:  01/10/2018  FINDINGS:  The proximal abdominal aorta measures 2.7 cm.  The mid abdominal aorta measures 1.8 cm.  The distal abdominal aorta measures 1.9 cm, slightly ectatic and unchanged from prior.  The right iliac artery measures 1.0 cm.  The left iliac artery measures 1.1 cm.  Aortoiliac atherosclerosis: Mild  Impression: Stable examination with slight distal abdominal aortic ectasia.  Negative for aneurysm.         Review of Systems:   CONSTITUTIONAL: No fever, chills, weight loss  SKIN: No rash or itching  CARDIOVASCULAR:  No chest pain, palpitations  RESPIRATORY: No shortness of breath  GASTROINTESTINAL: No diarrhea, No constipation, abdominal pain, left lower quadrant  GENITOURINARY: No urinary incontinence    MUSCULOSKELETAL:  - patient reports pain as above, see chief complaint     NEUROLOGICAL:   - Pain as above  - Strength in lower extremities is normal  - Sensation in lower extremities is normal  - No bowel or bladder incontinence     PSYCHIATRIC: No change in mood noticed         Objective:     Physical Exam:  Vitals:    04/29/25 0926   BP: (!) 177/86   BP Location: Right arm   Resp: 17   Weight: 98.9 kg (218 lb 0.6 oz)   Height: 6' 3" (1.905 m)       Body mass index is 27.25 kg/m².   (reviewed on 4/29/2025)     General: alert and oriented, in no apparent distress.  Cooperative.  Gait: normal gait  Skin: No rashes, No discoloration   HEENT: EOMI  Respiratory: respirations nonlabored.    Cardiology: No obvious peripheral edema.     Musculoskeletal:  - ROM fairly preserved   - No pain with lumbar flexion/ extension  - Left upper extremity range of motion intact throughout  - Digital stumps are hypersensitive to palpation, hypersensitivity does not extend further proximal  - No color change, no swelling, no changes in " hair growth along left hand    Neuro:  - Lower extremities:      >> 5/5 strength bilaterally, throughout, symmetric  - Upper extremities:    >> 5/5 strength bilaterally  - Normal sensation    Psych: mood and affect appropriate        Assessment:     Ulysses Boreaux is a 74 y.o. male who presents with       ICD-10-CM ICD-9-CM    1. 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 Chronic pain disorder  G89.4 338.4       3. Chronic pain due to trauma  G89.21 338.21       4. Pain of left hand  M79.642 729.5       5. Long term prescription opiate use  Z79.891 V58.69       6. Opioid use agreement exists  Z79.891 V58.69              Plan:   - Interventional: None.    - Pharmacologic:   - Refill Norco 10/325mg BID PRN (60 tabs) x 3 months. Will e-prescribe to fill on 5/24/25, 6/23/25, 7/23/25.  Patient tolerating opioids with no side effects, obtaining good pain control with functional improvement. He tolerates this medication well, and he denies any drowsiness or constipation.    - Updated opioid contract signed with Dr. Love on 10/28/20.     - LA  reviewed and appropriate.       - Last UDS 11/13/2024 was compliant for medications prescribed.  Will order drug screen (UDS or oral swab) next visit to ensure med compliance.      - Rehabilitative: Encouraged regular exercise.    - Diagnostic/ Imaging: No new imaging ordered. Previous imaging reviewed.     - Follow-up:  8-12 week medication refill - in clinic (per pt request)     Future Appointments   Date Time Provider Department Center   5/2/2025 10:20 AM [facility]   9/10/2025  8:20 AM LABORAT[facility], [facility]   9/17/2025  9:40 [facility]    10/2/2025 10:00 AM Fabi Radford NP [facility]        - Patient Questions: Answered all of the patient's questions regarding diagnosis, therapy, and treatment.    - This condition does  not require this patient to take time off of work, and the primary goal of our Pain Management services is to improve the patient's functional capacity.   - I discussed the risks, benefits, and alternatives to potential treatment options. All questions and concerns were fully addressed today in clinic.         Ysabel Garcia PA-C  Interventional Pain Management - [facility]    Disclaimer:  This note was prepared using voice recognition system and is likely to have sound alike errors that may have been overlooked even after proof reading.  Please call me with any questions.       ______________________________________________________________________  >> UDS:  8-18-15 :: appropriate  12-29-15 :: appropriate  5-3-16 :: appropriate  10-18-16 :: appropriate  4/13/2017 :: appropriate  9/29/2017 :: appropriate  3/9/2018 :: appropriate  9/4/2018 :: appropriate  6/19/2019 :: appropriate  12/12/2019 :: appropriate  6/12/2020 ::  Appropriate  10/2/2020 :: Appropriate  2/24/2021 :: Appropriate  8/24/2021 :: Appropriate  12/21/2021 :: Appropriate  6/30/2022 :: Appropriate  12/22/2022 :: Appropriate   7/19/2023 :: Appropriate   03/27/2024:: Appropriate   11/13/2024 :: Appropriate    :: pending

## 2025-04-29 ENCOUNTER — OFFICE VISIT (OUTPATIENT)
Dept: PAIN MEDICINE | Facility: CLINIC | Age: 75
End: 2025-04-29
Payer: MEDICARE

## 2025-04-29 VITALS
RESPIRATION RATE: 17 BRPM | WEIGHT: 218.06 LBS | DIASTOLIC BLOOD PRESSURE: 86 MMHG | SYSTOLIC BLOOD PRESSURE: 177 MMHG | BODY MASS INDEX: 27.11 KG/M2 | HEIGHT: 75 IN

## 2025-04-29 DIAGNOSIS — G89.4 CHRONIC PAIN DISORDER: ICD-10-CM

## 2025-04-29 DIAGNOSIS — Z79.891 LONG TERM PRESCRIPTION OPIATE USE: ICD-10-CM

## 2025-04-29 DIAGNOSIS — S68.119A TRAUMATIC AMPUTATION OF DIGIT OF ONE HAND WITHOUT COMPLICATION: Primary | ICD-10-CM

## 2025-04-29 DIAGNOSIS — Z79.891 OPIOID USE AGREEMENT EXISTS: ICD-10-CM

## 2025-04-29 DIAGNOSIS — S68.119A TRAUMATIC AMPUTATION OF DIGIT OF ONE HAND WITHOUT COMPLICATION: ICD-10-CM

## 2025-04-29 DIAGNOSIS — M79.642 PAIN OF LEFT HAND: ICD-10-CM

## 2025-04-29 DIAGNOSIS — G89.21 CHRONIC PAIN DUE TO TRAUMA: ICD-10-CM

## 2025-04-29 PROCEDURE — 3288F FALL RISK ASSESSMENT DOCD: CPT | Mod: CPTII,HCNC,S$GLB, | Performed by: PHYSICIAN ASSISTANT

## 2025-04-29 PROCEDURE — 99999 PR PBB SHADOW E&M-EST. PATIENT-LVL IV: CPT | Mod: PBBFAC,HCNC,, | Performed by: PHYSICIAN ASSISTANT

## 2025-04-29 PROCEDURE — 3008F BODY MASS INDEX DOCD: CPT | Mod: CPTII,HCNC,S$GLB, | Performed by: PHYSICIAN ASSISTANT

## 2025-04-29 PROCEDURE — 99214 OFFICE O/P EST MOD 30 MIN: CPT | Mod: HCNC,S$GLB,, | Performed by: PHYSICIAN ASSISTANT

## 2025-04-29 PROCEDURE — 1126F AMNT PAIN NOTED NONE PRSNT: CPT | Mod: CPTII,HCNC,S$GLB, | Performed by: PHYSICIAN ASSISTANT

## 2025-04-29 PROCEDURE — 1101F PT FALLS ASSESS-DOCD LE1/YR: CPT | Mod: CPTII,HCNC,S$GLB, | Performed by: PHYSICIAN ASSISTANT

## 2025-04-29 PROCEDURE — 3079F DIAST BP 80-89 MM HG: CPT | Mod: CPTII,HCNC,S$GLB, | Performed by: PHYSICIAN ASSISTANT

## 2025-04-29 PROCEDURE — 1160F RVW MEDS BY RX/DR IN RCRD: CPT | Mod: CPTII,HCNC,S$GLB, | Performed by: PHYSICIAN ASSISTANT

## 2025-04-29 PROCEDURE — 1159F MED LIST DOCD IN RCRD: CPT | Mod: CPTII,HCNC,S$GLB, | Performed by: PHYSICIAN ASSISTANT

## 2025-04-29 PROCEDURE — 3044F HG A1C LEVEL LT 7.0%: CPT | Mod: CPTII,HCNC,S$GLB, | Performed by: PHYSICIAN ASSISTANT

## 2025-04-29 PROCEDURE — 3077F SYST BP >= 140 MM HG: CPT | Mod: CPTII,HCNC,S$GLB, | Performed by: PHYSICIAN ASSISTANT

## 2025-04-29 RX ORDER — HYDROCODONE BITARTRATE AND ACETAMINOPHEN 10; 325 MG/1; MG/1
1 TABLET ORAL EVERY 12 HOURS PRN
Qty: 60 TABLET | Refills: 0 | Status: SHIPPED | OUTPATIENT
Start: 2025-06-23

## 2025-04-29 RX ORDER — HYDROCODONE BITARTRATE AND ACETAMINOPHEN 10; 325 MG/1; MG/1
1 TABLET ORAL EVERY 12 HOURS PRN
Qty: 60 TABLET | Refills: 0 | Status: SHIPPED | OUTPATIENT
Start: 2025-07-23

## 2025-04-29 RX ORDER — HYDROCODONE BITARTRATE AND ACETAMINOPHEN 10; 325 MG/1; MG/1
1 TABLET ORAL EVERY 12 HOURS PRN
Qty: 60 TABLET | Refills: 0 | Status: SHIPPED | OUTPATIENT
Start: 2025-05-24

## 2025-04-30 ENCOUNTER — EXTERNAL CHRONIC CARE MANAGEMENT (OUTPATIENT)
Dept: PRIMARY CARE CLINIC | Facility: CLINIC | Age: 75
End: 2025-04-30
Payer: MEDICARE

## 2025-04-30 PROCEDURE — 99490 CHRNC CARE MGMT STAFF 1ST 20: CPT | Mod: S$GLB,,, | Performed by: FAMILY MEDICINE

## 2025-04-30 PROCEDURE — 99439 CHRNC CARE MGMT STAF EA ADDL: CPT | Mod: S$GLB,,, | Performed by: FAMILY MEDICINE

## 2025-05-02 ENCOUNTER — HOSPITAL ENCOUNTER (OUTPATIENT)
Dept: PREADMISSION TESTING | Facility: HOSPITAL | Age: 75
Discharge: HOME | End: 2025-05-02
Attending: FAMILY MEDICINE
Payer: MEDICARE

## 2025-05-02 DIAGNOSIS — Z12.11 COLON CANCER SCREENING: Primary | ICD-10-CM

## 2025-05-02 RX ORDER — SODIUM, POTASSIUM,MAG SULFATES 17.5-3.13G
1 SOLUTION, RECONSTITUTED, ORAL ORAL DAILY
Qty: 1 KIT | Refills: 0 | Status: SHIPPED | OUTPATIENT
Start: 2025-05-02 | End: 2025-05-04

## 2025-05-31 ENCOUNTER — EXTERNAL CHRONIC CARE MANAGEMENT (OUTPATIENT)
Dept: PRIMARY CARE CLINIC | Facility: CLINIC | Age: 75
End: 2025-05-31
Payer: MEDICARE

## 2025-05-31 PROCEDURE — 99490 CHRNC CARE MGMT STAFF 1ST 20: CPT | Mod: S$GLB,,, | Performed by: FAMILY MEDICINE

## 2025-06-28 DIAGNOSIS — K21.9 GASTROESOPHAGEAL REFLUX DISEASE WITHOUT ESOPHAGITIS: ICD-10-CM

## 2025-06-28 RX ORDER — PANTOPRAZOLE SODIUM 20 MG/1
20 TABLET, DELAYED RELEASE ORAL
Qty: 90 TABLET | Refills: 0 | Status: SHIPPED | OUTPATIENT
Start: 2025-06-28

## 2025-06-28 NOTE — TELEPHONE ENCOUNTER
No care due was identified.  [facility] Embedded Care Due Messages. Reference number: [id number].   6/28/2025 5:58:20 AM CDT

## 2025-06-29 NOTE — TELEPHONE ENCOUNTER
Refill Decision Note   Ulysses Boreaux  is requesting a refill authorization.  Brief Assessment and Rationale for Refill:  Approve     Medication Therapy Plan:         Comments:     Note composed:9:14 PM 06/28/2025

## 2025-06-30 ENCOUNTER — EXTERNAL CHRONIC CARE MANAGEMENT (OUTPATIENT)
Dept: PRIMARY CARE CLINIC | Facility: CLINIC | Age: 75
End: 2025-06-30
Payer: MEDICARE

## 2025-06-30 PROCEDURE — 99490 CHRNC CARE MGMT STAFF 1ST 20: CPT | Mod: S$GLB,,, | Performed by: FAMILY MEDICINE

## 2025-07-07 ENCOUNTER — OFFICE VISIT (OUTPATIENT)
Dept: PAIN MEDICINE | Facility: CLINIC | Age: 75
End: 2025-07-07
Payer: MEDICARE

## 2025-07-07 VITALS
HEART RATE: 67 BPM | WEIGHT: 220.44 LBS | SYSTOLIC BLOOD PRESSURE: 164 MMHG | BODY MASS INDEX: 27.41 KG/M2 | HEIGHT: 75 IN | DIASTOLIC BLOOD PRESSURE: 91 MMHG | RESPIRATION RATE: 17 BRPM

## 2025-07-07 DIAGNOSIS — M79.642 PAIN OF LEFT HAND: ICD-10-CM

## 2025-07-07 DIAGNOSIS — S68.119A TRAUMATIC AMPUTATION OF DIGIT OF ONE HAND WITHOUT COMPLICATION: Primary | ICD-10-CM

## 2025-07-07 DIAGNOSIS — G89.4 CHRONIC PAIN DISORDER: ICD-10-CM

## 2025-07-07 DIAGNOSIS — G89.21 CHRONIC PAIN DUE TO TRAUMA: ICD-10-CM

## 2025-07-07 DIAGNOSIS — Z79.891 OPIOID USE AGREEMENT EXISTS: ICD-10-CM

## 2025-07-07 DIAGNOSIS — Z79.891 LONG TERM PRESCRIPTION OPIATE USE: ICD-10-CM

## 2025-07-07 PROCEDURE — 3044F HG A1C LEVEL LT 7.0%: CPT | Mod: CPTII,HCNC,S$GLB, | Performed by: PHYSICIAN ASSISTANT

## 2025-07-07 PROCEDURE — 3080F DIAST BP >= 90 MM HG: CPT | Mod: CPTII,HCNC,S$GLB, | Performed by: PHYSICIAN ASSISTANT

## 2025-07-07 PROCEDURE — 80307 DRUG TEST PRSMV CHEM ANLYZR: CPT | Mod: HCNC | Performed by: PHYSICIAN ASSISTANT

## 2025-07-07 PROCEDURE — 1125F AMNT PAIN NOTED PAIN PRSNT: CPT | Mod: CPTII,HCNC,S$GLB, | Performed by: PHYSICIAN ASSISTANT

## 2025-07-07 PROCEDURE — 3077F SYST BP >= 140 MM HG: CPT | Mod: CPTII,HCNC,S$GLB, | Performed by: PHYSICIAN ASSISTANT

## 2025-07-07 PROCEDURE — 3288F FALL RISK ASSESSMENT DOCD: CPT | Mod: CPTII,HCNC,S$GLB, | Performed by: PHYSICIAN ASSISTANT

## 2025-07-07 PROCEDURE — 99214 OFFICE O/P EST MOD 30 MIN: CPT | Mod: HCNC,S$GLB,, | Performed by: PHYSICIAN ASSISTANT

## 2025-07-07 PROCEDURE — 1101F PT FALLS ASSESS-DOCD LE1/YR: CPT | Mod: CPTII,HCNC,S$GLB, | Performed by: PHYSICIAN ASSISTANT

## 2025-07-07 PROCEDURE — 1159F MED LIST DOCD IN RCRD: CPT | Mod: CPTII,HCNC,S$GLB, | Performed by: PHYSICIAN ASSISTANT

## 2025-07-07 PROCEDURE — 3008F BODY MASS INDEX DOCD: CPT | Mod: CPTII,HCNC,S$GLB, | Performed by: PHYSICIAN ASSISTANT

## 2025-07-07 PROCEDURE — G2211 COMPLEX E/M VISIT ADD ON: HCPCS | Mod: HCNC,S$GLB,, | Performed by: PHYSICIAN ASSISTANT

## 2025-07-07 PROCEDURE — 99999 PR PBB SHADOW E&M-EST. PATIENT-LVL V: CPT | Mod: PBBFAC,HCNC,, | Performed by: PHYSICIAN ASSISTANT

## 2025-07-07 PROCEDURE — 1160F RVW MEDS BY RX/DR IN RCRD: CPT | Mod: CPTII,HCNC,S$GLB, | Performed by: PHYSICIAN ASSISTANT

## 2025-07-07 NOTE — PROGRESS NOTES
Chronic Pain -- Established Patient (Follow-up visit)    Chief complaint:  Flank Pain (Patient has pain on left side and left hand pain.  Pain level 1/10)         Interval History (7/8/2025): Patient presents for follow-up and medication refill.  Patient was last seen on 4/29/2025. He reports taking Norco 10 mg twice daily as needed for pain, which provides great pain relief. His pain is controlled. He mentions a recent change in his diabetes medication, switching to Trulicity, initiated by his diabetes provider. Trulicity initially reduced his appetite, and he was unable to eat for about five weeks. When he returned for follow-up, the dosage was increased to 1.5 mg, which he found too strong. It affected his taste buds. He discloses current marijuana use, with his last use being this week. Marijuana helps him eat, stating that 30-40 minutes after smoking, he is able to consume a full meal. He expresses concern about his blood sugar, mentioning frequent episodes of hypoglycemia, particularly last year. He describes the experience as causing shaking, sweating, headache, and a significant drop in blood sugar. Marijuana seems to be the only thing that is helping with this.      History of Present Illness:  This patient is a 65 year old male who presents today for f/u complaining of the above noted pains. The patient describes this pain as follows.    - duration (acute, chronic): left hand pain since 1997 after table saw amputation of digits 2 through 5 at work, left lower quadrant pain since hernia surgery in 2004  - timing (constant, intermittent): Constant  - character (sharp, dull, aching, burning): Throbbing  - radiating: No  - dermatomal distribution: No  - side: Left   - aggravating factors: Nothing worsens left digit pain, left lower quadrant pain worse with exercise or walking  - relieving factors: Medication / Norco  - antecedent trauma: Amputation via skill saw and 1997, hernia repair in 2004  - prior spinal  surgery: None  - pertinent negatives: No fever, No chills, No weight loss, No bladder dysfunction, No bowel dysfunction, No extremity weakness, No saddle anesthesia  - medications tried: Norco, Percocet, over-the-counter medications, compound pain cream  - other therapies tried (physical therapy, injections):     >> physical therapy tried in the past    >> no prior injections      Pain Disability Index (PDI) Score Review:      4/29/2025     9:28 AM 2/3/2025     8:58 AM 11/13/2024     9:30 AM   Last 3 PDI Scores   Pain Disability Index (PDI) 1 6 35         Imaging/ Diagnostic Studies/ Labs (Reviewed on 7/8/2025):    7/12/2018 US ABDOMINAL AORTA  CLINICAL HISTORY:  Abnormal findings on diagnostic imaging of other specified body structures  TECHNIQUE:  Limited ultrasound was performed of the abdominal aorta, with cross sectional diameter measurements obtained.  COMPARISON:  01/10/2018  FINDINGS:  The proximal abdominal aorta measures 2.7 cm.  The mid abdominal aorta measures 1.8 cm.  The distal abdominal aorta measures 1.9 cm, slightly ectatic and unchanged from prior.  The right iliac artery measures 1.0 cm.  The left iliac artery measures 1.1 cm.  Aortoiliac atherosclerosis: Mild  Impression: Stable examination with slight distal abdominal aortic ectasia.  Negative for aneurysm.         Review of Systems:   CONSTITUTIONAL: No fever, chills, weight loss  SKIN: No rash or itching  CARDIOVASCULAR:  No chest pain, palpitations  RESPIRATORY: No shortness of breath  GASTROINTESTINAL: No diarrhea, No constipation, abdominal pain, left lower quadrant  GENITOURINARY: No urinary incontinence    MUSCULOSKELETAL:  - patient reports pain as above, see chief complaint     NEUROLOGICAL:   - Pain as above  - Strength in lower extremities is normal  - Sensation in lower extremities is normal  - No bowel or bladder incontinence     PSYCHIATRIC: No change in mood noticed         Objective:     Physical Exam:  Vitals:    07/07/25 0930  "  BP: (!) 164/91   Pulse: 67   Resp: 17   Weight: 100 kg (220 lb 7.4 oz)   Height: 6' 3" (1.905 m)   Body mass index is 27.56 kg/m².   (reviewed on 7/8/2025)     General: alert and oriented, in no apparent distress.  Cooperative.  Gait: normal gait  Skin: No rashes, No discoloration   HEENT: EOMI  Respiratory: respirations nonlabored.  No audible wheezing.  Cardiology: No obvious peripheral edema.   GI: no obvious distention.     Musculoskeletal:  - ROM fairly preserved   - No pain with lumbar flexion/ extension  - Left upper extremity range of motion intact throughout  - Digital stumps are hypersensitive to palpation, hypersensitivity does not extend further proximal  - No color change, no swelling, no changes in hair growth along left hand    Neuro:  - Lower extremities:      >> 5/5 strength bilaterally, throughout, symmetric  - Upper extremities:    >> 5/5 strength bilaterally  - Normal sensation    Psych: mood and affect appropriate        Assessment:     Ulysses Boreaux is a 74 y.o. male who presents with       ICD-10-CM ICD-9-CM    1. 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 Chronic pain disorder  G89.4 338.4 Ambulatory referral/consult to Pain Clinic      3. Chronic pain due to trauma  G89.21 338.21       4. Pain of left hand  M79.642 729.5       5. Long term prescription opiate use  Z79.891 V58.69       6. Opioid use agreement exists  Z79.891 V58.69 Pain Clinic Drug Screen             Plan:   - Interventional: None.    - Pharmacologic:   - Refill Norco 10/325mg BID PRN (60 tabs) x 3 months. Will e-prescribe to fill on 7/23/25 (already at pharmacy), 8/22/25, 9/21/25.  Patient tolerating opioids with no side effects, obtaining good pain control with functional improvement. He tolerates this medication well, and he denies any drowsiness or constipation.    - Updated opioid contract signed with Dr. Love on 10/28/20.     - [place]  reviewed and appropriate.       - " Last UDS 11/13/2024 was compliant for medications prescribed.  Will order drug screen (UDS or oral swab) today 7/7/2025 to ensure med compliance.  Patient has disclosed marijuana use lately. Patient reports using marijuana helps with his appetite, allowing him to consume a full meal 30-40 minutes after smoking.      - Rehabilitative: Encouraged regular exercise.    - Diagnostic/ Imaging: No new imaging ordered. Previous imaging reviewed.     - Consult/ Referral: Refer to Dr. Simmons for consideration of medical marijuana for chronic pain treatment - if patient chooses to explore this treatment option. Patient reports using marijuana also helps with his appetite, allowing him to consume a full meal 30-40 minutes after smoking. Patient was considering obtaining a medical marijuana (Cardiology), so will place proper referral.    - Follow-up:  8-12 week medication refill - in clinic (per pt request)     Future Appointments   Date Time Provider Department Center   9/10/2025  8:20 AM [facility], [facility]   9/17/2025  9:40 AM Elza Pantoja MD [facility]   10/2/2025 10:00 AM Fabi Radford NP [facility]   10/13/2025  9:40 AM Ysabel Garcia PA-C [facility]       Visit today included increased complexity associated with the care of the episodic problem of chronic pain which was addressed and continue to manage the longitudinal care of the patient due to the serious and/or complex managed problem(s) listed above.     - Patient Questions: Answered all of the patient's questions regarding diagnosis, therapy, and treatment.    - This condition does not require this patient to take time off of work, and the primary goal of our Pain Management services is to improve the patient's functional capacity.   - I discussed the risks, benefits, and alternatives to potential treatment options. All questions and concerns were fully addressed today in clinic.          Ysabel Garcia PA-C  Interventional Pain Management - [facility]    Disclaimer:  This note was prepared using voice recognition system and is likely to have sound alike errors that may have been overlooked even after proof reading.  Please call me with any questions.     This note was generated with the assistance of ambient listening technology to support clinical documentation. The content has been reviewed and edited for accuracy by the author, though minor syntax or spelling errors may remain. Please contact the author of this note for any clarification.      ______________________________________________________________________  >> UDS:  8-18-15 :: appropriate  12-29-15 :: appropriate  5-3-16 :: appropriate  10-18-16 :: appropriate  4/13/2017 :: appropriate  9/29/2017 :: appropriate  3/9/2018 :: appropriate  9/4/2018 :: appropriate  6/19/2019 :: appropriate  12/12/2019 :: appropriate  6/12/2020 ::  Appropriate  10/2/2020 :: Appropriate  2/24/2021 :: Appropriate  8/24/2021 :: Appropriate  12/21/2021 :: Appropriate  6/30/2022 :: Appropriate  12/22/2022 :: Appropriate   7/19/2023 :: Appropriate   03/27/2024:: Appropriate   11/13/2024 :: Appropriate   7/7/2025 :: pending

## 2025-07-08 DIAGNOSIS — S68.119A TRAUMATIC AMPUTATION OF DIGIT OF ONE HAND WITHOUT COMPLICATION: ICD-10-CM

## 2025-07-08 RX ORDER — HYDROCODONE BITARTRATE AND ACETAMINOPHEN 10; 325 MG/1; MG/1
1 TABLET ORAL EVERY 12 HOURS PRN
Qty: 60 TABLET | Refills: 0 | Status: SHIPPED | OUTPATIENT
Start: 2025-08-22

## 2025-07-08 RX ORDER — HYDROCODONE BITARTRATE AND ACETAMINOPHEN 10; 325 MG/1; MG/1
1 TABLET ORAL EVERY 12 HOURS PRN
Qty: 60 TABLET | Refills: 0 | Status: SHIPPED | OUTPATIENT
Start: 2025-09-21

## 2025-07-11 LAB
1OH-MIDAZOLAM UR QL SCN: NOT DETECTED
6MAM UR QL: NOT DETECTED
7AMINOCLONAZEPAM UR QL: NOT DETECTED
A-OH ALPRAZ UR QL: NOT DETECTED
ALPRAZ UR QL: NOT DETECTED
AMPHET UR QL SCN: NOT DETECTED
ANNOTATION COMMENT IMP: ABNORMAL
AR NOROXYMORPHONE (CUTOFF 100 NG/ML): NOT DETECTED
AR OXYMORPHONE (CUTOFF 40 NG/ML): NOT DETECTED
BARBITURATES UR QL: NEGATIVE
BUPRENORPHINE UR QL: NOT DETECTED
BZE UR QL: NEGATIVE
CARBOXYTHC UR QL: ABNORMAL
CARISOPRODOL UR QL: NEGATIVE
CLONAZEPAM UR QL: NOT DETECTED
CODEINE UR QL: NOT DETECTED
CREAT UR-MCNC: 63.5 MG/DL
DIAZEPAM UR QL: NOT DETECTED
ETHYL GLUCURONIDE UR QL: ABNORMAL
FENTANYL UR QL: NOT DETECTED
GABAPENTIN UR QL CFM: NOT DETECTED
HYDROCODONE UR QL: PRESENT
HYDROMORPHONE UR QL: NOT DETECTED
LORAZEPAM UR QL: NOT DETECTED
MDA UR QL: NOT DETECTED
MDEA UR QL: NOT DETECTED
MDMA UR QL: NOT DETECTED
ME-PHENIDATE UR QL: NOT DETECTED
METHADONE UR QL: NEGATIVE
METHAMPHET UR QL: NOT DETECTED
MIDAZOLAM UR QL SCN: NOT DETECTED
MORPHINE UR QL: NOT DETECTED
NALOXONE UR QL CFM: NOT DETECTED
NORBUPRENORPHINE UR QL CFM: NOT DETECTED
NORDIAZEPAM UR QL: NOT DETECTED
NORFENTANYL UR QL: NOT DETECTED
NORMEPERIDINE UR QL CFM: NOT DETECTED
NOROXYCODONE UR QL CFM: PRESENT
NOROXYMORPHONE UR QL SCN: NOT DETECTED
OXAZEPAM UR QL: NOT DETECTED
OXYCODONE UR QL: NOT DETECTED
PATHOLOGY STUDY: ABNORMAL
PCP UR QL: NEGATIVE
PHENTERMINE UR QL: NOT DETECTED
PREGABALIN UR QL CFM: NOT DETECTED
SERVICE CMNT-IMP: ABNORMAL
TAPENTADOL UR QL SCN: NOT DETECTED
TAPENTADOL UR QL SCN: NOT DETECTED
TEMAZEPAM UR QL: NOT DETECTED
TRAMADOL UR QL: NEGATIVE
ZOLPIDEM PHENYL-4-CARB UR QL SCN: NOT DETECTED
ZOLPIDEM UR QL: NOT DETECTED

## 2025-07-18 NOTE — TELEPHONE ENCOUNTER
LOV: 4/4/19   This is a chronic problem.  Since last seen he has been using his back brace and he states that does help a great deal with his low back discomfort and he is able to do more things around the house.  But he still having his left sciatica.

## 2025-07-27 DIAGNOSIS — I15.2 HYPERTENSION ASSOCIATED WITH DIABETES: ICD-10-CM

## 2025-07-27 DIAGNOSIS — E11.59 HYPERTENSION ASSOCIATED WITH DIABETES: ICD-10-CM

## 2025-07-27 NOTE — TELEPHONE ENCOUNTER
No care due was identified.  [facility] Embedded Care Due Messages. Reference number: [id number].   7/27/2025 6:07:44 AM CDT

## 2025-07-28 RX ORDER — LOSARTAN POTASSIUM AND HYDROCHLOROTHIAZIDE 25; 100 MG/1; MG/1
1 TABLET ORAL
Qty: 90 TABLET | Refills: 0 | Status: SHIPPED | OUTPATIENT
Start: 2025-07-28

## 2025-07-28 NOTE — TELEPHONE ENCOUNTER
Refill Routing Note   Medication(s) are not appropriate for processing by [facility] for the following reason(s):        Required vitals abnormal    [facility] action(s):  Defer               Appointments  past 12m or future 3m with PCP    Date Provider   Last Visit   9/16/2024 Elza Pantoja MD   Next Visit   9/17/2025 Elza Pantoja MD   ED visits in past 90 days: 0        Note composed:10:51 PM 07/27/2025

## 2025-07-31 ENCOUNTER — EXTERNAL CHRONIC CARE MANAGEMENT (OUTPATIENT)
Dept: PRIMARY CARE CLINIC | Facility: CLINIC | Age: 75
End: 2025-07-31
Payer: MEDICARE

## 2025-07-31 PROCEDURE — 99490 CHRNC CARE MGMT STAFF 1ST 20: CPT | Mod: S$GLB,,, | Performed by: FAMILY MEDICINE

## 2025-08-08 ENCOUNTER — PATIENT OUTREACH (OUTPATIENT)
Dept: ADMINISTRATIVE | Facility: HOSPITAL | Age: 75
End: 2025-08-08
Payer: MEDICARE